# Patient Record
Sex: FEMALE | Race: WHITE | Employment: OTHER | URBAN - METROPOLITAN AREA
[De-identification: names, ages, dates, MRNs, and addresses within clinical notes are randomized per-mention and may not be internally consistent; named-entity substitution may affect disease eponyms.]

---

## 2019-01-16 ENCOUNTER — TRANSFERRED RECORDS (OUTPATIENT)
Dept: HEALTH INFORMATION MANAGEMENT | Facility: CLINIC | Age: 84
End: 2019-01-16

## 2019-01-16 LAB — EJECTION FRACTION: NORMAL %

## 2019-04-08 ENCOUNTER — TRANSFERRED RECORDS (OUTPATIENT)
Dept: HEALTH INFORMATION MANAGEMENT | Facility: CLINIC | Age: 84
End: 2019-04-08

## 2020-07-16 ENCOUNTER — TRANSFERRED RECORDS (OUTPATIENT)
Dept: HEALTH INFORMATION MANAGEMENT | Facility: CLINIC | Age: 85
End: 2020-07-16

## 2021-04-14 ENCOUNTER — TRANSFERRED RECORDS (OUTPATIENT)
Dept: FAMILY MEDICINE | Facility: CLINIC | Age: 86
End: 2021-04-14

## 2021-06-15 ENCOUNTER — TRANSFERRED RECORDS (OUTPATIENT)
Dept: HEALTH INFORMATION MANAGEMENT | Facility: CLINIC | Age: 86
End: 2021-06-15

## 2021-07-12 ENCOUNTER — TRANSFERRED RECORDS (OUTPATIENT)
Dept: HEALTH INFORMATION MANAGEMENT | Facility: CLINIC | Age: 86
End: 2021-07-12

## 2021-08-30 LAB
ALT SERPL-CCNC: 41 U/L (ref 14–63)
AST SERPL-CCNC: 46 U/L (ref 15–37)
CREATININE (EXTERNAL): 2.84 MG/DL (ref 0.55–1.3)
GFR ESTIMATED (EXTERNAL): 16 ML/MIN
GLUCOSE (EXTERNAL): 94 MG/DL (ref 74–106)
POTASSIUM (EXTERNAL): 4.4 MMOL/L (ref 3.5–5.1)

## 2021-09-27 ENCOUNTER — TRANSFERRED RECORDS (OUTPATIENT)
Dept: HEALTH INFORMATION MANAGEMENT | Facility: CLINIC | Age: 86
End: 2021-09-27

## 2022-07-21 ENCOUNTER — TRANSFERRED RECORDS (OUTPATIENT)
Dept: HEALTH INFORMATION MANAGEMENT | Facility: CLINIC | Age: 87
End: 2022-07-21

## 2022-07-21 LAB
CHOLESTEROL (EXTERNAL): 203 MG/DL (ref 0–200)
HDLC SERPL-MCNC: 70 MG/DL (ref 40–60)
LDL CHOLESTEROL CALCULATED (EXTERNAL): 104 MG/DL (ref 0–129)
TRIGLYCERIDES (EXTERNAL): 145 MG/DL (ref 30–150)

## 2022-08-22 ENCOUNTER — TRANSFERRED RECORDS (OUTPATIENT)
Dept: HEALTH INFORMATION MANAGEMENT | Facility: CLINIC | Age: 87
End: 2022-08-22

## 2022-08-29 ENCOUNTER — TRANSFERRED RECORDS (OUTPATIENT)
Dept: HEALTH INFORMATION MANAGEMENT | Facility: CLINIC | Age: 87
End: 2022-08-29

## 2022-10-11 ENCOUNTER — TRANSFERRED RECORDS (OUTPATIENT)
Dept: HEALTH INFORMATION MANAGEMENT | Facility: CLINIC | Age: 87
End: 2022-10-11

## 2023-01-01 ENCOUNTER — ANCILLARY PROCEDURE (OUTPATIENT)
Dept: CARDIOLOGY | Facility: CLINIC | Age: 88
End: 2023-01-01
Attending: INTERNAL MEDICINE
Payer: MEDICARE

## 2023-01-01 ENCOUNTER — VIRTUAL VISIT (OUTPATIENT)
Dept: FAMILY MEDICINE | Facility: CLINIC | Age: 88
End: 2023-01-01
Payer: MEDICARE

## 2023-01-01 ENCOUNTER — APPOINTMENT (OUTPATIENT)
Dept: PHYSICAL THERAPY | Facility: CLINIC | Age: 88
DRG: 280 | End: 2023-01-01
Payer: MEDICARE

## 2023-01-01 ENCOUNTER — MEDICAL CORRESPONDENCE (OUTPATIENT)
Dept: HEALTH INFORMATION MANAGEMENT | Facility: CLINIC | Age: 88
End: 2023-01-01
Payer: MEDICARE

## 2023-01-01 ENCOUNTER — TELEPHONE (OUTPATIENT)
Dept: FAMILY MEDICINE | Facility: CLINIC | Age: 88
End: 2023-01-01
Payer: MEDICARE

## 2023-01-01 ENCOUNTER — MYC MEDICAL ADVICE (OUTPATIENT)
Dept: FAMILY MEDICINE | Facility: CLINIC | Age: 88
End: 2023-01-01
Payer: MEDICARE

## 2023-01-01 ENCOUNTER — DOCUMENTATION ONLY (OUTPATIENT)
Dept: CARE COORDINATION | Facility: CLINIC | Age: 88
End: 2023-01-01
Payer: MEDICARE

## 2023-01-01 ENCOUNTER — ANCILLARY PROCEDURE (OUTPATIENT)
Dept: GENERAL RADIOLOGY | Facility: CLINIC | Age: 88
End: 2023-01-01
Attending: PHYSICIAN ASSISTANT
Payer: MEDICARE

## 2023-01-01 ENCOUNTER — HOSPITAL ENCOUNTER (INPATIENT)
Facility: CLINIC | Age: 88
LOS: 5 days | Discharge: HOME HEALTH | DRG: 280 | End: 2023-12-13
Attending: EMERGENCY MEDICINE | Admitting: HOSPITALIST
Payer: MEDICARE

## 2023-01-01 ENCOUNTER — MYC MEDICAL ADVICE (OUTPATIENT)
Dept: NEPHROLOGY | Facility: CLINIC | Age: 88
End: 2023-01-01
Payer: MEDICARE

## 2023-01-01 ENCOUNTER — ANCILLARY PROCEDURE (OUTPATIENT)
Dept: GENERAL RADIOLOGY | Facility: CLINIC | Age: 88
End: 2023-01-01
Attending: STUDENT IN AN ORGANIZED HEALTH CARE EDUCATION/TRAINING PROGRAM
Payer: MEDICARE

## 2023-01-01 ENCOUNTER — APPOINTMENT (OUTPATIENT)
Dept: GENERAL RADIOLOGY | Facility: CLINIC | Age: 88
DRG: 280 | End: 2023-01-01
Attending: HOSPITALIST
Payer: MEDICARE

## 2023-01-01 ENCOUNTER — PATIENT OUTREACH (OUTPATIENT)
Dept: CARE COORDINATION | Facility: CLINIC | Age: 88
End: 2023-01-01
Payer: MEDICARE

## 2023-01-01 ENCOUNTER — OFFICE VISIT (OUTPATIENT)
Dept: ORTHOPEDICS | Facility: CLINIC | Age: 88
End: 2023-01-01
Payer: MEDICARE

## 2023-01-01 ENCOUNTER — PATIENT OUTREACH (OUTPATIENT)
Dept: FAMILY MEDICINE | Facility: CLINIC | Age: 88
End: 2023-01-01
Payer: MEDICARE

## 2023-01-01 ENCOUNTER — APPOINTMENT (OUTPATIENT)
Dept: PHYSICAL THERAPY | Facility: CLINIC | Age: 88
DRG: 280 | End: 2023-01-01
Attending: HOSPITALIST
Payer: MEDICARE

## 2023-01-01 ENCOUNTER — HOSPITAL ENCOUNTER (OUTPATIENT)
Dept: CT IMAGING | Facility: CLINIC | Age: 88
Discharge: HOME | DRG: 280 | End: 2023-12-06
Attending: PHYSICIAN ASSISTANT | Admitting: PHYSICIAN ASSISTANT
Payer: MEDICARE

## 2023-01-01 ENCOUNTER — DOCUMENTATION ONLY (OUTPATIENT)
Dept: OTHER | Facility: CLINIC | Age: 88
End: 2023-01-01
Payer: MEDICARE

## 2023-01-01 ENCOUNTER — TELEPHONE (OUTPATIENT)
Dept: NEPHROLOGY | Facility: CLINIC | Age: 88
End: 2023-01-01
Payer: MEDICARE

## 2023-01-01 ENCOUNTER — MEDICAL CORRESPONDENCE (OUTPATIENT)
Dept: HEALTH INFORMATION MANAGEMENT | Facility: CLINIC | Age: 88
End: 2023-01-01

## 2023-01-01 ENCOUNTER — MYC MEDICAL ADVICE (OUTPATIENT)
Dept: PULMONOLOGY | Facility: CLINIC | Age: 88
End: 2023-01-01
Payer: MEDICARE

## 2023-01-01 ENCOUNTER — OFFICE VISIT (OUTPATIENT)
Dept: FAMILY MEDICINE | Facility: CLINIC | Age: 88
End: 2023-01-01
Payer: MEDICARE

## 2023-01-01 ENCOUNTER — TELEPHONE (OUTPATIENT)
Dept: FAMILY MEDICINE | Facility: CLINIC | Age: 88
End: 2023-01-01

## 2023-01-01 ENCOUNTER — TELEPHONE (OUTPATIENT)
Dept: CARDIOLOGY | Facility: CLINIC | Age: 88
End: 2023-01-01

## 2023-01-01 ENCOUNTER — ALLIED HEALTH/NURSE VISIT (OUTPATIENT)
Dept: PULMONOLOGY | Facility: CLINIC | Age: 88
End: 2023-01-01
Payer: MEDICARE

## 2023-01-01 ENCOUNTER — PATIENT OUTREACH (OUTPATIENT)
Dept: CARE COORDINATION | Facility: CLINIC | Age: 88
End: 2023-01-01

## 2023-01-01 ENCOUNTER — ANCILLARY PROCEDURE (OUTPATIENT)
Dept: GENERAL RADIOLOGY | Facility: CLINIC | Age: 88
End: 2023-01-01
Attending: INTERNAL MEDICINE
Payer: MEDICARE

## 2023-01-01 ENCOUNTER — APPOINTMENT (OUTPATIENT)
Dept: GENERAL RADIOLOGY | Facility: CLINIC | Age: 88
DRG: 280 | End: 2023-01-01
Attending: EMERGENCY MEDICINE
Payer: MEDICARE

## 2023-01-01 ENCOUNTER — OFFICE VISIT (OUTPATIENT)
Dept: PULMONOLOGY | Facility: CLINIC | Age: 88
End: 2023-01-01
Attending: FAMILY MEDICINE
Payer: MEDICARE

## 2023-01-01 ENCOUNTER — TELEPHONE (OUTPATIENT)
Dept: CARE COORDINATION | Facility: CLINIC | Age: 88
End: 2023-01-01
Payer: MEDICARE

## 2023-01-01 ENCOUNTER — OFFICE VISIT (OUTPATIENT)
Dept: INTERNAL MEDICINE | Facility: CLINIC | Age: 88
End: 2023-01-01
Payer: MEDICARE

## 2023-01-01 ENCOUNTER — APPOINTMENT (OUTPATIENT)
Dept: ULTRASOUND IMAGING | Facility: CLINIC | Age: 88
DRG: 280 | End: 2023-01-01
Attending: HOSPITALIST
Payer: MEDICARE

## 2023-01-01 ENCOUNTER — APPOINTMENT (OUTPATIENT)
Dept: CARDIOLOGY | Facility: CLINIC | Age: 88
DRG: 280 | End: 2023-01-01
Payer: MEDICARE

## 2023-01-01 VITALS
HEART RATE: 60 BPM | TEMPERATURE: 97.5 F | DIASTOLIC BLOOD PRESSURE: 70 MMHG | SYSTOLIC BLOOD PRESSURE: 140 MMHG | HEIGHT: 57 IN | BODY MASS INDEX: 36.63 KG/M2 | OXYGEN SATURATION: 91 % | WEIGHT: 169.8 LBS | RESPIRATION RATE: 16 BRPM

## 2023-01-01 VITALS
BODY MASS INDEX: 33.53 KG/M2 | RESPIRATION RATE: 18 BRPM | DIASTOLIC BLOOD PRESSURE: 79 MMHG | OXYGEN SATURATION: 94 % | WEIGHT: 166 LBS | HEART RATE: 63 BPM | SYSTOLIC BLOOD PRESSURE: 166 MMHG

## 2023-01-01 VITALS
DIASTOLIC BLOOD PRESSURE: 55 MMHG | WEIGHT: 166 LBS | HEART RATE: 63 BPM | OXYGEN SATURATION: 96 % | RESPIRATION RATE: 16 BRPM | BODY MASS INDEX: 33.53 KG/M2 | TEMPERATURE: 97.6 F | SYSTOLIC BLOOD PRESSURE: 128 MMHG

## 2023-01-01 VITALS
TEMPERATURE: 97.8 F | RESPIRATION RATE: 18 BRPM | HEIGHT: 59 IN | SYSTOLIC BLOOD PRESSURE: 188 MMHG | BODY MASS INDEX: 33.69 KG/M2 | DIASTOLIC BLOOD PRESSURE: 67 MMHG | OXYGEN SATURATION: 100 % | HEART RATE: 62 BPM | WEIGHT: 167.1 LBS

## 2023-01-01 VITALS
WEIGHT: 163.8 LBS | HEIGHT: 57 IN | TEMPERATURE: 97.3 F | BODY MASS INDEX: 35.34 KG/M2 | OXYGEN SATURATION: 97 % | HEART RATE: 60 BPM | SYSTOLIC BLOOD PRESSURE: 109 MMHG | RESPIRATION RATE: 10 BRPM | DIASTOLIC BLOOD PRESSURE: 56 MMHG

## 2023-01-01 VITALS
WEIGHT: 163.2 LBS | SYSTOLIC BLOOD PRESSURE: 184 MMHG | RESPIRATION RATE: 16 BRPM | DIASTOLIC BLOOD PRESSURE: 65 MMHG | HEART RATE: 62 BPM | OXYGEN SATURATION: 93 % | TEMPERATURE: 97.9 F | BODY MASS INDEX: 35.94 KG/M2

## 2023-01-01 VITALS — BODY MASS INDEX: 33.47 KG/M2 | HEIGHT: 59 IN | WEIGHT: 166 LBS

## 2023-01-01 DIAGNOSIS — I50.30 HEART FAILURE WITH PRESERVED EJECTION FRACTION, NYHA CLASS I (H): Primary | ICD-10-CM

## 2023-01-01 DIAGNOSIS — D63.1 ANEMIA DUE TO STAGE 4 CHRONIC KIDNEY DISEASE (H): ICD-10-CM

## 2023-01-01 DIAGNOSIS — G89.29 CHRONIC RIGHT-SIDED LOW BACK PAIN WITH RIGHT-SIDED SCIATICA: Primary | ICD-10-CM

## 2023-01-01 DIAGNOSIS — M25.551 HIP PAIN, RIGHT: Primary | ICD-10-CM

## 2023-01-01 DIAGNOSIS — J43.8 OTHER EMPHYSEMA (H): Primary | ICD-10-CM

## 2023-01-01 DIAGNOSIS — J18.9 PNEUMONIA OF RIGHT LOWER LOBE DUE TO INFECTIOUS ORGANISM: Primary | ICD-10-CM

## 2023-01-01 DIAGNOSIS — J43.8 OTHER EMPHYSEMA (H): ICD-10-CM

## 2023-01-01 DIAGNOSIS — R91.8 PULMONARY NODULES: Primary | ICD-10-CM

## 2023-01-01 DIAGNOSIS — Z95.0 PACEMAKER: ICD-10-CM

## 2023-01-01 DIAGNOSIS — J96.01 ACUTE RESPIRATORY FAILURE WITH HYPOXIA (H): Primary | ICD-10-CM

## 2023-01-01 DIAGNOSIS — Z53.9 DIAGNOSIS NOT YET DEFINED: Primary | ICD-10-CM

## 2023-01-01 DIAGNOSIS — I65.23 BILATERAL CAROTID ARTERY STENOSIS: ICD-10-CM

## 2023-01-01 DIAGNOSIS — I10 BENIGN ESSENTIAL HYPERTENSION: ICD-10-CM

## 2023-01-01 DIAGNOSIS — I50.33 ACUTE ON CHRONIC DIASTOLIC CONGESTIVE HEART FAILURE (H): Primary | ICD-10-CM

## 2023-01-01 DIAGNOSIS — N18.4 ANEMIA DUE TO STAGE 4 CHRONIC KIDNEY DISEASE (H): ICD-10-CM

## 2023-01-01 DIAGNOSIS — N18.4 CKD (CHRONIC KIDNEY DISEASE) STAGE 4, GFR 15-29 ML/MIN (H): Primary | ICD-10-CM

## 2023-01-01 DIAGNOSIS — J42 CHRONIC BRONCHITIS, UNSPECIFIED CHRONIC BRONCHITIS TYPE (H): ICD-10-CM

## 2023-01-01 DIAGNOSIS — F33.42 RECURRENT MAJOR DEPRESSIVE DISORDER, IN FULL REMISSION (H): ICD-10-CM

## 2023-01-01 DIAGNOSIS — M41.9 KYPHOSCOLIOSIS: ICD-10-CM

## 2023-01-01 DIAGNOSIS — J44.9 CHRONIC OBSTRUCTIVE PULMONARY DISEASE, UNSPECIFIED COPD TYPE (H): Primary | ICD-10-CM

## 2023-01-01 DIAGNOSIS — M54.16 LUMBAR RADICULOPATHY, ACUTE: ICD-10-CM

## 2023-01-01 DIAGNOSIS — J96.01 ACUTE RESPIRATORY FAILURE WITH HYPOXIA (H): ICD-10-CM

## 2023-01-01 DIAGNOSIS — Z00.00 ENCOUNTER FOR MEDICARE ANNUAL WELLNESS EXAM: Primary | ICD-10-CM

## 2023-01-01 DIAGNOSIS — B37.2 CANDIDAL INTERTRIGO: ICD-10-CM

## 2023-01-01 DIAGNOSIS — J18.9 PNEUMONIA OF RIGHT LOWER LOBE DUE TO INFECTIOUS ORGANISM: ICD-10-CM

## 2023-01-01 DIAGNOSIS — F41.1 GENERALIZED ANXIETY DISORDER: ICD-10-CM

## 2023-01-01 DIAGNOSIS — I50.30 HEART FAILURE WITH PRESERVED EJECTION FRACTION, NYHA CLASS I (H): ICD-10-CM

## 2023-01-01 DIAGNOSIS — I50.33 ACUTE ON CHRONIC DIASTOLIC CONGESTIVE HEART FAILURE (H): ICD-10-CM

## 2023-01-01 DIAGNOSIS — I50.32 CHRONIC HEART FAILURE WITH PRESERVED EJECTION FRACTION (H): ICD-10-CM

## 2023-01-01 DIAGNOSIS — M54.41 CHRONIC RIGHT-SIDED LOW BACK PAIN WITH RIGHT-SIDED SCIATICA: Primary | ICD-10-CM

## 2023-01-01 DIAGNOSIS — N18.4 CKD (CHRONIC KIDNEY DISEASE) STAGE 4, GFR 15-29 ML/MIN (H): ICD-10-CM

## 2023-01-01 DIAGNOSIS — M54.16 LUMBAR RADICULOPATHY, ACUTE: Primary | ICD-10-CM

## 2023-01-01 DIAGNOSIS — B37.2 CANDIDAL INTERTRIGO: Primary | ICD-10-CM

## 2023-01-01 DIAGNOSIS — M25.551 HIP PAIN, RIGHT: ICD-10-CM

## 2023-01-01 DIAGNOSIS — J44.89 CHRONIC BRONCHITIS WITH COPD (CHRONIC OBSTRUCTIVE PULMONARY DISEASE) (H): Primary | ICD-10-CM

## 2023-01-01 DIAGNOSIS — Z78.9 POLST (PHYSICIAN ORDERS FOR LIFE-SUSTAINING TREATMENT): ICD-10-CM

## 2023-01-01 DIAGNOSIS — E78.5 HYPERLIPIDEMIA LDL GOAL <100: ICD-10-CM

## 2023-01-01 DIAGNOSIS — I49.5 SICK SINUS SYNDROME (H): ICD-10-CM

## 2023-01-01 DIAGNOSIS — M54.41 ACUTE RIGHT-SIDED LOW BACK PAIN WITH RIGHT-SIDED SCIATICA: ICD-10-CM

## 2023-01-01 LAB
ALBUMIN MFR UR ELPH: 69.9 MG/DL
ALBUMIN SERPL BCG-MCNC: 3.8 G/DL (ref 3.5–5.2)
ALBUMIN SERPL BCG-MCNC: 4 G/DL (ref 3.5–5.2)
ALBUMIN UR-MCNC: 100 MG/DL
ALBUMIN UR-MCNC: 50 MG/DL
ALP SERPL-CCNC: 77 U/L (ref 40–150)
ALP SERPL-CCNC: 81 U/L (ref 40–150)
ALT SERPL W P-5'-P-CCNC: 14 U/L (ref 0–50)
ALT SERPL W P-5'-P-CCNC: 16 U/L (ref 0–50)
ANION GAP SERPL CALCULATED.3IONS-SCNC: 10 MMOL/L (ref 7–15)
ANION GAP SERPL CALCULATED.3IONS-SCNC: 12 MMOL/L (ref 7–15)
ANION GAP SERPL CALCULATED.3IONS-SCNC: 13 MMOL/L (ref 7–15)
ANION GAP SERPL CALCULATED.3IONS-SCNC: 14 MMOL/L (ref 7–15)
ANION GAP SERPL CALCULATED.3IONS-SCNC: 15 MMOL/L (ref 7–15)
ANION GAP SERPL CALCULATED.3IONS-SCNC: 9 MMOL/L (ref 7–15)
APPEARANCE UR: ABNORMAL
APPEARANCE UR: CLEAR
AST SERPL W P-5'-P-CCNC: 24 U/L (ref 0–45)
AST SERPL W P-5'-P-CCNC: 25 U/L (ref 0–45)
ATRIAL RATE - MUSE: 60 BPM
BACTERIA #/AREA URNS HPF: ABNORMAL /HPF
BACTERIA #/AREA URNS HPF: ABNORMAL /HPF
BACTERIA BLD CULT: NO GROWTH
BACTERIA BLD CULT: NO GROWTH
BACTERIA SPT CULT: ABNORMAL
BASOPHILS # BLD AUTO: 0 10E3/UL (ref 0–0.2)
BASOPHILS # BLD AUTO: 0 10E3/UL (ref 0–0.2)
BASOPHILS # BLD AUTO: 0.1 10E3/UL (ref 0–0.2)
BASOPHILS NFR BLD AUTO: 0 %
BASOPHILS NFR BLD AUTO: 1 %
BASOPHILS NFR BLD AUTO: 1 %
BILIRUB SERPL-MCNC: 0.3 MG/DL
BILIRUB SERPL-MCNC: 0.3 MG/DL
BILIRUB UR QL STRIP: NEGATIVE
BILIRUB UR QL STRIP: NEGATIVE
BUN SERPL-MCNC: 62.8 MG/DL (ref 8–23)
BUN SERPL-MCNC: 63.6 MG/DL (ref 8–23)
BUN SERPL-MCNC: 66.1 MG/DL (ref 8–23)
BUN SERPL-MCNC: 77.3 MG/DL (ref 8–23)
BUN SERPL-MCNC: 82.2 MG/DL (ref 8–23)
BUN SERPL-MCNC: 93.7 MG/DL (ref 8–23)
BUN SERPL-MCNC: 96.6 MG/DL (ref 8–23)
BUN SERPL-MCNC: 97.1 MG/DL (ref 8–23)
CALCIUM SERPL-MCNC: 8.3 MG/DL (ref 8.8–10.2)
CALCIUM SERPL-MCNC: 8.4 MG/DL (ref 8.8–10.2)
CALCIUM SERPL-MCNC: 8.6 MG/DL (ref 8.8–10.2)
CALCIUM SERPL-MCNC: 8.7 MG/DL (ref 8.8–10.2)
CALCIUM SERPL-MCNC: 9.1 MG/DL (ref 8.8–10.2)
CALCIUM SERPL-MCNC: 9.4 MG/DL (ref 8.8–10.2)
CHLORIDE SERPL-SCNC: 102 MMOL/L (ref 98–107)
CHLORIDE SERPL-SCNC: 103 MMOL/L (ref 98–107)
CHLORIDE SERPL-SCNC: 105 MMOL/L (ref 98–107)
CHLORIDE SERPL-SCNC: 105 MMOL/L (ref 98–107)
CHLORIDE SERPL-SCNC: 106 MMOL/L (ref 98–107)
CHLORIDE SERPL-SCNC: 109 MMOL/L (ref 98–107)
CHLORIDE SERPL-SCNC: 110 MMOL/L (ref 98–107)
CHLORIDE SERPL-SCNC: 98 MMOL/L (ref 98–107)
COLOR UR AUTO: ABNORMAL
COLOR UR AUTO: YELLOW
CREAT SERPL-MCNC: 2.06 MG/DL (ref 0.51–0.95)
CREAT SERPL-MCNC: 2.14 MG/DL (ref 0.51–0.95)
CREAT SERPL-MCNC: 2.15 MG/DL (ref 0.51–0.95)
CREAT SERPL-MCNC: 2.18 MG/DL (ref 0.51–0.95)
CREAT SERPL-MCNC: 2.19 MG/DL (ref 0.51–0.95)
CREAT SERPL-MCNC: 2.32 MG/DL (ref 0.51–0.95)
CREAT SERPL-MCNC: 2.49 MG/DL (ref 0.51–0.95)
CREAT SERPL-MCNC: 2.7 MG/DL (ref 0.51–0.95)
CREAT UR-MCNC: 48.2 MG/DL
CREAT UR-MCNC: 48.2 MG/DL
DEPRECATED HCO3 PLAS-SCNC: 21 MMOL/L (ref 22–29)
DEPRECATED HCO3 PLAS-SCNC: 22 MMOL/L (ref 22–29)
DEPRECATED HCO3 PLAS-SCNC: 22 MMOL/L (ref 22–29)
DEPRECATED HCO3 PLAS-SCNC: 23 MMOL/L (ref 22–29)
DEPRECATED HCO3 PLAS-SCNC: 23 MMOL/L (ref 22–29)
DEPRECATED HCO3 PLAS-SCNC: 25 MMOL/L (ref 22–29)
DEPRECATED HCO3 PLAS-SCNC: 28 MMOL/L (ref 22–29)
DEPRECATED HCO3 PLAS-SCNC: 28 MMOL/L (ref 22–29)
DIASTOLIC BLOOD PRESSURE - MUSE: NORMAL MMHG
DLCOUNC-%PRED-PRE: 49 %
DLCOUNC-PRE: 7.8 ML/MIN/MMHG
DLCOUNC-PRED: 15.74 ML/MIN/MMHG
EGFRCR SERPLBLD CKD-EPI 2021: 17 ML/MIN/1.73M2
EGFRCR SERPLBLD CKD-EPI 2021: 18 ML/MIN/1.73M2
EGFRCR SERPLBLD CKD-EPI 2021: 20 ML/MIN/1.73M2
EGFRCR SERPLBLD CKD-EPI 2021: 21 ML/MIN/1.73M2
EGFRCR SERPLBLD CKD-EPI 2021: 21 ML/MIN/1.73M2
EGFRCR SERPLBLD CKD-EPI 2021: 22 ML/MIN/1.73M2
EGFRCR SERPLBLD CKD-EPI 2021: 22 ML/MIN/1.73M2
EGFRCR SERPLBLD CKD-EPI 2021: 23 ML/MIN/1.73M2
EOSINOPHIL # BLD AUTO: 0 10E3/UL (ref 0–0.7)
EOSINOPHIL # BLD AUTO: 0.2 10E3/UL (ref 0–0.7)
EOSINOPHIL # BLD AUTO: 0.3 10E3/UL (ref 0–0.7)
EOSINOPHIL NFR BLD AUTO: 0 %
EOSINOPHIL NFR BLD AUTO: 3 %
EOSINOPHIL NFR BLD AUTO: 5 %
ERV-%PRED-PRE: 39 %
ERV-PRE: 0.23 L
ERV-PRED: 0.58 L
ERYTHROCYTE [DISTWIDTH] IN BLOOD BY AUTOMATED COUNT: 12.3 % (ref 10–15)
ERYTHROCYTE [DISTWIDTH] IN BLOOD BY AUTOMATED COUNT: 12.7 % (ref 10–15)
ERYTHROCYTE [DISTWIDTH] IN BLOOD BY AUTOMATED COUNT: 12.8 % (ref 10–15)
ERYTHROCYTE [DISTWIDTH] IN BLOOD BY AUTOMATED COUNT: 13 % (ref 10–15)
ERYTHROCYTE [DISTWIDTH] IN BLOOD BY AUTOMATED COUNT: 13 % (ref 10–15)
EXPTIME-PRE: 6.27 SEC
FEF2575-%PRED-PRE: 34 %
FEF2575-PRE: 0.41 L/SEC
FEF2575-PRED: 1.18 L/SEC
FEFMAX-%PRED-PRE: 70 %
FEFMAX-PRE: 2.43 L/SEC
FEFMAX-PRED: 3.44 L/SEC
FEV1-%PRED-PRE: 61 %
FEV1-PRE: 0.86 L
FEV1FEV6-PRE: 63 %
FEV1FEV6-PRED: 77 %
FEV1FVC-PRE: 63 %
FEV1FVC-PRED: 78 %
FEV1SVC-PRE: 62 %
FEV1SVC-PRED: 60 %
FIFMAX-PRE: 2.42 L/SEC
FLUAV RNA SPEC QL NAA+PROBE: NEGATIVE
FLUBV RNA RESP QL NAA+PROBE: NEGATIVE
FRCN2-%PRED-PRE: 92 %
FRCN2-PRE: 2.26 L
FRCN2-PRED: 2.44 L
FVC-%PRED-PRE: 74 %
FVC-PRE: 1.36 L
FVC-PRED: 1.82 L
GLUCOSE SERPL-MCNC: 114 MG/DL (ref 70–99)
GLUCOSE SERPL-MCNC: 118 MG/DL (ref 70–99)
GLUCOSE SERPL-MCNC: 123 MG/DL (ref 70–99)
GLUCOSE SERPL-MCNC: 90 MG/DL (ref 70–99)
GLUCOSE SERPL-MCNC: 92 MG/DL (ref 70–99)
GLUCOSE SERPL-MCNC: 93 MG/DL (ref 70–99)
GLUCOSE SERPL-MCNC: 96 MG/DL (ref 70–99)
GLUCOSE SERPL-MCNC: 98 MG/DL (ref 70–99)
GLUCOSE UR STRIP-MCNC: NEGATIVE MG/DL
GLUCOSE UR STRIP-MCNC: NEGATIVE MG/DL
GRAM STAIN RESULT: ABNORMAL
HCT VFR BLD AUTO: 31.8 % (ref 35–47)
HCT VFR BLD AUTO: 33.5 % (ref 35–47)
HCT VFR BLD AUTO: 33.6 % (ref 35–47)
HCT VFR BLD AUTO: 33.7 % (ref 35–47)
HCT VFR BLD AUTO: 33.8 % (ref 35–47)
HGB BLD-MCNC: 10.1 G/DL (ref 11.7–15.7)
HGB BLD-MCNC: 10.6 G/DL (ref 11.7–15.7)
HGB BLD-MCNC: 10.7 G/DL (ref 11.7–15.7)
HGB BLD-MCNC: 10.7 G/DL (ref 11.7–15.7)
HGB BLD-MCNC: 10.9 G/DL (ref 11.7–15.7)
HGB UR QL STRIP: ABNORMAL
HGB UR QL STRIP: NEGATIVE
HOLD SPECIMEN: NORMAL
HYALINE CASTS: 1 /LPF
IC-%PRED-PRE: 99 %
IC-PRE: 1.15 L
IC-PRED: 1.16 L
IMM GRANULOCYTES # BLD: 0 10E3/UL
IMM GRANULOCYTES NFR BLD: 0 %
IMM GRANULOCYTES NFR BLD: 1 %
IMM GRANULOCYTES NFR BLD: 1 %
INTERPRETATION ECG - MUSE: NORMAL
KETONES UR STRIP-MCNC: NEGATIVE MG/DL
KETONES UR STRIP-MCNC: NEGATIVE MG/DL
LACTATE SERPL-SCNC: 1.2 MMOL/L (ref 0.7–2)
LEUKOCYTE ESTERASE UR QL STRIP: ABNORMAL
LEUKOCYTE ESTERASE UR QL STRIP: ABNORMAL
LVEF ECHO: NORMAL
LYMPHOCYTES # BLD AUTO: 0.5 10E3/UL (ref 0.8–5.3)
LYMPHOCYTES # BLD AUTO: 0.6 10E3/UL (ref 0.8–5.3)
LYMPHOCYTES # BLD AUTO: 0.8 10E3/UL (ref 0.8–5.3)
LYMPHOCYTES NFR BLD AUTO: 11 %
LYMPHOCYTES NFR BLD AUTO: 12 %
LYMPHOCYTES NFR BLD AUTO: 12 %
MAGNESIUM SERPL-MCNC: 1.7 MG/DL (ref 1.7–2.3)
MAGNESIUM SERPL-MCNC: 1.8 MG/DL (ref 1.7–2.3)
MAGNESIUM SERPL-MCNC: 1.9 MG/DL (ref 1.7–2.3)
MAGNESIUM SERPL-MCNC: 2 MG/DL (ref 1.7–2.3)
MCH RBC QN AUTO: 32.9 PG (ref 26.5–33)
MCH RBC QN AUTO: 33 PG (ref 26.5–33)
MCH RBC QN AUTO: 33.1 PG (ref 26.5–33)
MCH RBC QN AUTO: 33.2 PG (ref 26.5–33)
MCH RBC QN AUTO: 33.4 PG (ref 26.5–33)
MCHC RBC AUTO-ENTMCNC: 31.6 G/DL (ref 31.5–36.5)
MCHC RBC AUTO-ENTMCNC: 31.7 G/DL (ref 31.5–36.5)
MCHC RBC AUTO-ENTMCNC: 31.8 G/DL (ref 31.5–36.5)
MCHC RBC AUTO-ENTMCNC: 31.8 G/DL (ref 31.5–36.5)
MCHC RBC AUTO-ENTMCNC: 32.4 G/DL (ref 31.5–36.5)
MCV RBC AUTO: 103 FL (ref 78–100)
MCV RBC AUTO: 104 FL (ref 78–100)
MCV RBC AUTO: 104 FL (ref 78–100)
MCV RBC AUTO: 105 FL (ref 78–100)
MCV RBC AUTO: 105 FL (ref 78–100)
MDC_IDC_LEAD_IMPLANT_DT: NORMAL
MDC_IDC_LEAD_IMPLANT_DT: NORMAL
MDC_IDC_LEAD_LOCATION: NORMAL
MDC_IDC_LEAD_LOCATION: NORMAL
MDC_IDC_LEAD_MFG: NORMAL
MDC_IDC_LEAD_MFG: NORMAL
MDC_IDC_LEAD_MODEL: NORMAL
MDC_IDC_LEAD_MODEL: NORMAL
MDC_IDC_LEAD_POLARITY_TYPE: NORMAL
MDC_IDC_LEAD_POLARITY_TYPE: NORMAL
MDC_IDC_PG_IMPLANT_DTM: NORMAL
MDC_IDC_PG_MFG: NORMAL
MDC_IDC_PG_MODEL: NORMAL
MDC_IDC_PG_SERIAL: NORMAL
MDC_IDC_PG_TYPE: NORMAL
MDC_IDC_SESS_CLINIC_NAME: NORMAL
MDC_IDC_SESS_DTM: NORMAL
MDC_IDC_SESS_TYPE: NORMAL
MICROALBUMIN UR-MCNC: 427 MG/L
MICROALBUMIN/CREAT UR: 885.89 MG/G CR (ref 0–25)
MONOCYTES # BLD AUTO: 0.4 10E3/UL (ref 0–1.3)
MONOCYTES # BLD AUTO: 0.5 10E3/UL (ref 0–1.3)
MONOCYTES # BLD AUTO: 0.6 10E3/UL (ref 0–1.3)
MONOCYTES NFR BLD AUTO: 10 %
MONOCYTES NFR BLD AUTO: 9 %
MONOCYTES NFR BLD AUTO: 9 %
NEUTROPHILS # BLD AUTO: 3.2 10E3/UL (ref 1.6–8.3)
NEUTROPHILS # BLD AUTO: 3.7 10E3/UL (ref 1.6–8.3)
NEUTROPHILS # BLD AUTO: 5 10E3/UL (ref 1.6–8.3)
NEUTROPHILS NFR BLD AUTO: 73 %
NEUTROPHILS NFR BLD AUTO: 73 %
NEUTROPHILS NFR BLD AUTO: 78 %
NITRATE UR QL: NEGATIVE
NITRATE UR QL: NEGATIVE
NRBC # BLD AUTO: 0 10E3/UL
NRBC # BLD AUTO: 0 10E3/UL
NRBC BLD AUTO-RTO: 0 /100
NRBC BLD AUTO-RTO: 0 /100
NT-PROBNP SERPL-MCNC: 5097 PG/ML (ref 0–1800)
P AXIS - MUSE: 8 DEGREES
PH UR STRIP: 5 [PH] (ref 5–7)
PH UR STRIP: 5 [PH] (ref 5–7)
PHOSPHATE SERPL-MCNC: 4.1 MG/DL (ref 2.5–4.5)
PHOSPHATE SERPL-MCNC: 4.6 MG/DL (ref 2.5–4.5)
PLATELET # BLD AUTO: 154 10E3/UL (ref 150–450)
PLATELET # BLD AUTO: 155 10E3/UL (ref 150–450)
PLATELET # BLD AUTO: 156 10E3/UL (ref 150–450)
PLATELET # BLD AUTO: 157 10E3/UL (ref 150–450)
PLATELET # BLD AUTO: 164 10E3/UL (ref 150–450)
PLATELET # BLD AUTO: 178 10E3/UL (ref 150–450)
POTASSIUM SERPL-SCNC: 4.5 MMOL/L (ref 3.4–5.3)
POTASSIUM SERPL-SCNC: 4.6 MMOL/L (ref 3.4–5.3)
POTASSIUM SERPL-SCNC: 4.7 MMOL/L (ref 3.4–5.3)
POTASSIUM SERPL-SCNC: 4.7 MMOL/L (ref 3.4–5.3)
POTASSIUM SERPL-SCNC: 4.9 MMOL/L (ref 3.4–5.3)
POTASSIUM SERPL-SCNC: 4.9 MMOL/L (ref 3.4–5.3)
POTASSIUM SERPL-SCNC: 5.4 MMOL/L (ref 3.4–5.3)
PR INTERVAL - MUSE: 120 MS
PROT SERPL-MCNC: 6.3 G/DL (ref 6.4–8.3)
PROT SERPL-MCNC: 6.4 G/DL (ref 6.4–8.3)
PROT/CREAT 24H UR: 1.45 MG/MG CR (ref 0–0.2)
PTH-INTACT SERPL-MCNC: 159 PG/ML (ref 15–65)
QRS DURATION - MUSE: 118 MS
QT - MUSE: 430 MS
QTC - MUSE: 430 MS
R AXIS - MUSE: -43 DEGREES
RBC # BLD AUTO: 3.07 10E6/UL (ref 3.8–5.2)
RBC # BLD AUTO: 3.2 10E6/UL (ref 3.8–5.2)
RBC # BLD AUTO: 3.22 10E6/UL (ref 3.8–5.2)
RBC # BLD AUTO: 3.24 10E6/UL (ref 3.8–5.2)
RBC # BLD AUTO: 3.26 10E6/UL (ref 3.8–5.2)
RBC #/AREA URNS AUTO: ABNORMAL /HPF
RBC URINE: 2 /HPF
RSV RNA SPEC NAA+PROBE: NEGATIVE
RVN2-%PRED-PRE: 97 %
RVN2-PRE: 2.03 L
RVN2-PRED: 2.09 L
SARS-COV-2 RNA RESP QL NAA+PROBE: NEGATIVE
SODIUM SERPL-SCNC: 138 MMOL/L (ref 135–145)
SODIUM SERPL-SCNC: 138 MMOL/L (ref 135–145)
SODIUM SERPL-SCNC: 139 MMOL/L (ref 135–145)
SODIUM SERPL-SCNC: 140 MMOL/L (ref 135–145)
SODIUM SERPL-SCNC: 140 MMOL/L (ref 135–145)
SODIUM SERPL-SCNC: 142 MMOL/L (ref 135–145)
SODIUM SERPL-SCNC: 145 MMOL/L (ref 135–145)
SODIUM SERPL-SCNC: 145 MMOL/L (ref 135–145)
SP GR UR STRIP: 1.01 (ref 1–1.03)
SP GR UR STRIP: 1.02 (ref 1–1.03)
SYSTOLIC BLOOD PRESSURE - MUSE: NORMAL MMHG
T AXIS - MUSE: 89 DEGREES
TLCN2-%PRED-PRE: 83 %
TLCN2-PRE: 3.41 L
TLCN2-PRED: 4.1 L
TROPONIN T SERPL HS-MCNC: 77 NG/L
TROPONIN T SERPL HS-MCNC: 77 NG/L
TROPONIN T SERPL HS-MCNC: 82 NG/L
TROPONIN T SERPL HS-MCNC: 83 NG/L
TROPONIN T SERPL HS-MCNC: 84 NG/L
TROPONIN T SERPL HS-MCNC: 88 NG/L
UROBILINOGEN UR STRIP-ACNC: 0.2 E.U./DL
UROBILINOGEN UR STRIP-MCNC: NORMAL MG/DL
VA-%PRED-PRE: 74 %
VA-PRE: 2.72 L
VC-%PRED-PRE: 58 %
VC-PRE: 1.38 L
VC-PRED: 2.34 L
VENTRICULAR RATE- MUSE: 60 BPM
WBC # BLD AUTO: 4 10E3/UL (ref 4–11)
WBC # BLD AUTO: 5.1 10E3/UL (ref 4–11)
WBC # BLD AUTO: 5.6 10E3/UL (ref 4–11)
WBC # BLD AUTO: 6.1 10E3/UL (ref 4–11)
WBC # BLD AUTO: 6.7 10E3/UL (ref 4–11)
WBC #/AREA URNS AUTO: >100 /HPF
WBC URINE: 3 /HPF

## 2023-01-01 PROCEDURE — 83735 ASSAY OF MAGNESIUM: CPT | Performed by: HOSPITALIST

## 2023-01-01 PROCEDURE — 87070 CULTURE OTHR SPECIMN AEROBIC: CPT

## 2023-01-01 PROCEDURE — 99207 PR APP CREDIT; MD BILLING SHARED VISIT: CPT

## 2023-01-01 PROCEDURE — 250N000013 HC RX MED GY IP 250 OP 250 PS 637: Performed by: INTERNAL MEDICINE

## 2023-01-01 PROCEDURE — 250N000012 HC RX MED GY IP 250 OP 636 PS 637

## 2023-01-01 PROCEDURE — 120N000001 HC R&B MED SURG/OB

## 2023-01-01 PROCEDURE — 36415 COLL VENOUS BLD VENIPUNCTURE: CPT | Performed by: HOSPITALIST

## 2023-01-01 PROCEDURE — 93971 EXTREMITY STUDY: CPT | Mod: RT

## 2023-01-01 PROCEDURE — 80048 BASIC METABOLIC PNL TOTAL CA: CPT | Performed by: PHYSICIAN ASSISTANT

## 2023-01-01 PROCEDURE — 84100 ASSAY OF PHOSPHORUS: CPT | Performed by: PHYSICIAN ASSISTANT

## 2023-01-01 PROCEDURE — 93306 TTE W/DOPPLER COMPLETE: CPT | Mod: 26 | Performed by: INTERNAL MEDICINE

## 2023-01-01 PROCEDURE — 83735 ASSAY OF MAGNESIUM: CPT | Performed by: INTERNAL MEDICINE

## 2023-01-01 PROCEDURE — 83970 ASSAY OF PARATHORMONE: CPT | Performed by: INTERNAL MEDICINE

## 2023-01-01 PROCEDURE — 250N000011 HC RX IP 250 OP 636: Mod: JZ | Performed by: HOSPITALIST

## 2023-01-01 PROCEDURE — 94640 AIRWAY INHALATION TREATMENT: CPT

## 2023-01-01 PROCEDURE — 81001 URINALYSIS AUTO W/SCOPE: CPT

## 2023-01-01 PROCEDURE — 250N000011 HC RX IP 250 OP 636: Performed by: INTERNAL MEDICINE

## 2023-01-01 PROCEDURE — 80048 BASIC METABOLIC PNL TOTAL CA: CPT | Performed by: HOSPITALIST

## 2023-01-01 PROCEDURE — 36415 COLL VENOUS BLD VENIPUNCTURE: CPT | Performed by: PHYSICIAN ASSISTANT

## 2023-01-01 PROCEDURE — 94375 RESPIRATORY FLOW VOLUME LOOP: CPT | Performed by: INTERNAL MEDICINE

## 2023-01-01 PROCEDURE — 250N000013 HC RX MED GY IP 250 OP 250 PS 637: Performed by: HOSPITALIST

## 2023-01-01 PROCEDURE — G0180 MD CERTIFICATION HHA PATIENT: HCPCS | Performed by: FAMILY MEDICINE

## 2023-01-01 PROCEDURE — 999N000157 HC STATISTIC RCP TIME EA 10 MIN

## 2023-01-01 PROCEDURE — 94640 AIRWAY INHALATION TREATMENT: CPT | Mod: 76

## 2023-01-01 PROCEDURE — 250N000011 HC RX IP 250 OP 636: Performed by: HOSPITALIST

## 2023-01-01 PROCEDURE — 93005 ELECTROCARDIOGRAM TRACING: CPT

## 2023-01-01 PROCEDURE — 99232 SBSQ HOSP IP/OBS MODERATE 35: CPT | Performed by: INTERNAL MEDICINE

## 2023-01-01 PROCEDURE — 85049 AUTOMATED PLATELET COUNT: CPT | Performed by: HOSPITALIST

## 2023-01-01 PROCEDURE — 97530 THERAPEUTIC ACTIVITIES: CPT | Mod: GP | Performed by: PHYSICAL THERAPIST

## 2023-01-01 PROCEDURE — 99233 SBSQ HOSP IP/OBS HIGH 50: CPT | Performed by: INTERNAL MEDICINE

## 2023-01-01 PROCEDURE — 99223 1ST HOSP IP/OBS HIGH 75: CPT | Mod: AI | Performed by: HOSPITALIST

## 2023-01-01 PROCEDURE — 85025 COMPLETE CBC W/AUTO DIFF WBC: CPT

## 2023-01-01 PROCEDURE — 72100 X-RAY EXAM L-S SPINE 2/3 VWS: CPT | Mod: TC | Performed by: RADIOLOGY

## 2023-01-01 PROCEDURE — 71045 X-RAY EXAM CHEST 1 VIEW: CPT

## 2023-01-01 PROCEDURE — 250N000011 HC RX IP 250 OP 636: Mod: JZ

## 2023-01-01 PROCEDURE — 250N000013 HC RX MED GY IP 250 OP 250 PS 637

## 2023-01-01 PROCEDURE — 99214 OFFICE O/P EST MOD 30 MIN: CPT | Performed by: PHYSICIAN ASSISTANT

## 2023-01-01 PROCEDURE — 85025 COMPLETE CBC W/AUTO DIFF WBC: CPT | Performed by: PHYSICIAN ASSISTANT

## 2023-01-01 PROCEDURE — 36415 COLL VENOUS BLD VENIPUNCTURE: CPT | Performed by: EMERGENCY MEDICINE

## 2023-01-01 PROCEDURE — G0439 PPPS, SUBSEQ VISIT: HCPCS | Performed by: FAMILY MEDICINE

## 2023-01-01 PROCEDURE — 250N000009 HC RX 250: Performed by: HOSPITALIST

## 2023-01-01 PROCEDURE — 82040 ASSAY OF SERUM ALBUMIN: CPT | Performed by: EMERGENCY MEDICINE

## 2023-01-01 PROCEDURE — 96374 THER/PROPH/DIAG INJ IV PUSH: CPT

## 2023-01-01 PROCEDURE — 250N000011 HC RX IP 250 OP 636: Mod: JZ | Performed by: INTERNAL MEDICINE

## 2023-01-01 PROCEDURE — 250N000012 HC RX MED GY IP 250 OP 636 PS 637: Performed by: INTERNAL MEDICINE

## 2023-01-01 PROCEDURE — 99214 OFFICE O/P EST MOD 30 MIN: CPT | Performed by: INTERNAL MEDICINE

## 2023-01-01 PROCEDURE — 87040 BLOOD CULTURE FOR BACTERIA: CPT | Performed by: EMERGENCY MEDICINE

## 2023-01-01 PROCEDURE — 36415 COLL VENOUS BLD VENIPUNCTURE: CPT | Performed by: INTERNAL MEDICINE

## 2023-01-01 PROCEDURE — 99215 OFFICE O/P EST HI 40 MIN: CPT | Mod: 95 | Performed by: FAMILY MEDICINE

## 2023-01-01 PROCEDURE — 83880 ASSAY OF NATRIURETIC PEPTIDE: CPT | Performed by: EMERGENCY MEDICINE

## 2023-01-01 PROCEDURE — 97161 PT EVAL LOW COMPLEX 20 MIN: CPT | Mod: GP | Performed by: PHYSICAL THERAPIST

## 2023-01-01 PROCEDURE — 999N000156 HC STATISTIC RCP CONSULT EA 30 MIN

## 2023-01-01 PROCEDURE — 85027 COMPLETE CBC AUTOMATED: CPT | Performed by: HOSPITALIST

## 2023-01-01 PROCEDURE — 80048 BASIC METABOLIC PNL TOTAL CA: CPT | Performed by: INTERNAL MEDICINE

## 2023-01-01 PROCEDURE — 87637 SARSCOV2&INF A&B&RSV AMP PRB: CPT | Performed by: EMERGENCY MEDICINE

## 2023-01-01 PROCEDURE — 82374 ASSAY BLOOD CARBON DIOXIDE: CPT | Performed by: INTERNAL MEDICINE

## 2023-01-01 PROCEDURE — 99233 SBSQ HOSP IP/OBS HIGH 50: CPT | Performed by: HOSPITALIST

## 2023-01-01 PROCEDURE — 84484 ASSAY OF TROPONIN QUANT: CPT | Performed by: EMERGENCY MEDICINE

## 2023-01-01 PROCEDURE — 255N000002 HC RX 255 OP 636: Performed by: HOSPITALIST

## 2023-01-01 PROCEDURE — 84100 ASSAY OF PHOSPHORUS: CPT | Performed by: INTERNAL MEDICINE

## 2023-01-01 PROCEDURE — 250N000009 HC RX 250

## 2023-01-01 PROCEDURE — 84132 ASSAY OF SERUM POTASSIUM: CPT | Performed by: INTERNAL MEDICINE

## 2023-01-01 PROCEDURE — 99214 OFFICE O/P EST MOD 30 MIN: CPT | Mod: 25 | Performed by: FAMILY MEDICINE

## 2023-01-01 PROCEDURE — 71250 CT THORAX DX C-: CPT | Mod: MG

## 2023-01-01 PROCEDURE — 94729 DIFFUSING CAPACITY: CPT | Performed by: INTERNAL MEDICINE

## 2023-01-01 PROCEDURE — 93280 PM DEVICE PROGR EVAL DUAL: CPT | Performed by: INTERNAL MEDICINE

## 2023-01-01 PROCEDURE — 99204 OFFICE O/P NEW MOD 45 MIN: CPT | Mod: 25 | Performed by: STUDENT IN AN ORGANIZED HEALTH CARE EDUCATION/TRAINING PROGRAM

## 2023-01-01 PROCEDURE — 84484 ASSAY OF TROPONIN QUANT: CPT | Performed by: HOSPITALIST

## 2023-01-01 PROCEDURE — 81001 URINALYSIS AUTO W/SCOPE: CPT | Performed by: PHYSICIAN ASSISTANT

## 2023-01-01 PROCEDURE — 99495 TRANSJ CARE MGMT MOD F2F 14D: CPT | Performed by: PHYSICIAN ASSISTANT

## 2023-01-01 PROCEDURE — 80053 COMPREHEN METABOLIC PANEL: CPT | Performed by: PHYSICIAN ASSISTANT

## 2023-01-01 PROCEDURE — 71046 X-RAY EXAM CHEST 2 VIEWS: CPT | Mod: TC | Performed by: RADIOLOGY

## 2023-01-01 PROCEDURE — 85025 COMPLETE CBC W/AUTO DIFF WBC: CPT | Performed by: EMERGENCY MEDICINE

## 2023-01-01 PROCEDURE — 99239 HOSP IP/OBS DSCHRG MGMT >30: CPT | Performed by: INTERNAL MEDICINE

## 2023-01-01 PROCEDURE — 93294 REM INTERROG EVL PM/LDLS PM: CPT | Performed by: INTERNAL MEDICINE

## 2023-01-01 PROCEDURE — 99204 OFFICE O/P NEW MOD 45 MIN: CPT | Performed by: STUDENT IN AN ORGANIZED HEALTH CARE EDUCATION/TRAINING PROGRAM

## 2023-01-01 PROCEDURE — 82374 ASSAY BLOOD CARBON DIOXIDE: CPT | Performed by: EMERGENCY MEDICINE

## 2023-01-01 PROCEDURE — 99285 EMERGENCY DEPT VISIT HI MDM: CPT | Mod: 25

## 2023-01-01 PROCEDURE — 82043 UR ALBUMIN QUANTITATIVE: CPT | Performed by: PHYSICIAN ASSISTANT

## 2023-01-01 PROCEDURE — 250N000011 HC RX IP 250 OP 636: Mod: JZ | Performed by: EMERGENCY MEDICINE

## 2023-01-01 PROCEDURE — 93296 REM INTERROG EVL PM/IDS: CPT | Performed by: INTERNAL MEDICINE

## 2023-01-01 PROCEDURE — 84156 ASSAY OF PROTEIN URINE: CPT | Performed by: PHYSICIAN ASSISTANT

## 2023-01-01 PROCEDURE — 83605 ASSAY OF LACTIC ACID: CPT | Performed by: EMERGENCY MEDICINE

## 2023-01-01 PROCEDURE — 94726 PLETHYSMOGRAPHY LUNG VOLUMES: CPT | Performed by: INTERNAL MEDICINE

## 2023-01-01 PROCEDURE — 83735 ASSAY OF MAGNESIUM: CPT

## 2023-01-01 PROCEDURE — 73502 X-RAY EXAM HIP UNI 2-3 VIEWS: CPT | Mod: TC | Performed by: RADIOLOGY

## 2023-01-01 PROCEDURE — 99222 1ST HOSP IP/OBS MODERATE 55: CPT | Performed by: INTERNAL MEDICINE

## 2023-01-01 PROCEDURE — 82570 ASSAY OF URINE CREATININE: CPT | Performed by: PHYSICIAN ASSISTANT

## 2023-01-01 RX ORDER — CITALOPRAM HYDROBROMIDE 20 MG/1
20 TABLET ORAL DAILY
Status: DISCONTINUED | OUTPATIENT
Start: 2023-01-01 | End: 2023-01-01 | Stop reason: HOSPADM

## 2023-01-01 RX ORDER — TORSEMIDE 20 MG/1
20 TABLET ORAL 2 TIMES DAILY
Qty: 60 TABLET | Refills: 0 | Status: SHIPPED | OUTPATIENT
Start: 2023-01-01 | End: 2023-01-01

## 2023-01-01 RX ORDER — HEPARIN SODIUM 5000 [USP'U]/.5ML
5000 INJECTION, SOLUTION INTRAVENOUS; SUBCUTANEOUS EVERY 12 HOURS
Status: DISCONTINUED | OUTPATIENT
Start: 2023-01-01 | End: 2023-01-01 | Stop reason: HOSPADM

## 2023-01-01 RX ORDER — CITALOPRAM HYDROBROMIDE 20 MG/1
TABLET ORAL
Qty: 135 TABLET | Refills: 1 | Status: SHIPPED | OUTPATIENT
Start: 2023-01-01 | End: 2024-01-01

## 2023-01-01 RX ORDER — CLONIDINE HYDROCHLORIDE 0.2 MG/1
0.2 TABLET ORAL 2 TIMES DAILY
Qty: 180 TABLET | Refills: 0 | Status: SHIPPED | OUTPATIENT
Start: 2023-01-01 | End: 2024-01-01

## 2023-01-01 RX ORDER — CALCIUM CARBONATE 500 MG/1
1000 TABLET, CHEWABLE ORAL 4 TIMES DAILY PRN
Status: DISCONTINUED | OUTPATIENT
Start: 2023-01-01 | End: 2023-01-01 | Stop reason: HOSPADM

## 2023-01-01 RX ORDER — GUAIFENESIN/DEXTROMETHORPHAN 100-10MG/5
10 SYRUP ORAL EVERY 4 HOURS PRN
Status: DISCONTINUED | OUTPATIENT
Start: 2023-01-01 | End: 2023-01-01 | Stop reason: HOSPADM

## 2023-01-01 RX ORDER — AZITHROMYCIN 250 MG/1
250 TABLET, FILM COATED ORAL DAILY
Status: DISCONTINUED | OUTPATIENT
Start: 2023-01-01 | End: 2023-01-01

## 2023-01-01 RX ORDER — IPRATROPIUM BROMIDE AND ALBUTEROL SULFATE 2.5; .5 MG/3ML; MG/3ML
1 SOLUTION RESPIRATORY (INHALATION) EVERY 6 HOURS
Qty: 180 ML | Refills: 3 | Status: SHIPPED | OUTPATIENT
Start: 2023-01-01 | End: 2024-01-01

## 2023-01-01 RX ORDER — CHLORTHALIDONE 25 MG/1
25 TABLET ORAL DAILY
Status: ON HOLD | COMMUNITY
End: 2023-01-01

## 2023-01-01 RX ORDER — KETOCONAZOLE 20 MG/G
CREAM TOPICAL 2 TIMES DAILY
Qty: 60 G | Refills: 3 | Status: ON HOLD | OUTPATIENT
Start: 2023-01-01 | End: 2023-01-01

## 2023-01-01 RX ORDER — CLONIDINE HYDROCHLORIDE 0.1 MG/1
0.05 TABLET ORAL 2 TIMES DAILY
Status: DISCONTINUED | OUTPATIENT
Start: 2023-01-01 | End: 2023-01-01

## 2023-01-01 RX ORDER — PREDNISONE 10 MG/1
10 TABLET ORAL DAILY
Qty: 2 TABLET | Refills: 0 | Status: SHIPPED | OUTPATIENT
Start: 2023-01-01 | End: 2023-01-01

## 2023-01-01 RX ORDER — NYSTATIN 100000 [USP'U]/G
POWDER TOPICAL 3 TIMES DAILY PRN
Qty: 60 G | Refills: 1 | Status: SHIPPED | OUTPATIENT
Start: 2023-01-01

## 2023-01-01 RX ORDER — ACETAMINOPHEN 325 MG/1
650 TABLET ORAL EVERY 4 HOURS PRN
Status: DISCONTINUED | OUTPATIENT
Start: 2023-01-01 | End: 2023-01-01 | Stop reason: HOSPADM

## 2023-01-01 RX ORDER — LIDOCAINE 40 MG/G
CREAM TOPICAL
Status: DISCONTINUED | OUTPATIENT
Start: 2023-01-01 | End: 2023-01-01 | Stop reason: HOSPADM

## 2023-01-01 RX ORDER — ONDANSETRON 4 MG/1
4 TABLET, ORALLY DISINTEGRATING ORAL EVERY 6 HOURS PRN
Status: DISCONTINUED | OUTPATIENT
Start: 2023-01-01 | End: 2023-01-01 | Stop reason: HOSPADM

## 2023-01-01 RX ORDER — CLONIDINE HYDROCHLORIDE 0.2 MG/1
0.2 TABLET ORAL 2 TIMES DAILY
Qty: 60 TABLET | Refills: 0 | Status: SHIPPED | OUTPATIENT
Start: 2023-01-01 | End: 2023-01-01

## 2023-01-01 RX ORDER — ONDANSETRON 2 MG/ML
4 INJECTION INTRAMUSCULAR; INTRAVENOUS EVERY 6 HOURS PRN
Status: DISCONTINUED | OUTPATIENT
Start: 2023-01-01 | End: 2023-01-01 | Stop reason: HOSPADM

## 2023-01-01 RX ORDER — CEFTRIAXONE 1 G/1
1 INJECTION, POWDER, FOR SOLUTION INTRAMUSCULAR; INTRAVENOUS EVERY 24 HOURS
Status: DISCONTINUED | OUTPATIENT
Start: 2023-01-01 | End: 2023-01-01 | Stop reason: HOSPADM

## 2023-01-01 RX ORDER — FUROSEMIDE 10 MG/ML
40 INJECTION INTRAMUSCULAR; INTRAVENOUS EVERY 12 HOURS
Status: DISCONTINUED | OUTPATIENT
Start: 2023-01-01 | End: 2023-01-01

## 2023-01-01 RX ORDER — IPRATROPIUM BROMIDE AND ALBUTEROL SULFATE 2.5; .5 MG/3ML; MG/3ML
3 SOLUTION RESPIRATORY (INHALATION)
Status: DISCONTINUED | OUTPATIENT
Start: 2023-01-01 | End: 2023-01-01 | Stop reason: HOSPADM

## 2023-01-01 RX ORDER — FUROSEMIDE 40 MG
40 TABLET ORAL 2 TIMES DAILY
Status: DISCONTINUED | OUTPATIENT
Start: 2023-01-01 | End: 2023-01-01

## 2023-01-01 RX ORDER — TRAMADOL HYDROCHLORIDE 50 MG/1
50 TABLET ORAL EVERY 6 HOURS PRN
Qty: 10 TABLET | Refills: 0 | Status: SHIPPED | OUTPATIENT
Start: 2023-01-01 | End: 2023-01-01

## 2023-01-01 RX ORDER — ALBUTEROL SULFATE 0.83 MG/ML
2.5 SOLUTION RESPIRATORY (INHALATION)
Status: DISCONTINUED | OUTPATIENT
Start: 2023-01-01 | End: 2023-01-01 | Stop reason: HOSPADM

## 2023-01-01 RX ORDER — CLOPIDOGREL BISULFATE 75 MG/1
75 TABLET ORAL DAILY
Status: DISCONTINUED | OUTPATIENT
Start: 2023-01-01 | End: 2023-01-01 | Stop reason: HOSPADM

## 2023-01-01 RX ORDER — CLONIDINE HYDROCHLORIDE 0.1 MG/1
0.1 TABLET ORAL 2 TIMES DAILY
Status: DISCONTINUED | OUTPATIENT
Start: 2023-01-01 | End: 2023-01-01

## 2023-01-01 RX ORDER — SODIUM BICARBONATE 650 MG/1
650 TABLET ORAL 2 TIMES DAILY
Status: DISCONTINUED | OUTPATIENT
Start: 2023-01-01 | End: 2023-01-01 | Stop reason: HOSPADM

## 2023-01-01 RX ORDER — HEPARIN SODIUM 5000 [USP'U]/.5ML
5000 INJECTION, SOLUTION INTRAVENOUS; SUBCUTANEOUS EVERY 8 HOURS
Status: DISCONTINUED | OUTPATIENT
Start: 2023-01-01 | End: 2023-01-01

## 2023-01-01 RX ORDER — AZITHROMYCIN 250 MG/1
250-500 TABLET, FILM COATED ORAL SEE ADMIN INSTRUCTIONS
Status: ON HOLD | COMMUNITY
End: 2023-01-01

## 2023-01-01 RX ORDER — HYDRALAZINE HYDROCHLORIDE 20 MG/ML
10 INJECTION INTRAMUSCULAR; INTRAVENOUS EVERY 4 HOURS PRN
Status: DISCONTINUED | OUTPATIENT
Start: 2023-01-01 | End: 2023-01-01 | Stop reason: HOSPADM

## 2023-01-01 RX ORDER — CHLORTHALIDONE 25 MG/1
25 TABLET ORAL DAILY
Status: DISCONTINUED | OUTPATIENT
Start: 2023-01-01 | End: 2023-01-01

## 2023-01-01 RX ORDER — BUMETANIDE 0.25 MG/ML
2 INJECTION INTRAMUSCULAR; INTRAVENOUS EVERY 8 HOURS
Status: DISCONTINUED | OUTPATIENT
Start: 2023-01-01 | End: 2023-01-01

## 2023-01-01 RX ORDER — PREDNISONE 20 MG/1
40 TABLET ORAL DAILY
Qty: 10 TABLET | Refills: 0 | Status: SHIPPED | OUTPATIENT
Start: 2023-01-01 | End: 2023-01-01

## 2023-01-01 RX ORDER — AZITHROMYCIN 250 MG/1
TABLET, FILM COATED ORAL
Qty: 6 TABLET | Refills: 0 | Status: SHIPPED | OUTPATIENT
Start: 2023-01-01 | End: 2023-01-01

## 2023-01-01 RX ORDER — CEFDINIR 300 MG/1
300 CAPSULE ORAL DAILY
Qty: 10 CAPSULE | Refills: 0 | Status: SHIPPED | OUTPATIENT
Start: 2023-01-01 | End: 2023-01-01

## 2023-01-01 RX ORDER — TORSEMIDE 20 MG/1
20 TABLET ORAL 2 TIMES DAILY
Status: DISCONTINUED | OUTPATIENT
Start: 2023-01-01 | End: 2023-01-01 | Stop reason: HOSPADM

## 2023-01-01 RX ORDER — PREDNISONE 20 MG/1
40 TABLET ORAL DAILY
Status: DISCONTINUED | OUTPATIENT
Start: 2023-01-01 | End: 2023-01-01

## 2023-01-01 RX ORDER — UMECLIDINIUM BROMIDE AND VILANTEROL TRIFENATATE 62.5; 25 UG/1; UG/1
1 POWDER RESPIRATORY (INHALATION) DAILY
Qty: 1 EACH | Refills: 5 | Status: SHIPPED | OUTPATIENT
Start: 2023-01-01 | End: 2024-01-01

## 2023-01-01 RX ORDER — RESPIRATORY SYNCYTIAL VIRUS VACCINE 120MCG/0.5
0.5 KIT INTRAMUSCULAR ONCE
Qty: 1 EACH | Refills: 0 | Status: CANCELLED | OUTPATIENT
Start: 2023-01-01 | End: 2023-01-01

## 2023-01-01 RX ORDER — AMLODIPINE BESYLATE 10 MG/1
10 TABLET ORAL DAILY
Status: DISCONTINUED | OUTPATIENT
Start: 2023-01-01 | End: 2023-01-01 | Stop reason: HOSPADM

## 2023-01-01 RX ORDER — CEFDINIR 300 MG/1
300 CAPSULE ORAL DAILY
Status: CANCELLED | OUTPATIENT
Start: 2023-01-01

## 2023-01-01 RX ORDER — ATORVASTATIN CALCIUM 40 MG/1
40 TABLET, FILM COATED ORAL DAILY
Status: DISCONTINUED | OUTPATIENT
Start: 2023-01-01 | End: 2023-01-01 | Stop reason: HOSPADM

## 2023-01-01 RX ORDER — PREDNISONE 20 MG/1
20 TABLET ORAL DAILY
Status: DISCONTINUED | OUTPATIENT
Start: 2023-01-01 | End: 2023-01-01 | Stop reason: HOSPADM

## 2023-01-01 RX ORDER — BUMETANIDE 0.25 MG/ML
1 INJECTION INTRAMUSCULAR; INTRAVENOUS EVERY 8 HOURS
Status: DISCONTINUED | OUTPATIENT
Start: 2023-01-01 | End: 2023-01-01

## 2023-01-01 RX ORDER — DOXYCYCLINE 100 MG/1
100 CAPSULE ORAL EVERY 12 HOURS SCHEDULED
Status: DISCONTINUED | OUTPATIENT
Start: 2023-01-01 | End: 2023-01-01 | Stop reason: HOSPADM

## 2023-01-01 RX ORDER — FUROSEMIDE 10 MG/ML
40 INJECTION INTRAMUSCULAR; INTRAVENOUS ONCE
Status: COMPLETED | OUTPATIENT
Start: 2023-01-01 | End: 2023-01-01

## 2023-01-01 RX ORDER — POTASSIUM CHLORIDE 1500 MG/1
TABLET, EXTENDED RELEASE ORAL
COMMUNITY
Start: 2023-01-09 | End: 2023-01-01

## 2023-01-01 RX ORDER — MONTELUKAST SODIUM 10 MG/1
10 TABLET ORAL AT BEDTIME
Status: DISCONTINUED | OUTPATIENT
Start: 2023-01-01 | End: 2023-01-01 | Stop reason: HOSPADM

## 2023-01-01 RX ORDER — IPRATROPIUM BROMIDE AND ALBUTEROL SULFATE 2.5; .5 MG/3ML; MG/3ML
3 SOLUTION RESPIRATORY (INHALATION)
Status: DISCONTINUED | OUTPATIENT
Start: 2023-01-01 | End: 2023-01-01

## 2023-01-01 RX ORDER — AMOXICILLIN 250 MG
1 CAPSULE ORAL 2 TIMES DAILY PRN
Status: DISCONTINUED | OUTPATIENT
Start: 2023-01-01 | End: 2023-01-01 | Stop reason: HOSPADM

## 2023-01-01 RX ORDER — AMOXICILLIN 250 MG
2 CAPSULE ORAL 2 TIMES DAILY PRN
Status: DISCONTINUED | OUTPATIENT
Start: 2023-01-01 | End: 2023-01-01 | Stop reason: HOSPADM

## 2023-01-01 RX ORDER — TORSEMIDE 20 MG/1
20 TABLET ORAL 2 TIMES DAILY
Qty: 60 TABLET | Refills: 0 | Status: SHIPPED | OUTPATIENT
Start: 2023-01-01 | End: 2024-01-01

## 2023-01-01 RX ORDER — CLONIDINE HYDROCHLORIDE 0.1 MG/1
0.2 TABLET ORAL 2 TIMES DAILY
Status: DISCONTINUED | OUTPATIENT
Start: 2023-01-01 | End: 2023-01-01 | Stop reason: HOSPADM

## 2023-01-01 RX ORDER — DOXAZOSIN 1 MG/1
1 TABLET ORAL AT BEDTIME
Status: DISCONTINUED | OUTPATIENT
Start: 2023-01-01 | End: 2023-01-01

## 2023-01-01 RX ORDER — ACETAMINOPHEN 650 MG/1
650 SUPPOSITORY RECTAL EVERY 4 HOURS PRN
Status: DISCONTINUED | OUTPATIENT
Start: 2023-01-01 | End: 2023-01-01 | Stop reason: HOSPADM

## 2023-01-01 RX ADMIN — BUMETANIDE 2 MG: 0.25 INJECTION INTRAMUSCULAR; INTRAVENOUS at 14:08

## 2023-01-01 RX ADMIN — CLOPIDOGREL BISULFATE 75 MG: 75 TABLET ORAL at 08:19

## 2023-01-01 RX ADMIN — CITALOPRAM HYDROBROMIDE 20 MG: 20 TABLET, FILM COATED ORAL at 15:51

## 2023-01-01 RX ADMIN — IPRATROPIUM BROMIDE AND ALBUTEROL SULFATE 3 ML: .5; 3 SOLUTION RESPIRATORY (INHALATION) at 12:00

## 2023-01-01 RX ADMIN — AZITHROMYCIN DIHYDRATE 250 MG: 250 TABLET ORAL at 14:59

## 2023-01-01 RX ADMIN — IPRATROPIUM BROMIDE AND ALBUTEROL SULFATE 3 ML: .5; 3 SOLUTION RESPIRATORY (INHALATION) at 08:14

## 2023-01-01 RX ADMIN — CARVEDILOL 37.5 MG: 25 TABLET, FILM COATED ORAL at 18:03

## 2023-01-01 RX ADMIN — CARVEDILOL 37.5 MG: 25 TABLET, FILM COATED ORAL at 09:47

## 2023-01-01 RX ADMIN — SODIUM BICARBONATE 650 MG TABLET 650 MG: at 08:46

## 2023-01-01 RX ADMIN — AZITHROMYCIN DIHYDRATE 250 MG: 250 TABLET ORAL at 08:19

## 2023-01-01 RX ADMIN — IPRATROPIUM BROMIDE AND ALBUTEROL SULFATE 3 ML: .5; 3 SOLUTION RESPIRATORY (INHALATION) at 08:33

## 2023-01-01 RX ADMIN — SODIUM BICARBONATE 650 MG TABLET 650 MG: at 19:05

## 2023-01-01 RX ADMIN — UMECLIDINIUM BROMIDE AND VILANTEROL TRIFENATATE 1 PUFF: 62.5; 25 POWDER RESPIRATORY (INHALATION) at 08:03

## 2023-01-01 RX ADMIN — HEPARIN SODIUM 5000 UNITS: 5000 INJECTION, SOLUTION INTRAVENOUS; SUBCUTANEOUS at 06:14

## 2023-01-01 RX ADMIN — DOXYCYCLINE HYCLATE 100 MG: 100 CAPSULE ORAL at 20:37

## 2023-01-01 RX ADMIN — IPRATROPIUM BROMIDE AND ALBUTEROL SULFATE 3 ML: .5; 3 SOLUTION RESPIRATORY (INHALATION) at 15:54

## 2023-01-01 RX ADMIN — AMLODIPINE BESYLATE 10 MG: 10 TABLET ORAL at 08:19

## 2023-01-01 RX ADMIN — MONTELUKAST 10 MG: 10 TABLET, FILM COATED ORAL at 23:09

## 2023-01-01 RX ADMIN — UMECLIDINIUM BROMIDE AND VILANTEROL TRIFENATATE 1 PUFF: 62.5; 25 POWDER RESPIRATORY (INHALATION) at 08:14

## 2023-01-01 RX ADMIN — CARVEDILOL 37.5 MG: 25 TABLET, FILM COATED ORAL at 08:47

## 2023-01-01 RX ADMIN — PREDNISONE 20 MG: 20 TABLET ORAL at 08:54

## 2023-01-01 RX ADMIN — TORSEMIDE 20 MG: 20 TABLET ORAL at 13:19

## 2023-01-01 RX ADMIN — HEPARIN SODIUM 5000 UNITS: 5000 INJECTION, SOLUTION INTRAVENOUS; SUBCUTANEOUS at 06:26

## 2023-01-01 RX ADMIN — MONTELUKAST 10 MG: 10 TABLET, FILM COATED ORAL at 21:11

## 2023-01-01 RX ADMIN — HEPARIN SODIUM 5000 UNITS: 5000 INJECTION, SOLUTION INTRAVENOUS; SUBCUTANEOUS at 19:07

## 2023-01-01 RX ADMIN — IPRATROPIUM BROMIDE AND ALBUTEROL SULFATE 3 ML: .5; 3 SOLUTION RESPIRATORY (INHALATION) at 16:10

## 2023-01-01 RX ADMIN — IPRATROPIUM BROMIDE AND ALBUTEROL SULFATE 3 ML: .5; 3 SOLUTION RESPIRATORY (INHALATION) at 09:24

## 2023-01-01 RX ADMIN — DOXYCYCLINE HYCLATE 100 MG: 100 CAPSULE ORAL at 08:55

## 2023-01-01 RX ADMIN — IPRATROPIUM BROMIDE AND ALBUTEROL SULFATE 3 ML: .5; 3 SOLUTION RESPIRATORY (INHALATION) at 15:52

## 2023-01-01 RX ADMIN — SODIUM BICARBONATE 650 MG TABLET 650 MG: at 20:40

## 2023-01-01 RX ADMIN — CLOPIDOGREL BISULFATE 75 MG: 75 TABLET ORAL at 09:48

## 2023-01-01 RX ADMIN — CARVEDILOL 37.5 MG: 25 TABLET, FILM COATED ORAL at 08:54

## 2023-01-01 RX ADMIN — SODIUM BICARBONATE 650 MG TABLET 650 MG: at 08:18

## 2023-01-01 RX ADMIN — HEPARIN SODIUM 5000 UNITS: 5000 INJECTION, SOLUTION INTRAVENOUS; SUBCUTANEOUS at 06:53

## 2023-01-01 RX ADMIN — IPRATROPIUM BROMIDE AND ALBUTEROL SULFATE 3 ML: .5; 3 SOLUTION RESPIRATORY (INHALATION) at 08:03

## 2023-01-01 RX ADMIN — IPRATROPIUM BROMIDE AND ALBUTEROL SULFATE 3 ML: .5; 3 SOLUTION RESPIRATORY (INHALATION) at 19:31

## 2023-01-01 RX ADMIN — CARVEDILOL 37.5 MG: 25 TABLET, FILM COATED ORAL at 18:28

## 2023-01-01 RX ADMIN — SODIUM BICARBONATE 650 MG TABLET 650 MG: at 20:37

## 2023-01-01 RX ADMIN — UMECLIDINIUM BROMIDE AND VILANTEROL TRIFENATATE 1 PUFF: 62.5; 25 POWDER RESPIRATORY (INHALATION) at 09:24

## 2023-01-01 RX ADMIN — AMLODIPINE BESYLATE 10 MG: 10 TABLET ORAL at 08:49

## 2023-01-01 RX ADMIN — IPRATROPIUM BROMIDE AND ALBUTEROL SULFATE 3 ML: .5; 3 SOLUTION RESPIRATORY (INHALATION) at 12:20

## 2023-01-01 RX ADMIN — CLOPIDOGREL BISULFATE 75 MG: 75 TABLET ORAL at 09:09

## 2023-01-01 RX ADMIN — TORSEMIDE 20 MG: 20 TABLET ORAL at 20:37

## 2023-01-01 RX ADMIN — UMECLIDINIUM BROMIDE AND VILANTEROL TRIFENATATE 1 PUFF: 62.5; 25 POWDER RESPIRATORY (INHALATION) at 08:35

## 2023-01-01 RX ADMIN — IPRATROPIUM BROMIDE AND ALBUTEROL SULFATE 3 ML: .5; 3 SOLUTION RESPIRATORY (INHALATION) at 12:31

## 2023-01-01 RX ADMIN — BUMETANIDE 1 MG: 0.25 INJECTION INTRAMUSCULAR; INTRAVENOUS at 06:53

## 2023-01-01 RX ADMIN — HEPARIN SODIUM 5000 UNITS: 5000 INJECTION, SOLUTION INTRAVENOUS; SUBCUTANEOUS at 22:25

## 2023-01-01 RX ADMIN — CITALOPRAM HYDROBROMIDE 20 MG: 20 TABLET, FILM COATED ORAL at 09:47

## 2023-01-01 RX ADMIN — CEFTRIAXONE 1 G: 1 INJECTION, POWDER, FOR SOLUTION INTRAMUSCULAR; INTRAVENOUS at 15:09

## 2023-01-01 RX ADMIN — CLONIDINE HYDROCHLORIDE 0.1 MG: 0.1 TABLET ORAL at 19:05

## 2023-01-01 RX ADMIN — GUAIFENESIN SYRUP AND DEXTROMETHORPHAN 10 ML: 100; 10 SYRUP ORAL at 14:59

## 2023-01-01 RX ADMIN — DOXAZOSIN 1 MG: 1 TABLET ORAL at 22:25

## 2023-01-01 RX ADMIN — IPRATROPIUM BROMIDE AND ALBUTEROL SULFATE 3 ML: .5; 3 SOLUTION RESPIRATORY (INHALATION) at 12:24

## 2023-01-01 RX ADMIN — PREDNISONE 40 MG: 20 TABLET ORAL at 14:59

## 2023-01-01 RX ADMIN — CLOPIDOGREL BISULFATE 75 MG: 75 TABLET ORAL at 08:49

## 2023-01-01 RX ADMIN — BUMETANIDE 2 MG: 0.25 INJECTION INTRAMUSCULAR; INTRAVENOUS at 06:42

## 2023-01-01 RX ADMIN — HEPARIN SODIUM 5000 UNITS: 5000 INJECTION, SOLUTION INTRAVENOUS; SUBCUTANEOUS at 14:14

## 2023-01-01 RX ADMIN — SODIUM BICARBONATE 650 MG TABLET 650 MG: at 20:48

## 2023-01-01 RX ADMIN — TORSEMIDE 20 MG: 20 TABLET ORAL at 08:55

## 2023-01-01 RX ADMIN — AMLODIPINE BESYLATE 10 MG: 10 TABLET ORAL at 09:47

## 2023-01-01 RX ADMIN — CITALOPRAM HYDROBROMIDE 20 MG: 20 TABLET, FILM COATED ORAL at 08:54

## 2023-01-01 RX ADMIN — BUMETANIDE 2 MG: 0.25 INJECTION INTRAMUSCULAR; INTRAVENOUS at 23:10

## 2023-01-01 RX ADMIN — CITALOPRAM HYDROBROMIDE 20 MG: 20 TABLET, FILM COATED ORAL at 08:19

## 2023-01-01 RX ADMIN — HEPARIN SODIUM 5000 UNITS: 5000 INJECTION, SOLUTION INTRAVENOUS; SUBCUTANEOUS at 07:00

## 2023-01-01 RX ADMIN — MONTELUKAST 10 MG: 10 TABLET, FILM COATED ORAL at 22:25

## 2023-01-01 RX ADMIN — ATORVASTATIN CALCIUM 40 MG: 40 TABLET, FILM COATED ORAL at 08:55

## 2023-01-01 RX ADMIN — PREDNISONE 20 MG: 20 TABLET ORAL at 08:47

## 2023-01-01 RX ADMIN — CARVEDILOL 37.5 MG: 25 TABLET, FILM COATED ORAL at 08:19

## 2023-01-01 RX ADMIN — SODIUM BICARBONATE 650 MG TABLET 650 MG: at 08:54

## 2023-01-01 RX ADMIN — CLONIDINE HYDROCHLORIDE 0.05 MG: 0.1 TABLET ORAL at 20:32

## 2023-01-01 RX ADMIN — IPRATROPIUM BROMIDE AND ALBUTEROL SULFATE 3 ML: .5; 3 SOLUTION RESPIRATORY (INHALATION) at 12:22

## 2023-01-01 RX ADMIN — AMLODIPINE BESYLATE 10 MG: 10 TABLET ORAL at 15:51

## 2023-01-01 RX ADMIN — ATORVASTATIN CALCIUM 40 MG: 40 TABLET, FILM COATED ORAL at 08:46

## 2023-01-01 RX ADMIN — IPRATROPIUM BROMIDE AND ALBUTEROL SULFATE 3 ML: .5; 3 SOLUTION RESPIRATORY (INHALATION) at 21:12

## 2023-01-01 RX ADMIN — CLONIDINE HYDROCHLORIDE 0.05 MG: 0.1 TABLET ORAL at 20:42

## 2023-01-01 RX ADMIN — AMLODIPINE BESYLATE 10 MG: 10 TABLET ORAL at 08:55

## 2023-01-01 RX ADMIN — HEPARIN SODIUM 5000 UNITS: 5000 INJECTION, SOLUTION INTRAVENOUS; SUBCUTANEOUS at 23:09

## 2023-01-01 RX ADMIN — CARVEDILOL 37.5 MG: 25 TABLET, FILM COATED ORAL at 17:55

## 2023-01-01 RX ADMIN — HEPARIN SODIUM 5000 UNITS: 5000 INJECTION, SOLUTION INTRAVENOUS; SUBCUTANEOUS at 18:58

## 2023-01-01 RX ADMIN — BUMETANIDE 1 MG: 0.25 INJECTION INTRAMUSCULAR; INTRAVENOUS at 22:25

## 2023-01-01 RX ADMIN — DOXYCYCLINE HYCLATE 100 MG: 100 CAPSULE ORAL at 19:06

## 2023-01-01 RX ADMIN — SODIUM BICARBONATE 650 MG TABLET 650 MG: at 09:09

## 2023-01-01 RX ADMIN — CEFTRIAXONE 1 G: 1 INJECTION, POWDER, FOR SOLUTION INTRAMUSCULAR; INTRAVENOUS at 13:24

## 2023-01-01 RX ADMIN — DOXAZOSIN 1 MG: 1 TABLET ORAL at 22:54

## 2023-01-01 RX ADMIN — PREDNISONE 40 MG: 20 TABLET ORAL at 09:09

## 2023-01-01 RX ADMIN — DOXYCYCLINE HYCLATE 100 MG: 100 CAPSULE ORAL at 20:32

## 2023-01-01 RX ADMIN — DOXYCYCLINE HYCLATE 100 MG: 100 CAPSULE ORAL at 09:09

## 2023-01-01 RX ADMIN — ATORVASTATIN CALCIUM 40 MG: 40 TABLET, FILM COATED ORAL at 09:47

## 2023-01-01 RX ADMIN — IPRATROPIUM BROMIDE AND ALBUTEROL SULFATE 3 ML: .5; 3 SOLUTION RESPIRATORY (INHALATION) at 20:33

## 2023-01-01 RX ADMIN — PREDNISONE 40 MG: 20 TABLET ORAL at 08:18

## 2023-01-01 RX ADMIN — MONTELUKAST 10 MG: 10 TABLET, FILM COATED ORAL at 21:52

## 2023-01-01 RX ADMIN — IPRATROPIUM BROMIDE AND ALBUTEROL SULFATE 3 ML: .5; 3 SOLUTION RESPIRATORY (INHALATION) at 16:27

## 2023-01-01 RX ADMIN — CLONIDINE HYDROCHLORIDE 0.05 MG: 0.1 TABLET ORAL at 09:09

## 2023-01-01 RX ADMIN — FUROSEMIDE 40 MG: 10 INJECTION, SOLUTION INTRAMUSCULAR; INTRAVENOUS at 09:40

## 2023-01-01 RX ADMIN — FUROSEMIDE 40 MG: 10 INJECTION, SOLUTION INTRAMUSCULAR; INTRAVENOUS at 17:29

## 2023-01-01 RX ADMIN — CLONIDINE HYDROCHLORIDE 0.05 MG: 0.1 TABLET ORAL at 18:57

## 2023-01-01 RX ADMIN — SODIUM BICARBONATE 650 MG TABLET 650 MG: at 09:47

## 2023-01-01 RX ADMIN — CARVEDILOL 37.5 MG: 25 TABLET, FILM COATED ORAL at 18:58

## 2023-01-01 RX ADMIN — MONTELUKAST 10 MG: 10 TABLET, FILM COATED ORAL at 22:54

## 2023-01-01 RX ADMIN — CLONIDINE HYDROCHLORIDE 0.2 MG: 0.1 TABLET ORAL at 08:55

## 2023-01-01 RX ADMIN — DOXYCYCLINE HYCLATE 100 MG: 100 CAPSULE ORAL at 09:47

## 2023-01-01 RX ADMIN — CLOPIDOGREL BISULFATE 75 MG: 75 TABLET ORAL at 08:55

## 2023-01-01 RX ADMIN — HEPARIN SODIUM 5000 UNITS: 5000 INJECTION, SOLUTION INTRAVENOUS; SUBCUTANEOUS at 14:37

## 2023-01-01 RX ADMIN — PREDNISONE 40 MG: 20 TABLET ORAL at 09:47

## 2023-01-01 RX ADMIN — CITALOPRAM HYDROBROMIDE 20 MG: 20 TABLET, FILM COATED ORAL at 08:48

## 2023-01-01 RX ADMIN — DOXAZOSIN 1 MG: 1 TABLET ORAL at 23:09

## 2023-01-01 RX ADMIN — CLONIDINE HYDROCHLORIDE 0.1 MG: 0.1 TABLET ORAL at 08:19

## 2023-01-01 RX ADMIN — CEFTRIAXONE 1 G: 1 INJECTION, POWDER, FOR SOLUTION INTRAMUSCULAR; INTRAVENOUS at 14:10

## 2023-01-01 RX ADMIN — CLONIDINE HYDROCHLORIDE 0.1 MG: 0.1 TABLET ORAL at 08:46

## 2023-01-01 RX ADMIN — ATORVASTATIN CALCIUM 40 MG: 40 TABLET, FILM COATED ORAL at 08:19

## 2023-01-01 RX ADMIN — FUROSEMIDE 40 MG: 10 INJECTION, SOLUTION INTRAMUSCULAR; INTRAVENOUS at 05:50

## 2023-01-01 RX ADMIN — CLOPIDOGREL BISULFATE 75 MG: 75 TABLET ORAL at 15:51

## 2023-01-01 RX ADMIN — BUMETANIDE 1 MG: 0.25 INJECTION INTRAMUSCULAR; INTRAVENOUS at 14:40

## 2023-01-01 RX ADMIN — IPRATROPIUM BROMIDE AND ALBUTEROL SULFATE 3 ML: .5; 3 SOLUTION RESPIRATORY (INHALATION) at 16:00

## 2023-01-01 RX ADMIN — CEFTRIAXONE 1 G: 1 INJECTION, POWDER, FOR SOLUTION INTRAMUSCULAR; INTRAVENOUS at 14:05

## 2023-01-01 RX ADMIN — CLONIDINE HYDROCHLORIDE 0.05 MG: 0.1 TABLET ORAL at 09:48

## 2023-01-01 RX ADMIN — CEFTRIAXONE 1 G: 1 INJECTION, POWDER, FOR SOLUTION INTRAMUSCULAR; INTRAVENOUS at 14:59

## 2023-01-01 RX ADMIN — SODIUM BICARBONATE 650 MG TABLET 650 MG: at 20:31

## 2023-01-01 RX ADMIN — AMLODIPINE BESYLATE 10 MG: 10 TABLET ORAL at 09:09

## 2023-01-01 RX ADMIN — PERFLUTREN 2 ML: 6.52 INJECTION, SUSPENSION INTRAVENOUS at 13:58

## 2023-01-01 RX ADMIN — TORSEMIDE 20 MG: 20 TABLET ORAL at 21:11

## 2023-01-01 RX ADMIN — CARVEDILOL 37.5 MG: 25 TABLET, FILM COATED ORAL at 09:09

## 2023-01-01 RX ADMIN — ATORVASTATIN CALCIUM 40 MG: 40 TABLET, FILM COATED ORAL at 09:09

## 2023-01-01 RX ADMIN — CLONIDINE HYDROCHLORIDE 0.2 MG: 0.1 TABLET ORAL at 20:37

## 2023-01-01 RX ADMIN — ALBUTEROL SULFATE 2.5 MG: 2.5 SOLUTION RESPIRATORY (INHALATION) at 17:08

## 2023-01-01 RX ADMIN — TORSEMIDE 20 MG: 20 TABLET ORAL at 08:47

## 2023-01-01 RX ADMIN — CARVEDILOL 37.5 MG: 25 TABLET, FILM COATED ORAL at 17:30

## 2023-01-01 RX ADMIN — DOXYCYCLINE HYCLATE 100 MG: 100 CAPSULE ORAL at 08:48

## 2023-01-01 RX ADMIN — CITALOPRAM HYDROBROMIDE 20 MG: 20 TABLET, FILM COATED ORAL at 09:09

## 2023-01-01 ASSESSMENT — ACTIVITIES OF DAILY LIVING (ADL)
ADLS_ACUITY_SCORE: 28
ADLS_ACUITY_SCORE: 26
ADLS_ACUITY_SCORE: 28
ADLS_ACUITY_SCORE: 37
ADLS_ACUITY_SCORE: 35
ADLS_ACUITY_SCORE: 28
ADLS_ACUITY_SCORE: 45
ADLS_ACUITY_SCORE: 28
ADLS_ACUITY_SCORE: 45
ADLS_ACUITY_SCORE: 28
ADLS_ACUITY_SCORE: 26
ADLS_ACUITY_SCORE: 28
ADLS_ACUITY_SCORE: 37
ADLS_ACUITY_SCORE: 45
ADLS_ACUITY_SCORE: 28
ADLS_ACUITY_SCORE: 28
ADLS_ACUITY_SCORE: 37
ADLS_ACUITY_SCORE: 45
ADLS_ACUITY_SCORE: 45
ADLS_ACUITY_SCORE: 28
ADLS_ACUITY_SCORE: 26
ADLS_ACUITY_SCORE: 45
ADLS_ACUITY_SCORE: 45
CURRENT_FUNCTION: BATHING REQUIRES ASSISTANCE
ADLS_ACUITY_SCORE: 28
ADLS_ACUITY_SCORE: 26
ADLS_ACUITY_SCORE: 28
ADLS_ACUITY_SCORE: 35
ADLS_ACUITY_SCORE: 37
ADLS_ACUITY_SCORE: 28
CURRENT_FUNCTION: SHOPPING REQUIRES ASSISTANCE
ADLS_ACUITY_SCORE: 35
ADLS_ACUITY_SCORE: 28
ADLS_ACUITY_SCORE: 28
ADLS_ACUITY_SCORE: 45
ADLS_ACUITY_SCORE: 28
DEPENDENT_IADLS:: CLEANING;COOKING;TRANSPORTATION
ADLS_ACUITY_SCORE: 45
ADLS_ACUITY_SCORE: 28
ADLS_ACUITY_SCORE: 45
ADLS_ACUITY_SCORE: 35
ADLS_ACUITY_SCORE: 37
ADLS_ACUITY_SCORE: 45
ADLS_ACUITY_SCORE: 26
ADLS_ACUITY_SCORE: 45
ADLS_ACUITY_SCORE: 28
ADLS_ACUITY_SCORE: 45
CURRENT_FUNCTION: TRANSPORTATION REQUIRES ASSISTANCE
ADLS_ACUITY_SCORE: 28
ADLS_ACUITY_SCORE: 45
ADLS_ACUITY_SCORE: 28
CURRENT_FUNCTION: LAUNDRY REQUIRES ASSISTANCE
ADLS_ACUITY_SCORE: 28
CURRENT_FUNCTION: HOUSEWORK REQUIRES ASSISTANCE
ADLS_ACUITY_SCORE: 26
ADLS_ACUITY_SCORE: 28
ADLS_ACUITY_SCORE: 26
ADLS_ACUITY_SCORE: 41
ADLS_ACUITY_SCORE: 28
ADLS_ACUITY_SCORE: 45
ADLS_ACUITY_SCORE: 45
ADLS_ACUITY_SCORE: 26
ADLS_ACUITY_SCORE: 28
ADLS_ACUITY_SCORE: 28
ADLS_ACUITY_SCORE: 26
ADLS_ACUITY_SCORE: 26
ADLS_ACUITY_SCORE: 28
CURRENT_FUNCTION: PREPARING MEALS REQUIRES ASSISTANCE
ADLS_ACUITY_SCORE: 45

## 2023-01-01 ASSESSMENT — ENCOUNTER SYMPTOMS
JOINT SWELLING: 0
DIARRHEA: 0
HEADACHES: 0
WEAKNESS: 1
CONSTIPATION: 0
FREQUENCY: 0
ABDOMINAL PAIN: 0
COUGH: 1
SHORTNESS OF BREATH: 1
FEVER: 0
BREAST MASS: 0
PARESTHESIAS: 0
DIZZINESS: 0
HEMATURIA: 0
HEARTBURN: 0
MYALGIAS: 0
SORE THROAT: 0
COUGH: 1
CHILLS: 0
WHEEZING: 1
HEMATOCHEZIA: 0
NAUSEA: 0
ARTHRALGIAS: 1
PALPITATIONS: 0
DYSURIA: 0
EYE PAIN: 0
NERVOUS/ANXIOUS: 0
SHORTNESS OF BREATH: 1

## 2023-01-01 ASSESSMENT — ANXIETY QUESTIONNAIRES
7. FEELING AFRAID AS IF SOMETHING AWFUL MIGHT HAPPEN: NOT AT ALL
3. WORRYING TOO MUCH ABOUT DIFFERENT THINGS: SEVERAL DAYS
6. BECOMING EASILY ANNOYED OR IRRITABLE: NOT AT ALL
1. FEELING NERVOUS, ANXIOUS, OR ON EDGE: SEVERAL DAYS
GAD7 TOTAL SCORE: 3
2. NOT BEING ABLE TO STOP OR CONTROL WORRYING: SEVERAL DAYS
IF YOU CHECKED OFF ANY PROBLEMS ON THIS QUESTIONNAIRE, HOW DIFFICULT HAVE THESE PROBLEMS MADE IT FOR YOU TO DO YOUR WORK, TAKE CARE OF THINGS AT HOME, OR GET ALONG WITH OTHER PEOPLE: NOT DIFFICULT AT ALL
GAD7 TOTAL SCORE: 3
4. TROUBLE RELAXING: NOT AT ALL
5. BEING SO RESTLESS THAT IT IS HARD TO SIT STILL: NOT AT ALL

## 2023-01-01 ASSESSMENT — PATIENT HEALTH QUESTIONNAIRE - PHQ9
SUM OF ALL RESPONSES TO PHQ QUESTIONS 1-9: 0
SUM OF ALL RESPONSES TO PHQ QUESTIONS 1-9: 0
10. IF YOU CHECKED OFF ANY PROBLEMS, HOW DIFFICULT HAVE THESE PROBLEMS MADE IT FOR YOU TO DO YOUR WORK, TAKE CARE OF THINGS AT HOME, OR GET ALONG WITH OTHER PEOPLE: NOT DIFFICULT AT ALL

## 2023-01-01 ASSESSMENT — LIFESTYLE VARIABLES: SMOKING_STATUS: 0

## 2023-04-10 ENCOUNTER — TRANSFERRED RECORDS (OUTPATIENT)
Dept: HEALTH INFORMATION MANAGEMENT | Facility: CLINIC | Age: 88
End: 2023-04-10

## 2023-04-26 ENCOUNTER — TRANSFERRED RECORDS (OUTPATIENT)
Dept: HEALTH INFORMATION MANAGEMENT | Facility: CLINIC | Age: 88
End: 2023-04-26
Payer: MEDICARE

## 2023-04-28 LAB — GFR ESTIMATED (EXTERNAL): 20 ML/MIN

## 2023-04-29 LAB
ALT SERPL-CCNC: 21 U/L (ref 14–63)
AST SERPL-CCNC: 22 U/L (ref 15–37)
CREATININE (EXTERNAL): 2.34 MG/DL (ref 0.55–1.3)
GLUCOSE (EXTERNAL): 112 MG/DL (ref 74–106)
POTASSIUM (EXTERNAL): 4.6 MMOL/L (ref 3.5–5.1)

## 2023-04-30 ENCOUNTER — TRANSFERRED RECORDS (OUTPATIENT)
Dept: HEALTH INFORMATION MANAGEMENT | Facility: CLINIC | Age: 88
End: 2023-04-30
Payer: MEDICARE

## 2023-05-05 ENCOUNTER — TRANSFERRED RECORDS (OUTPATIENT)
Dept: HEALTH INFORMATION MANAGEMENT | Facility: CLINIC | Age: 88
End: 2023-05-05
Payer: MEDICARE

## 2023-05-11 ENCOUNTER — TELEPHONE (OUTPATIENT)
Dept: FAMILY MEDICINE | Facility: CLINIC | Age: 88
End: 2023-05-11
Payer: MEDICARE

## 2023-05-11 NOTE — TELEPHONE ENCOUNTER
Reason for call:  Other   Patient called regarding (reason for call):   Establish care and ED follow up    Additional comments:   Establish care and ED follow up, discharge 1 week ago pneumonia; has stage 5 cancer, COPD, CHF. Please reach out to daughter-in-law for scheduling    Phone number to reach patient:  Home number on file [phone] (home)    Best Time:  any    Can we leave a detailed message on this number?  YES    Travel screening: Not Applicable

## 2023-05-23 ENCOUNTER — OFFICE VISIT (OUTPATIENT)
Dept: FAMILY MEDICINE | Facility: CLINIC | Age: 88
End: 2023-05-23
Payer: MEDICARE

## 2023-05-23 VITALS
TEMPERATURE: 97.8 F | SYSTOLIC BLOOD PRESSURE: 153 MMHG | HEART RATE: 64 BPM | OXYGEN SATURATION: 96 % | RESPIRATION RATE: 17 BRPM | DIASTOLIC BLOOD PRESSURE: 67 MMHG

## 2023-05-23 DIAGNOSIS — J42 CHRONIC BRONCHITIS, UNSPECIFIED CHRONIC BRONCHITIS TYPE (H): ICD-10-CM

## 2023-05-23 DIAGNOSIS — F41.1 GENERALIZED ANXIETY DISORDER: ICD-10-CM

## 2023-05-23 DIAGNOSIS — E78.5 HYPERLIPIDEMIA LDL GOAL <100: ICD-10-CM

## 2023-05-23 DIAGNOSIS — I50.32 CHRONIC HEART FAILURE WITH PRESERVED EJECTION FRACTION (H): ICD-10-CM

## 2023-05-23 DIAGNOSIS — I10 BENIGN ESSENTIAL HYPERTENSION: ICD-10-CM

## 2023-05-23 DIAGNOSIS — Z93.2 ILEOSTOMY STATUS (H): ICD-10-CM

## 2023-05-23 DIAGNOSIS — D50.9 IRON DEFICIENCY ANEMIA, UNSPECIFIED IRON DEFICIENCY ANEMIA TYPE: ICD-10-CM

## 2023-05-23 DIAGNOSIS — Z95.0 CARDIAC PACEMAKER IN SITU: ICD-10-CM

## 2023-05-23 DIAGNOSIS — Z76.89 ENCOUNTER TO ESTABLISH CARE WITH NEW DOCTOR: Primary | ICD-10-CM

## 2023-05-23 DIAGNOSIS — E21.5: ICD-10-CM

## 2023-05-23 DIAGNOSIS — J18.9 PNEUMONIA OF RIGHT LUNG DUE TO INFECTIOUS ORGANISM, UNSPECIFIED PART OF LUNG: ICD-10-CM

## 2023-05-23 DIAGNOSIS — N18.4 CKD (CHRONIC KIDNEY DISEASE) STAGE 4, GFR 15-29 ML/MIN (H): ICD-10-CM

## 2023-05-23 DIAGNOSIS — F33.42 RECURRENT MAJOR DEPRESSIVE DISORDER, IN FULL REMISSION (H): ICD-10-CM

## 2023-05-23 PROBLEM — L40.9 PSORIASIS: Status: ACTIVE | Noted: 2023-05-23

## 2023-05-23 PROBLEM — Z87.19 HISTORY OF ULCERATIVE COLITIS: Status: ACTIVE | Noted: 2023-05-23

## 2023-05-23 PROBLEM — Z96.652 S/P TOTAL KNEE ARTHROPLASTY, LEFT: Status: ACTIVE | Noted: 2023-05-23

## 2023-05-23 PROBLEM — N39.0 RECURRENT UTI: Status: ACTIVE | Noted: 2023-05-23

## 2023-05-23 PROBLEM — F41.9 ANXIETY: Status: ACTIVE | Noted: 2023-05-23

## 2023-05-23 PROBLEM — M15.9 OSTEOARTHRITIS OF MULTIPLE JOINTS: Status: ACTIVE | Noted: 2023-05-23

## 2023-05-23 PROBLEM — D63.1 ANEMIA DUE TO STAGE 4 CHRONIC KIDNEY DISEASE (H): Status: ACTIVE | Noted: 2023-05-23

## 2023-05-23 PROBLEM — I50.9 CHF (CONGESTIVE HEART FAILURE) (H): Status: RESOLVED | Noted: 2023-05-23 | Resolved: 2023-05-23

## 2023-05-23 PROBLEM — I49.5 SICK SINUS SYNDROME (H): Status: ACTIVE | Noted: 2023-05-23

## 2023-05-23 PROBLEM — E55.9 VITAMIN D DEFICIENCY: Status: ACTIVE | Noted: 2023-05-23

## 2023-05-23 PROBLEM — K51.00 ULCERATIVE PANCOLITIS (H): Status: ACTIVE | Noted: 2023-05-23

## 2023-05-23 PROBLEM — M41.9 KYPHOSCOLIOSIS: Status: ACTIVE | Noted: 2023-05-23

## 2023-05-23 PROBLEM — Z96.651 S/P TOTAL KNEE REPLACEMENT, RIGHT: Status: ACTIVE | Noted: 2023-05-23

## 2023-05-23 PROBLEM — I50.9 CHF (CONGESTIVE HEART FAILURE) (H): Status: ACTIVE | Noted: 2023-05-23

## 2023-05-23 LAB
ALBUMIN SERPL BCG-MCNC: 3.8 G/DL (ref 3.5–5.2)
ALBUMIN UR-MCNC: NEGATIVE MG/DL
ALP SERPL-CCNC: 75 U/L (ref 35–104)
ALT SERPL W P-5'-P-CCNC: 21 U/L (ref 10–35)
ANION GAP SERPL CALCULATED.3IONS-SCNC: 12 MMOL/L (ref 7–15)
APPEARANCE UR: CLEAR
AST SERPL W P-5'-P-CCNC: 41 U/L (ref 10–35)
BACTERIA #/AREA URNS HPF: ABNORMAL /HPF
BASOPHILS # BLD AUTO: 0 10E3/UL (ref 0–0.2)
BASOPHILS NFR BLD AUTO: 1 %
BILIRUB SERPL-MCNC: 0.2 MG/DL
BILIRUB UR QL STRIP: NEGATIVE
BUN SERPL-MCNC: 97.3 MG/DL (ref 8–23)
CALCIUM SERPL-MCNC: 9.1 MG/DL (ref 8.8–10.2)
CHLORIDE SERPL-SCNC: 102 MMOL/L (ref 98–107)
COLOR UR AUTO: YELLOW
CREAT SERPL-MCNC: 2.95 MG/DL (ref 0.51–0.95)
CREAT UR-MCNC: 71.9 MG/DL
DEPRECATED HCO3 PLAS-SCNC: 22 MMOL/L (ref 22–29)
EOSINOPHIL # BLD AUTO: 0.1 10E3/UL (ref 0–0.7)
EOSINOPHIL NFR BLD AUTO: 2 %
ERYTHROCYTE [DISTWIDTH] IN BLOOD BY AUTOMATED COUNT: 12 % (ref 10–15)
GFR SERPL CREATININE-BSD FRML MDRD: 15 ML/MIN/1.73M2
GLUCOSE SERPL-MCNC: 111 MG/DL (ref 70–99)
GLUCOSE UR STRIP-MCNC: NEGATIVE MG/DL
HCT VFR BLD AUTO: 33.3 % (ref 35–47)
HGB BLD-MCNC: 10.9 G/DL (ref 11.7–15.7)
HGB UR QL STRIP: NEGATIVE
IMM GRANULOCYTES # BLD: 0 10E3/UL
IMM GRANULOCYTES NFR BLD: 0 %
KETONES UR STRIP-MCNC: NEGATIVE MG/DL
LEUKOCYTE ESTERASE UR QL STRIP: ABNORMAL
LYMPHOCYTES # BLD AUTO: 0.5 10E3/UL (ref 0.8–5.3)
LYMPHOCYTES NFR BLD AUTO: 11 %
MCH RBC QN AUTO: 33.5 PG (ref 26.5–33)
MCHC RBC AUTO-ENTMCNC: 32.7 G/DL (ref 31.5–36.5)
MCV RBC AUTO: 103 FL (ref 78–100)
MICROALBUMIN UR-MCNC: 77.7 MG/L
MICROALBUMIN/CREAT UR: 108.07 MG/G CR (ref 0–25)
MONOCYTES # BLD AUTO: 0.4 10E3/UL (ref 0–1.3)
MONOCYTES NFR BLD AUTO: 8 %
NEUTROPHILS # BLD AUTO: 3.8 10E3/UL (ref 1.6–8.3)
NEUTROPHILS NFR BLD AUTO: 78 %
NITRATE UR QL: NEGATIVE
PH UR STRIP: 5 [PH] (ref 5–7)
PHOSPHATE SERPL-MCNC: 4.8 MG/DL (ref 2.5–4.5)
PLATELET # BLD AUTO: 178 10E3/UL (ref 150–450)
POTASSIUM SERPL-SCNC: 5 MMOL/L (ref 3.4–5.3)
PROT SERPL-MCNC: 6.6 G/DL (ref 6.4–8.3)
PTH-INTACT SERPL-MCNC: 56 PG/ML (ref 15–65)
RBC # BLD AUTO: 3.25 10E6/UL (ref 3.8–5.2)
RBC #/AREA URNS AUTO: ABNORMAL /HPF
SODIUM SERPL-SCNC: 136 MMOL/L (ref 136–145)
SP GR UR STRIP: 1.01 (ref 1–1.03)
SQUAMOUS #/AREA URNS AUTO: ABNORMAL /LPF
TSH SERPL DL<=0.005 MIU/L-ACNC: 1.04 UIU/ML (ref 0.3–4.2)
UROBILINOGEN UR STRIP-ACNC: 0.2 E.U./DL
WBC # BLD AUTO: 4.9 10E3/UL (ref 4–11)
WBC #/AREA URNS AUTO: ABNORMAL /HPF

## 2023-05-23 PROCEDURE — 84100 ASSAY OF PHOSPHORUS: CPT | Performed by: FAMILY MEDICINE

## 2023-05-23 PROCEDURE — 81001 URINALYSIS AUTO W/SCOPE: CPT | Performed by: FAMILY MEDICINE

## 2023-05-23 PROCEDURE — 87086 URINE CULTURE/COLONY COUNT: CPT | Performed by: FAMILY MEDICINE

## 2023-05-23 PROCEDURE — 99205 OFFICE O/P NEW HI 60 MIN: CPT | Performed by: FAMILY MEDICINE

## 2023-05-23 PROCEDURE — 87088 URINE BACTERIA CULTURE: CPT | Performed by: FAMILY MEDICINE

## 2023-05-23 PROCEDURE — 82043 UR ALBUMIN QUANTITATIVE: CPT | Performed by: FAMILY MEDICINE

## 2023-05-23 PROCEDURE — 83970 ASSAY OF PARATHORMONE: CPT | Performed by: FAMILY MEDICINE

## 2023-05-23 PROCEDURE — 36415 COLL VENOUS BLD VENIPUNCTURE: CPT | Performed by: FAMILY MEDICINE

## 2023-05-23 PROCEDURE — 87186 SC STD MICRODIL/AGAR DIL: CPT | Performed by: FAMILY MEDICINE

## 2023-05-23 PROCEDURE — 80050 GENERAL HEALTH PANEL: CPT | Performed by: FAMILY MEDICINE

## 2023-05-23 PROCEDURE — 82570 ASSAY OF URINE CREATININE: CPT | Performed by: FAMILY MEDICINE

## 2023-05-23 RX ORDER — CLONIDINE HYDROCHLORIDE 0.1 MG/1
0.1 TABLET ORAL 2 TIMES DAILY
COMMUNITY
End: 2023-05-23

## 2023-05-23 RX ORDER — FUROSEMIDE 20 MG
40 TABLET ORAL 2 TIMES DAILY
COMMUNITY
End: 2023-05-23

## 2023-05-23 RX ORDER — KARAYA GUM
POWDER (GRAM) TOPICAL
Qty: 128 G | Refills: 3 | Status: SHIPPED | OUTPATIENT
Start: 2023-05-23 | End: 2023-01-01 | Stop reason: ALTCHOICE

## 2023-05-23 RX ORDER — KARAYA GUM
POWDER (GRAM) TOPICAL
COMMUNITY
End: 2023-05-23

## 2023-05-23 RX ORDER — DOXAZOSIN 1 MG/1
1 TABLET ORAL AT BEDTIME
COMMUNITY
End: 2023-05-23

## 2023-05-23 RX ORDER — DOXYLAMINE SUCCINATE AND PYRIDOXINE HYDROCHLORIDE, DELAYED RELEASE TABLETS 10 MG/10 MG 10; 10 MG/1; MG/1
TABLET, DELAYED RELEASE ORAL
COMMUNITY
End: 2023-01-01 | Stop reason: ALTCHOICE

## 2023-05-23 RX ORDER — ALBUTEROL SULFATE 90 UG/1
2 AEROSOL, METERED RESPIRATORY (INHALATION) EVERY 4 HOURS PRN
Qty: 18 G | Refills: 3 | Status: SHIPPED | OUTPATIENT
Start: 2023-05-23 | End: 2023-01-01

## 2023-05-23 RX ORDER — CHLORTHALIDONE 25 MG/1
25 TABLET ORAL DAILY
COMMUNITY
End: 2023-05-23

## 2023-05-23 RX ORDER — BUDESONIDE 0.5 MG/2ML
0.5 INHALANT ORAL 2 TIMES DAILY
Qty: 360 ML | Refills: 3 | Status: SHIPPED | OUTPATIENT
Start: 2023-05-23 | End: 2023-06-05

## 2023-05-23 RX ORDER — CHLORTHALIDONE 25 MG/1
25 TABLET ORAL DAILY
Qty: 90 TABLET | Refills: 3 | Status: SHIPPED | OUTPATIENT
Start: 2023-05-23 | End: 2023-07-14

## 2023-05-23 RX ORDER — POTASSIUM CHLORIDE 1500 MG/1
20 TABLET, EXTENDED RELEASE ORAL DAILY
Qty: 90 TABLET | Refills: 3 | Status: SHIPPED | OUTPATIENT
Start: 2023-05-23 | End: 2023-06-12

## 2023-05-23 RX ORDER — FERROUS SULFATE 325(65) MG
325 TABLET ORAL 2 TIMES DAILY WITH MEALS
COMMUNITY
End: 2023-05-23

## 2023-05-23 RX ORDER — CLONIDINE HYDROCHLORIDE 0.1 MG/1
0.1 TABLET ORAL 2 TIMES DAILY
Qty: 180 TABLET | Refills: 3 | Status: SHIPPED | OUTPATIENT
Start: 2023-05-23 | End: 2023-08-07

## 2023-05-23 RX ORDER — DOXYCYCLINE 100 MG/1
100 CAPSULE ORAL
COMMUNITY
End: 2023-05-23

## 2023-05-23 RX ORDER — FUROSEMIDE 40 MG
40 TABLET ORAL 2 TIMES DAILY
Qty: 180 TABLET | Refills: 3 | Status: SHIPPED | OUTPATIENT
Start: 2023-05-23 | End: 2023-06-12

## 2023-05-23 RX ORDER — FERROUS SULFATE 325(65) MG
325 TABLET ORAL 2 TIMES DAILY WITH MEALS
Qty: 180 TABLET | Refills: 3 | Status: SHIPPED | OUTPATIENT
Start: 2023-05-23 | End: 2023-06-12

## 2023-05-23 RX ORDER — MONTELUKAST SODIUM 10 MG/1
10 TABLET ORAL AT BEDTIME
COMMUNITY
End: 2023-05-23

## 2023-05-23 RX ORDER — CITALOPRAM HYDROBROMIDE 20 MG/1
20 TABLET ORAL DAILY
Qty: 90 TABLET | Refills: 1 | Status: SHIPPED | OUTPATIENT
Start: 2023-05-23 | End: 2023-06-19

## 2023-05-23 RX ORDER — ALBUTEROL SULFATE 90 UG/1
2 AEROSOL, METERED RESPIRATORY (INHALATION) EVERY 6 HOURS PRN
COMMUNITY
End: 2023-05-23

## 2023-05-23 RX ORDER — CARVEDILOL 25 MG/1
25 TABLET ORAL 2 TIMES DAILY WITH MEALS
Qty: 180 TABLET | Refills: 3 | Status: SHIPPED | OUTPATIENT
Start: 2023-05-23 | End: 2023-08-07

## 2023-05-23 RX ORDER — AMLODIPINE BESYLATE 10 MG/1
10 TABLET ORAL DAILY
Qty: 90 TABLET | Refills: 3 | Status: SHIPPED | OUTPATIENT
Start: 2023-05-23 | End: 2024-01-01

## 2023-05-23 RX ORDER — BUDESONIDE 0.5 MG/2ML
0.5 INHALANT ORAL 2 TIMES DAILY
COMMUNITY
End: 2023-05-23

## 2023-05-23 RX ORDER — CARVEDILOL 25 MG/1
25 TABLET ORAL 2 TIMES DAILY WITH MEALS
COMMUNITY
End: 2023-05-23

## 2023-05-23 RX ORDER — POTASSIUM CHLORIDE 750 MG/1
20 TABLET, EXTENDED RELEASE ORAL DAILY
COMMUNITY
End: 2023-05-23

## 2023-05-23 RX ORDER — IPRATROPIUM BROMIDE AND ALBUTEROL SULFATE 2.5; .5 MG/3ML; MG/3ML
1 SOLUTION RESPIRATORY (INHALATION) 2 TIMES DAILY
Qty: 180 ML | Refills: 11 | Status: SHIPPED | OUTPATIENT
Start: 2023-05-23 | End: 2023-08-14

## 2023-05-23 RX ORDER — PRAVASTATIN SODIUM 80 MG/1
80 TABLET ORAL DAILY
COMMUNITY
End: 2023-05-23

## 2023-05-23 RX ORDER — MONTELUKAST SODIUM 10 MG/1
10 TABLET ORAL AT BEDTIME
Qty: 90 TABLET | Refills: 3 | Status: SHIPPED | OUTPATIENT
Start: 2023-05-23 | End: 2024-01-01

## 2023-05-23 RX ORDER — PRAVASTATIN SODIUM 80 MG/1
80 TABLET ORAL DAILY
Qty: 90 TABLET | Refills: 3 | Status: SHIPPED | OUTPATIENT
Start: 2023-05-23 | End: 2023-08-11

## 2023-05-23 RX ORDER — DOXAZOSIN 1 MG/1
1 TABLET ORAL AT BEDTIME
Qty: 90 TABLET | Refills: 3 | Status: ON HOLD | OUTPATIENT
Start: 2023-05-23 | End: 2023-01-01

## 2023-05-23 RX ORDER — CITALOPRAM HYDROBROMIDE 20 MG/1
20 TABLET ORAL DAILY
COMMUNITY
End: 2023-05-23

## 2023-05-23 RX ORDER — AMLODIPINE BESYLATE 10 MG/1
10 TABLET ORAL DAILY
COMMUNITY
End: 2023-05-23

## 2023-05-23 RX ORDER — DOXYCYCLINE 100 MG/1
100 CAPSULE ORAL
Qty: 39 CAPSULE | Refills: 3 | Status: ON HOLD | OUTPATIENT
Start: 2023-05-23 | End: 2024-01-01

## 2023-05-23 RX ORDER — SENNOSIDES 8.6 MG
1300 CAPSULE ORAL EVERY MORNING
COMMUNITY

## 2023-05-23 RX ORDER — IPRATROPIUM BROMIDE AND ALBUTEROL SULFATE 2.5; .5 MG/3ML; MG/3ML
1 SOLUTION RESPIRATORY (INHALATION) 2 TIMES DAILY
COMMUNITY
End: 2023-05-23

## 2023-05-23 RX ORDER — MULTIPLE VITAMINS W/ MINERALS TAB 9MG-400MCG
1 TAB ORAL DAILY
COMMUNITY

## 2023-05-23 NOTE — PROGRESS NOTES
Assessment & Plan     Encounter to establish care with new doctor  Reviewed problem list, medications, allergies, past medical history, surgical history, social history, and family history.      Pneumonia of right lung due to infectious organism, unspecified part of lung  Patient remains on oxygen, but will need to get supplies from a [place] provider, as her current equipment is from [place].  - Home Oxygen Order for DME - ONLY FOR DME    CKD (chronic kidney disease) stage 4, GFR 15-29 ml/min (H)  Referral to Nephrology.  - Adult Nephrology  Referral; Future  - Comprehensive metabolic panel (BMP + Alb, Alk Phos, ALT, AST, Total. Bili, TP); Future  - CBC with platelets and differential; Future  - UA Macroscopic with reflex to Microscopic and Culture; Future  - Phosphorus; Future  - Albumin Random Urine Quantitative with Creat Ratio; Future  - Comprehensive metabolic panel (BMP + Alb, Alk Phos, ALT, AST, Total. Bili, TP)  - CBC with platelets and differential  - UA Macroscopic with reflex to Microscopic and Culture  - Phosphorus  - Albumin Random Urine Quantitative with Creat Ratio  - UA Microscopic with Reflex to Culture  - Urine Culture    Benign essential hypertension  Referral to Cardiology.  Medications refilled today.  - Adult Cardiology Eval  Referral; Future  - amLODIPine (NORVASC) 10 MG tablet; Take 1 tablet (10 mg) by mouth daily  - carvedilol (COREG) 25 MG tablet; Take 1 tablet (25 mg) by mouth 2 times daily (with meals)  - chlorthalidone (HYGROTON) 25 MG tablet; Take 1 tablet (25 mg) by mouth daily  - cloNIDine (CATAPRES) 0.1 MG tablet; Take 1 tablet (0.1 mg) by mouth 2 times daily  - doxazosin (CARDURA) 1 MG tablet; Take 1 tablet (1 mg) by mouth At Bedtime    Hyperlipidemia LDL goal <100  Referral to cardiology, will need to update labs.  - Adult Cardiology Eval  Referral; Future  - pravastatin (PRAVACHOL) 80 MG tablet; Take 1 tablet (80 mg) by mouth daily  - Lipid panel reflex to  direct LDL Fasting    Cardiac pacemaker in situ  - Adult Cardiology Eval  Referral; Future    Chronic bronchitis, unspecified chronic bronchitis type (H)  On oxygen secondary to recent pneumonia infection, will continue for now and work on weaning as tolerated.  Refill current medications, consider pulmonology referral in future.  - albuterol (PROAIR HFA/PROVENTIL HFA/VENTOLIN HFA) 108 (90 Base) MCG/ACT inhaler; Inhale 2 puffs into the lungs every 4 hours as needed for shortness of breath, wheezing or cough  - budesonide (PULMICORT) 0.5 MG/2ML neb solution; Take 2 mLs (0.5 mg) by nebulization 2 times daily  - doxycycline hyclate (VIBRAMYCIN) 100 MG capsule; Take 1 capsule (100 mg) by mouth three times a week  - ipratropium - albuterol 0.5 mg/2.5 mg/3 mL (DUONEB) 0.5-2.5 (3) MG/3ML neb solution; Take 1 vial (3 mLs) by nebulization 2 times daily  - montelukast (SINGULAIR) 10 MG tablet; Take 1 tablet (10 mg) by mouth At Bedtime    Ileostomy status (H)  Referral to GI, will have SB3 PAL assist in getting ostomy supplies filled. Patient can only use the Karaya paste due to skin sensitivity.  - Adult GI  Referral - Consult Only; Future  - Ostomy Supplies (KARAYA PASTE) PSTE; Use as directed    Recurrent major depressive disorder, in full remission (H)  Controlled, continue current medication.  - citalopram (CELEXA) 20 MG tablet; Take 1 tablet (20 mg) by mouth daily    Generalized anxiety disorder  Continue current medications.  - TSH with free T4 reflex; Future  - TSH with free T4 reflex  - citalopram (CELEXA) 20 MG tablet; Take 1 tablet (20 mg) by mouth daily    Chronic heart failure with preserved ejection fraction (H)  Referral to cardiology, continue current medications.  - Adult Cardiology Eval  Referral; Future  - carvedilol (COREG) 25 MG tablet; Take 1 tablet (25 mg) by mouth 2 times daily (with meals)  - furosemide (LASIX) 40 MG tablet; Take 1 tablet (40 mg) by mouth 2 times daily  -  potassium chloride ER (K-TAB) 20 MEQ CR tablet; Take 1 tablet (20 mEq) by mouth daily    Parathyroid gland disorder (H)  Worsening per last records, check labs, Recommendations pending results.  - TSH with free T4 reflex; Future  - Parathyroid Hormone Intact; Future  - TSH with free T4 reflex  - Parathyroid Hormone Intact    Iron deficiency anemia, unspecified iron deficiency anemia type  Continue current medication.  - ferrous sulfate (FEROSUL) 325 (65 Fe) MG tablet; Take 1 tablet (325 mg) by mouth 2 times daily (with meals)      I certify that this patient, Arpita Rocha has been under my care (or a nurse practitioner or physican's assistant working with me). This is the face-to-face encounter for oxygen medical necessity.      At the time of this encounter supplemental oxygen is reasonable and necessary and is expected to improve the patient's condition in a home setting.       Patient has continued oxygen desaturation due to Pneumonia J18.9.    If portability is ordered, is the patient mobile within the home? yes          71 minutes spent by me on the date of the encounter doing chart review, history and exam, documentation and further activities per the note    See Patient Instructions    Ludivina Canales MD  [facility]    Alex Palm is a 87 year old, presenting for the following health issues:  ER F/U        5/23/2023    12:43 PM   Additional Questions   Roomed by Audelia Arenas [facility]     ED/UC Followup:    Facility:  [facility]   Date of visit: discharged May 6th   Reason for visit: pneumonia     Establish care     referral for Cardiologist, Neuphrology     records were faxed 2 weeks ago     Patient has medication list with her       Discharged from hospital 2 weeks ago from pneumonia. On oxygen, will need oygen from here, has concerntrator at home. COPD before pneumonia without oxygen, discharged with oxygen after pneumonia, currently 3L per [place]. Send orders  "to [facility] for oxygen. [facility] [address] [place] for Medications.    Has ileostomy, will need GI referral. Will need ostomy supplies, (premier drain flextend convex use as directed every 3-4 days, z43.2, 7/8\", premier pouch, karaya paste, etc).    Needs follow up for renal function, needs labs done.     Current Outpatient Medications   Medication Sig Dispense Refill     acetaminophen (TYLENOL) 650 MG CR tablet Take 1,300 mg by mouth every morning       albuterol (PROAIR HFA/PROVENTIL HFA/VENTOLIN HFA) 108 (90 Base) MCG/ACT inhaler Inhale 2 puffs into the lungs every 6 hours as needed       amLODIPine (NORVASC) 10 MG tablet Take 10 mg by mouth daily       Bioflavonoid Products (VITAMIN C) CHEW        budesonide (PULMICORT) 0.5 MG/2ML neb solution Take 0.5 mg by nebulization 2 times daily       carvedilol (COREG) 25 MG tablet Take 25 mg by mouth 2 times daily (with meals)       chlorthalidone (HYGROTON) 25 MG tablet Take 25 mg by mouth daily       citalopram (CELEXA) 20 MG tablet Take 20 mg by mouth daily       cloNIDine (CATAPRES) 0.1 MG tablet Take 0.1 mg by mouth 2 times daily       Cranberry 450 MG TABS        doxazosin (CARDURA) 1 MG tablet Take 1 mg by mouth At Bedtime       doxycycline hyclate (VIBRAMYCIN) 100 MG capsule Take 100 mg by mouth three times a week       Doxylamine-Pyridoxine 10-10 MG TBEC        ferrous sulfate (FEROSUL) 325 (65 Fe) MG tablet Take 325 mg by mouth 2 times daily (with meals)       furosemide (LASIX) 20 MG tablet Take 40 mg by mouth 2 times daily       ipratropium - albuterol 0.5 mg/2.5 mg/3 mL (DUONEB) 0.5-2.5 (3) MG/3ML neb solution Take 1 vial by nebulization 2 times daily       montelukast (SINGULAIR) 10 MG tablet Take 10 mg by mouth At Bedtime       multivitamin w/minerals (THERA-VIT-M) tablet Take 1 tablet by mouth daily       Ostomy Supplies (KARAYA PASTE) PSTE        potassium chloride ER (K-TAB/KLOR-CON) 10 MEQ CR tablet Take 20 mEq by mouth daily       " pravastatin (PRAVACHOL) 80 MG tablet Take 80 mg by mouth daily           Review of Systems   Constitutional, HEENT, cardiovascular, pulmonary, gi and gu systems are negative, except as otherwise noted.      Objective    BP (!) 153/67   Pulse 64   Temp 97.8  F (36.6  C) (Oral)   Resp 17   SpO2 96%   There is no height or weight on file to calculate BMI.  Physical Exam   GENERAL: healthy, alert and no distress  PSYCH: mentation appears normal, affect normal/bright

## 2023-05-23 NOTE — Clinical Note
New patient, needs oxygen order sent to [facility].  Needs ostomy supplies, but I cannot find the order?

## 2023-05-24 ENCOUNTER — TELEPHONE (OUTPATIENT)
Dept: NEPHROLOGY | Facility: CLINIC | Age: 88
End: 2023-05-24
Payer: MEDICARE

## 2023-05-24 ENCOUNTER — ALLIED HEALTH/NURSE VISIT (OUTPATIENT)
Dept: FAMILY MEDICINE | Facility: CLINIC | Age: 88
End: 2023-05-24
Payer: MEDICARE

## 2023-05-24 ENCOUNTER — PATIENT OUTREACH (OUTPATIENT)
Dept: FAMILY MEDICINE | Facility: CLINIC | Age: 88
End: 2023-05-24
Payer: MEDICARE

## 2023-05-24 VITALS — OXYGEN SATURATION: 98 % | HEART RATE: 60 BPM

## 2023-05-24 DIAGNOSIS — Z93.2 ILEOSTOMY STATUS (H): Primary | ICD-10-CM

## 2023-05-24 DIAGNOSIS — Z43.2 ILEOSTOMY CARE (H): ICD-10-CM

## 2023-05-24 DIAGNOSIS — J42 CHRONIC BRONCHITIS, UNSPECIFIED CHRONIC BRONCHITIS TYPE (H): Primary | ICD-10-CM

## 2023-05-24 PROCEDURE — 99207 PR NO CHARGE NURSE ONLY: CPT

## 2023-05-24 RX ORDER — OSTOMY SUPPLY
PASTE (GRAM) MISCELLANEOUS
Qty: 120 G | Refills: 3 | Status: SHIPPED | OUTPATIENT
Start: 2023-05-24

## 2023-05-24 RX ORDER — OSTOMY SUPPLY 1"
EACH MISCELLANEOUS
Qty: 5 EACH | Refills: 12 | Status: SHIPPED | OUTPATIENT
Start: 2023-05-24

## 2023-05-24 NOTE — TELEPHONE ENCOUNTER
- [place] home [facility] calling to request addendum to the LOV with Dr. MCCANN.    - Need the Face to Face documentation added to the note     - Patient will also need a walk test scheduled to be done in clinic for oxygen to be supplied.     - Called patient's son (CTC on file) spoke with the reps for [employer], advised that the karaya paste is no longer being manufactured. Requests that new script for adapt paste be written instead.     - Routing to PCP to addend, and to sign the order for the adapt paste     Nando Webber RN  Patient Advocate Liaison (PAL)  [facility]  [phone]    05/24/2023 at 1:12 PM

## 2023-05-24 NOTE — TELEPHONE ENCOUNTER
[facility] Call Center    Phone Message    May a detailed message be left on voicemail: yes     Reason for Call: Order(s): Other:   Reason for requested: initial visit CKD labs orders  Date needed: 9/6/23  Provider name: Dr. Mclain      Action Taken: Other: cs neph    Travel Screening: Not Applicable

## 2023-05-24 NOTE — TELEPHONE ENCOUNTER
- SB 3 PAL Welcome Letter Sent  - Scheduled ANW   Appointments in Next Year    Aug 21, 2023 10:00 AM  (Arrive by 9:40 AM)  Annual Wellness Visit with April Justina Canales MD  [facility] ([facility] ) [phone]        Phone numbers provided for referrals   - GI Referral for ileostomy = [phone]  - Cardiology referral = [phone]  - Nephrology Referral  = [phone]    Nando Webber - Patient Advocate Liaison - PAL RN  [facility]  [phone]

## 2023-05-24 NOTE — TELEPHONE ENCOUNTER
Faxed the following orders to [facility] ([phone]).  - Home O2 orders  - Orders for ostomy supplies    - Sent 05/24/2023 @ 11:39 AM    Nando Webber RN  Patient Advocate Liaison (PAL)  [facility]  [phone]    05/24/2023 at 11:51 AM

## 2023-05-24 NOTE — PROGRESS NOTES
Patient has been assessed for Home Oxygen needs. Oxygen readings:    *Pulse oximetry (SpO2) = 93% on room air at rest while awake.    *SpO2 improved to 98% on 2liters/minute at rest.    *SpO2 = 86% on room air during activity/with exercise.    Dropped from 98% to 86% in approximately 30 seconds of walking.     *SpO2 improved to 88% on 2 liters/minute during activity/with exercise.    Nando Webber RN  Patient Advocate Liaison (PAL)  [facility]  [phone]    05/24/2023 at 4:17 PM

## 2023-05-24 NOTE — LETTER
Yolis Palm,    Thank you for choosing [facility] today for your health care needs.     [facility] is transforming primary care  At [facility], we re dedicated to constantly improve how we serve the health care needs of our patients and communities. We re currently making changes to the way we deliver care.     Changes you ll notice include:  An emphasis on building a relationship with a primary care provider  Access to a PAL (patient advocate and liaison) to help guide you with your care needs  Appointment lengths tailored to your specific needs and greater access to a care team to help you and your provider improve and maintain your health and well-being  Improved online access to your care team    Benefits of a primary care provider  If you don t have a designated primary care provider, we encourage you to get to know our care team online and find a provider you d like to see. Most of our providers have a short video on their online provider page. Visit [facility] to explore our providers and locations.    Benefits of having a primary care provider include:    They get to know you - your health history, family history and goals, making it easier to make a health plan together.   You get to know them - making health-related conversations and decisions easier    Primary care doctors help you when you re sick or hurt - but also focus on keeping you healthy with preventive care and screenings.    A doctor who sees you regularly is more likely to notice changes in your health.   You ll be connected to a broad care team who partners with your provider to support you.    Patient Advocate Liaison (PAL)   To help make sure you get the right care, at the right time, we include PALs, or Patient Advocate Liaisons, as part of your care team. Your PAL will be your first line of contact. They ll advocate for your needs and help you navigate our services, connecting you with care team members and  community resources to ensure your care is well coordinated. You ll be introduced to a PAL in an upcoming visit. You can reach your PAL (Nando GAO RN) at [phone].    Expanded care team access with tailored appointment lengths  Depending on your health care needs, you may have longer or shorter appointments and see additional care team providers - including Medication Therapy Management (MTM) pharmacists, diabetes educators, behavioral health clinicians, or social workers. At times, they may be included in your visit with your provider, or you may see them individually.     Online access to your health care records and care team  [employer] is our online tool that makes it easy to see your health care information and communicate with your care team.     ADTELLIGENCE allows you to:   View your health maintenance plan so you know when you re due for a preventive screening  Send secure messages to your care team  View your health history and visit summaries   Schedule appointments   Complete questionnaires and eCheck-in before appointments    Get care from your provider with an e-visit    View and pay your bill     Sign up at [email or web address]. Once you have an account, you also can download the mobile carlos.     Connecting to fast and convenient care  When you need fast, convenient care - consider one of the following options:     Video Visit: A convenient care option for visiting with your provider out of the comfort of your own home. Most of the things you come to the clinic to address with your provider can now be done virtually through a video. This includes your chronic medication follow up, questions or concerns you may have, and even your annual Medicare Wellness Visit.     Phone Visit: Another convenient option for follow up of common problems that may require a more in-depth discussion with your provider.     E-visit: When you need acute care quickly, or have a quick question about your medication, an E-visit is  completed through [employer] and your provider will respond within one business day.      Nando FAULKNER RN  Patient Advocate Liaison (PAL)  [facility]  [phone]

## 2023-05-24 NOTE — TELEPHONE ENCOUNTER
- Call placed to [facility] ([phone]) to inquire on supply. [facility] does not have the Karaya paste, but has powder, does not have the Premier drainable pouch ostomy supply     - Call placed to [facility] ([phone]) to inquire on supply. [facility] does not have the Karaya paste, but has powder, does not have the Premier drainable pouch ostomy supply any more, no longer supplied.     - Call placed to [facility] ([phone]) to inquire on supply.[facility] does not have Ostomy supplies.     - Call placed to [facility] ([phone]) to inquire on supply. [facility] does not have the Karaya paste, but has powder, does not have the Premier drainable pouch ostomy supply, but can get it ordered for the patient. Requires Office notes, progress notes, and prescriptions faxed to [phone].    - Call placed to Daquan (CTC on file), advised that if adamant about getting the paste, will need to contact [place] supplier to see what supply chains carry the paste. Patient does not want to try any other product due to skin sensitivity.     - Will await call back from Daquan on where they can get the paste supplied.    - Teed up ostomy supplies for provider signature     Nando Webber RN  Patient Advocate Liaison (PAL)  [facility]  [phone]    05/24/2023 at 9:41 AM

## 2023-05-25 ENCOUNTER — MYC MEDICAL ADVICE (OUTPATIENT)
Dept: FAMILY MEDICINE | Facility: CLINIC | Age: 88
End: 2023-05-25
Payer: MEDICARE

## 2023-05-25 ENCOUNTER — TELEPHONE (OUTPATIENT)
Dept: NEPHROLOGY | Facility: CLINIC | Age: 88
End: 2023-05-25
Payer: MEDICARE

## 2023-05-25 DIAGNOSIS — N39.0 URINARY TRACT INFECTION WITHOUT HEMATURIA, SITE UNSPECIFIED: Primary | ICD-10-CM

## 2023-05-25 DIAGNOSIS — D63.1 ANEMIA DUE TO STAGE 4 CHRONIC KIDNEY DISEASE (H): ICD-10-CM

## 2023-05-25 DIAGNOSIS — N18.4 ANEMIA DUE TO STAGE 4 CHRONIC KIDNEY DISEASE (H): ICD-10-CM

## 2023-05-25 DIAGNOSIS — N18.4 CKD (CHRONIC KIDNEY DISEASE) STAGE 4, GFR 15-29 ML/MIN (H): ICD-10-CM

## 2023-05-25 RX ORDER — CEPHALEXIN 500 MG/1
500 CAPSULE ORAL 2 TIMES DAILY
Qty: 14 CAPSULE | Refills: 0 | Status: SHIPPED | OUTPATIENT
Start: 2023-05-25 | End: 2023-06-01

## 2023-05-25 NOTE — TELEPHONE ENCOUNTER
Earlier appt scheduled from wait list previously scheduled for 9/6 at [place] location to now 6/12 @ 9:30 with labs at 8:15 at [facility] location (same provider). Pt's son (Daquan) in agreement, lab orders needed.    Yahaira Silveira    Nephrology Clinic Navigator.

## 2023-05-25 NOTE — CONFIDENTIAL NOTE
DIAGNOSIS:  CKD (chronic kidney disease) stage 4, GFR 15-29 ml/min    DATE RECEIVED: 06.12.2023    NOTES STATUS DETAILS   OFFICE NOTE from referring provider Internal 05.23.2023 Ludivina Chamberlain MD   OFFICE NOTE from other specialist      *Only VASCULITIS or LUPUS gather office notes for the following     *PULMONARY       *ENT     *DERMATOLOGY     *RHEUMATOLOGY     DISCHARGE SUMMARY from hospital     DISCHARGE REPORT from the ER     MEDICATION LIST Internal    IMAGING  (NEED IMAGES AND REPORTS)     KIDNEY CT SCAN     KIDNEY ULTRASOUND     MR ABDOMEN     NUCLEAR MEDICINE RENAL     LABS     CBC Internal 05.23.2023   CMP Internal 05.23.2023   BMP     UA Internal 05.23.2023   URINE PROTEIN Internal 05.23.2023   RENAL PANEL     BIOPSY     KIDNEY BIOPSY

## 2023-05-25 NOTE — TELEPHONE ENCOUNTER
- updated forms as requested by [facility] re-faxed to ([phone]).    - Home O2 orders + F2F + Walk Test  - Ostomy Supplies + Paste script.    Sent 05/25/2023 @ 0940 AM    Nando Webber RN  Patient Advocate Liaison (PAL)  [facility]  [phone]    05/25/2023 at 9:40 AM

## 2023-05-26 ENCOUNTER — TELEPHONE (OUTPATIENT)
Dept: FAMILY MEDICINE | Facility: CLINIC | Age: 88
End: 2023-05-26
Payer: MEDICARE

## 2023-05-26 NOTE — TELEPHONE ENCOUNTER
----- Message from April Justina Canales MD sent at 5/25/2023  5:53 PM CDT -----  Creatinine Clearance: 17 mL/min  Please call patient and let her know that her urine grew bacteria, indicating she has an infection.  Given that she recently was hospitalized, I want to treat her for the infection, even though she may not have symptoms.    I am sending in Cephalexin, one capsule (500 mg) twice daily      (Creatinine Clearance: 17 mL/min)

## 2023-05-26 NOTE — TELEPHONE ENCOUNTER
- Patient has question on Furosemide.   - Patient was taking this script 40 mg in AM and 20 mg in afternoon.  - Orders placed by Dr. MCCANN are for 40 mg 2 times daily.   - OK to continue with the 40 mg 2 times daily vs what she was on before (not seeing 60 mg in outside records)    - Routing to PCP to advise.    Nando Webber, RODRÍGUEZ  Patient Advocate Liaison (PAL)  [facility]  [phone]    05/26/2023 at 8:34 AM

## 2023-05-26 NOTE — TELEPHONE ENCOUNTER
Called pt and relayed provider message below. Patient was given an opportunity to ask questions, verbalized understanding of plan, and is agreeable.    Raine Bergman RN

## 2023-05-26 NOTE — TELEPHONE ENCOUNTER
- Call from Daquan (CTC on file)  - Requests which medication is the abx. Informed that it is the Cephalexin for her bacterial infection.   - Daquan will  script today.    - Questions about transferring to different pharmacy, education provided on how to transfer pharmacies. Will transition from [facility] pharmacy to [facility] pharmacy off [facility].    - Daquan given opportunity to ask questions, no further questions at this time.     Nando Webber RN  Patient Advocate Liaison (PAL)  [facility]  [phone]    05/26/2023 at 11:40 AM

## 2023-05-26 NOTE — TELEPHONE ENCOUNTER
Ok, what I found in the records was 40 mg BID.  If she is already taking 40 in the am and 20 in the PM, lets have her go down to 20 mg BID and recheck labs as planned next week.

## 2023-05-26 NOTE — TELEPHONE ENCOUNTER
Routing to Nando Richey, Registered Nurse, PAL (Patient Advocate Liaison)   [facility]   [phone]

## 2023-05-26 NOTE — TELEPHONE ENCOUNTER
- Relayed PCP recommendation via myc message.     Nando Webber, RN  Patient Advocate Liaison (PAL)  [facility]  [phone]    05/26/2023 at 10:20 AM

## 2023-05-27 LAB
BACTERIA UR CULT: ABNORMAL
BACTERIA UR CULT: ABNORMAL

## 2023-05-30 ENCOUNTER — MYC MEDICAL ADVICE (OUTPATIENT)
Dept: FAMILY MEDICINE | Facility: CLINIC | Age: 88
End: 2023-05-30
Payer: MEDICARE

## 2023-05-30 DIAGNOSIS — J42 CHRONIC BRONCHITIS, UNSPECIFIED CHRONIC BRONCHITIS TYPE (H): ICD-10-CM

## 2023-05-30 DIAGNOSIS — J44.89 CHRONIC BRONCHITIS WITH COPD (CHRONIC OBSTRUCTIVE PULMONARY DISEASE) (H): Primary | ICD-10-CM

## 2023-05-30 DIAGNOSIS — Z99.81 OXYGEN DEPENDENT: ICD-10-CM

## 2023-05-30 NOTE — TELEPHONE ENCOUNTER
Dr. Jose Canales   Please see my chart message, request for pulmonology referral     Thank you,   Maria C Richey, Registered Nurse  [facility]

## 2023-06-05 RX ORDER — BUDESONIDE 0.5 MG/2ML
0.5 INHALANT ORAL 2 TIMES DAILY
Qty: 360 ML | Refills: 3 | Status: SHIPPED | OUTPATIENT
Start: 2023-06-05 | End: 2023-06-05

## 2023-06-05 RX ORDER — BUDESONIDE 0.5 MG/2ML
0.5 INHALANT ORAL 2 TIMES DAILY
Qty: 360 ML | Refills: 3 | Status: SHIPPED | OUTPATIENT
Start: 2023-06-05 | End: 2024-01-01

## 2023-06-07 ENCOUNTER — MYC MEDICAL ADVICE (OUTPATIENT)
Dept: FAMILY MEDICINE | Facility: CLINIC | Age: 88
End: 2023-06-07
Payer: MEDICARE

## 2023-06-07 DIAGNOSIS — Z93.2 ILEOSTOMY STATUS (H): Primary | ICD-10-CM

## 2023-06-07 NOTE — TELEPHONE ENCOUNTER
Received a vm from Angela with [facility] returning call to writer.     -Angela is requesting a call back to [phone]. Angela states it is okay to leave a detailed message on secure line.     -Writegraeme returned call to [facility] and left a detailed message and requesting call back.     Angela returned call to write and was able to see orders placed and will process supply order and will likely be sent out tomorrow after processing.     -Replied to pt via [employer].     Renetta VARGAS RN   PAL (Patient Advocate Liaison)  [facility]  [phone]

## 2023-06-07 NOTE — TELEPHONE ENCOUNTER
Call placed to [facility] to determine if supplies have been sent to pt as a fax was sent on 5/24/23 to [phone] for pouches and paste.     -Left message to call back to CARMEN RN at [phone].    -Awaiting call back      RODRÍGUEZ Garcia (Patient Advocate Liaison)  [facility]  [phone]

## 2023-06-08 ENCOUNTER — TELEPHONE (OUTPATIENT)
Dept: WOUND CARE | Facility: CLINIC | Age: 88
End: 2023-06-08
Payer: MEDICARE

## 2023-06-08 NOTE — TELEPHONE ENCOUNTER
Spoke with pt's son Daquan to offer scheduling for referral from Dr. Jose Canales for ileostomy. Pt's son stated she is not needing an appt with ostomy nurse at this time and that they already received supplies through PCP. Stated they will call back if they need any appt with Roxanna in the future.

## 2023-06-08 NOTE — TELEPHONE ENCOUNTER
- Concerning patient's myc message about the adapt paste. Call placed to [facility] to inquire about the adapt paste and if this needs to be reordered or if it is good to send to patient. LMTCB     - Awaiting call back from [facility] to advise.    Nando Webber RN  Patient Advocate Liaison (PAL)  [facility]  [phone]    06/08/2023 at 5:50 PM

## 2023-06-09 DIAGNOSIS — I10 BENIGN ESSENTIAL HYPERTENSION: Primary | ICD-10-CM

## 2023-06-09 NOTE — TELEPHONE ENCOUNTER
Called Daquan ([facility] on file) advised that still waiting for [facility] to update on if the paste was sent.     Will be outreaching come Monday if nothing has been heard.     Daquan understands, and states that they have a little bit of the previous supply, so waiting until Monday is ok.    Nando Webber, RN  Patient Advocate Liaison (PAL)  [facility]  [phone]    06/09/2023 at 4:17 PM

## 2023-06-09 NOTE — TELEPHONE ENCOUNTER
- [facility] calls back and states in  that they sent the ostomy supplies, but did not confirm the adapt paste.    - Call back LMTCB concerning confirmation that ADAPT paste was sent with supplies.    - Awaiting call back    Nando Webber RN  Patient Advocate Liaison (PAL)  [facility]  [phone]    06/09/2023 at 11:53 AM

## 2023-06-12 ENCOUNTER — OFFICE VISIT (OUTPATIENT)
Dept: NEPHROLOGY | Facility: CLINIC | Age: 88
End: 2023-06-12
Attending: INTERNAL MEDICINE
Payer: MEDICARE

## 2023-06-12 ENCOUNTER — PRE VISIT (OUTPATIENT)
Dept: NEPHROLOGY | Facility: CLINIC | Age: 88
End: 2023-06-12

## 2023-06-12 ENCOUNTER — LAB (OUTPATIENT)
Dept: LAB | Facility: CLINIC | Age: 88
End: 2023-06-12
Payer: MEDICARE

## 2023-06-12 VITALS
HEIGHT: 57 IN | SYSTOLIC BLOOD PRESSURE: 120 MMHG | WEIGHT: 168.2 LBS | HEART RATE: 64 BPM | BODY MASS INDEX: 36.29 KG/M2 | DIASTOLIC BLOOD PRESSURE: 66 MMHG

## 2023-06-12 DIAGNOSIS — E55.9 VITAMIN D DEFICIENCY: ICD-10-CM

## 2023-06-12 DIAGNOSIS — D50.9 IRON DEFICIENCY ANEMIA, UNSPECIFIED IRON DEFICIENCY ANEMIA TYPE: ICD-10-CM

## 2023-06-12 DIAGNOSIS — N18.4 CKD (CHRONIC KIDNEY DISEASE) STAGE 4, GFR 15-29 ML/MIN (H): ICD-10-CM

## 2023-06-12 DIAGNOSIS — J44.1 COPD EXACERBATION (H): ICD-10-CM

## 2023-06-12 DIAGNOSIS — E78.5 HYPERLIPIDEMIA LDL GOAL <100: ICD-10-CM

## 2023-06-12 DIAGNOSIS — E66.01 MORBID OBESITY (H): Primary | ICD-10-CM

## 2023-06-12 DIAGNOSIS — I50.32 CHRONIC HEART FAILURE WITH PRESERVED EJECTION FRACTION (H): ICD-10-CM

## 2023-06-12 DIAGNOSIS — I10 BENIGN ESSENTIAL HYPERTENSION: ICD-10-CM

## 2023-06-12 LAB
ALBUMIN MFR UR ELPH: 23.1 MG/DL (ref 1–14)
ALBUMIN SERPL BCG-MCNC: 3.8 G/DL (ref 3.5–5.2)
ANION GAP SERPL CALCULATED.3IONS-SCNC: 11 MMOL/L (ref 7–15)
BUN SERPL-MCNC: 94.4 MG/DL (ref 8–23)
CALCIUM SERPL-MCNC: 9.2 MG/DL (ref 8.8–10.2)
CHLORIDE SERPL-SCNC: 108 MMOL/L (ref 98–107)
CHOLEST SERPL-MCNC: 161 MG/DL
CREAT SERPL-MCNC: 2.55 MG/DL (ref 0.51–0.95)
CREAT UR-MCNC: 72.3 MG/DL
DEPRECATED HCO3 PLAS-SCNC: 23 MMOL/L (ref 22–29)
ERYTHROCYTE [DISTWIDTH] IN BLOOD BY AUTOMATED COUNT: 12.4 % (ref 10–15)
FERRITIN SERPL-MCNC: 1342 NG/ML (ref 11–328)
GFR SERPL CREATININE-BSD FRML MDRD: 18 ML/MIN/1.73M2
GLUCOSE SERPL-MCNC: 104 MG/DL (ref 70–99)
HCT VFR BLD AUTO: 32.3 % (ref 35–47)
HDLC SERPL-MCNC: 52 MG/DL
HGB BLD-MCNC: 10.4 G/DL (ref 11.7–15.7)
IRON BINDING CAPACITY (ROCHE): 230 UG/DL (ref 240–430)
IRON SATN MFR SERPL: 49 % (ref 15–46)
IRON SERPL-MCNC: 112 UG/DL (ref 37–145)
LDLC SERPL CALC-MCNC: 95 MG/DL
MCH RBC QN AUTO: 33 PG (ref 26.5–33)
MCHC RBC AUTO-ENTMCNC: 32.2 G/DL (ref 31.5–36.5)
MCV RBC AUTO: 103 FL (ref 78–100)
NONHDLC SERPL-MCNC: 109 MG/DL
PHOSPHATE SERPL-MCNC: 4.7 MG/DL (ref 2.5–4.5)
PLATELET # BLD AUTO: 117 10E3/UL (ref 150–450)
POTASSIUM SERPL-SCNC: 4.5 MMOL/L (ref 3.4–5.3)
PROT/CREAT 24H UR: 0.32 MG/MG CR (ref 0–0.2)
RBC # BLD AUTO: 3.15 10E6/UL (ref 3.8–5.2)
SODIUM SERPL-SCNC: 142 MMOL/L (ref 136–145)
TRIGL SERPL-MCNC: 72 MG/DL
VIT B12 SERPL-MCNC: 1251 PG/ML (ref 232–1245)
WBC # BLD AUTO: 6.3 10E3/UL (ref 4–11)

## 2023-06-12 PROCEDURE — 82306 VITAMIN D 25 HYDROXY: CPT | Performed by: INTERNAL MEDICINE

## 2023-06-12 PROCEDURE — 83550 IRON BINDING TEST: CPT | Performed by: PATHOLOGY

## 2023-06-12 PROCEDURE — 80069 RENAL FUNCTION PANEL: CPT | Performed by: PATHOLOGY

## 2023-06-12 PROCEDURE — 36415 COLL VENOUS BLD VENIPUNCTURE: CPT | Performed by: PATHOLOGY

## 2023-06-12 PROCEDURE — 85027 COMPLETE CBC AUTOMATED: CPT | Performed by: PATHOLOGY

## 2023-06-12 PROCEDURE — 84156 ASSAY OF PROTEIN URINE: CPT | Performed by: PATHOLOGY

## 2023-06-12 PROCEDURE — 82607 VITAMIN B-12: CPT | Performed by: INTERNAL MEDICINE

## 2023-06-12 PROCEDURE — G0463 HOSPITAL OUTPT CLINIC VISIT: HCPCS | Performed by: INTERNAL MEDICINE

## 2023-06-12 PROCEDURE — 83540 ASSAY OF IRON: CPT | Performed by: PATHOLOGY

## 2023-06-12 PROCEDURE — 82728 ASSAY OF FERRITIN: CPT | Performed by: PATHOLOGY

## 2023-06-12 PROCEDURE — 80061 LIPID PANEL: CPT | Performed by: PATHOLOGY

## 2023-06-12 PROCEDURE — 99205 OFFICE O/P NEW HI 60 MIN: CPT | Performed by: INTERNAL MEDICINE

## 2023-06-12 RX ORDER — FERROUS SULFATE 325(65) MG
650 TABLET ORAL
Qty: 180 TABLET | Refills: 3 | Status: SHIPPED | OUTPATIENT
Start: 2023-06-12

## 2023-06-12 RX ORDER — FUROSEMIDE 40 MG
40 TABLET ORAL DAILY
Qty: 180 TABLET | Refills: 3 | Status: SHIPPED | OUTPATIENT
Start: 2023-06-12 | End: 2023-07-14

## 2023-06-12 ASSESSMENT — PAIN SCALES - GENERAL: PAINLEVEL: NO PAIN (0)

## 2023-06-12 NOTE — TELEPHONE ENCOUNTER
- Called [facility] again, TCB concerning ADAPT ostomy paste    Nando Webber, RN  Patient Advocate Liaison (PAL)  [facility]  [phone]    06/12/2023 at 9:08 AM

## 2023-06-12 NOTE — LETTER
6/12/2023       RE: Arpita Rocha  [address]  [place] [address]     Dear Colleague,    Thank you for referring your patient, Arpita Rocha, to the [facility] NEPHROLOGY [facility]IC [place] at [facility]. Please see a copy of my visit note below.    Nephrology Clinic    Arpita Rocha MRN:[id number] YOB: 1935  Date of Service: 06/12/2023  Primary care provider: Ludivina Chamberlain  Requesting physician: Ludivina Chamberlain    REASON FOR CONSULT: CKD    HISTORY OF PRESENT ILLNESS:   Arpita Rocha is a 87 year old female who presents for evaluation of an elevated creatinine at 2.5 mg/dL  The patient has recently moved from [place] and was first seen by a primary care provider in [place] on 5/23/2023.  The past medical history is significant for COPD s/p recent exacerbation on oxygen, ileostomy, HTN, HFpEF, pacemaker insertion and CKD stage 4 for many years. She reports having been followed by a nephrologist in [place] for the past 4 years and used to see him every 3 months on average. She was recently hospitalized in May 2023 for a pneumonia and was discharged on oxygen and on furosemide 20 mg twice daily. She used to be on furosemide 40 mg in the morning and 20 mg in the afternoon. Also as her blood pressure was found to be uncontrolled while in the hospital, she had doxazocin 1 mg at bedtime added to her BP regimen which includes otherwise chlorthalidone 25 mg daily, furosemide 20 mg twice daily, clonidine 0.1 mg twice daily, amlodipine 10 mg daily, and carvedilol 25 mg twice daily. She hasn't been monitoring her blood pressure. It is 120/66 today in clinic. The latest UACR is 108.87 mg/g Cr on 5/23. There is no recent imaging available.    The patient denies any dysuria and any pollakiuria. She reports mild LE edema improved from before and is essentially sedentary.    The following portions of the patient's  history were reviewed and updated as appropriate: allergies, current medications, past family history, past medical history, past social history, past surgical history and problem list.    PAST MEDICAL HISTORY:  No past medical history on file.  PAST SURGICAL HISTORY:  Past Surgical History:   Procedure Laterality Date    CATARACT EXTRACTION Bilateral     COLONOSCOPY      FEMUR FRACTURE SURGERY Right     2010    ILEOSTOMY      IMPLANT PACEMAKER      left knee replacement      MULTIPLE TOOTH EXTRACTIONS      right knee replacement Right     SPINAL FUSION      TONSILLECTOMY      TUBAL LIGATION       MEDICATIONS:  Prescription Medications as of 6/12/2023         Rx Number Disp Refills Start End Last Dispensed Date Next Fill Date Owning Pharmacy    acetaminophen (TYLENOL) 650 MG CR tablet            Sig: Take 1,300 mg by mouth every morning    Class: Historical    Route: Oral    albuterol (PROAIR HFA/PROVENTIL HFA/VENTOLIN HFA) 108 (90 Base) MCG/ACT inhaler  18 g 3 5/23/2023    [id number] [address] AT [place] [address]    Sig: Inhale 2 puffs into the lungs every 4 hours as needed for shortness of breath, wheezing or cough    Class: E-Prescribe    Notes to Pharmacy: Pharmacy may dispense brand covered by insurance (Proair, or proventil or ventolin or generic albuterol inhaler)    Route: Inhalation    amLODIPine (NORVASC) 10 MG tablet  90 tablet 3 5/23/2023    [facility] #[id number] [facility] [address] AT [facility] [address]    Sig: Take 1 tablet (10 mg) by mouth daily    Class: E-Prescribe    Route: Oral    Bioflavonoid Products (VITAMIN C) CHEW            Class: Historical    Route: Oral    budesonide (PULMICORT) 0.5 MG/2ML neb solution  360 mL 3 6/5/2023    [facility]/pharmacy #[id number] [facility], [place] - [address]    Sig: Take 2 mLs (0.5 mg) by nebulization 2 times daily    Class: E-Prescribe    Route: Nebulization    carvedilol  (COREG) 25 MG tablet  180 tablet 3 5/23/2023    [facility] #[id number] [facility], [place] - [address] AT [facility] [address]    Sig: Take 1 tablet (25 mg) by mouth 2 times daily (with meals)    Class: E-Prescribe    Route: Oral    chlorthalidone (HYGROTON) 25 MG tablet  90 tablet 3 5/23/2023    [facility] #[id number] [facility], [place] - [address] AT [facility] [address]    Sig: Take 1 tablet (25 mg) by mouth daily    Class: E-Prescribe    Route: Oral    citalopram (CELEXA) 20 MG tablet  90 tablet 1 5/23/2023    [facility] #[id number] [place] [address], [place] - [address] AT [facility] [address]    Sig: Take 1 tablet (20 mg) by mouth daily    Class: E-Prescribe    Route: Oral    cloNIDine (CATAPRES) 0.1 MG tablet  180 tablet 3 5/23/2023    [facility] #[id number] [place] [address], [place] - [address] AT [place] [address]    Sig: Take 1 tablet (0.1 mg) by mouth 2 times daily    Class: E-Prescribe    Route: Oral    Cranberry 450 MG TABS            Class: Historical    Route: Oral    doxazosin (CARDURA) 1 MG tablet  90 tablet 3 5/23/2023    [facility] #[id number] [facility], [place] - [address] AT [facility] [address]    Sig: Take 1 tablet (1 mg) by mouth At Bedtime    Class: E-Prescribe    Route: Oral    doxycycline hyclate (VIBRAMYCIN) 100 MG capsule  39 capsule 3 5/23/2023    [facility] #[id number] [facility], [place] - [address] AT [facility] [address]    Sig: Take 1 capsule (100 mg) by mouth three times a week    Class: E-Prescribe    Route: Oral    Doxylamine-Pyridoxine 10-10 MG TBEC            Class: Historical    Route: Oral    ferrous sulfate (FEROSUL) 325 (65 Fe) MG tablet  180 tablet 3 5/23/2023    [facility] #[id number] [facility], [place] - [address] AT [place] [address]    Sig: Take 1 tablet (325 mg) by mouth 2 times daily (with meals)    Class: E-Prescribe    Route: Oral     furosemide (LASIX) 40 MG tablet  180 tablet 3 5/23/2023    [facility] #[id number] [facility], [place] - [address] AT [facility] [address]    Sig: Take 1 tablet (40 mg) by mouth 2 times daily    Class: E-Prescribe    Route: Oral    ipratropium - albuterol 0.5 mg/2.5 mg/3 mL (DUONEB) 0.5-2.5 (3) MG/3ML neb solution  180 mL 11 5/23/2023    [facility] #[id number] [facility], [place] - [address] AT [facility] [address]    Sig: Take 1 vial (3 mLs) by nebulization 2 times daily    Class: E-Prescribe    Route: Nebulization    montelukast (SINGULAIR) 10 MG tablet  90 tablet 3 5/23/2023    [facility] #[id number] [facility], [place] - [address] AT [facility] [address]    Sig: Take 1 tablet (10 mg) by mouth At Bedtime    Class: E-Prescribe    Route: Oral    multivitamin w/minerals (THERA-VIT-M) tablet            Sig: Take 1 tablet by mouth daily    Class: Historical    Route: Oral    Ostomy Supplies (ADAPT) PSTE  120 g 3 5/24/2023        Sig: Use with new ostomy pouch and drain PRN    Class: Local Print    Ostomy Supplies (KARAYA PASTE) PSTE  128 g 3 5/23/2023    [facility] #[id number] [facility], [place] - [address] AT [facility] [address]    Sig: Use as directed    Class: E-Prescribe    Ostomy Supplies (PREMIER DRAINABLE POUCH 22MM) Pouch MISC  5 each 12 5/24/2023        Sig: Use as directed every 3-4 days    Class: Local Print    potassium chloride ER (K-TAB) 20 MEQ CR tablet  90 tablet 3 5/23/2023    [facility] #[id number] [facility], [place] - [address] AT [facility] [address]    Sig: Take 1 tablet (20 mEq) by mouth daily    Class: E-Prescribe    Route: Oral    pravastatin (PRAVACHOL) 80 MG tablet  90 tablet 3 5/23/2023    [facility] #[id number] - [place], [place] - [address] AT [place] [address]    Sig: Take 1 tablet (80 mg) by mouth daily    Class: E-Prescribe    Route: Oral           ALLERGIES:     Allergies   Allergen Reactions    Acetaminophen-Codeine     Penicillin G      REVIEW OF SYSTEMS:  Review Of Systems  Skin: negative for, pigmentation, acne, rash, scaling, itching, bruising, lumps or bumps  Eyes: negative for, visual blurring, double vision, glaucoma, cataracts, eye pain, color blindness, glasses, contacts  Ears/Nose/Throat: negative for, nasal congestion, purulent rhinorrhea, sneezing, postnasal drainage, hearing loss, deafness, tinnitus, vertigo  Respiratory: as per above  Cardiovascular: negative for, palpitations, tachycardia, irregular heart beat, chest pain, exertional chest pain or pressure and paroxysmal nocturnal dyspnea  Gastrointestinal: negative for, nausea, vomiting, heartburn, dyspepsia, reflux and hematemesis  Genitourinary: negative for, nocturia, dysuria, frequency, urgency and hesitancy  Musculoskeletal: negative for, fracture, back pain, neck pain, arthritis, joint pain, joint swelling and joint stiffness  Neurologic: negative for, headaches, syncope, stroke, seizures, paralysis, local weakness, numbness or tingling of hands and numbness or tingling of feet    A comprehensive review of systems was performed and found to be negative except as described here or above.  SOCIAL HISTORY:   Social History     Socioeconomic History    Marital status:      Spouse name: Not on file    Number of children: Not on file    Years of education: Not on file    Highest education level: Not on file   Occupational History    Not on file   Tobacco Use    Smoking status: Former     Packs/day: 1.00     Years: 44.00     Pack years: 44.00     Types: Cigarettes     Start date: 1952     Quit date: 1996     Years since quittin.4    Smokeless tobacco: Never   Vaping Use    Vaping status: Never Used   Substance and Sexual Activity    Alcohol use: Not Currently    Drug use: Never    Sexual activity: Not Currently     Partners: Male   Other Topics Concern    Not on file   Social History  "Narrative    Not on file     Social Determinants of Health     Financial Resource Strain: Not on file   Food Insecurity: Not on file   Transportation Needs: Not on file   Physical Activity: Not on file   Stress: Not on file   Social Connections: Not on file   Intimate Partner Violence: Not on file   Housing Stability: Not on file     FAMILY MEDICAL HISTORY:   Family History   Problem Relation Age of Onset    Cerebrovascular Disease Mother     Diabetes Mother     Hypertension Mother     Cancer Mother     Hypertension Father     Cerebrovascular Disease Sister     Hypertension Sister     Dementia Sister     Cancer Brother     Cancer Brother     Emphysema Brother      PHYSICAL EXAM:   /66   Pulse 64   Ht 1.448 m (4' 9\")   Wt 76.3 kg (168 lb 3.2 oz)   BMI 36.40 kg/m    GENERAL APPEARANCE: alert and no distress  EYES: nonicteric  HENT: mouth without ulcers or lesions  NECK: supple, no adenopathy  RESP: lungs clear to auscultation   CV: regular rhythm, normal rate, no rub  ABDOMEN: soft, nontender, normal bowel sounds, no HSM   Extremities: no clubbing, cyanosis, or edema  MS: no evidence of inflammation in joints, no muscle tenderness  SKIN: no rash  NEURO: mentation intact and speech normal  PSYCH: affect normal/bright   LABS:   Recent Results (from the past 672 hour(s))   Comprehensive metabolic panel (BMP + Alb, Alk Phos, ALT, AST, Total. Bili, TP)    Collection Time: 05/23/23  1:59 PM   Result Value Ref Range    Sodium 136 136 - 145 mmol/L    Potassium 5.0 3.4 - 5.3 mmol/L    Chloride 102 98 - 107 mmol/L    Carbon Dioxide (CO2) 22 22 - 29 mmol/L    Anion Gap 12 7 - 15 mmol/L    Urea Nitrogen 97.3 (H) 8.0 - 23.0 mg/dL    Creatinine 2.95 (H) 0.51 - 0.95 mg/dL    Calcium 9.1 8.8 - 10.2 mg/dL    Glucose 111 (H) 70 - 99 mg/dL    Alkaline Phosphatase 75 35 - 104 U/L    AST 41 (H) 10 - 35 U/L    ALT 21 10 - 35 U/L    Protein Total 6.6 6.4 - 8.3 g/dL    Albumin 3.8 3.5 - 5.2 g/dL    Bilirubin Total 0.2 <=1.2 mg/dL "    GFR Estimate 15 (L) >60 mL/min/1.73m2   Phosphorus    Collection Time: 05/23/23  1:59 PM   Result Value Ref Range    Phosphorus 4.8 (H) 2.5 - 4.5 mg/dL   TSH with free T4 reflex    Collection Time: 05/23/23  1:59 PM   Result Value Ref Range    TSH 1.04 0.30 - 4.20 uIU/mL   Parathyroid Hormone Intact    Collection Time: 05/23/23  1:59 PM   Result Value Ref Range    Parathyroid Hormone Intact 56 15 - 65 pg/mL   CBC with platelets and differential    Collection Time: 05/23/23  1:59 PM   Result Value Ref Range    WBC Count 4.9 4.0 - 11.0 10e3/uL    RBC Count 3.25 (L) 3.80 - 5.20 10e6/uL    Hemoglobin 10.9 (L) 11.7 - 15.7 g/dL    Hematocrit 33.3 (L) 35.0 - 47.0 %     (H) 78 - 100 fL    MCH 33.5 (H) 26.5 - 33.0 pg    MCHC 32.7 31.5 - 36.5 g/dL    RDW 12.0 10.0 - 15.0 %    Platelet Count 178 150 - 450 10e3/uL    % Neutrophils 78 %    % Lymphocytes 11 %    % Monocytes 8 %    % Eosinophils 2 %    % Basophils 1 %    % Immature Granulocytes 0 %    Absolute Neutrophils 3.8 1.6 - 8.3 10e3/uL    Absolute Lymphocytes 0.5 (L) 0.8 - 5.3 10e3/uL    Absolute Monocytes 0.4 0.0 - 1.3 10e3/uL    Absolute Eosinophils 0.1 0.0 - 0.7 10e3/uL    Absolute Basophils 0.0 0.0 - 0.2 10e3/uL    Absolute Immature Granulocytes 0.0 <=0.4 10e3/uL   UA Macroscopic with reflex to Microscopic and Culture    Collection Time: 05/23/23  2:13 PM    Specimen: Urine, Clean Catch   Result Value Ref Range    Color Urine Yellow Colorless, Straw, Light Yellow, Yellow    Appearance Urine Clear Clear    Glucose Urine Negative Negative mg/dL    Bilirubin Urine Negative Negative    Ketones Urine Negative Negative mg/dL    Specific Gravity Urine 1.015 1.003 - 1.035    Blood Urine Negative Negative    pH Urine 5.0 5.0 - 7.0    Protein Albumin Urine Negative Negative mg/dL    Urobilinogen Urine 0.2 0.2, 1.0 E.U./dL    Nitrite Urine Negative Negative    Leukocyte Esterase Urine Small (A) Negative   Albumin Random Urine Quantitative with Creat Ratio    Collection  Time: 05/23/23  2:13 PM   Result Value Ref Range    Creatinine Urine mg/dL 71.9 mg/dL    Albumin Urine mg/L 77.7 mg/L    Albumin Urine mg/g Cr 108.07 (H) 0.00 - 25.00 mg/g Cr   UA Microscopic with Reflex to Culture    Collection Time: 05/23/23  2:13 PM   Result Value Ref Range    Bacteria Urine Few (A) None Seen /HPF    RBC Urine None Seen 0-2 /HPF /HPF    WBC Urine 25-50 (A) 0-5 /HPF /HPF    Squamous Epithelials Urine Few (A) None Seen /LPF   Urine Culture    Collection Time: 05/23/23  2:13 PM    Specimen: Urine, Clean Catch   Result Value Ref Range    Culture >100,000 CFU/mL Enterococcus faecalis (A)     Culture <10,000 CFU/mL Urogenital carlos        Susceptibility    Enterococcus faecalis - SERENE     Penicillin 2 Susceptible ug/mL     Ampicillin <=2 Susceptible ug/mL     Vancomycin 1 Susceptible ug/mL     Nitrofurantoin <=16 Susceptible ug/mL   Lipid panel reflex to direct LDL Fasting    Collection Time: 06/12/23  8:26 AM   Result Value Ref Range    Cholesterol 161 <200 mg/dL    Triglycerides 72 <150 mg/dL    Direct Measure HDL 52 >=50 mg/dL    LDL Cholesterol Calculated 95 <=100 mg/dL    Non HDL Cholesterol 109 <130 mg/dL   Renal panel    Collection Time: 06/12/23  8:26 AM   Result Value Ref Range    Sodium 142 136 - 145 mmol/L    Potassium 4.5 3.4 - 5.3 mmol/L    Chloride 108 (H) 98 - 107 mmol/L    Carbon Dioxide (CO2) 23 22 - 29 mmol/L    Anion Gap 11 7 - 15 mmol/L    Glucose 104 (H) 70 - 99 mg/dL    Urea Nitrogen 94.4 (H) 8.0 - 23.0 mg/dL    Creatinine 2.55 (H) 0.51 - 0.95 mg/dL    GFR Estimate 18 (L) >60 mL/min/1.73m2    Calcium 9.2 8.8 - 10.2 mg/dL    Albumin 3.8 3.5 - 5.2 g/dL    Phosphorus 4.7 (H) 2.5 - 4.5 mg/dL   CBC with platelets    Collection Time: 06/12/23  8:26 AM   Result Value Ref Range    WBC Count 6.3 4.0 - 11.0 10e3/uL    RBC Count 3.15 (L) 3.80 - 5.20 10e6/uL    Hemoglobin 10.4 (L) 11.7 - 15.7 g/dL    Hematocrit 32.3 (L) 35.0 - 47.0 %     (H) 78 - 100 fL    MCH 33.0 26.5 - 33.0 pg     MCHC 32.2 31.5 - 36.5 g/dL    RDW 12.4 10.0 - 15.0 %    Platelet Count 117 (L) 150 - 450 10e3/uL   Protein  random urine    Collection Time: 06/12/23  8:35 AM   Result Value Ref Range    Total Protein Urine mg/dL 23.1 (H) 1.0 - 14.0 mg/dL    Total Protein UR MG/MG CR 0.32 (H) 0.00 - 0.20 mg/mg Cr    Creatinine Urine mg/dL 72.3 mg/dL     CMP  Recent Labs   Lab Test 06/12/23  0826 05/23/23  1359    136   POTASSIUM 4.5 5.0   CHLORIDE 108* 102   CO2 23 22   ANIONGAP 11 12   * 111*   BUN 94.4* 97.3*   CR 2.55* 2.95*   GFRESTIMATED 18* 15*   CANELO 9.2 9.1   PHOS 4.7* 4.8*   PROTTOTAL  --  6.6   ALBUMIN 3.8 3.8   BILITOTAL  --  0.2   ALKPHOS  --  75   AST  --  41*   ALT  --  21     CBC  Recent Labs   Lab Test 06/12/23  0826 05/23/23  1359   HGB 10.4* 10.9*   WBC 6.3 4.9   RBC 3.15* 3.25*   HCT 32.3* 33.3*   * 103*   MCH 33.0 33.5*   MCHC 32.2 32.7   RDW 12.4 12.0   * 178     INRNo lab results found.  ABGNo lab results found.   URINE STUDIES  Recent Labs   Lab Test 05/23/23  1413   COLOR Yellow   APPEARANCE Clear   URINEGLC Negative   URINEBILI Negative   URINEKETONE Negative   SG 1.015   UBLD Negative   URINEPH 5.0   PROTEIN Negative   UROBILINOGEN 0.2   NITRITE Negative   LEUKEST Small*   RBCU None Seen   WBCU 25-50*     No lab results found.    ASSESSMENT AND PLAN:   #CKD stage 4  eGFR around 15-20 mL/min  UACR on 5/22 is 108 mg/g Cr  UPCR on 6/12 is 0.32 mg/g Cr  Recent renal imaging is not available and therefore I ordered a kidney ultrasound.  The potential responsible factors include a short bowel syndrome, longstanding HTN, pulmonary-renal syndrome and normal decline related to age. The patient is unlikely at this stage of CKD to respond to 20 mg of furosemide and therefore will change furosemide to 40 mg once daily and the patient will keep a BP diary and communicate the results  No documented intake of NSAIDs or other nephrotoxic medications.   The patient was also instructed to lose  weight,  keep the sodium intake around 2400 mg /day, follow a plant-based diet and to avoid NSAIDs     #HTN  Primary and secondary to CKD. Currently on doxazosin 1 mg daily, chlorthalidone 25 mg daily, furosemide 20 mg twice daily, clonidine 0.1 mg twice daily, amlodipine 10 mg daily, and carvedilol 25 mg twice daily. Management as per above.    #CVD/dyslipidemia  On pravastatin 80 mg daily as indicated for any patient with CKD above the age of 50     #Blood count  Hemoglobin 10.4  Iron sat 49%  Ferritin level 1342   The patient is iron replete therefore seems to be mostly driven by CKD and chronic inflammation. No indication for MANUELA at this point     #Acid-base status  CO2 level 23 no need for any sodium bicarbonate supplementation    #Electrolytes  Na 142  K 4.5 no acute issues, stopped potassium supplementation     #BMD  Ca  9.2        P 4.7   Albumin 3.8  iPTH  56 Vitamin D level is pending    #CKD journey/transplant goals of care to be discussed at her next visit    The total time of this encounter amounted to 60 minutes on the day of the encounter. This time included time spent with the patient, reviewing records, ordering tests, and performing post visit documentation.   Labs ordered include: CBC with diff, Renal Panel, CMP, UA with microscopy, UPCR, UACR, vitamin D levels, iPTH levels    The patient will return to follow up in 2 months     Cata Mclain MD  Division of Renal Disease and Hypertension  June 12, 2023  9:23 AM      Again, thank you for allowing me to participate in the care of your patient.      Sincerely,    Cata Mclain MD

## 2023-06-12 NOTE — PATIENT INSTRUCTIONS
It was a pleasure taking care of you today.  I've included a brief summary of our discussion and care plan from today's visit below.  Please review this information with your primary care provider.  _______________________________________________________________________    My recommendations are summarized as follows:  -Keep the amount of sodium in your diet at 2.4 g/day (also see instructions attached in that regard)  -Keep a Blood Pressure diary by taking your blood pressure twice a day as instructed (also see instructions attached in that regard). Please make sure that you are using a validated blood pressure device by checking that it is the case at: https://www.validatebp.org/  -Avoid all NSAID's. Examples include Ibuprofen (Advil, Motrin), naprosyn (Aleve), celebrex among others. Acetaminophen (Tylenol) is ok with maximum dose in 24 hours of 3200 mg.  -Healthy lifestyle measures will keep your kidney's functioning at their current best. This includes regular exercise, weight loss and smoking cessation if you smoke.   -Do not exceed one alcoholic drink per day  -If for any reason, you are at risk of volume depletion/dehydration (high grade fevers, severe vomiting or diarrhea) stop furosemide and chlorthalidone till you get better  -Stop potassium pills  -Take furosemide 40 mg once a day instead of 20 mg twice a day  -Take 2 tablets of iron every other day instead of one tablet twice a day  -Do labs in 2 weeks, one months, 6 weeks (7/24) and 2 months from now  -Do a renal ultrasound  -Do a chest XRay      To schedule imaging please call [phone]     To schedule your lab appointment at the [facility], please call       Return to Nephrology Clinic in 2 months to review your progress.    _______________________________________________________________________    Who do I call with any questions after my visit?    Please be in touch if there are any further questions that arise  following today's visit.  There are multiple ways to contact your nephrology care team.      During business hours, you may reach your Nephrology LPN Care Coordinator, Makenna, at .      To schedule or reschedule an appointment, please call [phone].    You can always send a secure message through Mindbloom.  Mindbloom messages are answered by your nurse or doctor typically within 24 hours.  Please allow extra time on weekends and holidays.      For urgent/emergent questions after business hours, you may reach the on-call Nephrology Fellow by contacting the [facility]  at [phone].     How will I get the results of any tests ordered?    You will receive all of your results.  If you have signed up for [facility], any tests ordered at your visit will be available to you after your physician reviews them.  Typically this takes 1-2 weeks.  If there are urgent results that require a change in your care plan, your physician or nurse will call you to discuss the next steps.      What is Mindbloom?  Mindbloom is a secure way for you to access all of your healthcare records from the [facility].  It is a web based computer program, so you can sign on to it from any location.  It also allows you to send secure messages to your care team.  I recommend signing up for Mindbloom access if you have not already done so and are comfortable with using a computer.      How do I schedule a follow-up visit?  If you did not schedule a follow-up visit today, please call [phone] to schedule a follow-up office visit.        Sincerely,      Dr. Cata Mclain  [facility]  Division of Nephrology and Hypertension

## 2023-06-12 NOTE — NURSING NOTE
"Chief Complaint   Patient presents with     Consult     Establish care with CKD     /66   Pulse 64   Ht 1.448 m (4' 9\")   Wt 76.3 kg (168 lb 3.2 oz)   BMI 36.40 kg/m    Pili Pinzon CMA on 6/12/2023 at 9:24 AM    "

## 2023-06-12 NOTE — TELEPHONE ENCOUNTER
- Call placed to Daquan (CTC on file) request for phone number that was given to him from [facility], will look for it and get back to PAL on the phone numbers    Nando Webber, RN  Patient Advocate Liaison (PAL)  [facility]  [phone]    06/12/2023 at 9:39 AM

## 2023-06-12 NOTE — PROGRESS NOTES
Nephrology Clinic    Arpita Rocha MRN:[id number] YOB: 1935  Date of Service: 06/12/2023  Primary care provider: Ludivina Chamberlain  Requesting physician: Ludivina Chamberlain    REASON FOR CONSULT: CKD    HISTORY OF PRESENT ILLNESS:   Arpita Rocha is a 87 year old female who presents for evaluation of an elevated creatinine at 2.5 mg/dL  The patient has recently moved from [place] and was first seen by a primary care provider in [place] on 5/23/2023.  The past medical history is significant for COPD s/p recent exacerbation on oxygen, ileostomy, HTN, HFpEF, pacemaker insertion and CKD stage 4 for many years. She reports having been followed by a nephrologist in [place] for the past 4 years and used to see him every 3 months on average. She was recently hospitalized in May 2023 for a pneumonia and was discharged on oxygen and on furosemide 20 mg twice daily. She used to be on furosemide 40 mg in the morning and 20 mg in the afternoon. Also as her blood pressure was found to be uncontrolled while in the hospital, she had doxazocin 1 mg at bedtime added to her BP regimen which includes otherwise chlorthalidone 25 mg daily, furosemide 20 mg twice daily, clonidine 0.1 mg twice daily, amlodipine 10 mg daily, and carvedilol 25 mg twice daily. She hasn't been monitoring her blood pressure. It is 120/66 today in clinic. The latest UACR is 108.87 mg/g Cr on 5/23. There is no recent imaging available.    The patient denies any dysuria and any pollakiuria. She reports mild LE edema improved from before and is essentially sedentary.    The following portions of the patient's history were reviewed and updated as appropriate: allergies, current medications, past family history, past medical history, past social history, past surgical history and problem list.    PAST MEDICAL HISTORY:  No past medical history on file.  PAST SURGICAL HISTORY:  Past Surgical History:   Procedure Laterality Date      CATARACT EXTRACTION Bilateral      COLONOSCOPY       FEMUR FRACTURE SURGERY Right     2010     ILEOSTOMY       IMPLANT PACEMAKER       left knee replacement       MULTIPLE TOOTH EXTRACTIONS       right knee replacement Right      SPINAL FUSION       TONSILLECTOMY       TUBAL LIGATION       MEDICATIONS:  Prescription Medications as of 6/12/2023       Rx Number Disp Refills Start End Last Dispensed Date Next Fill Date Owning Pharmacy    acetaminophen (TYLENOL) 650 MG CR tablet            Sig: Take 1,300 mg by mouth every morning    Class: Historical    Route: Oral    albuterol (PROAIR HFA/PROVENTIL HFA/VENTOLIN HFA) 108 (90 Base) MCG/ACT inhaler  18 g 3 5/23/2023    [id number], [place] - [address] AT [facility] [address]    Sig: Inhale 2 puffs into the lungs every 4 hours as needed for shortness of breath, wheezing or cough    Class: E-Prescribe    Notes to Pharmacy: Pharmacy may dispense brand covered by insurance (Proair, or proventil or ventolin or generic albuterol inhaler)    Route: Inhalation    amLODIPine (NORVASC) 10 MG tablet  90 tablet 3 5/23/2023    [id number], [place] - [address] AT [facility] [address]    Sig: Take 1 tablet (10 mg) by mouth daily    Class: E-Prescribe    Route: Oral    Bioflavonoid Products (VITAMIN C) CHEW            Class: Historical    Route: Oral    budesonide (PULMICORT) 0.5 MG/2ML neb solution  360 mL 3 6/5/2023    [facility][id number] - [place], [place] - [address]    Sig: Take 2 mLs (0.5 mg) by nebulization 2 times daily    Class: E-Prescribe    Route: Nebulization    carvedilol (COREG) 25 MG tablet  180 tablet 3 5/23/2023    [id number], [place] - [address] AT [facility] [address]    Sig: Take 1 tablet (25 mg) by mouth 2 times daily (with meals)    Class: E-Prescribe    Route: Oral    chlorthalidone (HYGROTON) 25 MG tablet  90 tablet 3 5/23/2023     [facility] STORE #[id number] [facility], [place] - [address] AT [place] [address]    Sig: Take 1 tablet (25 mg) by mouth daily    Class: E-Prescribe    Route: Oral    citalopram (CELEXA) 20 MG tablet  90 tablet 1 5/23/2023    [facility] ST[facility] #[id number] [facility], [place] - [address] AT [place] [address]    Sig: Take 1 tablet (20 mg) by mouth daily    Class: E-Prescribe    Route: Oral    cloNIDine (CATAPRES) 0.1 MG tablet  180 tablet 3 5/23/2023    [facility] DR[facility] STORE #[id number] [place] [address], [place] - [address] AT [place] [address]    Sig: Take 1 tablet (0.1 mg) by mouth 2 times daily    Class: E-Prescribe    Route: Oral    Cranberry 450 MG TABS            Class: Historical    Route: Oral    doxazosin (CARDURA) 1 MG tablet  90 tablet 3 5/23/2023    [facility] [address] [place] [address], [place] - [address] AT [place] [address]    Sig: Take 1 tablet (1 mg) by mouth At Bedtime    Class: E-Prescribe    Route: Oral    doxycycline hyclate (VIBRAMYCIN) 100 MG capsule  39 capsule 3 5/23/2023    [facility] #[id number] [place] [address], [place] - [address] AT [place] [address]    Sig: Take 1 capsule (100 mg) by mouth three times a week    Class: E-Prescribe    Route: Oral    Doxylamine-Pyridoxine 10-10 MG TBEC            Class: Historical    Route: Oral    ferrous sulfate (FEROSUL) 325 (65 Fe) MG tablet  180 tablet 3 5/23/2023    [id number] [place] [address], [place] - [address] AT [facility] [address]    Sig: Take 1 tablet (325 mg) by mouth 2 times daily (with meals)    Class: E-Prescribe    Route: Oral    furosemide (LASIX) 40 MG tablet  180 tablet 3 5/23/2023    [facility] STORE #[id number] [facility], [place] - [address] AT [place] [address]    Sig: Take 1 tablet (40 mg) by mouth 2 times daily    Class: E-Prescribe    Route: Oral    ipratropium - albuterol 0.5 mg/2.5 mg/3 mL (DUONEB) 0.5-2.5 (3)  MG/3ML neb solution  180 mL 11 5/23/2023    [facility] DR[facility] STORE #[id number] [facility], [place] - [address] AT [facility] [address]    Sig: Take 1 vial (3 mLs) by nebulization 2 times daily    Class: E-Prescribe    Route: Nebulization    montelukast (SINGULAIR) 10 MG tablet  90 tablet 3 5/23/2023    [facility] ST[facility] #[id number] [facility], [place] - [address] AT [facility] [address]    Sig: Take 1 tablet (10 mg) by mouth At Bedtime    Class: E-Prescribe    Route: Oral    multivitamin w/minerals (THERA-VIT-M) tablet            Sig: Take 1 tablet by mouth daily    Class: Historical    Route: Oral    Ostomy Supplies (ADAPT) PSTE  120 g 3 5/24/2023        Sig: Use with new ostomy pouch and drain PRN    Class: Local Print    Ostomy Supplies (KARAYA PASTE) PSTE  128 g 3 5/23/2023    [facility] DR[facility] STORE #[id number] [place] [address], [place] - [address] AT [place] [address]    Sig: Use as directed    Class: E-Prescribe    Ostomy Supplies (PREMIER DRAINABLE POUCH 22MM) Pouch MISC  5 each 12 5/24/2023        Sig: Use as directed every 3-4 days    Class: Local Print    potassium chloride ER (K-TAB) 20 MEQ CR tablet  90 tablet 3 5/23/2023    [facility] STORE #[id number] [facility], [place] - [address] AT [facility] [address]    Sig: Take 1 tablet (20 mEq) by mouth daily    Class: E-Prescribe    Route: Oral    pravastatin (PRAVACHOL) 80 MG tablet  90 tablet 3 5/23/2023    [facility] ST[facility] #[id number] [facility], [place] - [address] AT [facility] [address]    Sig: Take 1 tablet (80 mg) by mouth daily    Class: E-Prescribe    Route: Oral         ALLERGIES:    Allergies   Allergen Reactions     Acetaminophen-Codeine      Penicillin G      REVIEW OF SYSTEMS:  Review Of Systems  Skin: negative for, pigmentation, acne, rash, scaling, itching, bruising, lumps or bumps  Eyes: negative for, visual blurring, double vision, glaucoma, cataracts, eye pain, color blindness, glasses,  contacts  Ears/Nose/Throat: negative for, nasal congestion, purulent rhinorrhea, sneezing, postnasal drainage, hearing loss, deafness, tinnitus, vertigo  Respiratory: as per above  Cardiovascular: negative for, palpitations, tachycardia, irregular heart beat, chest pain, exertional chest pain or pressure and paroxysmal nocturnal dyspnea  Gastrointestinal: negative for, nausea, vomiting, heartburn, dyspepsia, reflux and hematemesis  Genitourinary: negative for, nocturia, dysuria, frequency, urgency and hesitancy  Musculoskeletal: negative for, fracture, back pain, neck pain, arthritis, joint pain, joint swelling and joint stiffness  Neurologic: negative for, headaches, syncope, stroke, seizures, paralysis, local weakness, numbness or tingling of hands and numbness or tingling of feet    A comprehensive review of systems was performed and found to be negative except as described here or above.  SOCIAL HISTORY:   Social History     Socioeconomic History     Marital status:      Spouse name: Not on file     Number of children: Not on file     Years of education: Not on file     Highest education level: Not on file   Occupational History     Not on file   Tobacco Use     Smoking status: Former     Packs/day: 1.00     Years: 44.00     Pack years: 44.00     Types: Cigarettes     Start date: 1952     Quit date: 1996     Years since quittin.4     Smokeless tobacco: Never   Vaping Use     Vaping status: Never Used   Substance and Sexual Activity     Alcohol use: Not Currently     Drug use: Never     Sexual activity: Not Currently     Partners: Male   Other Topics Concern     Not on file   Social History Narrative     Not on file     Social Determinants of Health     Financial Resource Strain: Not on file   Food Insecurity: Not on file   Transportation Needs: Not on file   Physical Activity: Not on file   Stress: Not on file   Social Connections: Not on file   Intimate Partner Violence: Not on file  "  Housing Stability: Not on file     FAMILY MEDICAL HISTORY:   Family History   Problem Relation Age of Onset     Cerebrovascular Disease Mother      Diabetes Mother      Hypertension Mother      Cancer Mother      Hypertension Father      Cerebrovascular Disease Sister      Hypertension Sister      Dementia Sister      Cancer Brother      Cancer Brother      Emphysema Brother      PHYSICAL EXAM:   /66   Pulse 64   Ht 1.448 m (4' 9\")   Wt 76.3 kg (168 lb 3.2 oz)   BMI 36.40 kg/m    GENERAL APPEARANCE: alert and no distress  EYES: nonicteric  HENT: mouth without ulcers or lesions  NECK: supple, no adenopathy  RESP: lungs clear to auscultation   CV: regular rhythm, normal rate, no rub  ABDOMEN: soft, nontender, normal bowel sounds, no HSM   Extremities: no clubbing, cyanosis, or edema  MS: no evidence of inflammation in joints, no muscle tenderness  SKIN: no rash  NEURO: mentation intact and speech normal  PSYCH: affect normal/bright   LABS:   Recent Results (from the past 672 hour(s))   Comprehensive metabolic panel (BMP + Alb, Alk Phos, ALT, AST, Total. Bili, TP)    Collection Time: 05/23/23  1:59 PM   Result Value Ref Range    Sodium 136 136 - 145 mmol/L    Potassium 5.0 3.4 - 5.3 mmol/L    Chloride 102 98 - 107 mmol/L    Carbon Dioxide (CO2) 22 22 - 29 mmol/L    Anion Gap 12 7 - 15 mmol/L    Urea Nitrogen 97.3 (H) 8.0 - 23.0 mg/dL    Creatinine 2.95 (H) 0.51 - 0.95 mg/dL    Calcium 9.1 8.8 - 10.2 mg/dL    Glucose 111 (H) 70 - 99 mg/dL    Alkaline Phosphatase 75 35 - 104 U/L    AST 41 (H) 10 - 35 U/L    ALT 21 10 - 35 U/L    Protein Total 6.6 6.4 - 8.3 g/dL    Albumin 3.8 3.5 - 5.2 g/dL    Bilirubin Total 0.2 <=1.2 mg/dL    GFR Estimate 15 (L) >60 mL/min/1.73m2   Phosphorus    Collection Time: 05/23/23  1:59 PM   Result Value Ref Range    Phosphorus 4.8 (H) 2.5 - 4.5 mg/dL   TSH with free T4 reflex    Collection Time: 05/23/23  1:59 PM   Result Value Ref Range    TSH 1.04 0.30 - 4.20 uIU/mL   Parathyroid " Hormone Intact    Collection Time: 05/23/23  1:59 PM   Result Value Ref Range    Parathyroid Hormone Intact 56 15 - 65 pg/mL   CBC with platelets and differential    Collection Time: 05/23/23  1:59 PM   Result Value Ref Range    WBC Count 4.9 4.0 - 11.0 10e3/uL    RBC Count 3.25 (L) 3.80 - 5.20 10e6/uL    Hemoglobin 10.9 (L) 11.7 - 15.7 g/dL    Hematocrit 33.3 (L) 35.0 - 47.0 %     (H) 78 - 100 fL    MCH 33.5 (H) 26.5 - 33.0 pg    MCHC 32.7 31.5 - 36.5 g/dL    RDW 12.0 10.0 - 15.0 %    Platelet Count 178 150 - 450 10e3/uL    % Neutrophils 78 %    % Lymphocytes 11 %    % Monocytes 8 %    % Eosinophils 2 %    % Basophils 1 %    % Immature Granulocytes 0 %    Absolute Neutrophils 3.8 1.6 - 8.3 10e3/uL    Absolute Lymphocytes 0.5 (L) 0.8 - 5.3 10e3/uL    Absolute Monocytes 0.4 0.0 - 1.3 10e3/uL    Absolute Eosinophils 0.1 0.0 - 0.7 10e3/uL    Absolute Basophils 0.0 0.0 - 0.2 10e3/uL    Absolute Immature Granulocytes 0.0 <=0.4 10e3/uL   UA Macroscopic with reflex to Microscopic and Culture    Collection Time: 05/23/23  2:13 PM    Specimen: Urine, Clean Catch   Result Value Ref Range    Color Urine Yellow Colorless, Straw, Light Yellow, Yellow    Appearance Urine Clear Clear    Glucose Urine Negative Negative mg/dL    Bilirubin Urine Negative Negative    Ketones Urine Negative Negative mg/dL    Specific Gravity Urine 1.015 1.003 - 1.035    Blood Urine Negative Negative    pH Urine 5.0 5.0 - 7.0    Protein Albumin Urine Negative Negative mg/dL    Urobilinogen Urine 0.2 0.2, 1.0 E.U./dL    Nitrite Urine Negative Negative    Leukocyte Esterase Urine Small (A) Negative   Albumin Random Urine Quantitative with Creat Ratio    Collection Time: 05/23/23  2:13 PM   Result Value Ref Range    Creatinine Urine mg/dL 71.9 mg/dL    Albumin Urine mg/L 77.7 mg/L    Albumin Urine mg/g Cr 108.07 (H) 0.00 - 25.00 mg/g Cr   UA Microscopic with Reflex to Culture    Collection Time: 05/23/23  2:13 PM   Result Value Ref Range    Bacteria  Urine Few (A) None Seen /HPF    RBC Urine None Seen 0-2 /HPF /HPF    WBC Urine 25-50 (A) 0-5 /HPF /HPF    Squamous Epithelials Urine Few (A) None Seen /LPF   Urine Culture    Collection Time: 05/23/23  2:13 PM    Specimen: Urine, Clean Catch   Result Value Ref Range    Culture >100,000 CFU/mL Enterococcus faecalis (A)     Culture <10,000 CFU/mL Urogenital carlos        Susceptibility    Enterococcus faecalis - SERENE     Penicillin 2 Susceptible ug/mL     Ampicillin <=2 Susceptible ug/mL     Vancomycin 1 Susceptible ug/mL     Nitrofurantoin <=16 Susceptible ug/mL   Lipid panel reflex to direct LDL Fasting    Collection Time: 06/12/23  8:26 AM   Result Value Ref Range    Cholesterol 161 <200 mg/dL    Triglycerides 72 <150 mg/dL    Direct Measure HDL 52 >=50 mg/dL    LDL Cholesterol Calculated 95 <=100 mg/dL    Non HDL Cholesterol 109 <130 mg/dL   Renal panel    Collection Time: 06/12/23  8:26 AM   Result Value Ref Range    Sodium 142 136 - 145 mmol/L    Potassium 4.5 3.4 - 5.3 mmol/L    Chloride 108 (H) 98 - 107 mmol/L    Carbon Dioxide (CO2) 23 22 - 29 mmol/L    Anion Gap 11 7 - 15 mmol/L    Glucose 104 (H) 70 - 99 mg/dL    Urea Nitrogen 94.4 (H) 8.0 - 23.0 mg/dL    Creatinine 2.55 (H) 0.51 - 0.95 mg/dL    GFR Estimate 18 (L) >60 mL/min/1.73m2    Calcium 9.2 8.8 - 10.2 mg/dL    Albumin 3.8 3.5 - 5.2 g/dL    Phosphorus 4.7 (H) 2.5 - 4.5 mg/dL   CBC with platelets    Collection Time: 06/12/23  8:26 AM   Result Value Ref Range    WBC Count 6.3 4.0 - 11.0 10e3/uL    RBC Count 3.15 (L) 3.80 - 5.20 10e6/uL    Hemoglobin 10.4 (L) 11.7 - 15.7 g/dL    Hematocrit 32.3 (L) 35.0 - 47.0 %     (H) 78 - 100 fL    MCH 33.0 26.5 - 33.0 pg    MCHC 32.2 31.5 - 36.5 g/dL    RDW 12.4 10.0 - 15.0 %    Platelet Count 117 (L) 150 - 450 10e3/uL   Protein  random urine    Collection Time: 06/12/23  8:35 AM   Result Value Ref Range    Total Protein Urine mg/dL 23.1 (H) 1.0 - 14.0 mg/dL    Total Protein UR MG/MG CR 0.32 (H) 0.00 - 0.20  mg/mg Cr    Creatinine Urine mg/dL 72.3 mg/dL     CMP  Recent Labs   Lab Test 06/12/23  0826 05/23/23  1359    136   POTASSIUM 4.5 5.0   CHLORIDE 108* 102   CO2 23 22   ANIONGAP 11 12   * 111*   BUN 94.4* 97.3*   CR 2.55* 2.95*   GFRESTIMATED 18* 15*   CANELO 9.2 9.1   PHOS 4.7* 4.8*   PROTTOTAL  --  6.6   ALBUMIN 3.8 3.8   BILITOTAL  --  0.2   ALKPHOS  --  75   AST  --  41*   ALT  --  21     CBC  Recent Labs   Lab Test 06/12/23  0826 05/23/23  1359   HGB 10.4* 10.9*   WBC 6.3 4.9   RBC 3.15* 3.25*   HCT 32.3* 33.3*   * 103*   MCH 33.0 33.5*   MCHC 32.2 32.7   RDW 12.4 12.0   * 178     INRNo lab results found.  ABGNo lab results found.   URINE STUDIES  Recent Labs   Lab Test 05/23/23  1413   COLOR Yellow   APPEARANCE Clear   URINEGLC Negative   URINEBILI Negative   URINEKETONE Negative   SG 1.015   UBLD Negative   URINEPH 5.0   PROTEIN Negative   UROBILINOGEN 0.2   NITRITE Negative   LEUKEST Small*   RBCU None Seen   WBCU 25-50*     No lab results found.    ASSESSMENT AND PLAN:   #CKD stage 4  eGFR around 15-20 mL/min  UACR on 5/22 is 108 mg/g Cr  UPCR on 6/12 is 0.32 mg/g Cr  Recent renal imaging is not available and therefore I ordered a kidney ultrasound.  The potential responsible factors include a short bowel syndrome, longstanding HTN, pulmonary-renal syndrome and normal decline related to age. The patient is unlikely at this stage of CKD to respond to 20 mg of furosemide and therefore will change furosemide to 40 mg once daily and the patient will keep a BP diary and communicate the results  No documented intake of NSAIDs or other nephrotoxic medications.   The patient was also instructed to lose weight,  keep the sodium intake around 2400 mg /day, follow a plant-based diet and to avoid NSAIDs     #HTN  Primary and secondary to CKD. Currently on doxazosin 1 mg daily, chlorthalidone 25 mg daily, furosemide 20 mg twice daily, clonidine 0.1 mg twice daily, amlodipine 10 mg daily, and  carvedilol 25 mg twice daily. Management as per above.    #CVD/dyslipidemia  On pravastatin 80 mg daily as indicated for any patient with CKD above the age of 50     #Blood count  Hemoglobin 10.4  Iron sat 49%  Ferritin level 1342   The patient is iron replete therefore seems to be mostly driven by CKD and chronic inflammation. No indication for MANUELA at this point     #Acid-base status  CO2 level 23 no need for any sodium bicarbonate supplementation    #Electrolytes  Na 142  K 4.5 no acute issues, stopped potassium supplementation     #BMD  Ca  9.2        P 4.7   Albumin 3.8  iPTH  56 Vitamin D level is pending    #CKD journey/transplant goals of care to be discussed at her next visit    The total time of this encounter amounted to 60 minutes on the day of the encounter. This time included time spent with the patient, reviewing records, ordering tests, and performing post visit documentation.   Labs ordered include: CBC with diff, Renal Panel, CMP, UA with microscopy, UPCR, UACR, vitamin D levels, iPTH levels    The patient will return to follow up in 2 months     Cata Mclain MD  Division of Renal Disease and Hypertension  June 12, 2023  9:23 AM

## 2023-06-13 ENCOUNTER — LAB (OUTPATIENT)
Dept: LAB | Facility: CLINIC | Age: 88
End: 2023-06-13
Payer: MEDICARE

## 2023-06-13 ENCOUNTER — TELEPHONE (OUTPATIENT)
Dept: LAB | Facility: CLINIC | Age: 88
End: 2023-06-13

## 2023-06-13 DIAGNOSIS — D50.9 IRON DEFICIENCY ANEMIA, UNSPECIFIED IRON DEFICIENCY ANEMIA TYPE: ICD-10-CM

## 2023-06-13 LAB
DEPRECATED CALCIDIOL+CALCIFEROL SERPL-MC: 34 UG/L (ref 20–75)
FOLATE SERPL-MCNC: 28.3 NG/ML (ref 4.6–34.8)

## 2023-06-13 PROCEDURE — 82746 ASSAY OF FOLIC ACID SERUM: CPT

## 2023-06-13 PROCEDURE — 36415 COLL VENOUS BLD VENIPUNCTURE: CPT

## 2023-06-13 NOTE — PROGRESS NOTES
Lab was drawn in a wrong tube, canceled and reordered. Lab called patient and daughter answered, daughter said will try to get her mom back for redraw.     ANTONIETA Villarreal

## 2023-06-14 NOTE — TELEPHONE ENCOUNTER
Call back from [facility]. Advised that there is a form that is required to be signed by primary care. Fax number provided @ [phone] [facility].     - Will wait for fax     Nando Webber RN  Patient Advocate Liaison (PAL)  [facility]  [phone]    06/14/2023 at 10:01 AM

## 2023-06-14 NOTE — TELEPHONE ENCOUNTER
- DME signed, Faxed to the below listed number 06/14/2023 @ 8:30 AM.    - TC to follow-up on if fax received.     Nando Webber RN  Patient Advocate Liaison (PAL)  [facility]  [phone]    06/14/2023 at 8:44 AM

## 2023-06-14 NOTE — TELEPHONE ENCOUNTER
"- [facility] (Nicole) calls back, states that they have everything they need from PCP, call placed to patient to verify address, and then supplies will be sent to patients address.     - Will wait for patient to return call.     - call placed to [employer], states that they need an \"item number\", holister Adapt paste stock # [id number] and [id number] from [employer] website. Advised to give this number to [facility] and verify that it is for the adapt paste.     Nando Webber RN  Patient Advocate Liaison (PAL)  [facility]  [phone]    06/14/2023 at 10:33 AM   "

## 2023-06-14 NOTE — TELEPHONE ENCOUNTER
- Call from [facility] (CTC on file). Advised that the received ostomy supplies, but no ADAPT paste.    - Number provided on the invoice ([phone]) to call concerning orders.     - Call placed to above number, advised that [facility] requires a DME order for the adapt paste, not a prescription.    - DME order teed up for provider review and routed.     - When signed, [facility] requests FAX to [phone]    Nando Webber RN  Patient Advocate Liaison (PAL)  [facility]  [phone]    06/14/2023 at 8:24 AM

## 2023-06-15 DIAGNOSIS — J44.1 COPD EXACERBATION (H): Primary | ICD-10-CM

## 2023-06-16 ENCOUNTER — PATIENT OUTREACH (OUTPATIENT)
Dept: FAMILY MEDICINE | Facility: CLINIC | Age: 88
End: 2023-06-16
Payer: MEDICARE

## 2023-06-16 ENCOUNTER — TELEPHONE (OUTPATIENT)
Dept: NEPHROLOGY | Facility: CLINIC | Age: 88
End: 2023-06-16
Payer: MEDICARE

## 2023-06-16 ENCOUNTER — TELEPHONE (OUTPATIENT)
Dept: PULMONOLOGY | Facility: CLINIC | Age: 88
End: 2023-06-16
Payer: MEDICARE

## 2023-06-16 NOTE — TELEPHONE ENCOUNTER
- [facility] (CTC on file) calling to report that the adapt paste has arrived to the house. Nothing further needed at this time.    Nando Webber RN  Patient Advocate Liaison (PAL)  [facility]  [phone]    06/16/2023 at 11:06 AM

## 2023-06-16 NOTE — TELEPHONE ENCOUNTER
Earlier 2 month follow appt scheduled for 8/7 at 10:30 in person with labs scheduled at 9:30. Pt's son Daquan in agreement, lab orders needed. December appt will remain on for now, if not needed, [facility] will reach out to cancel.    Yahaira Silveira,  Nephrology Clinic Navigator  [facility]  Direct: [phone].

## 2023-06-16 NOTE — TELEPHONE ENCOUNTER
[facility] Call Center    Phone Message    May a detailed message be left on voicemail: yes     Reason for Call: Appointment Intake    Referring Provider Name: Cata Mclain MD, Urgent: 3-5 Days  Diagnosis and/or Symptoms: COPD exacerbation (H), patient has seen [facility] in the past.  Please call to schedule.     Action Taken: Pulm    Travel Screening: Not Applicable

## 2023-06-16 NOTE — TELEPHONE ENCOUNTER
No new patient appointments prior to patient's appt that she is scheduled for. Sent [employer] message to patient to try location other than [place] for new patient appt.    Artur Zimmerman RN

## 2023-06-21 ENCOUNTER — PATIENT OUTREACH (OUTPATIENT)
Dept: FAMILY MEDICINE | Facility: CLINIC | Age: 88
End: 2023-06-21
Payer: MEDICARE

## 2023-06-21 NOTE — TELEPHONE ENCOUNTER
"- [employer] (CTC on file) calls and leaves message on PAL RN line    - States that patient is looking to have a different company supply her O2 and portable tanks     - States that they are having issues with the regulators making \"too much noise\" and wanting to go with a different company.    - Patient is requesting PAL RN to fax O2 prescription and portable order to \"[employer]\" ([phone])    - Call placed to [employer] concerning the transition off current supplier. Supplier contracts are usually valid for several years after establishing, and conversation with medicare is usually required to transition.    - Call placed to [facility], spoke with Maria C concerning this transfer. Due to the request being new, and [facility] only having this contract for 1 billed month, OK to have this sent over to a different supplier to see if can contract.    - Sending fax to Kaveh 06/21/2023 @ 10:13 AM.    Nando Webber RN  Patient Advocate Liaison (PAL)  [facility]  [phone]    06/21/2023 at 10:13 AM   "

## 2023-06-22 ENCOUNTER — HOSPITAL ENCOUNTER (OUTPATIENT)
Dept: ULTRASOUND IMAGING | Facility: CLINIC | Age: 88
Discharge: HOME | End: 2023-06-22
Attending: INTERNAL MEDICINE
Payer: MEDICARE

## 2023-06-22 ENCOUNTER — HOSPITAL ENCOUNTER (OUTPATIENT)
Dept: GENERAL RADIOLOGY | Facility: CLINIC | Age: 88
Discharge: HOME | End: 2023-06-22
Attending: INTERNAL MEDICINE
Payer: MEDICARE

## 2023-06-22 DIAGNOSIS — N18.4 CKD (CHRONIC KIDNEY DISEASE) STAGE 4, GFR 15-29 ML/MIN (H): ICD-10-CM

## 2023-06-22 DIAGNOSIS — J44.1 COPD EXACERBATION (H): ICD-10-CM

## 2023-06-22 PROCEDURE — 71046 X-RAY EXAM CHEST 2 VIEWS: CPT

## 2023-06-22 PROCEDURE — 76770 US EXAM ABDO BACK WALL COMP: CPT

## 2023-06-25 ENCOUNTER — HEALTH MAINTENANCE LETTER (OUTPATIENT)
Age: 88
End: 2023-06-25

## 2023-06-27 ENCOUNTER — LAB (OUTPATIENT)
Dept: LAB | Facility: CLINIC | Age: 88
End: 2023-06-27
Payer: MEDICARE

## 2023-06-27 DIAGNOSIS — N18.4 CKD (CHRONIC KIDNEY DISEASE) STAGE 4, GFR 15-29 ML/MIN (H): ICD-10-CM

## 2023-06-27 LAB
ALBUMIN SERPL BCG-MCNC: 3.6 G/DL (ref 3.5–5.2)
ALP SERPL-CCNC: 64 U/L (ref 35–104)
ALT SERPL W P-5'-P-CCNC: 14 U/L (ref 0–50)
ANION GAP SERPL CALCULATED.3IONS-SCNC: 13 MMOL/L (ref 7–15)
AST SERPL W P-5'-P-CCNC: 23 U/L (ref 0–45)
BILIRUB SERPL-MCNC: <0.2 MG/DL
BUN SERPL-MCNC: 90 MG/DL (ref 8–23)
CALCIUM SERPL-MCNC: 8.8 MG/DL (ref 8.8–10.2)
CHLORIDE SERPL-SCNC: 105 MMOL/L (ref 98–107)
CREAT SERPL-MCNC: 2.34 MG/DL (ref 0.51–0.95)
DEPRECATED HCO3 PLAS-SCNC: 20 MMOL/L (ref 22–29)
GFR SERPL CREATININE-BSD FRML MDRD: 20 ML/MIN/1.73M2
GLUCOSE SERPL-MCNC: 169 MG/DL (ref 70–99)
POTASSIUM SERPL-SCNC: 5.1 MMOL/L (ref 3.4–5.3)
PROT SERPL-MCNC: 6.1 G/DL (ref 6.4–8.3)
SODIUM SERPL-SCNC: 138 MMOL/L (ref 136–145)

## 2023-06-27 PROCEDURE — 80053 COMPREHEN METABOLIC PANEL: CPT

## 2023-06-27 PROCEDURE — 36415 COLL VENOUS BLD VENIPUNCTURE: CPT

## 2023-06-28 ENCOUNTER — TELEPHONE (OUTPATIENT)
Dept: FAMILY MEDICINE | Facility: CLINIC | Age: 88
End: 2023-06-28
Payer: MEDICARE

## 2023-06-28 NOTE — TELEPHONE ENCOUNTER
Patient Quality Outreach    Patient is due for the following:   Physical Annual Wellness Visit      Topic Date Due     COVID-19 Vaccine (1) Never done     Pneumococcal Vaccine (1 - PCV) Never done     Diptheria Tetanus Pertussis (DTAP/TDAP/TD) Vaccine (1 - Tdap) Never done     Zoster (Shingles) Vaccine (1 of 2) Never done       Next Steps:   Patient was scheduled for 08/21/23    Type of outreach:    Chart review performed, no outreach needed.      Questions for provider review:    None           Audelia Arenas, CMA

## 2023-07-06 ENCOUNTER — PATIENT OUTREACH (OUTPATIENT)
Dept: FAMILY MEDICINE | Facility: CLINIC | Age: 88
End: 2023-07-06
Payer: MEDICARE

## 2023-07-06 NOTE — TELEPHONE ENCOUNTER
- Daquan calls (CTC on file)     - States that he needs F2F and walk test from LOV note sent to [facility] for them to fill the new DME.    - PAL RN received 2 faxes from [facility] concerning F2F and testing to qualify for the DME order for O2.     - This PAL RN Faxed DME, F2F OV note, and Walk test to [facility] 07/06/2023 @ 11:45. Confirmation received from fax outgoing.    Nando Webber RN  Patient Advocate Liaison (PAL)  [facility]  [phone]    07/06/2023 at 11:57 AM

## 2023-07-07 ENCOUNTER — OFFICE VISIT (OUTPATIENT)
Dept: CARDIOLOGY | Facility: CLINIC | Age: 88
End: 2023-07-07
Payer: MEDICARE

## 2023-07-07 ENCOUNTER — LAB (OUTPATIENT)
Dept: LAB | Facility: CLINIC | Age: 88
End: 2023-07-07
Payer: MEDICARE

## 2023-07-07 VITALS
WEIGHT: 165.1 LBS | HEART RATE: 60 BPM | HEIGHT: 57 IN | BODY MASS INDEX: 35.62 KG/M2 | OXYGEN SATURATION: 100 % | DIASTOLIC BLOOD PRESSURE: 59 MMHG | SYSTOLIC BLOOD PRESSURE: 145 MMHG

## 2023-07-07 DIAGNOSIS — Z95.0 CARDIAC PACEMAKER IN SITU: ICD-10-CM

## 2023-07-07 DIAGNOSIS — N18.4 CKD (CHRONIC KIDNEY DISEASE) STAGE 4, GFR 15-29 ML/MIN (H): ICD-10-CM

## 2023-07-07 DIAGNOSIS — I50.30 HEART FAILURE WITH PRESERVED EJECTION FRACTION, NYHA CLASS I (H): Primary | ICD-10-CM

## 2023-07-07 DIAGNOSIS — R09.89 BRUIT OF RIGHT CAROTID ARTERY: ICD-10-CM

## 2023-07-07 DIAGNOSIS — I10 BENIGN ESSENTIAL HYPERTENSION: ICD-10-CM

## 2023-07-07 DIAGNOSIS — R06.03 ACUTE RESPIRATORY DISTRESS: ICD-10-CM

## 2023-07-07 DIAGNOSIS — Z95.0 PACEMAKER: ICD-10-CM

## 2023-07-07 DIAGNOSIS — E78.5 HYPERLIPIDEMIA LDL GOAL <100: ICD-10-CM

## 2023-07-07 DIAGNOSIS — I50.30 HEART FAILURE WITH PRESERVED EJECTION FRACTION, NYHA CLASS I (H): ICD-10-CM

## 2023-07-07 DIAGNOSIS — J43.8 OTHER EMPHYSEMA (H): ICD-10-CM

## 2023-07-07 LAB
ANION GAP SERPL CALCULATED.3IONS-SCNC: 7 MMOL/L (ref 7–15)
BUN SERPL-MCNC: 77.2 MG/DL (ref 8–23)
CALCIUM SERPL-MCNC: 9.6 MG/DL (ref 8.8–10.2)
CHLORIDE SERPL-SCNC: 102 MMOL/L (ref 98–107)
CREAT SERPL-MCNC: 2.21 MG/DL (ref 0.51–0.95)
DEPRECATED HCO3 PLAS-SCNC: 24 MMOL/L (ref 22–29)
GFR SERPL CREATININE-BSD FRML MDRD: 21 ML/MIN/1.73M2
GLUCOSE SERPL-MCNC: 100 MG/DL (ref 70–99)
NT-PROBNP SERPL-MCNC: 1345 PG/ML (ref 0–1800)
POTASSIUM SERPL-SCNC: 5.2 MMOL/L (ref 3.4–5.3)
SODIUM SERPL-SCNC: 133 MMOL/L (ref 136–145)

## 2023-07-07 PROCEDURE — 93000 ELECTROCARDIOGRAM COMPLETE: CPT | Performed by: INTERNAL MEDICINE

## 2023-07-07 PROCEDURE — 80048 BASIC METABOLIC PNL TOTAL CA: CPT | Performed by: INTERNAL MEDICINE

## 2023-07-07 PROCEDURE — 36415 COLL VENOUS BLD VENIPUNCTURE: CPT | Performed by: INTERNAL MEDICINE

## 2023-07-07 PROCEDURE — 99215 OFFICE O/P EST HI 40 MIN: CPT | Performed by: INTERNAL MEDICINE

## 2023-07-07 PROCEDURE — 83880 ASSAY OF NATRIURETIC PEPTIDE: CPT | Performed by: INTERNAL MEDICINE

## 2023-07-07 NOTE — PROGRESS NOTES
HPI and Plan:   See dictation    Today's clinic visit entailed:  Review of external notes as documented elsewhere in note  Review of the result(s) of each unique test - BMP, CBC, CXR, renal ultrasound  Discussion of management or test interpretation with external physician/other qualified healthcare professional/appropriate source - Dr. Mclain, nephrology    Provider  Link to [facility] Help Grid     The level of medical decision making during this visit was of high complexity.      Orders Placed This Encounter   Procedures     US Carotid Bilateral     Basic metabolic panel     Follow-Up with Cardiology     Adult Nephrology  Referral     EKG 12-lead complete w/read - Clinics (performed today)     Echocardiogram Complete       No orders of the defined types were placed in this encounter.      There are no discontinued medications.      Encounter Diagnoses   Name Primary?     Cardiac pacemaker in situ      Benign essential hypertension      Hyperlipidemia LDL goal <100      Heart failure with preserved ejection fraction, NYHA class I (H) Yes     CKD (chronic kidney disease) stage 4, GFR 15-29 ml/min (H)      Other emphysema (H)      Acute respiratory distress      Pacemaker      Bruit of right carotid artery        CURRENT MEDICATIONS:  Current Outpatient Medications   Medication Sig Dispense Refill     acetaminophen (TYLENOL) 650 MG CR tablet Take 1,300 mg by mouth every morning       albuterol (PROAIR HFA/PROVENTIL HFA/VENTOLIN HFA) 108 (90 Base) MCG/ACT inhaler Inhale 2 puffs into the lungs every 4 hours as needed for shortness of breath, wheezing or cough 18 g 3     amLODIPine (NORVASC) 10 MG tablet Take 1 tablet (10 mg) by mouth daily 90 tablet 3     Bioflavonoid Products (VITAMIN C) CHEW        budesonide (PULMICORT) 0.5 MG/2ML neb solution Take 2 mLs (0.5 mg) by nebulization 2 times daily 360 mL 3     carvedilol (COREG) 25 MG tablet Take 1 tablet (25 mg) by mouth 2 times daily (with meals) 180 tablet 3      chlorthalidone (HYGROTON) 25 MG tablet Take 1 tablet (25 mg) by mouth daily 90 tablet 3     citalopram (CELEXA) 20 MG tablet Take 1.5 tablets (30 mg) by mouth daily 135 tablet 0     cloNIDine (CATAPRES) 0.1 MG tablet Take 1 tablet (0.1 mg) by mouth 2 times daily 180 tablet 3     Cranberry 450 MG TABS        doxazosin (CARDURA) 1 MG tablet Take 1 tablet (1 mg) by mouth At Bedtime 90 tablet 3     doxycycline hyclate (VIBRAMYCIN) 100 MG capsule Take 1 capsule (100 mg) by mouth three times a week 39 capsule 3     Doxylamine-Pyridoxine 10-10 MG TBEC        ferrous sulfate (FEROSUL) 325 (65 Fe) MG tablet Take 2 tablets (650 mg) by mouth daily (with breakfast) 180 tablet 3     furosemide (LASIX) 40 MG tablet Take 1 tablet (40 mg) by mouth daily 180 tablet 3     ipratropium - albuterol 0.5 mg/2.5 mg/3 mL (DUONEB) 0.5-2.5 (3) MG/3ML neb solution Take 1 vial (3 mLs) by nebulization 2 times daily 180 mL 11     montelukast (SINGULAIR) 10 MG tablet Take 1 tablet (10 mg) by mouth At Bedtime 90 tablet 3     multivitamin w/minerals (THERA-VIT-M) tablet Take 1 tablet by mouth daily       Ostomy Supplies (ADAPT) PSTE Use with new ostomy pouch and drain  g 3     Ostomy Supplies (KARAYA PASTE) PSTE Use as directed 128 g 3     Ostomy Supplies (PREMIER DRAINABLE POUCH 22MM) Pouch MISC Use as directed every 3-4 days 5 each 12     pravastatin (PRAVACHOL) 80 MG tablet Take 1 tablet (80 mg) by mouth daily 90 tablet 3       ALLERGIES     Allergies   Allergen Reactions     Acetaminophen-Codeine      Penicillin G        PAST MEDICAL HISTORY:  No past medical history on file.    PAST SURGICAL HISTORY:  Past Surgical History:   Procedure Laterality Date     CATARACT EXTRACTION Bilateral      COLONOSCOPY       FEMUR FRACTURE SURGERY Right     2010     ILEOSTOMY       IMPLANT PACEMAKER       left knee replacement       MULTIPLE TOOTH EXTRACTIONS       right knee replacement Right      SPINAL FUSION       TONSILLECTOMY       TUBAL LIGATION    "      FAMILY HISTORY:  Family History   Problem Relation Age of Onset     Cerebrovascular Disease Mother      Diabetes Mother      Hypertension Mother      Cancer Mother      Hypertension Father      Cerebrovascular Disease Sister      Hypertension Sister      Dementia Sister      Cancer Brother      Cancer Brother      Emphysema Brother        SOCIAL HISTORY:  Social History     Socioeconomic History     Marital status:      Spouse name: None     Number of children: None     Years of education: None     Highest education level: None   Tobacco Use     Smoking status: Former     Packs/day: 1.00     Years: 44.00     Pack years: 44.00     Types: Cigarettes     Start date: 1952     Quit date: 1996     Years since quittin.5     Smokeless tobacco: Never   Vaping Use     Vaping Use: Never used   Substance and Sexual Activity     Alcohol use: Not Currently     Drug use: Never     Sexual activity: Not Currently     Partners: Male       Review of Systems:  Skin:  not assessed       Eyes:  Positive for glasses    ENT:  not assessed      Respiratory:  Positive for shortness of breath;dyspnea on exertion has COPD   Cardiovascular:    Positive for;edema pt has pacemaker;ankles  Gastroenterology: not assessed      Genitourinary:  not assessed      Musculoskeletal:  not assessed      Neurologic:  not assessed      Psychiatric:  not assessed      Heme/Lymph/Imm:  not assessed      Endocrine:  not assessed        Physical Exam:  Vitals: BP (!) 145/59 (BP Location: Right arm, Patient Position: Sitting, Cuff Size: Adult Large)   Pulse 60   Ht 1.448 m (4' 9\")   Wt 74.9 kg (165 lb 1.6 oz)   SpO2 100%   BMI 35.73 kg/m      Constitutional:  cooperative;in no acute distress frail;chronically ill      Skin:  warm and dry to the touch          Head:  normocephalic        Eyes:  pupils equal and round        Lymph:      ENT:  no pallor or cyanosis        Neck:    right carotid bruit      Respiratory:  normal symmetry " basal rales  mild kyphosis    Cardiac: regular rhythm;no murmurs, gallops or rubs detected                  pulses below the femoral arteries are diminished                                      GI:  abdomen soft        Extremities and Muscular Skeletal:      bilateral LE edema;2+          Neurological:  no gross motor deficits        Psych:  Alert and Oriented x 3        CC  April Justina Canales MD  [address] [place] [address]

## 2023-07-07 NOTE — LETTER
7/7/2023    Ludivina Canales MD  [address] [facility] [address]    RE: Arpita Rocha       Dear Colleague,     I had the pleasure of seeing Arpita Rocha in the [facility].  HPI and Plan:   See dictation    Today's clinic visit entailed:  Review of external notes as documented elsewhere in note  Review of the result(s) of each unique test - BMP, CBC, CXR, renal ultrasound  Discussion of management or test interpretation with external physician/other qualified healthcare professional/appropriate source - Dr. Mclain, nephrology    Provider  Link to [facility] Help Grid     The level of medical decision making during this visit was of high complexity.      Orders Placed This Encounter   Procedures    US Carotid Bilateral    Basic metabolic panel    Follow-Up with Cardiology    Adult Nephrology  Referral    EKG 12-lead complete w/read - Clinics (performed today)    Echocardiogram Complete       No orders of the defined types were placed in this encounter.      There are no discontinued medications.      Encounter Diagnoses   Name Primary?    Cardiac pacemaker in situ     Benign essential hypertension     Hyperlipidemia LDL goal <100     Heart failure with preserved ejection fraction, NYHA class I (H) Yes    CKD (chronic kidney disease) stage 4, GFR 15-29 ml/min (H)     Other emphysema (H)     Acute respiratory distress     Pacemaker     Bruit of right carotid artery        CURRENT MEDICATIONS:  Current Outpatient Medications   Medication Sig Dispense Refill    acetaminophen (TYLENOL) 650 MG CR tablet Take 1,300 mg by mouth every morning      albuterol (PROAIR HFA/PROVENTIL HFA/VENTOLIN HFA) 108 (90 Base) MCG/ACT inhaler Inhale 2 puffs into the lungs every 4 hours as needed for shortness of breath, wheezing or cough 18 g 3    amLODIPine (NORVASC) 10 MG tablet Take 1 tablet (10 mg) by mouth daily 90 tablet 3    Bioflavonoid Products (VITAMIN C) CHEW       budesonide (PULMICORT)  0.5 MG/2ML neb solution Take 2 mLs (0.5 mg) by nebulization 2 times daily 360 mL 3    carvedilol (COREG) 25 MG tablet Take 1 tablet (25 mg) by mouth 2 times daily (with meals) 180 tablet 3    chlorthalidone (HYGROTON) 25 MG tablet Take 1 tablet (25 mg) by mouth daily 90 tablet 3    citalopram (CELEXA) 20 MG tablet Take 1.5 tablets (30 mg) by mouth daily 135 tablet 0    cloNIDine (CATAPRES) 0.1 MG tablet Take 1 tablet (0.1 mg) by mouth 2 times daily 180 tablet 3    Cranberry 450 MG TABS       doxazosin (CARDURA) 1 MG tablet Take 1 tablet (1 mg) by mouth At Bedtime 90 tablet 3    doxycycline hyclate (VIBRAMYCIN) 100 MG capsule Take 1 capsule (100 mg) by mouth three times a week 39 capsule 3    Doxylamine-Pyridoxine 10-10 MG TBEC       ferrous sulfate (FEROSUL) 325 (65 Fe) MG tablet Take 2 tablets (650 mg) by mouth daily (with breakfast) 180 tablet 3    furosemide (LASIX) 40 MG tablet Take 1 tablet (40 mg) by mouth daily 180 tablet 3    ipratropium - albuterol 0.5 mg/2.5 mg/3 mL (DUONEB) 0.5-2.5 (3) MG/3ML neb solution Take 1 vial (3 mLs) by nebulization 2 times daily 180 mL 11    montelukast (SINGULAIR) 10 MG tablet Take 1 tablet (10 mg) by mouth At Bedtime 90 tablet 3    multivitamin w/minerals (THERA-VIT-M) tablet Take 1 tablet by mouth daily      Ostomy Supplies (ADAPT) PSTE Use with new ostomy pouch and drain  g 3    Ostomy Supplies (KARAYA PASTE) PSTE Use as directed 128 g 3    Ostomy Supplies (PREMIER DRAINABLE POUCH 22MM) Pouch MISC Use as directed every 3-4 days 5 each 12    pravastatin (PRAVACHOL) 80 MG tablet Take 1 tablet (80 mg) by mouth daily 90 tablet 3       ALLERGIES     Allergies   Allergen Reactions    Acetaminophen-Codeine     Penicillin G        PAST MEDICAL HISTORY:  No past medical history on file.    PAST SURGICAL HISTORY:  Past Surgical History:   Procedure Laterality Date    CATARACT EXTRACTION Bilateral     COLONOSCOPY      FEMUR FRACTURE SURGERY Right     2010    ILEOSTOMY       "IMPLANT PACEMAKER      left knee replacement      MULTIPLE TOOTH EXTRACTIONS      right knee replacement Right     SPINAL FUSION      TONSILLECTOMY      TUBAL LIGATION         FAMILY HISTORY:  Family History   Problem Relation Age of Onset    Cerebrovascular Disease Mother     Diabetes Mother     Hypertension Mother     Cancer Mother     Hypertension Father     Cerebrovascular Disease Sister     Hypertension Sister     Dementia Sister     Cancer Brother     Cancer Brother     Emphysema Brother        SOCIAL HISTORY:  Social History     Socioeconomic History    Marital status:      Spouse name: None    Number of children: None    Years of education: None    Highest education level: None   Tobacco Use    Smoking status: Former     Packs/day: 1.00     Years: 44.00     Pack years: 44.00     Types: Cigarettes     Start date: 1952     Quit date: 1996     Years since quittin.5    Smokeless tobacco: Never   Vaping Use    Vaping Use: Never used   Substance and Sexual Activity    Alcohol use: Not Currently    Drug use: Never    Sexual activity: Not Currently     Partners: Male       Review of Systems:  Skin:  not assessed       Eyes:  Positive for glasses    ENT:  not assessed      Respiratory:  Positive for shortness of breath;dyspnea on exertion has COPD   Cardiovascular:    Positive for;edema pt has pacemaker;ankles  Gastroenterology: not assessed      Genitourinary:  not assessed      Musculoskeletal:  not assessed      Neurologic:  not assessed      Psychiatric:  not assessed      Heme/Lymph/Imm:  not assessed      Endocrine:  not assessed        Physical Exam:  Vitals: BP (!) 145/59 (BP Location: Right arm, Patient Position: Sitting, Cuff Size: Adult Large)   Pulse 60   Ht 1.448 m (4' 9\")   Wt 74.9 kg (165 lb 1.6 oz)   SpO2 100%   BMI 35.73 kg/m      Constitutional:  cooperative;in no acute distress frail;chronically ill      Skin:  warm and dry to the touch          Head:  normocephalic    "     Eyes:  pupils equal and round        Lymph:      ENT:  no pallor or cyanosis        Neck:    right carotid bruit      Respiratory:  normal symmetry basal rales  mild kyphosis    Cardiac: regular rhythm;no murmurs, gallops or rubs detected                  pulses below the femoral arteries are diminished                                      GI:  abdomen soft        Extremities and Muscular Skeletal:      bilateral LE edema;2+          Neurological:  no gross motor deficits        Psych:  Alert and Oriented x 3        CC  Ludivina Canales MD  [address] [place] [address]                Service Date: 07/07/2023    REFERRING PHYSICIAN:  Ludivina Canales MD    HISTORY OF PRESENT ILLNESS:  Ms. Rocha is a pleasant 87-year-old female who presents to clinic today for evaluation of heart failure.  She is present with her son and daughter-in-law.  She presents in a wheelchair with oxygen.  She has underlying history of remote but heavy tobacco abuse, quitting in 1995, likely underlying emphysema.  However, she was fairly mobile without significant symptoms up until about 6 months to a year ago.  She developed COVID infection, which did not require hospitalization, but a few months later ended up with a secondary pneumonia and was hospitalized with this. She was discharged on oxygen of 3 liters per nasal cannula after this hospitalization, which she continues to use.  She has continued difficulty with dyspnea on exertion.  She has what sounds like orthopneic symptoms.  She has been sleeping in a recliner for over a year now as she did not feel well with her breathing lying supine.  Prior to the COVID infection and pneumonia, she was able to walk back and forth from her car to various places without resting or stopping with the breathing.  Her symptoms prior to the infections, mainly were with the orthopnea at nighttime.  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  It looks  like her creatinine averages between 2.3 to 2.5.  She has known chronic kidney disease for about 5 years.  She is not diabetic.  She does have high blood pressure and is on multiple medications for this. Back in 2006,  she had a permanent pacemaker implant. Just in listening to her, it sounds like she may have had symptomatic bradycardia. On EKG today, we see that she is atrial paced.  No ST segment changes.  She has seen a nephrologist in June and I did review that note from Dr. Mclain.  I believe this was an initial visit. Renal ultrasound was ordered.  I do not believe any medication changes were made with the exception of increasing Lasix to 40 mg.  She is on amlodipine 10 mg, chlorthalidone 25 mg, carvedilol 25 mg twice daily, clonidine 0.1 twice daily, doxazosin 1 mg at bedtime for her blood pressure.  She does have anemia of chronic disease.  Her iron saturations were within normal limits.  Dr. Mclain did not feel she qualified or needed epoetin injections at this time.  Since that visit, however, her total CO2 has decreased. Based on lab work done 10 days ago, her creatinine was measured at 2.34, total CO2 is now 20.  I believe this is the lowest it has been on recent checks.  Her potassium went up a little bit to 5.1 and sodium was 138.    PHYSICAL EXAMINATION:  Today she is mildly hypertensive with a blood pressure 145/59, pulse is 60, weight is 165. carotid pulses are diminished with a vibratory bruit noted on the right. Cardiovascular tones are regular without murmur or gallop.  Lung fields demonstrate diminished breath sounds posteriorly with crackles in the left base.  She has 2+ pitting edema to mid shin.    IMPRESSION:  In summary, Ms. Rocha is a pleasant 87-year-old female with history of permanent pacemaker implant, recent hospitalization for pneumonia and deterioration of her breathing in the last 6 months to a year, now requiring oxygen.  She has underlying stage 4 or 5 chronic kidney disease,  anemia of chronic disease and COPD with former heavy remote tobacco use.  1.  Cardiorenal syndrome.  She likely has an element of heart failure with preserved ejection fraction from cardiorenal syndrome.  I would like to order an echocardiogram as well as an NT-proBNP today to assess her LV function, evaluate for pulmonary hypertension and increased intracardiac pressures.  I am reluctant to adjusting her diuretics until I have this information.  I would also like to talk to her nephrologist about her current regimen, which includes chlorthalidone and furosemide.  Her oxygen saturations have been fairly high on 3 liters.  She can try and titrate down her oxygen from 3 liters to 2 and possibly 1 at rest.  She may require up to 3 with exertion.  2.  Permanent pacemaker.  We will [facility] up in the [facility] for checks. On EKG today, she is atrial paced.  We will need to check the battery life.  3.  Carotid artery disease.  I hear a vibratory murmur on the right.  I would like her to undergo a carotid ultrasound for further evaluation.  4.  Chronic kidney disease.  Creatinine on last check was improved at 2.34, but she did develop some acidosis with a total CO2 declining to 20.  Once again, I will talk to her nephrologist. Addition of sodium bicarbonate may exacerbate any heart failure.  I would also like to coordinate efforts with diuretics with her nephrologist as well.  5.  Anemia of chronic disease.  Her last hemoglobin measured in mid June was 10.4, platelets were a little low at 117.  Her iron saturations were in normal range.  This is being closely followed by Nephrology.      I will see her back in clinic with the above test results and after conversing with her nephrologist and coordinating her medications.    Please feel free to contact me with any questions you have in regards to her care.    Mary Gonzalez, DO    cc:  Ludivina Canales MD  [address]  [place]  [address]      Mary Gonzalez DO        D: 2023   T: 2023   MT: [id number]    Name:     CONNER LINDER  MRN:      [id number]        Account:      [id number]   :      1935           Service Date: 2023       Document: [id number]     Thank you for allowing me to participate in the care of your patient.      Sincerely,     Mary Gonzalez DO     [facility]

## 2023-07-07 NOTE — PROGRESS NOTES
Service Date: 07/07/2023    REFERRING PHYSICIAN:  Ludivina Canales MD    HISTORY OF PRESENT ILLNESS:  Ms. Rocha is a pleasant 87-year-old female who presents to clinic today for evaluation of heart failure.  She is present with her son and daughter-in-law.  She presents in a wheelchair with oxygen.  She has underlying history of remote but heavy tobacco abuse, quitting in 1995, likely underlying emphysema.  However, she was fairly mobile without significant symptoms up until about 6 months to a year ago.  She developed COVID infection, which did not require hospitalization, but a few months later ended up with a secondary pneumonia and was hospitalized with this. She was discharged on oxygen of 3 liters per nasal cannula after this hospitalization, which she continues to use.  She has continued difficulty with dyspnea on exertion.  She has what sounds like orthopneic symptoms.  She has been sleeping in a recliner for over a year now as she did not feel well with her breathing lying supine.  Prior to the COVID infection and pneumonia, she was able to walk back and forth from her car to various places without resting or stopping with the breathing.  Her symptoms prior to the infections, mainly were with the orthopnea at nighttime.  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  It looks like her creatinine averages between 2.3 to 2.5.  She has known chronic kidney disease for about 5 years.  She is not diabetic.  She does have high blood pressure and is on multiple medications for this. Back in 2006,  she had a permanent pacemaker implant. Just in listening to her, it sounds like she may have had symptomatic bradycardia. On EKG today, we see that she is atrial paced.  No ST segment changes.  She has seen a nephrologist in June and I did review that note from Dr. Mclain.  I believe this was an initial visit. Renal ultrasound was ordered.  I do not believe any medication changes were made with the  exception of increasing Lasix to 40 mg.  She is on amlodipine 10 mg, chlorthalidone 25 mg, carvedilol 25 mg twice daily, clonidine 0.1 twice daily, doxazosin 1 mg at bedtime for her blood pressure.  She does have anemia of chronic disease.  Her iron saturations were within normal limits.  Dr. Mclain did not feel she qualified or needed epoetin injections at this time.  Since that visit, however, her total CO2 has decreased. Based on lab work done 10 days ago, her creatinine was measured at 2.34, total CO2 is now 20.  I believe this is the lowest it has been on recent checks.  Her potassium went up a little bit to 5.1 and sodium was 138.    PHYSICAL EXAMINATION:  Today she is mildly hypertensive with a blood pressure 145/59, pulse is 60, weight is 165. carotid pulses are diminished with a vibratory bruit noted on the right. Cardiovascular tones are regular without murmur or gallop.  Lung fields demonstrate diminished breath sounds posteriorly with crackles in the left base.  She has 2+ pitting edema to mid shin.    IMPRESSION:  In summary, Ms. Rocha is a pleasant 87-year-old female with history of permanent pacemaker implant, recent hospitalization for pneumonia and deterioration of her breathing in the last 6 months to a year, now requiring oxygen.  She has underlying stage 4 or 5 chronic kidney disease, anemia of chronic disease and COPD with former heavy remote tobacco use.  1.  Cardiorenal syndrome.  She likely has an element of heart failure with preserved ejection fraction from cardiorenal syndrome.  I would like to order an echocardiogram as well as an NT-proBNP today to assess her LV function, evaluate for pulmonary hypertension and increased intracardiac pressures.  I am reluctant to adjusting her diuretics until I have this information.  I would also like to talk to her nephrologist about her current regimen, which includes chlorthalidone and furosemide.  Her oxygen saturations have been fairly high on 3  liters.  She can try and titrate down her oxygen from 3 liters to 2 and possibly 1 at rest.  She may require up to 3 with exertion.  2.  Permanent pacemaker.  We will [facility] up [facility] for checks. On EKG today, she is atrial paced.  We will need to check the battery life.  3.  Carotid artery disease.  I hear a vibratory murmur on the right.  I would like her to undergo a carotid ultrasound for further evaluation.  4.  Chronic kidney disease.  Creatinine on last check was improved at 2.34, but she did develop some acidosis with a total CO2 declining to 20.  Once again, I will talk to her nephrologist. Addition of sodium bicarbonate may exacerbate any heart failure.  I would also like to coordinate efforts with diuretics with her nephrologist as well.  5.  Anemia of chronic disease.  Her last hemoglobin measured in mid  was 10.4, platelets were a little low at 117.  Her iron saturations were in normal range.  This is being closely followed by Nephrology.      I will see her back in clinic with the above test results and after conversing with her nephrologist and coordinating her medications.    Please feel free to contact me with any questions you have in regards to her care.    Mary Gonzalez DO    cc:  [place] [address]      Mary Gonzalez DO        D: 2023   T: 2023   MT: [id number]    Name:     CONNER LINDER  MRN:      [id number]        Account:      [id number]   :      1935           Service Date: 2023       Document: [id number]

## 2023-07-11 ENCOUNTER — TELEPHONE (OUTPATIENT)
Dept: CARDIOLOGY | Facility: CLINIC | Age: 88
End: 2023-07-11

## 2023-07-11 DIAGNOSIS — I50.32 CHRONIC HEART FAILURE WITH PRESERVED EJECTION FRACTION (H): ICD-10-CM

## 2023-07-11 DIAGNOSIS — N18.4 CKD (CHRONIC KIDNEY DISEASE) STAGE 4, GFR 15-29 ML/MIN (H): ICD-10-CM

## 2023-07-11 NOTE — TELEPHONE ENCOUNTER
Please see labs and advise on any changes.  Per last OV note 7/7/23:    4.  Chronic kidney disease.  Creatinine on last check was improved at 2.34, but she did develop some acidosis with a total CO2 declining to 20.  Once again, I will talk to her nephrologist. Addition of sodium bicarbonate may exacerbate any heart failure.  I would also like to coordinate efforts with diuretics with her nephrologist as well.          Geent Montaño RN on 7/11/2023 at 2:31 PM

## 2023-07-14 RX ORDER — FUROSEMIDE 40 MG
40 TABLET ORAL 2 TIMES DAILY
Qty: 180 TABLET | Refills: 3 | Status: ON HOLD | OUTPATIENT
Start: 2023-07-14 | End: 2023-01-01

## 2023-07-14 NOTE — TELEPHONE ENCOUNTER
Mary Gonzalez DO Lidke, Jen M, RN  Caller: Unspecified (3 days ago,  2:29 PM)  I talked to her nephrologist.  Have her stop chlorthalidone.  Increase lasix to 40mg BID and add a complete metabolic panel on July 25 when she comes in for her echo and carotid ultrasound.         Discussed with son and daughter-in-law who are patient's caregivers.  They have verbalized understanding and will call for lab appt.  Genet Montaño RN on 7/14/2023 at 10:02 AM

## 2023-07-18 ENCOUNTER — LAB (OUTPATIENT)
Dept: LAB | Facility: CLINIC | Age: 88
End: 2023-07-18
Payer: MEDICARE

## 2023-07-18 DIAGNOSIS — N18.4 CKD (CHRONIC KIDNEY DISEASE) STAGE 4, GFR 15-29 ML/MIN (H): ICD-10-CM

## 2023-07-18 DIAGNOSIS — I50.32 CHRONIC HEART FAILURE WITH PRESERVED EJECTION FRACTION (H): ICD-10-CM

## 2023-07-18 LAB
ALBUMIN SERPL BCG-MCNC: 4 G/DL (ref 3.5–5.2)
ALP SERPL-CCNC: 69 U/L (ref 35–104)
ALT SERPL W P-5'-P-CCNC: 16 U/L (ref 0–50)
ANION GAP SERPL CALCULATED.3IONS-SCNC: 14 MMOL/L (ref 7–15)
AST SERPL W P-5'-P-CCNC: 35 U/L (ref 0–45)
BASOPHILS # BLD AUTO: 0 10E3/UL (ref 0–0.2)
BASOPHILS NFR BLD AUTO: 1 %
BILIRUB SERPL-MCNC: 0.2 MG/DL
BUN SERPL-MCNC: 99.8 MG/DL (ref 8–23)
CALCIUM SERPL-MCNC: 9 MG/DL (ref 8.8–10.2)
CHLORIDE SERPL-SCNC: 104 MMOL/L (ref 98–107)
CREAT SERPL-MCNC: 2.74 MG/DL (ref 0.51–0.95)
DEPRECATED HCO3 PLAS-SCNC: 19 MMOL/L (ref 22–29)
EOSINOPHIL # BLD AUTO: 0.2 10E3/UL (ref 0–0.7)
EOSINOPHIL NFR BLD AUTO: 4 %
ERYTHROCYTE [DISTWIDTH] IN BLOOD BY AUTOMATED COUNT: 12.7 % (ref 10–15)
GFR SERPL CREATININE-BSD FRML MDRD: 16 ML/MIN/1.73M2
GLUCOSE SERPL-MCNC: 98 MG/DL (ref 70–99)
HCT VFR BLD AUTO: 30.5 % (ref 35–47)
HGB BLD-MCNC: 9.8 G/DL (ref 11.7–15.7)
IMM GRANULOCYTES # BLD: 0 10E3/UL
IMM GRANULOCYTES NFR BLD: 0 %
LYMPHOCYTES # BLD AUTO: 0.6 10E3/UL (ref 0.8–5.3)
LYMPHOCYTES NFR BLD AUTO: 16 %
MCH RBC QN AUTO: 32.9 PG (ref 26.5–33)
MCHC RBC AUTO-ENTMCNC: 32.1 G/DL (ref 31.5–36.5)
MCV RBC AUTO: 102 FL (ref 78–100)
MONOCYTES # BLD AUTO: 0.3 10E3/UL (ref 0–1.3)
MONOCYTES NFR BLD AUTO: 8 %
NEUTROPHILS # BLD AUTO: 2.9 10E3/UL (ref 1.6–8.3)
NEUTROPHILS NFR BLD AUTO: 72 %
PLATELET # BLD AUTO: 152 10E3/UL (ref 150–450)
POTASSIUM SERPL-SCNC: 4.8 MMOL/L (ref 3.4–5.3)
PROT SERPL-MCNC: 6.6 G/DL (ref 6.4–8.3)
RBC # BLD AUTO: 2.98 10E6/UL (ref 3.8–5.2)
SODIUM SERPL-SCNC: 137 MMOL/L (ref 136–145)
WBC # BLD AUTO: 4.1 10E3/UL (ref 4–11)

## 2023-07-18 PROCEDURE — 80053 COMPREHEN METABOLIC PANEL: CPT

## 2023-07-18 PROCEDURE — 85025 COMPLETE CBC W/AUTO DIFF WBC: CPT

## 2023-07-18 PROCEDURE — 36415 COLL VENOUS BLD VENIPUNCTURE: CPT

## 2023-07-19 ENCOUNTER — TELEPHONE (OUTPATIENT)
Dept: CARDIOLOGY | Facility: CLINIC | Age: 88
End: 2023-07-19
Payer: MEDICARE

## 2023-07-19 DIAGNOSIS — I50.32 CHRONIC HEART FAILURE WITH PRESERVED EJECTION FRACTION (H): Primary | ICD-10-CM

## 2023-07-20 ENCOUNTER — TELEPHONE (OUTPATIENT)
Dept: FAMILY MEDICINE | Facility: CLINIC | Age: 88
End: 2023-07-20
Payer: MEDICARE

## 2023-07-20 NOTE — TELEPHONE ENCOUNTER
General Call    Contacts       Type Contact Phone/Fax    07/20/2023 01:53 PM CDT Phone (Incoming) [phone]        Reason for Call:  Oxygen Test    What are your questions or concerns:  Sue from [facility] called and said that they have the prescription for the oxygen, but they do NOT have the Oxygen Test; she would like to know how to get the oxygen test ordered. Please call her back at your earliest convenience, thank you!    Date of last appointment with provider: 5/23/23    Could we send this information to you in [facility] or would you prefer to receive a phone call?:   Patient would prefer a phone call   Okay to leave a detailed message?: Yes at Other phone number:  Sue Thomas Aprnikko Roa - [phone]

## 2023-07-20 NOTE — TELEPHONE ENCOUNTER
- Call placed to Sue shepherd below  - They advised they have not received any further faxes with the information needed despite several attempts to fax    - Confirmed fax number as [phone].     - PAL RN sent requested documents again 07/20/2023 @ 2:30 PM.    Nando Webber RN  Patient Advocate Liaison (PAL)  [facility]  [phone]    07/20/2023 at 2:32 PM

## 2023-07-20 NOTE — TELEPHONE ENCOUNTER
- Call back to [facility], confirmed that they did receive the fax this time with all the information.    - [facility] advises that the DME order  2 days after it was written so will need a new DME order    - Also advised that the O2 testing also  after 30 days from test date. Advised will need to repeat this walk test as well.    - PAL RN confirmed that no other information needs to be updated at this time, will reschedule patient for a new walk test and DME orders signed day of visit with short window of DME script.     - Call placed to [facility] concerning supply, and if patient continues to receive supply, they did confirm that patient still is receiving O2 supplies through [facility].     - Call placed to Son ([facility] on file), resquests to hold off on O2 orders for now.    - Reports that patient is down to 1 lpm now vs 3 lpm.     - Wants to discuss with patient before any more decisions are made.    - PAL RN closing encounter for now, will revisit when call back with decision.    Nando Webber, RN  Patient Advocate Liaison (PAL)  [facility]  [phone]    2023 at 2:58 PM

## 2023-07-21 NOTE — TELEPHONE ENCOUNTER
Mary Gonzalez, Genet Clay, RN  Caller: Unspecified (2 days ago,  8:52 AM)  See if she feels any better with the change in diuretic.  I am nervous about waiting until 8/7 to see what her Cr is doing. See if she can come in to have a BMP on Monday.     Patient is coming in on Tuesday 7/25 and will complete BMP at that time.  Patient has not noticed any change in symptoms.  Genet Montaño, RODRÍGUEZ on 7/21/2023 at 4:13 PM

## 2023-07-25 ENCOUNTER — HOSPITAL ENCOUNTER (OUTPATIENT)
Dept: CARDIOLOGY | Facility: CLINIC | Age: 88
Discharge: HOME | End: 2023-07-25
Attending: INTERNAL MEDICINE
Payer: MEDICARE

## 2023-07-25 ENCOUNTER — LAB (OUTPATIENT)
Dept: LAB | Facility: CLINIC | Age: 88
End: 2023-07-25
Payer: MEDICARE

## 2023-07-25 DIAGNOSIS — R06.03 ACUTE RESPIRATORY DISTRESS: ICD-10-CM

## 2023-07-25 DIAGNOSIS — R09.89 BRUIT OF RIGHT CAROTID ARTERY: ICD-10-CM

## 2023-07-25 DIAGNOSIS — I50.30 HEART FAILURE WITH PRESERVED EJECTION FRACTION, NYHA CLASS I (H): ICD-10-CM

## 2023-07-25 DIAGNOSIS — I50.32 CHRONIC HEART FAILURE WITH PRESERVED EJECTION FRACTION (H): ICD-10-CM

## 2023-07-25 LAB
ANION GAP SERPL CALCULATED.3IONS-SCNC: 13 MMOL/L (ref 7–15)
BUN SERPL-MCNC: 96.5 MG/DL (ref 8–23)
CALCIUM SERPL-MCNC: 9.2 MG/DL (ref 8.8–10.2)
CHLORIDE SERPL-SCNC: 105 MMOL/L (ref 98–107)
CREAT SERPL-MCNC: 2.69 MG/DL (ref 0.51–0.95)
DEPRECATED HCO3 PLAS-SCNC: 20 MMOL/L (ref 22–29)
GFR SERPL CREATININE-BSD FRML MDRD: 17 ML/MIN/1.73M2
GLUCOSE SERPL-MCNC: 119 MG/DL (ref 70–99)
LVEF ECHO: NORMAL
POTASSIUM SERPL-SCNC: 4.6 MMOL/L (ref 3.4–5.3)
SODIUM SERPL-SCNC: 138 MMOL/L (ref 136–145)

## 2023-07-25 PROCEDURE — 93880 EXTRACRANIAL BILAT STUDY: CPT

## 2023-07-25 PROCEDURE — 255N000002 HC RX 255 OP 636: Performed by: INTERNAL MEDICINE

## 2023-07-25 PROCEDURE — 36415 COLL VENOUS BLD VENIPUNCTURE: CPT | Performed by: INTERNAL MEDICINE

## 2023-07-25 PROCEDURE — 93306 TTE W/DOPPLER COMPLETE: CPT | Mod: 26 | Performed by: INTERNAL MEDICINE

## 2023-07-25 PROCEDURE — 93880 EXTRACRANIAL BILAT STUDY: CPT | Mod: 26 | Performed by: INTERNAL MEDICINE

## 2023-07-25 PROCEDURE — 80048 BASIC METABOLIC PNL TOTAL CA: CPT | Performed by: INTERNAL MEDICINE

## 2023-07-25 RX ADMIN — HUMAN ALBUMIN MICROSPHERES AND PERFLUTREN 3 ML: 10; .22 INJECTION, SOLUTION INTRAVENOUS at 14:22

## 2023-07-26 NOTE — TELEPHONE ENCOUNTER
BMP recheck.  See below, patient does not notice any change with the change made in her diuretics.        Genet Montaño RN on 7/26/2023 at 9:04 AM

## 2023-07-27 ENCOUNTER — TELEPHONE (OUTPATIENT)
Dept: CARDIOLOGY | Facility: CLINIC | Age: 88
End: 2023-07-27
Payer: MEDICARE

## 2023-07-27 NOTE — TELEPHONE ENCOUNTER
Please see carotid ultrasound results and advise, per last OV note 7/7/23:    3. Carotid artery disease. I hear a vibratory murmur on the right. I would like her to undergo a carotid ultrasound for further evaluation.       Impression:     1. Right internal carotid artery: Velocity criteria are discordant,  with  cm/s meeting threshold for ?70% diameter stenosis, though  EDV and PICA/CCA ratio falls within 50-69% range. Recommend clinical  correlation and further assessment with CTA/MRA if appropriate.      2. Left internal carotid artery: Velocity criteria are discordant,  with  cm/s meeting threshold for ?70% diameter stenosis, though  EDV and PICA/CCA ratio falls within 50-69% range. Recommend clinical  correlation and further assessment with CTA/MRA if appropriate.      3. Bilateral vertebral arteries demonstrate antegrade flow.      4. Elevated bilateral external carotid arteries suggesting significant  obstruction.      5. There are no prior studies available for comparison.

## 2023-08-07 ENCOUNTER — LAB (OUTPATIENT)
Dept: LAB | Facility: CLINIC | Age: 88
End: 2023-08-07
Payer: MEDICARE

## 2023-08-07 ENCOUNTER — OFFICE VISIT (OUTPATIENT)
Dept: NEPHROLOGY | Facility: CLINIC | Age: 88
End: 2023-08-07
Attending: INTERNAL MEDICINE
Payer: MEDICARE

## 2023-08-07 ENCOUNTER — PATIENT OUTREACH (OUTPATIENT)
Dept: CARE COORDINATION | Facility: CLINIC | Age: 88
End: 2023-08-07

## 2023-08-07 VITALS
DIASTOLIC BLOOD PRESSURE: 70 MMHG | WEIGHT: 168 LBS | HEART RATE: 60 BPM | OXYGEN SATURATION: 94 % | SYSTOLIC BLOOD PRESSURE: 165 MMHG | BODY MASS INDEX: 36.35 KG/M2

## 2023-08-07 DIAGNOSIS — D63.1 ANEMIA DUE TO STAGE 4 CHRONIC KIDNEY DISEASE (H): ICD-10-CM

## 2023-08-07 DIAGNOSIS — I10 BENIGN ESSENTIAL HYPERTENSION: ICD-10-CM

## 2023-08-07 DIAGNOSIS — N18.4 ANEMIA DUE TO STAGE 4 CHRONIC KIDNEY DISEASE (H): ICD-10-CM

## 2023-08-07 DIAGNOSIS — I50.32 CHRONIC HEART FAILURE WITH PRESERVED EJECTION FRACTION (H): ICD-10-CM

## 2023-08-07 DIAGNOSIS — N18.4 CKD (CHRONIC KIDNEY DISEASE) STAGE 4, GFR 15-29 ML/MIN (H): ICD-10-CM

## 2023-08-07 DIAGNOSIS — N18.4 ANEMIA DUE TO STAGE 4 CHRONIC KIDNEY DISEASE (H): Primary | ICD-10-CM

## 2023-08-07 DIAGNOSIS — D63.1 ANEMIA DUE TO STAGE 4 CHRONIC KIDNEY DISEASE (H): Primary | ICD-10-CM

## 2023-08-07 LAB
ALBUMIN SERPL BCG-MCNC: 3.9 G/DL (ref 3.5–5.2)
ALP SERPL-CCNC: 85 U/L (ref 35–104)
ALT SERPL W P-5'-P-CCNC: 11 U/L (ref 0–50)
ANION GAP SERPL CALCULATED.3IONS-SCNC: 12 MMOL/L (ref 7–15)
AST SERPL W P-5'-P-CCNC: 22 U/L (ref 0–45)
BASOPHILS # BLD AUTO: 0 10E3/UL (ref 0–0.2)
BASOPHILS NFR BLD AUTO: 1 %
BILIRUB SERPL-MCNC: 0.2 MG/DL
BUN SERPL-MCNC: 81.4 MG/DL (ref 8–23)
CALCIUM SERPL-MCNC: 9.2 MG/DL (ref 8.8–10.2)
CHLORIDE SERPL-SCNC: 108 MMOL/L (ref 98–107)
CREAT SERPL-MCNC: 2.55 MG/DL (ref 0.51–0.95)
DEPRECATED HCO3 PLAS-SCNC: 21 MMOL/L (ref 22–29)
EOSINOPHIL # BLD AUTO: 0.2 10E3/UL (ref 0–0.7)
EOSINOPHIL NFR BLD AUTO: 4 %
ERYTHROCYTE [DISTWIDTH] IN BLOOD BY AUTOMATED COUNT: 12.7 % (ref 10–15)
FERRITIN SERPL-MCNC: 1379 NG/ML (ref 11–328)
GFR SERPL CREATININE-BSD FRML MDRD: 18 ML/MIN/1.73M2
GLUCOSE SERPL-MCNC: 108 MG/DL (ref 70–99)
HCT VFR BLD AUTO: 29.8 % (ref 35–47)
HGB BLD-MCNC: 9.7 G/DL (ref 11.7–15.7)
IMM GRANULOCYTES # BLD: 0 10E3/UL
IMM GRANULOCYTES NFR BLD: 0 %
IRON BINDING CAPACITY (ROCHE): 202 UG/DL (ref 240–430)
IRON SATN MFR SERPL: 44 % (ref 15–46)
IRON SERPL-MCNC: 89 UG/DL (ref 37–145)
LYMPHOCYTES # BLD AUTO: 0.6 10E3/UL (ref 0.8–5.3)
LYMPHOCYTES NFR BLD AUTO: 12 %
MCH RBC QN AUTO: 33.2 PG (ref 26.5–33)
MCHC RBC AUTO-ENTMCNC: 32.6 G/DL (ref 31.5–36.5)
MCV RBC AUTO: 102 FL (ref 78–100)
MONOCYTES # BLD AUTO: 0.4 10E3/UL (ref 0–1.3)
MONOCYTES NFR BLD AUTO: 8 %
NEUTROPHILS # BLD AUTO: 3.5 10E3/UL (ref 1.6–8.3)
NEUTROPHILS NFR BLD AUTO: 75 %
NRBC # BLD AUTO: 0 10E3/UL
NRBC BLD AUTO-RTO: 0 /100
PLATELET # BLD AUTO: 129 10E3/UL (ref 150–450)
POTASSIUM SERPL-SCNC: 5 MMOL/L (ref 3.4–5.3)
PROT SERPL-MCNC: 6.4 G/DL (ref 6.4–8.3)
RBC # BLD AUTO: 2.92 10E6/UL (ref 3.8–5.2)
SODIUM SERPL-SCNC: 141 MMOL/L (ref 136–145)
WBC # BLD AUTO: 4.6 10E3/UL (ref 4–11)

## 2023-08-07 PROCEDURE — 99214 OFFICE O/P EST MOD 30 MIN: CPT | Performed by: INTERNAL MEDICINE

## 2023-08-07 PROCEDURE — 85025 COMPLETE CBC W/AUTO DIFF WBC: CPT | Performed by: PATHOLOGY

## 2023-08-07 PROCEDURE — 82728 ASSAY OF FERRITIN: CPT | Performed by: PATHOLOGY

## 2023-08-07 PROCEDURE — 83550 IRON BINDING TEST: CPT | Performed by: PATHOLOGY

## 2023-08-07 PROCEDURE — 83540 ASSAY OF IRON: CPT | Performed by: PATHOLOGY

## 2023-08-07 PROCEDURE — 80053 COMPREHEN METABOLIC PANEL: CPT | Performed by: PATHOLOGY

## 2023-08-07 PROCEDURE — G0463 HOSPITAL OUTPT CLINIC VISIT: HCPCS | Performed by: INTERNAL MEDICINE

## 2023-08-07 PROCEDURE — 36415 COLL VENOUS BLD VENIPUNCTURE: CPT | Performed by: PATHOLOGY

## 2023-08-07 RX ORDER — CLONIDINE HYDROCHLORIDE 0.1 MG/1
0.05 TABLET ORAL 2 TIMES DAILY
Qty: 180 TABLET | Refills: 3 | Status: ON HOLD | OUTPATIENT
Start: 2023-08-07 | End: 2023-01-01

## 2023-08-07 RX ORDER — CARVEDILOL 25 MG/1
37.5 TABLET ORAL 2 TIMES DAILY WITH MEALS
Qty: 180 TABLET | Refills: 3 | Status: SHIPPED | OUTPATIENT
Start: 2023-08-07 | End: 2024-01-01

## 2023-08-07 RX ORDER — SODIUM BICARBONATE 650 MG/1
650 TABLET ORAL 2 TIMES DAILY
Qty: 180 TABLET | Refills: 1 | Status: SHIPPED | OUTPATIENT
Start: 2023-08-07 | End: 2024-01-01

## 2023-08-07 ASSESSMENT — PAIN SCALES - GENERAL: PAINLEVEL: NO PAIN (0)

## 2023-08-07 NOTE — PROGRESS NOTES
Anemia Management Note - Enrollment  SUBJECTIVE/OBJECTIVE:    Referred by Dr. Cata Mclain on 2023  Primary Diagnosis: Anemia in Chronic Kidney Disease (N18.4, D63.1)     Secondary Diagnosis:  Chronic Kidney Disease, Stage 4 (N18.4)   Hgb goal range:  10-11  Epo/Darbo: TBD:  Daquan will discuss with Arpita and call me back.   Iron regimen:  NA. Elevated Ferritin level.  Labs : 2024  Recent MANUELA use, transfusion, IV iron: NA  RX/TX plans : TBD    No history of stroke, MI, and blood clots or cancers.    Contact: OK to speak with Daquan Rohca (son), Bright Morrisjoanne (son), Micehlle and Randa Josefina (daughter-in-laws) regarding Scheduling. Billing and Medical Information per consent to communicate dated .         Latest Ref Rng & Units 2023     1:59 PM 2023     8:26 AM 2023    10:11 AM 2023     9:24 AM   Anemia   Hemoglobin 11.7 - 15.7 g/dL 10.9  10.4  9.8  9.7    Ferritin 11 - 328 ng/mL  1,342   1,379        BP Readings from Last 3 Encounters:   23 (!) 165/70   23 (!) 145/59   23 120/66     Wt Readings from Last 2 Encounters:   23 76.2 kg (168 lb)   23 74.9 kg (165 lb 1.6 oz)     Current Outpatient Medications   Medication Sig Dispense Refill    acetaminophen (TYLENOL) 650 MG CR tablet Take 1,300 mg by mouth every morning      albuterol (PROAIR HFA/PROVENTIL HFA/VENTOLIN HFA) 108 (90 Base) MCG/ACT inhaler Inhale 2 puffs into the lungs every 4 hours as needed for shortness of breath, wheezing or cough 18 g 3    amLODIPine (NORVASC) 10 MG tablet Take 1 tablet (10 mg) by mouth daily 90 tablet 3    Bioflavonoid Products (VITAMIN C) CHEW       budesonide (PULMICORT) 0.5 MG/2ML neb solution Take 2 mLs (0.5 mg) by nebulization 2 times daily 360 mL 3    carvedilol (COREG) 25 MG tablet Take 1.5 tablets (37.5 mg) by mouth 2 times daily (with meals) 180 tablet 3    citalopram (CELEXA) 20 MG tablet Take 1.5 tablets (30 mg) by mouth daily 135 tablet 0     cloNIDine (CATAPRES) 0.1 MG tablet Take 0.5 tablets (0.05 mg) by mouth 2 times daily 180 tablet 3    Cranberry 450 MG TABS       doxazosin (CARDURA) 1 MG tablet Take 1 tablet (1 mg) by mouth At Bedtime 90 tablet 3    doxycycline hyclate (VIBRAMYCIN) 100 MG capsule Take 1 capsule (100 mg) by mouth three times a week 39 capsule 3    Doxylamine-Pyridoxine 10-10 MG TBEC       ferrous sulfate (FEROSUL) 325 (65 Fe) MG tablet Take 2 tablets (650 mg) by mouth daily (with breakfast) 180 tablet 3    furosemide (LASIX) 40 MG tablet Take 1 tablet (40 mg) by mouth 2 times daily 180 tablet 3    ipratropium - albuterol 0.5 mg/2.5 mg/3 mL (DUONEB) 0.5-2.5 (3) MG/3ML neb solution Take 1 vial (3 mLs) by nebulization 2 times daily 180 mL 11    montelukast (SINGULAIR) 10 MG tablet Take 1 tablet (10 mg) by mouth At Bedtime 90 tablet 3    multivitamin w/minerals (THERA-VIT-M) tablet Take 1 tablet by mouth daily      Ostomy Supplies (ADAPT) PSTE Use with new ostomy pouch and drain  g 3    Ostomy Supplies (KARAYA PASTE) PSTE Use as directed 128 g 3    Ostomy Supplies (PREMIER DRAINABLE POUCH 22MM) Pouch MISC Use as directed every 3-4 days 5 each 12    pravastatin (PRAVACHOL) 80 MG tablet Take 1 tablet (80 mg) by mouth daily 90 tablet 3    sodium bicarbonate 650 MG tablet Take 1 tablet (650 mg) by mouth 2 times daily for 180 days 180 tablet 1     ASSESSMENT:  Hgb Not at goal/Initiation of therapy   Ferritin: Elevated  TSat: at goal  Iron regimen recommended: NA  Recommended MANUELA regimen: TBD  Blood Pressure: HTN. Monitor closely.     PLAN:  1. Spoke with Daquan for enrollment in Anemia Management Service once Iron levels are resulted.  2. Discussed:  anemia overview, monitoring service, and goal hemoglobin range and rationale and risks of MANUELA blood clots, stroke, and increase in blood pressure  3. Dose location: in clinic -[facility]   4. Labs: [place]  5. Pharmacy: TBD      Need updated Iron studies before Insurance will  approve MANUELA.  Was able to add-on Iron Studies to prior lab draw. Results are pending.     8/9/23; Iron levels are back, ok to initiate MANUELA therapy. Last Hgb was 9.7. Range was ordered 10-11 by Dr. Mclain. Spoke with Daquan (son), he will discuss with Arpita and his wife the options for MANUELA and call back on 8/10/23.     Next call date:  8/11/23    Ne Schuster RN   Anemia Services  [facility]  [email or web address]   Office : [phone]  Fax: [phone]

## 2023-08-07 NOTE — PROGRESS NOTES
Nephrology Clinic    Arpita Rocha MRN:[id number] YOB: 1935  Date of Service: 08/07/2023  Primary care provider: Ludivina Chamberlain  Requesting physician: Ludivina Chamberlain    REASON FOR CONSULT: CKD    HISTORY OF PRESENT ILLNESS:   Arpita Rocha is a 87 year old female who first presented  for evaluation of an elevated creatinine at 2.5 mg/dL in June 2023.  The patient has recently moved from [place] and was first seen by a primary care provider in [place] on 5/23/2023.  The past medical history is significant for COPD s/p recent exacerbation on oxygen, ileostomy, HTN, HFpEF, pacemaker insertion and CKD stage 4 for many years. She reports having been followed by a nephrologist in [place] for the past 4 years and used to see him every 3 months on average. Her baseline creatinine level varies between 2.2 and 2.7 and is 2.5 at this visit. The latest UACR is 108.87 mg/g Cr on 5/23. A renal ultrasound done on 6/22/23 shows normal size kidneys with a few simple cysts bilaterally.    She was recently hospitalized in May 2023 for a pneumonia and was discharged on oxygen and on furosemide 20 mg twice daily. She used to be on furosemide 40 mg in the morning and 20 mg in the afternoon. Also as her blood pressure was found to be uncontrolled while in the hospital, she had doxazocin 1 mg at bedtime added to her BP regimen which includes otherwise chlorthalidone 25 mg daily, furosemide 20 mg twice daily, clonidine 0.1 mg twice daily, amlodipine 10 mg daily, and carvedilol 25 mg twice daily. She was seen by Dr Gonzalez from cardiology and her diuretic regimen was amended from chlorthalidone 25 mg daily and furosemide 40 mg daily to furosemide 40 mg twice daily. She reports that her dyspnea has significantly improved and that she is no longer needing oxygen at rest. An echocardiogram done on 7/7/23 shows grade 1 diastolic dysfunction and mild to moderate mitral regurgitation. Her blood  pressure control remains sub optimal with systolic values between 150 and 160.    The patient denies any dysuria and any pollakiuria. She reports mild LE edema improved from before and is essentially sedentary.    The following portions of the patient's history were reviewed and updated as appropriate: allergies, current medications, past family history, past medical history, past social history, past surgical history and problem list.    PAST MEDICAL HISTORY:  No past medical history on file.  PAST SURGICAL HISTORY:  Past Surgical History:   Procedure Laterality Date    CATARACT EXTRACTION Bilateral     COLONOSCOPY      FEMUR FRACTURE SURGERY Right     2010    ILEOSTOMY      IMPLANT PACEMAKER      left knee replacement      MULTIPLE TOOTH EXTRACTIONS      right knee replacement Right     SPINAL FUSION      TONSILLECTOMY      TUBAL LIGATION       MEDICATIONS:  Prescription Medications as of 8/7/2023         Rx Number Disp Refills Start End Last Dispensed Date Next Fill Date Owning Pharmacy    acetaminophen (TYLENOL) 650 MG CR tablet            Sig: Take 1,300 mg by mouth every morning    Class: Historical    Route: Oral    albuterol (PROAIR HFA/PROVENTIL HFA/VENTOLIN HFA) 108 (90 Base) MCG/ACT inhaler  18 g 3 5/23/2023    [facility] [employer] [id number] [place] [address] [address] AT [place] [address]    Sig: Inhale 2 puffs into the lungs every 4 hours as needed for shortness of breath, wheezing or cough    Class: E-Prescribe    Notes to Pharmacy: Pharmacy may dispense brand covered by insurance (Proair, or proventil or ventolin or generic albuterol inhaler)    Route: Inhalation    amLODIPine (NORVASC) 10 MG tablet  90 tablet 3 5/23/2023    [facility] #[id number] [facility] [address] AT [place] [address]    Sig: Take 1 tablet (10 mg) by mouth daily    Class: E-Prescribe    Route: Oral    Bioflavonoid Products (VITAMIN C) CHEW            Class: Historical    Route:  Oral    budesonide (PULMICORT) 0.5 MG/2ML neb solution  360 mL 3 6/5/2023    [facility]/pharmacy #[id number] [facility], [place] - [address]    Sig: Take 2 mLs (0.5 mg) by nebulization 2 times daily    Class: E-Prescribe    Route: Nebulization    carvedilol (COREG) 25 MG tablet  180 tablet 3 5/23/2023    [facility] #[id number] [facility], [place] - [address] AT [facility] [address]    Sig: Take 1 tablet (25 mg) by mouth 2 times daily (with meals)    Class: E-Prescribe    Route: Oral    citalopram (CELEXA) 20 MG tablet  135 tablet 0 6/19/2023    [facility]/pharmacy #[id number] [facility], [place] - [address]    Sig: Take 1.5 tablets (30 mg) by mouth daily    Class: E-Prescribe    Route: Oral    cloNIDine (CATAPRES) 0.1 MG tablet  180 tablet 3 5/23/2023    [facility] [address] [place] [address], [place] - [address] AT [facility] [address]    Sig: Take 1 tablet (0.1 mg) by mouth 2 times daily    Class: E-Prescribe    Route: Oral    Cranberry 450 MG TABS            Class: Historical    Route: Oral    doxazosin (CARDURA) 1 MG tablet  90 tablet 3 5/23/2023    [facility] [address] [place] [address], [place] - [address] AT [facility] [address]    Sig: Take 1 tablet (1 mg) by mouth At Bedtime    Class: E-Prescribe    Route: Oral    doxycycline hyclate (VIBRAMYCIN) 100 MG capsule  39 capsule 3 5/23/2023    [facility] #[id number] [facility], [place] - [address] AT [facility] [address]    Sig: Take 1 capsule (100 mg) by mouth three times a week    Class: E-Prescribe    Route: Oral    Doxylamine-Pyridoxine 10-10 MG TBEC            Class: Historical    Route: Oral    ferrous sulfate (FEROSUL) 325 (65 Fe) MG tablet  180 tablet 3 6/12/2023    [facility]/pharmacy #[id number] [facility], [place] - [address]    Sig: Take 2 tablets (650 mg) by mouth daily (with breakfast)    Class: E-Prescribe    Route: Oral    furosemide (LASIX) 40 MG tablet  180 tablet 3 7/14/2023    [facility]/pharmacy #[id number]  [facility], [place] - [address]    Sig: Take 1 tablet (40 mg) by mouth 2 times daily    Class: E-Prescribe    Notes to Pharmacy: Do not fill, patient will call for refill.    Route: Oral    ipratropium - albuterol 0.5 mg/2.5 mg/3 mL (DUONEB) 0.5-2.5 (3) MG/3ML neb solution  180 mL 11 5/23/2023    [facility] #[id number] [place] [address], [place] - [address] AT [facility] [address]    Sig: Take 1 vial (3 mLs) by nebulization 2 times daily    Class: E-Prescribe    Route: Nebulization    montelukast (SINGULAIR) 10 MG tablet  90 tablet 3 5/23/2023    [facility] [address] [place] [address], [place] - [address] AT [facility] [address]    Sig: Take 1 tablet (10 mg) by mouth At Bedtime    Class: E-Prescribe    Route: Oral    multivitamin w/minerals (THERA-VIT-M) tablet            Sig: Take 1 tablet by mouth daily    Class: Historical    Route: Oral    Ostomy Supplies (ADAPT) PSTE  120 g 3 5/24/2023        Sig: Use with new ostomy pouch and drain PRN    Class: Local Print    Ostomy Supplies (KARAYA PASTE) PSTE  128 g 3 5/23/2023    [facility] [address] [place] [address], [place] - [address] AT [facility] [address]    Sig: Use as directed    Class: E-Prescribe    Ostomy Supplies (PREMIER DRAINABLE POUCH 22MM) Pouch MISC  5 each 12 5/24/2023        Sig: Use as directed every 3-4 days    Class: Local Print    pravastatin (PRAVACHOL) 80 MG tablet  90 tablet 3 5/23/2023    [facility] #[id number] [facility], [place] - [address] AT [facility] [address]    Sig: Take 1 tablet (80 mg) by mouth daily    Class: E-Prescribe    Route: Oral           ALLERGIES:    Allergies   Allergen Reactions    Acetaminophen-Codeine     Penicillin G      REVIEW OF SYSTEMS:  Review Of Systems  Skin: negative for, pigmentation, acne, rash, scaling, itching, bruising, lumps or bumps  Eyes: negative for, visual blurring, double vision, glaucoma, cataracts, eye pain, color blindness, glasses,  contacts  Ears/Nose/Throat: negative for, nasal congestion, purulent rhinorrhea, sneezing, postnasal drainage, hearing loss, deafness, tinnitus, vertigo  Respiratory: as per above  Cardiovascular: negative for, palpitations, tachycardia, irregular heart beat, chest pain, exertional chest pain or pressure and paroxysmal nocturnal dyspnea  Gastrointestinal: negative for, nausea, vomiting, heartburn, dyspepsia, reflux and hematemesis  Genitourinary: negative for, nocturia, dysuria, frequency, urgency and hesitancy  Musculoskeletal: negative for, fracture, back pain, neck pain, arthritis, joint pain, joint swelling and joint stiffness  Neurologic: negative for, headaches, syncope, stroke, seizures, paralysis, local weakness, numbness or tingling of hands and numbness or tingling of feet    A comprehensive review of systems was performed and found to be negative except as described here or above.  SOCIAL HISTORY:   Social History     Socioeconomic History    Marital status:      Spouse name: Not on file    Number of children: Not on file    Years of education: Not on file    Highest education level: Not on file   Occupational History    Not on file   Tobacco Use    Smoking status: Former     Packs/day: 1.00     Years: 44.00     Pack years: 44.00     Types: Cigarettes     Start date: 1952     Quit date: 1996     Years since quittin.6    Smokeless tobacco: Never   Vaping Use    Vaping Use: Never used   Substance and Sexual Activity    Alcohol use: Not Currently    Drug use: Never    Sexual activity: Not Currently     Partners: Male   Other Topics Concern    Not on file   Social History Narrative    Not on file     Social Determinants of Health     Financial Resource Strain: Not on file   Food Insecurity: Not on file   Transportation Needs: Not on file   Physical Activity: Not on file   Stress: Not on file   Social Connections: Not on file   Intimate Partner Violence: Not on file   Housing Stability: Not  on file     FAMILY MEDICAL HISTORY:   Family History   Problem Relation Age of Onset    Cerebrovascular Disease Mother     Diabetes Mother     Hypertension Mother     Cancer Mother     Hypertension Father     Cerebrovascular Disease Sister     Hypertension Sister     Dementia Sister     Cancer Brother     Cancer Brother     Emphysema Brother      PHYSICAL EXAM:   BP (!) 165/70 (BP Location: Left arm, Patient Position: Sitting, Cuff Size: Adult Large)   Pulse 60   Wt 76.2 kg (168 lb)   SpO2 94%   BMI 36.35 kg/m    GENERAL APPEARANCE: alert and no distress  EYES: nonicteric  HENT: mouth without ulcers or lesions  NECK: supple, no adenopathy  RESP: lungs clear to auscultation   CV: regular rhythm, normal rate, no rub  ABDOMEN: soft, nontender, normal bowel sounds, no HSM   Extremities: no clubbing, cyanosis, or edema  MS: no evidence of inflammation in joints, no muscle tenderness  SKIN: no rash  NEURO: mentation intact and speech normal  PSYCH: affect normal/bright   LABS:   Recent Results (from the past 672 hour(s))   Comprehensive metabolic panel    Collection Time: 07/18/23 10:11 AM   Result Value Ref Range    Sodium 137 136 - 145 mmol/L    Potassium 4.8 3.4 - 5.3 mmol/L    Chloride 104 98 - 107 mmol/L    Carbon Dioxide (CO2) 19 (L) 22 - 29 mmol/L    Anion Gap 14 7 - 15 mmol/L    Urea Nitrogen 99.8 (H) 8.0 - 23.0 mg/dL    Creatinine 2.74 (H) 0.51 - 0.95 mg/dL    Calcium 9.0 8.8 - 10.2 mg/dL    Glucose 98 70 - 99 mg/dL    Alkaline Phosphatase 69 35 - 104 U/L    AST 35 0 - 45 U/L    ALT 16 0 - 50 U/L    Protein Total 6.6 6.4 - 8.3 g/dL    Albumin 4.0 3.5 - 5.2 g/dL    Bilirubin Total 0.2 <=1.2 mg/dL    GFR Estimate 16 (L) >60 mL/min/1.73m2   CBC with platelets and differential    Collection Time: 07/18/23 10:11 AM   Result Value Ref Range    WBC Count 4.1 4.0 - 11.0 10e3/uL    RBC Count 2.98 (L) 3.80 - 5.20 10e6/uL    Hemoglobin 9.8 (L) 11.7 - 15.7 g/dL    Hematocrit 30.5 (L) 35.0 - 47.0 %     (H) 78 - 100  fL    MCH 32.9 26.5 - 33.0 pg    MCHC 32.1 31.5 - 36.5 g/dL    RDW 12.7 10.0 - 15.0 %    Platelet Count 152 150 - 450 10e3/uL    % Neutrophils 72 %    % Lymphocytes 16 %    % Monocytes 8 %    % Eosinophils 4 %    % Basophils 1 %    % Immature Granulocytes 0 %    Absolute Neutrophils 2.9 1.6 - 8.3 10e3/uL    Absolute Lymphocytes 0.6 (L) 0.8 - 5.3 10e3/uL    Absolute Monocytes 0.3 0.0 - 1.3 10e3/uL    Absolute Eosinophils 0.2 0.0 - 0.7 10e3/uL    Absolute Basophils 0.0 0.0 - 0.2 10e3/uL    Absolute Immature Granulocytes 0.0 <=0.4 10e3/uL   Basic metabolic panel    Collection Time: 07/25/23  2:17 PM   Result Value Ref Range    Sodium 138 136 - 145 mmol/L    Potassium 4.6 3.4 - 5.3 mmol/L    Chloride 105 98 - 107 mmol/L    Carbon Dioxide (CO2) 20 (L) 22 - 29 mmol/L    Anion Gap 13 7 - 15 mmol/L    Urea Nitrogen 96.5 (H) 8.0 - 23.0 mg/dL    Creatinine 2.69 (H) 0.51 - 0.95 mg/dL    Calcium 9.2 8.8 - 10.2 mg/dL    Glucose 119 (H) 70 - 99 mg/dL    GFR Estimate 17 (L) >60 mL/min/1.73m2   Echocardiogram Complete    Collection Time: 07/25/23  2:22 PM   Result Value Ref Range    LVEF  60-65%    Comprehensive metabolic panel    Collection Time: 08/07/23  9:24 AM   Result Value Ref Range    Sodium 141 136 - 145 mmol/L    Potassium 5.0 3.4 - 5.3 mmol/L    Chloride 108 (H) 98 - 107 mmol/L    Carbon Dioxide (CO2) 21 (L) 22 - 29 mmol/L    Anion Gap 12 7 - 15 mmol/L    Urea Nitrogen 81.4 (H) 8.0 - 23.0 mg/dL    Creatinine 2.55 (H) 0.51 - 0.95 mg/dL    Calcium 9.2 8.8 - 10.2 mg/dL    Glucose 108 (H) 70 - 99 mg/dL    Alkaline Phosphatase 85 35 - 104 U/L    AST 22 0 - 45 U/L    ALT 11 0 - 50 U/L    Protein Total 6.4 6.4 - 8.3 g/dL    Albumin 3.9 3.5 - 5.2 g/dL    Bilirubin Total 0.2 <=1.2 mg/dL    GFR Estimate 18 (L) >60 mL/min/1.73m2   CBC with platelets and differential    Collection Time: 08/07/23  9:24 AM   Result Value Ref Range    WBC Count 4.6 4.0 - 11.0 10e3/uL    RBC Count 2.92 (L) 3.80 - 5.20 10e6/uL    Hemoglobin 9.7 (L) 11.7  - 15.7 g/dL    Hematocrit 29.8 (L) 35.0 - 47.0 %     (H) 78 - 100 fL    MCH 33.2 (H) 26.5 - 33.0 pg    MCHC 32.6 31.5 - 36.5 g/dL    RDW 12.7 10.0 - 15.0 %    Platelet Count 129 (L) 150 - 450 10e3/uL    % Neutrophils 75 %    % Lymphocytes 12 %    % Monocytes 8 %    % Eosinophils 4 %    % Basophils 1 %    % Immature Granulocytes 0 %    NRBCs per 100 WBC 0 <1 /100    Absolute Neutrophils 3.5 1.6 - 8.3 10e3/uL    Absolute Lymphocytes 0.6 (L) 0.8 - 5.3 10e3/uL    Absolute Monocytes 0.4 0.0 - 1.3 10e3/uL    Absolute Eosinophils 0.2 0.0 - 0.7 10e3/uL    Absolute Basophils 0.0 0.0 - 0.2 10e3/uL    Absolute Immature Granulocytes 0.0 <=0.4 10e3/uL    Absolute NRBCs 0.0 10e3/uL     CMP  Recent Labs   Lab Test 08/07/23  0924 07/25/23  1417 07/18/23  1011 07/07/23  1131 06/27/23  1405 06/12/23  0826 05/23/23  1359    138 137 133* 138 142 136   POTASSIUM 5.0 4.6 4.8 5.2 5.1 4.5 5.0   CHLORIDE 108* 105 104 102 105 108* 102   CO2 21* 20* 19* 24 20* 23 22   ANIONGAP 12 13 14 7 13 11 12   * 119* 98 100* 169* 104* 111*   BUN 81.4* 96.5* 99.8* 77.2* 90.0* 94.4* 97.3*   CR 2.55* 2.69* 2.74* 2.21* 2.34* 2.55* 2.95*   GFRESTIMATED 18* 17* 16* 21* 20* 18* 15*   CANELO 9.2 9.2 9.0 9.6 8.8 9.2 9.1   PHOS  --   --   --   --   --  4.7* 4.8*   PROTTOTAL 6.4  --  6.6  --  6.1*  --  6.6   ALBUMIN 3.9  --  4.0  --  3.6 3.8 3.8   BILITOTAL 0.2  --  0.2  --  <0.2  --  0.2   ALKPHOS 85  --  69  --  64  --  75   AST 22  --  35  --  23  --  41*   ALT 11  --  16  --  14  --  21     CBC  Recent Labs   Lab Test 08/07/23  0924 07/18/23  1011 06/12/23  0826 05/23/23  1359   HGB 9.7* 9.8* 10.4* 10.9*   WBC 4.6 4.1 6.3 4.9   RBC 2.92* 2.98* 3.15* 3.25*   HCT 29.8* 30.5* 32.3* 33.3*   * 102* 103* 103*   MCH 33.2* 32.9 33.0 33.5*   MCHC 32.6 32.1 32.2 32.7   RDW 12.7 12.7 12.4 12.0   * 152 117* 178     INRNo lab results found.  ABGNo lab results found.   URINE STUDIES  Recent Labs   Lab Test 05/23/23  1413   COLOR Yellow    APPEARANCE Clear   URINEGLC Negative   URINEBILI Negative   URINEKETONE Negative   SG 1.015   UBLD Negative   URINEPH 5.0   PROTEIN Negative   UROBILINOGEN 0.2   NITRITE Negative   LEUKEST Small*   RBCU None Seen   WBCU 25-50*     No lab results found.    ASSESSMENT AND PLAN:   #CKD stage 4  eGFR around 15-20 mL/min  UACR on 5/22 is 108 mg/g Cr  UPCR on 6/12 is 0.32 mg/g Cr  A kidney ultrasound done on 6/22 shows normal size kidneys with a few bilateral simple cysts.  The potential responsible factors include a short bowel syndrome, longstanding HTN, pulmonary-cardiorenal syndrome and normal decline related to age. No documented intake of NSAIDs or other nephrotoxic medications. The blood pressure remains suboptimal and the patient is complaining of significant fatigue and dry mouth likely related to clonidine use. Unable to add ACE inh or spironolactone at this point as her potassium level is 5. Will increase carvedilol to 37.5 mg twice daily and decrease concomitantly clinidine to 0.5 tablets twice daily.  The patient was also instructed to lose weight,  keep the sodium intake around 2400 mg /day, follow a plant-based diet and to avoid NSAIDs     #HTN  Primary and secondary to CKD. Currently on doxazosin 1 mg daily, furosemide 40 mg twice daily, clonidine 0.1 mg twice daily, amlodipine 10 mg daily, and carvedilol 25 mg twice daily. Management as per above.    #CVD/dyslipidemia  On pravastatin 80 mg daily as indicated for any patient with CKD above the age of 50     #Blood count  Hemoglobin 10.4 -> 9.7 despite iron supplementation. Will refer her to anemia clinic  Iron sat 49%  Ferritin level 1342   The patient is iron replete therefore seems to be mostly driven by CKD and chronic inflammation.    #Acid-base status  CO2 level 21, started a small dose of sodium bicarbonate    #Electrolytes  Na 142 -> 141  K 4.5 -> 5 off potassium supplementation     #BMD  Ca  9.2        P 4.7   Albumin 3.8  iPTH  56 Vitamin D  level is 34 no need for supplementation    #CKD journey/transplant: the patient does not wish to have dialysis if she were to need it but is willing to get CKD education    The total time of this encounter amounted to 30 minutes on the day of the encounter. This time included time spent with the patient, reviewing records, ordering tests, and performing post visit documentation.     The patient will return to follow up in 6 weeks     Cata Mclain MD  Division of Renal Disease and Hypertension

## 2023-08-07 NOTE — NURSING NOTE
Chief Complaint   Patient presents with    RECHECK     BP (!) 152/73 (BP Location: Left arm, Patient Position: Sitting, Cuff Size: Adult Large)   Pulse 60   Wt 76.2 kg (168 lb)   SpO2 94%   BMI 36.35 kg/m

## 2023-08-07 NOTE — LETTER
8/7/2023       RE: Arpita Rocha  [address]  [place] [address]     Dear Colleague,    Thank you for referring your patient, Arpita Rocha, to the [facility] NEPHROLOGY [facility]IC [place] at [facility]. Please see a copy of my visit note below.    Nephrology Clinic    Arpita Rocha MRN:[id number] YOB: 1935  Date of Service: 08/07/2023  Primary care provider: Ludivina Chamberlain  Requesting physician: Ludivina Chamberlain    REASON FOR CONSULT: CKD    HISTORY OF PRESENT ILLNESS:   Arpita Rocha is a 87 year old female who first presented  for evaluation of an elevated creatinine at 2.5 mg/dL in June 2023.  The patient has recently moved from [place] and was first seen by a primary care provider in [place] on 5/23/2023.  The past medical history is significant for COPD s/p recent exacerbation on oxygen, ileostomy, HTN, HFpEF, pacemaker insertion and CKD stage 4 for many years. She reports having been followed by a nephrologist in [place] for the past 4 years and used to see him every 3 months on average. Her baseline creatinine level varies between 2.2 and 2.7 and is 2.5 at this visit. The latest UACR is 108.87 mg/g Cr on 5/23. A renal ultrasound done on 6/22/23 shows normal size kidneys with a few simple cysts bilaterally.    She was recently hospitalized in May 2023 for a pneumonia and was discharged on oxygen and on furosemide 20 mg twice daily. She used to be on furosemide 40 mg in the morning and 20 mg in the afternoon. Also as her blood pressure was found to be uncontrolled while in the hospital, she had doxazocin 1 mg at bedtime added to her BP regimen which includes otherwise chlorthalidone 25 mg daily, furosemide 20 mg twice daily, clonidine 0.1 mg twice daily, amlodipine 10 mg daily, and carvedilol 25 mg twice daily. She was seen by Dr Gonzalez from cardiology and her diuretic regimen was amended from  chlorthalidone 25 mg daily and furosemide 40 mg daily to furosemide 40 mg twice daily. She reports that her dyspnea has significantly improved and that she is no longer needing oxygen at rest. An echocardiogram done on 7/7/23 shows grade 1 diastolic dysfunction and mild to moderate mitral regurgitation. Her blood pressure control remains sub optimal with systolic values between 150 and 160.    The patient denies any dysuria and any pollakiuria. She reports mild LE edema improved from before and is essentially sedentary.    The following portions of the patient's history were reviewed and updated as appropriate: allergies, current medications, past family history, past medical history, past social history, past surgical history and problem list.    PAST MEDICAL HISTORY:  No past medical history on file.  PAST SURGICAL HISTORY:  Past Surgical History:   Procedure Laterality Date    CATARACT EXTRACTION Bilateral     COLONOSCOPY      FEMUR FRACTURE SURGERY Right     2010    ILEOSTOMY      IMPLANT PACEMAKER      left knee replacement      MULTIPLE TOOTH EXTRACTIONS      right knee replacement Right     SPINAL FUSION      TONSILLECTOMY      TUBAL LIGATION       MEDICATIONS:  Prescription Medications as of 8/7/2023         Rx Number Disp Refills Start End Last Dispensed Date Next Fill Date Owning Pharmacy    acetaminophen (TYLENOL) 650 MG CR tablet            Sig: Take 1,300 mg by mouth every morning    Class: Historical    Route: Oral    albuterol (PROAIR HFA/PROVENTIL HFA/VENTOLIN HFA) 108 (90 Base) MCG/ACT inhaler  18 g 3 5/23/2023    [facility] #[id number] [facility] [address] AT [place] [address]    Sig: Inhale 2 puffs into the lungs every 4 hours as needed for shortness of breath, wheezing or cough    Class: E-Prescribe    Notes to Pharmacy: Pharmacy may dispense brand covered by insurance (Proair, or proventil or ventolin or generic albuterol inhaler)    Route: Inhalation     amLODIPine (NORVASC) 10 MG tablet  90 tablet 3 5/23/2023    [facility] #[id number] [facility], [place] - [address] AT [facility] [address]    Sig: Take 1 tablet (10 mg) by mouth daily    Class: E-Prescribe    Route: Oral    Bioflavonoid Products (VITAMIN C) CHEW            Class: Historical    Route: Oral    budesonide (PULMICORT) 0.5 MG/2ML neb solution  360 mL 3 6/5/2023    [facility]/pharmacy #[id number] [facility], [place] - [address]    Sig: Take 2 mLs (0.5 mg) by nebulization 2 times daily    Class: E-Prescribe    Route: Nebulization    carvedilol (COREG) 25 MG tablet  180 tablet 3 5/23/2023    [facility] #[id number] [facility], [place] - [address] AT [facility] [address]    Sig: Take 1 tablet (25 mg) by mouth 2 times daily (with meals)    Class: E-Prescribe    Route: Oral    citalopram (CELEXA) 20 MG tablet  135 tablet 0 6/19/2023    [facility]/pharmacy #[id number] - [place], [place] - [address]    Sig: Take 1.5 tablets (30 mg) by mouth daily    Class: E-Prescribe    Route: Oral    cloNIDine (CATAPRES) 0.1 MG tablet  180 tablet 3 5/23/2023    [facility] DR[facility]ORE #[id number] [place] [address], [place] - [address] AT [facility] [address]    Sig: Take 1 tablet (0.1 mg) by mouth 2 times daily    Class: E-Prescribe    Route: Oral    Cranberry 450 MG TABS            Class: Historical    Route: Oral    doxazosin (CARDURA) 1 MG tablet  90 tablet 3 5/23/2023    [facility] #[id number] [facility], [place] - [address] AT [facility] [address]    Sig: Take 1 tablet (1 mg) by mouth At Bedtime    Class: E-Prescribe    Route: Oral    doxycycline hyclate (VIBRAMYCIN) 100 MG capsule  39 capsule 3 5/23/2023    [facility] #[id number] [facility], [place] - [address] AT [facility] [address]    Sig: Take 1 capsule (100 mg) by mouth three times a week    Class: E-Prescribe    Route: Oral    Doxylamine-Pyridoxine 10-10 MG TBEC            Class: Historical    Route:  Oral    ferrous sulfate (FEROSUL) 325 (65 Fe) MG tablet  180 tablet 3 6/12/2023    [facility] #[id number] - [place], [place] - [address]    Sig: Take 2 tablets (650 mg) by mouth daily (with breakfast)    Class: E-Prescribe    Route: Oral    furosemide (LASIX) 40 MG tablet  180 tablet 3 7/14/2023    [facility]/pharmacy #[id number] - [place], [place] - [address]    Sig: Take 1 tablet (40 mg) by mouth 2 times daily    Class: E-Prescribe    Notes to Pharmacy: Do not fill, patient will call for refill.    Route: Oral    ipratropium - albuterol 0.5 mg/2.5 mg/3 mL (DUONEB) 0.5-2.5 (3) MG/3ML neb solution  180 mL 11 5/23/2023    [facility] DR[facility] STORE #[id number] [facility], [place] - [address] AT [facility] [address]    Sig: Take 1 vial (3 mLs) by nebulization 2 times daily    Class: E-Prescribe    Route: Nebulization    montelukast (SINGULAIR) 10 MG tablet  90 tablet 3 5/23/2023    [facility] DR[facility] #[id number] [facility], [place] - [address] AT [facility] [address]    Sig: Take 1 tablet (10 mg) by mouth At Bedtime    Class: E-Prescribe    Route: Oral    multivitamin w/minerals (THERA-VIT-M) tablet            Sig: Take 1 tablet by mouth daily    Class: Historical    Route: Oral    Ostomy Supplies (ADAPT) PSTE  120 g 3 5/24/2023        Sig: Use with new ostomy pouch and drain PRN    Class: Local Print    Ostomy Supplies (KARAYA PASTE) PSTE  128 g 3 5/23/2023    [facility] #[id number] [facility], [place] - [address] AT [facility] [address]    Sig: Use as directed    Class: E-Prescribe    Ostomy Supplies (PREMIER DRAINABLE POUCH 22MM) Pouch MISC  5 each 12 5/24/2023        Sig: Use as directed every 3-4 days    Class: Local Print    pravastatin (PRAVACHOL) 80 MG tablet  90 tablet 3 5/23/2023    [facility] #[id number] [place] [address] [address] AT [place] [address]    Sig: Take 1 tablet (80 mg) by mouth daily    Class: E-Prescribe    Route: Oral            ALLERGIES:    Allergies   Allergen Reactions    Acetaminophen-Codeine     Penicillin G      REVIEW OF SYSTEMS:  Review Of Systems  Skin: negative for, pigmentation, acne, rash, scaling, itching, bruising, lumps or bumps  Eyes: negative for, visual blurring, double vision, glaucoma, cataracts, eye pain, color blindness, glasses, contacts  Ears/Nose/Throat: negative for, nasal congestion, purulent rhinorrhea, sneezing, postnasal drainage, hearing loss, deafness, tinnitus, vertigo  Respiratory: as per above  Cardiovascular: negative for, palpitations, tachycardia, irregular heart beat, chest pain, exertional chest pain or pressure and paroxysmal nocturnal dyspnea  Gastrointestinal: negative for, nausea, vomiting, heartburn, dyspepsia, reflux and hematemesis  Genitourinary: negative for, nocturia, dysuria, frequency, urgency and hesitancy  Musculoskeletal: negative for, fracture, back pain, neck pain, arthritis, joint pain, joint swelling and joint stiffness  Neurologic: negative for, headaches, syncope, stroke, seizures, paralysis, local weakness, numbness or tingling of hands and numbness or tingling of feet    A comprehensive review of systems was performed and found to be negative except as described here or above.  SOCIAL HISTORY:   Social History     Socioeconomic History    Marital status:      Spouse name: Not on file    Number of children: Not on file    Years of education: Not on file    Highest education level: Not on file   Occupational History    Not on file   Tobacco Use    Smoking status: Former     Packs/day: 1.00     Years: 44.00     Pack years: 44.00     Types: Cigarettes     Start date: 1952     Quit date: 1996     Years since quittin.6    Smokeless tobacco: Never   Vaping Use    Vaping Use: Never used   Substance and Sexual Activity    Alcohol use: Not Currently    Drug use: Never    Sexual activity: Not Currently     Partners: Male   Other Topics Concern    Not on file   Social  History Narrative    Not on file     Social Determinants of Health     Financial Resource Strain: Not on file   Food Insecurity: Not on file   Transportation Needs: Not on file   Physical Activity: Not on file   Stress: Not on file   Social Connections: Not on file   Intimate Partner Violence: Not on file   Housing Stability: Not on file     FAMILY MEDICAL HISTORY:   Family History   Problem Relation Age of Onset    Cerebrovascular Disease Mother     Diabetes Mother     Hypertension Mother     Cancer Mother     Hypertension Father     Cerebrovascular Disease Sister     Hypertension Sister     Dementia Sister     Cancer Brother     Cancer Brother     Emphysema Brother      PHYSICAL EXAM:   BP (!) 165/70 (BP Location: Left arm, Patient Position: Sitting, Cuff Size: Adult Large)   Pulse 60   Wt 76.2 kg (168 lb)   SpO2 94%   BMI 36.35 kg/m    GENERAL APPEARANCE: alert and no distress  EYES: nonicteric  HENT: mouth without ulcers or lesions  NECK: supple, no adenopathy  RESP: lungs clear to auscultation   CV: regular rhythm, normal rate, no rub  ABDOMEN: soft, nontender, normal bowel sounds, no HSM   Extremities: no clubbing, cyanosis, or edema  MS: no evidence of inflammation in joints, no muscle tenderness  SKIN: no rash  NEURO: mentation intact and speech normal  PSYCH: affect normal/bright   LABS:   Recent Results (from the past 672 hour(s))   Comprehensive metabolic panel    Collection Time: 07/18/23 10:11 AM   Result Value Ref Range    Sodium 137 136 - 145 mmol/L    Potassium 4.8 3.4 - 5.3 mmol/L    Chloride 104 98 - 107 mmol/L    Carbon Dioxide (CO2) 19 (L) 22 - 29 mmol/L    Anion Gap 14 7 - 15 mmol/L    Urea Nitrogen 99.8 (H) 8.0 - 23.0 mg/dL    Creatinine 2.74 (H) 0.51 - 0.95 mg/dL    Calcium 9.0 8.8 - 10.2 mg/dL    Glucose 98 70 - 99 mg/dL    Alkaline Phosphatase 69 35 - 104 U/L    AST 35 0 - 45 U/L    ALT 16 0 - 50 U/L    Protein Total 6.6 6.4 - 8.3 g/dL    Albumin 4.0 3.5 - 5.2 g/dL    Bilirubin Total 0.2  <=1.2 mg/dL    GFR Estimate 16 (L) >60 mL/min/1.73m2   CBC with platelets and differential    Collection Time: 07/18/23 10:11 AM   Result Value Ref Range    WBC Count 4.1 4.0 - 11.0 10e3/uL    RBC Count 2.98 (L) 3.80 - 5.20 10e6/uL    Hemoglobin 9.8 (L) 11.7 - 15.7 g/dL    Hematocrit 30.5 (L) 35.0 - 47.0 %     (H) 78 - 100 fL    MCH 32.9 26.5 - 33.0 pg    MCHC 32.1 31.5 - 36.5 g/dL    RDW 12.7 10.0 - 15.0 %    Platelet Count 152 150 - 450 10e3/uL    % Neutrophils 72 %    % Lymphocytes 16 %    % Monocytes 8 %    % Eosinophils 4 %    % Basophils 1 %    % Immature Granulocytes 0 %    Absolute Neutrophils 2.9 1.6 - 8.3 10e3/uL    Absolute Lymphocytes 0.6 (L) 0.8 - 5.3 10e3/uL    Absolute Monocytes 0.3 0.0 - 1.3 10e3/uL    Absolute Eosinophils 0.2 0.0 - 0.7 10e3/uL    Absolute Basophils 0.0 0.0 - 0.2 10e3/uL    Absolute Immature Granulocytes 0.0 <=0.4 10e3/uL   Basic metabolic panel    Collection Time: 07/25/23  2:17 PM   Result Value Ref Range    Sodium 138 136 - 145 mmol/L    Potassium 4.6 3.4 - 5.3 mmol/L    Chloride 105 98 - 107 mmol/L    Carbon Dioxide (CO2) 20 (L) 22 - 29 mmol/L    Anion Gap 13 7 - 15 mmol/L    Urea Nitrogen 96.5 (H) 8.0 - 23.0 mg/dL    Creatinine 2.69 (H) 0.51 - 0.95 mg/dL    Calcium 9.2 8.8 - 10.2 mg/dL    Glucose 119 (H) 70 - 99 mg/dL    GFR Estimate 17 (L) >60 mL/min/1.73m2   Echocardiogram Complete    Collection Time: 07/25/23  2:22 PM   Result Value Ref Range    LVEF  60-65%    Comprehensive metabolic panel    Collection Time: 08/07/23  9:24 AM   Result Value Ref Range    Sodium 141 136 - 145 mmol/L    Potassium 5.0 3.4 - 5.3 mmol/L    Chloride 108 (H) 98 - 107 mmol/L    Carbon Dioxide (CO2) 21 (L) 22 - 29 mmol/L    Anion Gap 12 7 - 15 mmol/L    Urea Nitrogen 81.4 (H) 8.0 - 23.0 mg/dL    Creatinine 2.55 (H) 0.51 - 0.95 mg/dL    Calcium 9.2 8.8 - 10.2 mg/dL    Glucose 108 (H) 70 - 99 mg/dL    Alkaline Phosphatase 85 35 - 104 U/L    AST 22 0 - 45 U/L    ALT 11 0 - 50 U/L    Protein Total  6.4 6.4 - 8.3 g/dL    Albumin 3.9 3.5 - 5.2 g/dL    Bilirubin Total 0.2 <=1.2 mg/dL    GFR Estimate 18 (L) >60 mL/min/1.73m2   CBC with platelets and differential    Collection Time: 08/07/23  9:24 AM   Result Value Ref Range    WBC Count 4.6 4.0 - 11.0 10e3/uL    RBC Count 2.92 (L) 3.80 - 5.20 10e6/uL    Hemoglobin 9.7 (L) 11.7 - 15.7 g/dL    Hematocrit 29.8 (L) 35.0 - 47.0 %     (H) 78 - 100 fL    MCH 33.2 (H) 26.5 - 33.0 pg    MCHC 32.6 31.5 - 36.5 g/dL    RDW 12.7 10.0 - 15.0 %    Platelet Count 129 (L) 150 - 450 10e3/uL    % Neutrophils 75 %    % Lymphocytes 12 %    % Monocytes 8 %    % Eosinophils 4 %    % Basophils 1 %    % Immature Granulocytes 0 %    NRBCs per 100 WBC 0 <1 /100    Absolute Neutrophils 3.5 1.6 - 8.3 10e3/uL    Absolute Lymphocytes 0.6 (L) 0.8 - 5.3 10e3/uL    Absolute Monocytes 0.4 0.0 - 1.3 10e3/uL    Absolute Eosinophils 0.2 0.0 - 0.7 10e3/uL    Absolute Basophils 0.0 0.0 - 0.2 10e3/uL    Absolute Immature Granulocytes 0.0 <=0.4 10e3/uL    Absolute NRBCs 0.0 10e3/uL     CMP  Recent Labs   Lab Test 08/07/23  0924 07/25/23  1417 07/18/23  1011 07/07/23  1131 06/27/23  1405 06/12/23  0826 05/23/23  1359    138 137 133* 138 142 136   POTASSIUM 5.0 4.6 4.8 5.2 5.1 4.5 5.0   CHLORIDE 108* 105 104 102 105 108* 102   CO2 21* 20* 19* 24 20* 23 22   ANIONGAP 12 13 14 7 13 11 12   * 119* 98 100* 169* 104* 111*   BUN 81.4* 96.5* 99.8* 77.2* 90.0* 94.4* 97.3*   CR 2.55* 2.69* 2.74* 2.21* 2.34* 2.55* 2.95*   GFRESTIMATED 18* 17* 16* 21* 20* 18* 15*   CANELO 9.2 9.2 9.0 9.6 8.8 9.2 9.1   PHOS  --   --   --   --   --  4.7* 4.8*   PROTTOTAL 6.4  --  6.6  --  6.1*  --  6.6   ALBUMIN 3.9  --  4.0  --  3.6 3.8 3.8   BILITOTAL 0.2  --  0.2  --  <0.2  --  0.2   ALKPHOS 85  --  69  --  64  --  75   AST 22  --  35  --  23  --  41*   ALT 11  --  16  --  14  --  21     CBC  Recent Labs   Lab Test 08/07/23  0924 07/18/23  1011 06/12/23  0826 05/23/23  1359   HGB 9.7* 9.8* 10.4* 10.9*   WBC 4.6 4.1  6.3 4.9   RBC 2.92* 2.98* 3.15* 3.25*   HCT 29.8* 30.5* 32.3* 33.3*   * 102* 103* 103*   MCH 33.2* 32.9 33.0 33.5*   MCHC 32.6 32.1 32.2 32.7   RDW 12.7 12.7 12.4 12.0   * 152 117* 178     INRNo lab results found.  ABGNo lab results found.   URINE STUDIES  Recent Labs   Lab Test 05/23/23  1413   COLOR Yellow   APPEARANCE Clear   URINEGLC Negative   URINEBILI Negative   URINEKETONE Negative   SG 1.015   UBLD Negative   URINEPH 5.0   PROTEIN Negative   UROBILINOGEN 0.2   NITRITE Negative   LEUKEST Small*   RBCU None Seen   WBCU 25-50*     No lab results found.    ASSESSMENT AND PLAN:   #CKD stage 4  eGFR around 15-20 mL/min  UACR on 5/22 is 108 mg/g Cr  UPCR on 6/12 is 0.32 mg/g Cr  A kidney ultrasound done on 6/22 shows normal size kidneys with a few bilateral simple cysts.  The potential responsible factors include a short bowel syndrome, longstanding HTN, pulmonary-cardiorenal syndrome and normal decline related to age. No documented intake of NSAIDs or other nephrotoxic medications. The blood pressure remains suboptimal and the patient is complaining of significant fatigue and dry mouth likely related to clonidine use. Unable to add ACE inh or spironolactone at this point as her potassium level is 5. Will increase carvedilol to 37.5 mg twice daily and decrease concomitantly clinidine to 0.5 tablets twice daily.  The patient was also instructed to lose weight,  keep the sodium intake around 2400 mg /day, follow a plant-based diet and to avoid NSAIDs     #HTN  Primary and secondary to CKD. Currently on doxazosin 1 mg daily, furosemide 40 mg twice daily, clonidine 0.1 mg twice daily, amlodipine 10 mg daily, and carvedilol 25 mg twice daily. Management as per above.    #CVD/dyslipidemia  On pravastatin 80 mg daily as indicated for any patient with CKD above the age of 50     #Blood count  Hemoglobin 10.4 -> 9.7 despite iron supplementation. Will refer her to anemia clinic  Iron sat 49%  Ferritin level  1345   The patient is iron replete therefore seems to be mostly driven by CKD and chronic inflammation.    #Acid-base status  CO2 level 21, started a small dose of sodium bicarbonate    #Electrolytes  Na 142 -> 141  K 4.5 -> 5 off potassium supplementation     #BMD  Ca  9.2        P 4.7   Albumin 3.8  iPTH  56 Vitamin D level is 34 no need for supplementation    #CKD journey/transplant: the patient does not wish to have dialysis if she were to need it but is willing to get CKD education    The total time of this encounter amounted to 30 minutes on the day of the encounter. This time included time spent with the patient, reviewing records, ordering tests, and performing post visit documentation.     The patient will return to follow up in 6 weeks     Cata Mclain MD  Division of Renal Disease and Hypertension

## 2023-08-08 NOTE — TELEPHONE ENCOUNTER
Mary Gonzalez DO Lidke, Jen M, RN  Caller: Unspecified (1 week ago)  Need to discuss with pt and family     Patient has upcoming OV 8/11/23 with Dr. Gonzalez to discuss.  Genet Montaño, RN on 8/8/2023 at 4:10 PM

## 2023-08-09 ENCOUNTER — PATIENT OUTREACH (OUTPATIENT)
Dept: NEPHROLOGY | Facility: CLINIC | Age: 88
End: 2023-08-09
Payer: MEDICARE

## 2023-08-10 ENCOUNTER — PATIENT OUTREACH (OUTPATIENT)
Dept: CARE COORDINATION | Facility: CLINIC | Age: 88
End: 2023-08-10
Payer: MEDICARE

## 2023-08-10 NOTE — PROGRESS NOTES
Anemia Management Note  SUBJECTIVE/OBJECTIVE:  Referred by Dr. Cata Mclain on 2023  Primary Diagnosis: Anemia in Chronic Kidney Disease (N18.4, D63.1)     Secondary Diagnosis:  Chronic Kidney Disease, Stage 4 (N18.4)   Hgb goal range:  10-11  Epo/Darbo: TBD:  Arpita prefers to hold off on MANUELA and Monitor labs.   Iron regimen:  NA. Elevated Ferritin level.  Labs : 2024  Recent MANUELA use, transfusion, IV iron: NA  RX/TX plans : TBD     No history of stroke, MI, and blood clots or cancers.     Contact: OK to speak with Daquan Rocha (son), Bright Morrisjoanne (son), Michelle and Randa Rocha (daughter-in-laws) regarding Scheduling. Billing and Medical Information per consent to communicate dated .         Latest Ref Rng & Units 2023     1:59 PM 2023     8:26 AM 2023    10:11 AM 2023     9:24 AM   Anemia   Hemoglobin 11.7 - 15.7 g/dL 10.9  10.4  9.8  9.7    Ferritin 11 - 328 ng/mL  1,342   1,379      BP Readings from Last 3 Encounters:   23 (!) 165/70   23 (!) 145/59   23 120/66     Wt Readings from Last 2 Encounters:   23 76.2 kg (168 lb)   23 74.9 kg (165 lb 1.6 oz)           ASSESSMENT:  Hgb:at goal - continue to monitor  TSat: at goal >30% Ferritin: Elevated (>1000ng/mL)    PLAN:  Daquan returned call. He spoke with Arpita. Arpita prefers to hold off on MANUELA at this time and Monitor labs.  Instructed Daquan to call Anemia Services if Arpita changes her mind.  Labs are scheduled again on 23.     Orders needed to be renewed (for next follow-up date) in EPIC: None    Iron labs due:  Early 2023    Plan discussed with:  Daquan (son)      NEXT FOLLOW-UP DATE:  23    Ne Schuster RN   Anemia Services  [email or web address]   Office : [phone]  Fax: [phone]

## 2023-08-11 ENCOUNTER — OFFICE VISIT (OUTPATIENT)
Dept: CARDIOLOGY | Facility: CLINIC | Age: 88
End: 2023-08-11
Attending: INTERNAL MEDICINE
Payer: MEDICARE

## 2023-08-11 VITALS
WEIGHT: 167.2 LBS | HEART RATE: 60 BPM | DIASTOLIC BLOOD PRESSURE: 60 MMHG | BODY MASS INDEX: 33.71 KG/M2 | OXYGEN SATURATION: 92 % | SYSTOLIC BLOOD PRESSURE: 120 MMHG | HEIGHT: 59 IN

## 2023-08-11 DIAGNOSIS — N18.4 CKD (CHRONIC KIDNEY DISEASE) STAGE 4, GFR 15-29 ML/MIN (H): ICD-10-CM

## 2023-08-11 DIAGNOSIS — J43.8 OTHER EMPHYSEMA (H): ICD-10-CM

## 2023-08-11 DIAGNOSIS — R06.03 ACUTE RESPIRATORY DISTRESS: ICD-10-CM

## 2023-08-11 DIAGNOSIS — Z95.0 CARDIAC PACEMAKER IN SITU: ICD-10-CM

## 2023-08-11 DIAGNOSIS — E78.5 HYPERLIPIDEMIA LDL GOAL <100: ICD-10-CM

## 2023-08-11 DIAGNOSIS — I65.23 BILATERAL CAROTID ARTERY STENOSIS: Primary | ICD-10-CM

## 2023-08-11 DIAGNOSIS — I10 BENIGN ESSENTIAL HYPERTENSION: ICD-10-CM

## 2023-08-11 DIAGNOSIS — I50.30 HEART FAILURE WITH PRESERVED EJECTION FRACTION, NYHA CLASS I (H): ICD-10-CM

## 2023-08-11 PROCEDURE — 99215 OFFICE O/P EST HI 40 MIN: CPT | Performed by: INTERNAL MEDICINE

## 2023-08-11 RX ORDER — ATORVASTATIN CALCIUM 40 MG/1
40 TABLET, FILM COATED ORAL DAILY
Qty: 30 TABLET | Refills: 11 | Status: SHIPPED | OUTPATIENT
Start: 2023-08-11 | End: 2024-01-01

## 2023-08-11 RX ORDER — CLOPIDOGREL BISULFATE 75 MG/1
75 TABLET ORAL DAILY
Qty: 30 TABLET | Refills: 11 | Status: SHIPPED | OUTPATIENT
Start: 2023-08-11 | End: 2024-01-01

## 2023-08-11 NOTE — LETTER
8/11/2023    Ludivina Canales MD  [address] [facility] [address]    RE: Arpita Rocha       Dear Colleague,     I had the pleasure of seeing Arpita Rocha in the [facility].  HPI and Plan:   Arpita Rocha is a 87 year old female who presents with history of COPD, chronic kidney disease, COVID infection last year and pneumonia leaving her oxygen dependent.  She was evaluated for heart failure in July and underwent cardiac testing including an echocardiogram, blood tests including NT proBNP, and a carotid ultrasound due to auscultation of bruits.  I had contacted her nephrologist to discuss changes to her blood pressure management and diuretics.  She saw a kidney specialist a few days ago who recommended to decrease clonidine from 1 mg twice daily 2.5 mg twice daily and increase carvedilol to 37.5 mg twice daily.  She was also put on sodium bicarb tablets due to low total CO2.  Since her last visit she continues to wean off of oxygen utilizing only 1 L with activity at home.  She presents in a wheelchair today not on oxygen.  She has been noticing increased cough recently and her breathing has been stable to slightly improved.  Her blood pressure was significantly improved today with the medication changes from her kidney specialist.  I reviewed her echocardiogram findings which demonstrated normal LV EF of 60 to 65%, she had moderate concentric hypertrophy and grade 1 diastolic dysfunction.  Mild pulmonary hypertension was noted with normal RV function.  Her NT proBNP back in July was 1,345.  Her hemoglobin was low at 9.7 just a few days ago measured by the kidney specialist.  Her carotid ultrasound suggested significant stenosis in both the right and left internal carotid arteries.    Summary    1.  Oxygen dependent COPD-her breathing issues appear to be primarily related to underlying lung disease.  There may be a mild component of heart failure with preserved ejection  fraction but the predominant factor is underlying lung disease.  It sounds like she is successful in reducing her oxygen requirements.  Consider following up with pulmonology.    2.  Hypertension-blood pressure appears better controlled with the changes that the kidney specialist made.  Recommend to continue carvedilol at 37.5 mg twice daily.  I am hoping better blood Pressure control will also help with her breathing issues.    3.  Chronic kidney disease-her creatinine is stable with the change to her diuretics made upon her last visit.  We discussed possible Epogen injections for her anemia of chronic disease.  She is not interested at this time.    4.  Severe bilateral carotid artery stenosis-she was adamant that she does not want to consider surgery.  I have recommended changing pravastatin to Lipitor 40 mg and did provide her prescription with this and went over the potential side effects with higher intensity statin therapy.  I would also recommend Plavix 75 mg daily for prevention of stroke.  I did discuss possible bleeding risks with this and we will need to continue to monitor her platelet count which was slightly low last checked August 7 at 129.    I am happy to see her back at any time.  Please feel free to contact me with any questions you have in regards to her care         Today's clinic visit entailed:  Review of external notes as documented elsewhere in note  Review of the result(s) of each unique test - carotid ultrasound, NT BNP, BMP, echo  Prescription drug management    Provider  Link to [facility] Help Grid     The level of medical decision making during this visit was of high complexity.    No orders of the defined types were placed in this encounter.    Orders Placed This Encounter   Medications    atorvastatin (LIPITOR) 40 MG tablet     Sig: Take 1 tablet (40 mg) by mouth daily     Dispense:  30 tablet     Refill:  11    clopidogrel (PLAVIX) 75 MG tablet     Sig: Take 1 tablet (75 mg) by mouth  daily     Dispense:  30 tablet     Refill:  11     Medications Discontinued During This Encounter   Medication Reason    pravastatin (PRAVACHOL) 80 MG tablet          Encounter Diagnoses   Name Primary?    Cardiac pacemaker in situ     Benign essential hypertension     Hyperlipidemia LDL goal <100     Heart failure with preserved ejection fraction, NYHA class I (H)     CKD (chronic kidney disease) stage 4, GFR 15-29 ml/min (H)     Other emphysema (H)     Acute respiratory distress     Bilateral carotid artery stenosis Yes       CURRENT MEDICATIONS:  Current Outpatient Medications   Medication Sig Dispense Refill    acetaminophen (TYLENOL) 650 MG CR tablet Take 1,300 mg by mouth every morning      albuterol (PROAIR HFA/PROVENTIL HFA/VENTOLIN HFA) 108 (90 Base) MCG/ACT inhaler Inhale 2 puffs into the lungs every 4 hours as needed for shortness of breath, wheezing or cough 18 g 3    amLODIPine (NORVASC) 10 MG tablet Take 1 tablet (10 mg) by mouth daily 90 tablet 3    atorvastatin (LIPITOR) 40 MG tablet Take 1 tablet (40 mg) by mouth daily 30 tablet 11    Bioflavonoid Products (VITAMIN C) CHEW       budesonide (PULMICORT) 0.5 MG/2ML neb solution Take 2 mLs (0.5 mg) by nebulization 2 times daily (Patient taking differently: Take 0.5 mg by nebulization 2 times daily Pt uses up to 4 times daily) 360 mL 3    carvedilol (COREG) 25 MG tablet Take 1.5 tablets (37.5 mg) by mouth 2 times daily (with meals) 180 tablet 3    citalopram (CELEXA) 20 MG tablet Take 1.5 tablets (30 mg) by mouth daily 135 tablet 0    cloNIDine (CATAPRES) 0.1 MG tablet Take 0.5 tablets (0.05 mg) by mouth 2 times daily 180 tablet 3    clopidogrel (PLAVIX) 75 MG tablet Take 1 tablet (75 mg) by mouth daily 30 tablet 11    Cranberry 450 MG TABS       doxazosin (CARDURA) 1 MG tablet Take 1 tablet (1 mg) by mouth At Bedtime 90 tablet 3    doxycycline hyclate (VIBRAMYCIN) 100 MG capsule Take 1 capsule (100 mg) by mouth three times a week 39 capsule 3     Doxylamine-Pyridoxine 10-10 MG TBEC       ferrous sulfate (FEROSUL) 325 (65 Fe) MG tablet Take 2 tablets (650 mg) by mouth daily (with breakfast) 180 tablet 3    furosemide (LASIX) 40 MG tablet Take 1 tablet (40 mg) by mouth 2 times daily 180 tablet 3    ipratropium - albuterol 0.5 mg/2.5 mg/3 mL (DUONEB) 0.5-2.5 (3) MG/3ML neb solution Take 1 vial (3 mLs) by nebulization 2 times daily 180 mL 11    montelukast (SINGULAIR) 10 MG tablet Take 1 tablet (10 mg) by mouth At Bedtime 90 tablet 3    multivitamin w/minerals (THERA-VIT-M) tablet Take 1 tablet by mouth daily      Ostomy Supplies (ADAPT) PSTE Use with new ostomy pouch and drain  g 3    Ostomy Supplies (KARAYA PASTE) PSTE Use as directed 128 g 3    Ostomy Supplies (PREMIER DRAINABLE POUCH 22MM) Pouch MISC Use as directed every 3-4 days 5 each 12    sodium bicarbonate 650 MG tablet Take 1 tablet (650 mg) by mouth 2 times daily for 180 days 180 tablet 1       ALLERGIES     Allergies   Allergen Reactions    Acetaminophen-Codeine     Penicillin G        PAST MEDICAL HISTORY:  No past medical history on file.    PAST SURGICAL HISTORY:  Past Surgical History:   Procedure Laterality Date    CATARACT EXTRACTION Bilateral     COLONOSCOPY      FEMUR FRACTURE SURGERY Right     2010    ILEOSTOMY      IMPLANT PACEMAKER      left knee replacement      MULTIPLE TOOTH EXTRACTIONS      right knee replacement Right     SPINAL FUSION      TONSILLECTOMY      TUBAL LIGATION         FAMILY HISTORY:  Family History   Problem Relation Age of Onset    Cerebrovascular Disease Mother     Diabetes Mother     Hypertension Mother     Cancer Mother     Hypertension Father     Cerebrovascular Disease Sister     Hypertension Sister     Dementia Sister     Cancer Brother     Cancer Brother     Emphysema Brother        SOCIAL HISTORY:  Social History     Socioeconomic History    Marital status:      Spouse name: None    Number of children: None    Years of education: None    Highest  "education level: None   Tobacco Use    Smoking status: Former     Packs/day: 1.00     Years: 44.00     Pack years: 44.00     Types: Cigarettes     Start date: 1952     Quit date: 1996     Years since quittin.6    Smokeless tobacco: Never   Vaping Use    Vaping Use: Never used   Substance and Sexual Activity    Alcohol use: Not Currently    Drug use: Never    Sexual activity: Not Currently     Partners: Male       Review of Systems:  Skin:  not assessed     Eyes:  not assessed    ENT:  not assessed    Respiratory:  Positive for dyspnea on exertion;cough  Cardiovascular:    edema;Positive for  Gastroenterology: not assessed    Genitourinary:  not assessed    Musculoskeletal:  not assessed    Neurologic:  not assessed    Psychiatric:  not assessed    Heme/Lymph/Imm:  not assessed    Endocrine:  not assessed      Physical Exam:  Vitals: /60 (BP Location: Right arm, Patient Position: Sitting, Cuff Size: Adult Regular)   Pulse 60   Ht 1.499 m (4' 11\")   Wt 75.8 kg (167 lb 3.2 oz)   SpO2 92%   BMI 33.77 kg/m      Constitutional:  cooperative;in no acute distress frail;chronically ill      Skin:  warm and dry to the touch          Head:  normocephalic        Eyes:  pupils equal and round        Lymph:      ENT:  no pallor or cyanosis        Neck:    right carotid bruit      Respiratory:  normal symmetry basal rales  mild kyphosis    Cardiac: regular rhythm;no murmurs, gallops or rubs detected                  pulses below the femoral arteries are diminished                                      GI:  abdomen soft        Extremities and Muscular Skeletal:      bilateral LE edema;2+          Neurological:  no gross motor deficits        Psych:  Alert and Oriented x 3        Recent Lab Results:  LIPID RESULTS:  Lab Results   Component Value Date    CHOL 161 2023    HDL 52 2023    LDL 95 2023    TRIG 72 2023       LIVER ENZYME RESULTS:  Lab Results   Component Value Date    AST 22 " 08/07/2023    ALT 11 08/07/2023       CBC RESULTS:  Lab Results   Component Value Date    WBC 4.6 08/07/2023    RBC 2.92 (L) 08/07/2023    HGB 9.7 (L) 08/07/2023    HCT 29.8 (L) 08/07/2023     (H) 08/07/2023    MCH 33.2 (H) 08/07/2023    MCHC 32.6 08/07/2023    RDW 12.7 08/07/2023     (L) 08/07/2023       BMP RESULTS:  Lab Results   Component Value Date     08/07/2023    POTASSIUM 5.0 08/07/2023    CHLORIDE 108 (H) 08/07/2023    CO2 21 (L) 08/07/2023    ANIONGAP 12 08/07/2023     (H) 08/07/2023    BUN 81.4 (H) 08/07/2023    CR 2.55 (H) 08/07/2023    GFRESTIMATED 18 (L) 08/07/2023    CANELO 9.2 08/07/2023        A1C RESULTS:  No results found for: A1C    INR RESULTS:  No results found for: INR        CC  Mary Gonzalez DO  [address]  [place] [id number]      Thank you for allowing me to participate in the care of your patient.      Sincerely,     Mary Gonzalez DO     [facility]

## 2023-08-11 NOTE — PROGRESS NOTES
HPI and Plan:   Arpita Rocha is a 87 year old female who presents with history of COPD, chronic kidney disease, COVID infection last year and pneumonia leaving her oxygen dependent.  She was evaluated for heart failure in July and underwent cardiac testing including an echocardiogram, blood tests including NT proBNP, and a carotid ultrasound due to auscultation of bruits.  I had contacted her nephrologist to discuss changes to her blood pressure management and diuretics.  She saw a kidney specialist a few days ago who recommended to decrease clonidine from 1 mg twice daily 2.5 mg twice daily and increase carvedilol to 37.5 mg twice daily.  She was also put on sodium bicarb tablets due to low total CO2.  Since her last visit she continues to wean off of oxygen utilizing only 1 L with activity at home.  She presents in a wheelchair today not on oxygen.  She has been noticing increased cough recently and her breathing has been stable to slightly improved.  Her blood pressure was significantly improved today with the medication changes from her kidney specialist.  I reviewed her echocardiogram findings which demonstrated normal LV EF of 60 to 65%, she had moderate concentric hypertrophy and grade 1 diastolic dysfunction.  Mild pulmonary hypertension was noted with normal RV function.  Her NT proBNP back in July was 1,345.  Her hemoglobin was low at 9.7 just a few days ago measured by the kidney specialist.  Her carotid ultrasound suggested significant stenosis in both the right and left internal carotid arteries.    Summary    1.  Oxygen dependent COPD-her breathing issues appear to be primarily related to underlying lung disease.  There may be a mild component of heart failure with preserved ejection fraction but the predominant factor is underlying lung disease.  It sounds like she is successful in reducing her oxygen requirements.  Consider following up with pulmonology.    2.  Hypertension-blood pressure  appears better controlled with the changes that the kidney specialist made.  Recommend to continue carvedilol at 37.5 mg twice daily.  I am hoping better blood Pressure control will also help with her breathing issues.    3.  Chronic kidney disease-her creatinine is stable with the change to her diuretics made upon her last visit.  We discussed possible Epogen injections for her anemia of chronic disease.  She is not interested at this time.    4.  Severe bilateral carotid artery stenosis-she was adamant that she does not want to consider surgery.  I have recommended changing pravastatin to Lipitor 40 mg and did provide her prescription with this and went over the potential side effects with higher intensity statin therapy.  I would also recommend Plavix 75 mg daily for prevention of stroke.  I did discuss possible bleeding risks with this and we will need to continue to monitor her platelet count which was slightly low last checked August 7 at 129.    I am happy to see her back at any time.  Please feel free to contact me with any questions you have in regards to her care         Today's clinic visit entailed:  Review of external notes as documented elsewhere in note  Review of the result(s) of each unique test - carotid ultrasound, NT BNP, BMP, echo  Prescription drug management    Provider  Link to [facility] Help Grid     The level of medical decision making during this visit was of high complexity.    No orders of the defined types were placed in this encounter.    Orders Placed This Encounter   Medications    atorvastatin (LIPITOR) 40 MG tablet     Sig: Take 1 tablet (40 mg) by mouth daily     Dispense:  30 tablet     Refill:  11    clopidogrel (PLAVIX) 75 MG tablet     Sig: Take 1 tablet (75 mg) by mouth daily     Dispense:  30 tablet     Refill:  11     Medications Discontinued During This Encounter   Medication Reason    pravastatin (PRAVACHOL) 80 MG tablet          Encounter Diagnoses   Name Primary?    Cardiac  pacemaker in situ     Benign essential hypertension     Hyperlipidemia LDL goal <100     Heart failure with preserved ejection fraction, NYHA class I (H)     CKD (chronic kidney disease) stage 4, GFR 15-29 ml/min (H)     Other emphysema (H)     Acute respiratory distress     Bilateral carotid artery stenosis Yes       CURRENT MEDICATIONS:  Current Outpatient Medications   Medication Sig Dispense Refill    acetaminophen (TYLENOL) 650 MG CR tablet Take 1,300 mg by mouth every morning      albuterol (PROAIR HFA/PROVENTIL HFA/VENTOLIN HFA) 108 (90 Base) MCG/ACT inhaler Inhale 2 puffs into the lungs every 4 hours as needed for shortness of breath, wheezing or cough 18 g 3    amLODIPine (NORVASC) 10 MG tablet Take 1 tablet (10 mg) by mouth daily 90 tablet 3    atorvastatin (LIPITOR) 40 MG tablet Take 1 tablet (40 mg) by mouth daily 30 tablet 11    Bioflavonoid Products (VITAMIN C) CHEW       budesonide (PULMICORT) 0.5 MG/2ML neb solution Take 2 mLs (0.5 mg) by nebulization 2 times daily (Patient taking differently: Take 0.5 mg by nebulization 2 times daily Pt uses up to 4 times daily) 360 mL 3    carvedilol (COREG) 25 MG tablet Take 1.5 tablets (37.5 mg) by mouth 2 times daily (with meals) 180 tablet 3    citalopram (CELEXA) 20 MG tablet Take 1.5 tablets (30 mg) by mouth daily 135 tablet 0    cloNIDine (CATAPRES) 0.1 MG tablet Take 0.5 tablets (0.05 mg) by mouth 2 times daily 180 tablet 3    clopidogrel (PLAVIX) 75 MG tablet Take 1 tablet (75 mg) by mouth daily 30 tablet 11    Cranberry 450 MG TABS       doxazosin (CARDURA) 1 MG tablet Take 1 tablet (1 mg) by mouth At Bedtime 90 tablet 3    doxycycline hyclate (VIBRAMYCIN) 100 MG capsule Take 1 capsule (100 mg) by mouth three times a week 39 capsule 3    Doxylamine-Pyridoxine 10-10 MG TBEC       ferrous sulfate (FEROSUL) 325 (65 Fe) MG tablet Take 2 tablets (650 mg) by mouth daily (with breakfast) 180 tablet 3    furosemide (LASIX) 40 MG tablet Take 1 tablet (40 mg) by  mouth 2 times daily 180 tablet 3    ipratropium - albuterol 0.5 mg/2.5 mg/3 mL (DUONEB) 0.5-2.5 (3) MG/3ML neb solution Take 1 vial (3 mLs) by nebulization 2 times daily 180 mL 11    montelukast (SINGULAIR) 10 MG tablet Take 1 tablet (10 mg) by mouth At Bedtime 90 tablet 3    multivitamin w/minerals (THERA-VIT-M) tablet Take 1 tablet by mouth daily      Ostomy Supplies (ADAPT) PSTE Use with new ostomy pouch and drain  g 3    Ostomy Supplies (KARAYA PASTE) PSTE Use as directed 128 g 3    Ostomy Supplies (PREMIER DRAINABLE POUCH 22MM) Pouch MISC Use as directed every 3-4 days 5 each 12    sodium bicarbonate 650 MG tablet Take 1 tablet (650 mg) by mouth 2 times daily for 180 days 180 tablet 1       ALLERGIES     Allergies   Allergen Reactions    Acetaminophen-Codeine     Penicillin G        PAST MEDICAL HISTORY:  No past medical history on file.    PAST SURGICAL HISTORY:  Past Surgical History:   Procedure Laterality Date    CATARACT EXTRACTION Bilateral     COLONOSCOPY      FEMUR FRACTURE SURGERY Right     2010    ILEOSTOMY      IMPLANT PACEMAKER      left knee replacement      MULTIPLE TOOTH EXTRACTIONS      right knee replacement Right     SPINAL FUSION      TONSILLECTOMY      TUBAL LIGATION         FAMILY HISTORY:  Family History   Problem Relation Age of Onset    Cerebrovascular Disease Mother     Diabetes Mother     Hypertension Mother     Cancer Mother     Hypertension Father     Cerebrovascular Disease Sister     Hypertension Sister     Dementia Sister     Cancer Brother     Cancer Brother     Emphysema Brother        SOCIAL HISTORY:  Social History     Socioeconomic History    Marital status:      Spouse name: None    Number of children: None    Years of education: None    Highest education level: None   Tobacco Use    Smoking status: Former     Packs/day: 1.00     Years: 44.00     Pack years: 44.00     Types: Cigarettes     Start date: 1/1/1952     Quit date: 1/1/1996     Years since quitting:  "27.6    Smokeless tobacco: Never   Vaping Use    Vaping Use: Never used   Substance and Sexual Activity    Alcohol use: Not Currently    Drug use: Never    Sexual activity: Not Currently     Partners: Male       Review of Systems:  Skin:  not assessed     Eyes:  not assessed    ENT:  not assessed    Respiratory:  Positive for dyspnea on exertion;cough  Cardiovascular:    edema;Positive for  Gastroenterology: not assessed    Genitourinary:  not assessed    Musculoskeletal:  not assessed    Neurologic:  not assessed    Psychiatric:  not assessed    Heme/Lymph/Imm:  not assessed    Endocrine:  not assessed      Physical Exam:  Vitals: /60 (BP Location: Right arm, Patient Position: Sitting, Cuff Size: Adult Regular)   Pulse 60   Ht 1.499 m (4' 11\")   Wt 75.8 kg (167 lb 3.2 oz)   SpO2 92%   BMI 33.77 kg/m      Constitutional:  cooperative;in no acute distress frail;chronically ill      Skin:  warm and dry to the touch          Head:  normocephalic        Eyes:  pupils equal and round        Lymph:      ENT:  no pallor or cyanosis        Neck:    right carotid bruit      Respiratory:  normal symmetry basal rales  mild kyphosis    Cardiac: regular rhythm;no murmurs, gallops or rubs detected                  pulses below the femoral arteries are diminished                                      GI:  abdomen soft        Extremities and Muscular Skeletal:      bilateral LE edema;2+          Neurological:  no gross motor deficits        Psych:  Alert and Oriented x 3        Recent Lab Results:  LIPID RESULTS:  Lab Results   Component Value Date    CHOL 161 06/12/2023    HDL 52 06/12/2023    LDL 95 06/12/2023    TRIG 72 06/12/2023       LIVER ENZYME RESULTS:  Lab Results   Component Value Date    AST 22 08/07/2023    ALT 11 08/07/2023       CBC RESULTS:  Lab Results   Component Value Date    WBC 4.6 08/07/2023    RBC 2.92 (L) 08/07/2023    HGB 9.7 (L) 08/07/2023    HCT 29.8 (L) 08/07/2023     (H) 08/07/2023    " MCH 33.2 (H) 08/07/2023    MCHC 32.6 08/07/2023    RDW 12.7 08/07/2023     (L) 08/07/2023       BMP RESULTS:  Lab Results   Component Value Date     08/07/2023    POTASSIUM 5.0 08/07/2023    CHLORIDE 108 (H) 08/07/2023    CO2 21 (L) 08/07/2023    ANIONGAP 12 08/07/2023     (H) 08/07/2023    BUN 81.4 (H) 08/07/2023    CR 2.55 (H) 08/07/2023    GFRESTIMATED 18 (L) 08/07/2023    CANELO 9.2 08/07/2023        A1C RESULTS:  No results found for: A1C    INR RESULTS:  No results found for: INR        CC  Mary Gonzalez, DO  [address]  [place] [id number]

## 2023-08-14 ENCOUNTER — MYC MEDICAL ADVICE (OUTPATIENT)
Dept: FAMILY MEDICINE | Facility: CLINIC | Age: 88
End: 2023-08-14
Payer: MEDICARE

## 2023-08-14 DIAGNOSIS — J42 CHRONIC BRONCHITIS, UNSPECIFIED CHRONIC BRONCHITIS TYPE (H): ICD-10-CM

## 2023-08-14 RX ORDER — IPRATROPIUM BROMIDE AND ALBUTEROL SULFATE 2.5; .5 MG/3ML; MG/3ML
1 SOLUTION RESPIRATORY (INHALATION) 2 TIMES DAILY
Qty: 180 ML | Refills: 9 | Status: SHIPPED | OUTPATIENT
Start: 2023-08-14 | End: 2023-01-01

## 2023-08-14 SDOH — HEALTH STABILITY: PHYSICAL HEALTH: ON AVERAGE, HOW MANY MINUTES DO YOU ENGAGE IN EXERCISE AT THIS LEVEL?: 0 MIN

## 2023-08-14 SDOH — ECONOMIC STABILITY: TRANSPORTATION INSECURITY
IN THE PAST 12 MONTHS, HAS LACK OF TRANSPORTATION KEPT YOU FROM MEETINGS, WORK, OR FROM GETTING THINGS NEEDED FOR DAILY LIVING?: NO

## 2023-08-14 SDOH — ECONOMIC STABILITY: FOOD INSECURITY: WITHIN THE PAST 12 MONTHS, THE FOOD YOU BOUGHT JUST DIDN'T LAST AND YOU DIDN'T HAVE MONEY TO GET MORE.: NEVER TRUE

## 2023-08-14 SDOH — ECONOMIC STABILITY: FOOD INSECURITY: WITHIN THE PAST 12 MONTHS, YOU WORRIED THAT YOUR FOOD WOULD RUN OUT BEFORE YOU GOT MONEY TO BUY MORE.: NEVER TRUE

## 2023-08-14 SDOH — HEALTH STABILITY: PHYSICAL HEALTH: ON AVERAGE, HOW MANY DAYS PER WEEK DO YOU ENGAGE IN MODERATE TO STRENUOUS EXERCISE (LIKE A BRISK WALK)?: 0 DAYS

## 2023-08-14 SDOH — ECONOMIC STABILITY: INCOME INSECURITY: HOW HARD IS IT FOR YOU TO PAY FOR THE VERY BASICS LIKE FOOD, HOUSING, MEDICAL CARE, AND HEATING?: NOT VERY HARD

## 2023-08-14 SDOH — ECONOMIC STABILITY: TRANSPORTATION INSECURITY
IN THE PAST 12 MONTHS, HAS THE LACK OF TRANSPORTATION KEPT YOU FROM MEDICAL APPOINTMENTS OR FROM GETTING MEDICATIONS?: NO

## 2023-08-14 SDOH — ECONOMIC STABILITY: INCOME INSECURITY: IN THE LAST 12 MONTHS, WAS THERE A TIME WHEN YOU WERE NOT ABLE TO PAY THE MORTGAGE OR RENT ON TIME?: NO

## 2023-08-14 ASSESSMENT — ACTIVITIES OF DAILY LIVING (ADL)
CURRENT_FUNCTION: SHOPPING REQUIRES ASSISTANCE
CURRENT_FUNCTION: HOUSEWORK REQUIRES ASSISTANCE
CURRENT_FUNCTION: LAUNDRY REQUIRES ASSISTANCE
CURRENT_FUNCTION: BATHING REQUIRES ASSISTANCE
CURRENT_FUNCTION: TRANSPORTATION REQUIRES ASSISTANCE
CURRENT_FUNCTION: PREPARING MEALS REQUIRES ASSISTANCE

## 2023-08-14 ASSESSMENT — ENCOUNTER SYMPTOMS
ABDOMINAL PAIN: 0
FEVER: 0
CHILLS: 0
PALPITATIONS: 0
SHORTNESS OF BREATH: 1
DYSURIA: 0
HEADACHES: 0
ARTHRALGIAS: 1
FREQUENCY: 0
SORE THROAT: 0
DIARRHEA: 0
NAUSEA: 0
WEAKNESS: 1
HEMATURIA: 0
DIZZINESS: 0
PARESTHESIAS: 0
JOINT SWELLING: 0
MYALGIAS: 0
BREAST MASS: 0
EYE PAIN: 0
HEARTBURN: 0
CONSTIPATION: 0
NERVOUS/ANXIOUS: 0
HEMATOCHEZIA: 0
COUGH: 1

## 2023-08-14 ASSESSMENT — SOCIAL DETERMINANTS OF HEALTH (SDOH)
HOW OFTEN DO YOU ATTEND CHURCH OR RELIGIOUS SERVICES?: MORE THAN 4 TIMES PER YEAR
IN A TYPICAL WEEK, HOW MANY TIMES DO YOU TALK ON THE PHONE WITH FAMILY, FRIENDS, OR NEIGHBORS?: TWICE A WEEK
DO YOU BELONG TO ANY CLUBS OR ORGANIZATIONS SUCH AS CHURCH GROUPS UNIONS, FRATERNAL OR ATHLETIC GROUPS, OR SCHOOL GROUPS?: NO
HOW OFTEN DO YOU GET TOGETHER WITH FRIENDS OR RELATIVES?: MORE THAN THREE TIMES A WEEK

## 2023-08-14 ASSESSMENT — LIFESTYLE VARIABLES
AUDIT-C TOTAL SCORE: 0
HOW MANY STANDARD DRINKS CONTAINING ALCOHOL DO YOU HAVE ON A TYPICAL DAY: PATIENT DOES NOT DRINK
SKIP TO QUESTIONS 9-10: 1
HOW OFTEN DO YOU HAVE SIX OR MORE DRINKS ON ONE OCCASION: NEVER
HOW OFTEN DO YOU HAVE A DRINK CONTAINING ALCOHOL: NEVER

## 2023-08-14 NOTE — TELEPHONE ENCOUNTER
- Advised that a new prescription would need to be sent to the pharmacy as insurance coverage through [employer] was the issue.    - Resent prescription to the [facility], call placed to pharmacy to inquire. Advised that patient's medicare part B prescriptions cannot be transferred between pharmacies.    - Advised that order will be filled tomorrow for the patient, as they are out of supply today.     Nando Webber RN  Patient Advocate Liaison (PAL)  [facility]  [phone]    08/14/2023 at 1:27 PM

## 2023-08-14 NOTE — TELEPHONE ENCOUNTER
- Call placed to [facility], advised that the ipratropium - albuterol  (DUONEB)  is not covered by insurance.     - Request sent to patient to see if there were issues with coverage through the other pharmacy that it was sent to.    Nando Webber RN  Patient Advocate Liaison (PAL)  [facility]  [phone]    08/14/2023 at 9:35 AM

## 2023-08-21 PROBLEM — K51.00 ULCERATIVE PANCOLITIS (H): Status: RESOLVED | Noted: 2023-05-23 | Resolved: 2023-01-01

## 2023-08-21 NOTE — LETTER
My Depression Action Plan  Name: Arpita Rocha   Date of Birth 1935  Date: 8/21/2023    My doctor: Ludivina Chamberlain   My clinic: [address] [address] [phone]            GREEN    ZONE   Good Control    What it looks like:   Things are going generally well. You have normal ups and downs. You may even feel depressed from time to time, but bad moods usually last less than a day.   What you need to do:  Continue to care for yourself (see self care plan)  Check your depression survival kit and update it as needed  Follow your physician s recommendations including any medication.  Do not stop taking medication unless you consult with your physician first.             YELLOW         ZONE Getting Worse    What it looks like:   Depression is starting to interfere with your life.   It may be hard to get out of bed; you may be starting to isolate yourself from others.  Symptoms of depression are starting to last most all day and this has happened for several days.   You may have suicidal thoughts but they are not constant.   What you need to do:     Call your care team. Your response to treatment will improve if you keep your care team informed of your progress. Yellow periods are signs an adjustment may need to be made.     Continue your self-care.  Just get dressed and ready for the day.  Don't give yourself time to talk yourself out of it.    Talk to someone in your support network.    Open up your Depression Self-Care Plan/Wellness Kit.             RED    ZONE Medical Alert - Get Help    What it looks like:   Depression is seriously interfering with your life.   You may experience these or other symptoms: You can t get out of bed most days, can t work or engage in other necessary activities, you have trouble taking care of basic hygiene, or basic responsibilities, thoughts of suicide or death that will not go away,  self-injurious behavior.     What you need to do:  Call your care team and request a same-day appointment. If they are not available (weekends or after hours) call your local crisis line, emergency room or 911.          Depression Self-Care Plan / Wellness Kit    Many people find that medication and therapy are helpful treatments for managing depression. In addition, making small changes to your everyday life can help to boost your mood and improve your wellbeing. Below are some tips for you to consider. Be sure to talk with your medical provider and/or behavioral health consultant if your symptoms are worsening or not improving.     Sleep   Sleep hygiene  means all of the habits that support good, restful sleep. It includes maintaining a consistent bedtime and wake time, using your bedroom only for sleeping or sex, and keeping the bedroom dark and free of distractions like a computer, smartphone, or television.     Develop a Healthy Routine  Maintain good hygiene. Get out of bed in the morning, make your bed, brush your teeth, take a shower, and get dressed. Don t spend too much time viewing media that makes you feel stressed. Find time to relax each day.    Exercise  Get some form of exercise every day. This will help reduce pain and release endorphins, the  feel good  chemicals in your brain. It can be as simple as just going for a walk or doing some gardening, anything that will get you moving.      Diet  Strive to eat healthy foods, including fruits and vegetables. Drink plenty of water. Avoid excessive sugar, caffeine, alcohol, and other mood-altering substances.     Stay Connected with Others  Stay in touch with friends and family members.    Manage Your Mood  Try deep breathing, massage therapy, biofeedback, or meditation. Take part in fun activities when you can. Try to find something to smile about each day.     Psychotherapy  Be open to working with a therapist if your provider recommends it.      Medication  Be sure to take your medication as prescribed. Most anti-depressants need to be taken every day. It usually takes several weeks for medications to work. Not all medicines work for all people. It is important to follow-up with your provider to make sure you have a treatment plan that is working for you. Do not stop your medication abruptly without first discussing it with your provider.    Crisis Resources   These hotlines are for both adults and children. They and are open 24 hours a day, 7 days a week unless noted otherwise.    National Suicide Prevention Lifeline   988 or 6-317-748-PHVY (8560)    Crisis Text Line    www.crisistextline.org  Text HOME to 501640 from anywhere in the [place], anytime, about any type of crisis. A live, trained crisis counselor will receive the text and respond quickly.    [facility] Lifeline for LGBTQ Youth  A national crisis intervention and suicide lifeline for LGBTQ youth under 25. Provides a safe place to talk without judgement. Call 1-574.251.6071; text START to 066381 or visit www.thetrevorproject.org to talk to a trained counselor.    For [facility] crisis numbers, visit the [facility] website at:  https://[place].gov/dhs/people-we-serve/adults/health-care/mental-health/resources/crisis-contacts.jsp

## 2023-08-21 NOTE — LETTER
My COPD Action Plan     Name: Arpita Rocha    YOB: 1935   Date: 8/21/2023    My doctor: Ludivina Canales MD   My clinic: [address] [address] [phone]  My Controller Medicine: Budesonide (Pulmicort)   Dose: twice per day     My Rescue Medicine:  albuterol inhaler every 4 hours as needed, Duo Nebs 4 times per day        My Flare Up Medicine: Doxycycline (Doryx, Monodox, Vibramycin)   Dose: three times per week     My COPD Severity: UNKNOWN, Moderate      Use of Oxygen: Oxygen Not Prescribed      Make sure you've had your pneumonia   vaccines.          GREEN ZONE       Doing well today    Usual level of activity and exercise  Usual amount of cough and mucus  No shortness of breath  Usual level of health (thinking clearly, sleeping well, feel like eating) Actions:    Take daily medicines  Use oxygen as prescribed  Follow regular exercise and diet plan  Avoid cigarette smoke and other irritants that harm the lungs           YELLOW ZONE          Having a bad day or flare up    Short of breath more than usual  A lot more sputum (mucus) than usual  Sputum looks yellow, green, tan, brown or bloody  More coughing or wheezing  Fever or chills  Less energy; trouble completing activities  Trouble thinking or focusing  Using quick relief inhaler or nebulizer more often  Poor sleep; symptoms wake me up  Do not feel like eating Actions:    Get plenty of rest  Take daily medicines  Use quick relief inhaler every 4 hours  If you use oxygen, call you doctor to see if you should adjust your oxygen  Do breathing exercises or other things to help you relax  Let a loved one, friend or neighbor know you are feeling worse  Call your care team if you have 2 or more symptoms.  Start taking steroids or antibiotics if directed by your care team           RED ZONE       Need medical care now    Severe shortness of breath (feel you can't  breathe)  Fever, chills  Not enough breath to do any activity  Trouble coughing up mucus, walking or talking  Blood in mucus  Frequent coughing Rescue medicines are not working  Not able to sleep because of breathing  Feel confused or drowsy  Chest pain    Actions:    Call your health care team.  If you cannot reach your care team, call 911 or go to the emergency room.        Annual Reminders:  Meet with Care Team, Flu Shot every Fall  Pharmacy: [id number] - [facility] [id number]

## 2023-08-21 NOTE — PATIENT INSTRUCTIONS
Patient Education   Personalized Prevention Plan  You are due for the preventive services outlined below.  Your care team is available to assist you in scheduling these services.  If you have already completed any of these items, please share that information with your care team to update in your medical record.  Health Maintenance Due   Topic Date Due    Breathing Capacity Test  Never done    COVID-19 Vaccine (1) Never done    Pneumococcal Vaccine (1 - PCV) Never done    Diptheria Tetanus Pertussis (DTAP/TDAP/TD) Vaccine (1 - Tdap) Never done    Zoster (Shingles) Vaccine (1 of 2) Never done    Kidney Microalbumin Urine Test  08/23/2023     Your Health Risk Assessment indicates you feel you are not in good health    A healthy lifestyle helps keep the body fit and the mind alert. It helps protect you from disease, helps you fight disease, and helps prevent chronic disease (disease that doesn't go away) from getting worse. This is important as you get older and begin to notice twinges in muscles and joints and a decline in the strength and stamina you once took for granted. A healthy lifestyle includes good healthcare, good nutrition, weight control, recreation, and regular exercise. Avoid harmful substances and do what you can to keep safe. Another part of a healthy lifestyle is stay mentally active and socially involved.    Good healthcare   Have a wellness visit every year.   If you have new symptoms, let us know right away. Don't wait until the next checkup.   Take medicines exactly as prescribed and keep your medicines in a safe place. Tell us if your medicine causes problems.   Healthy diet and weight control   Eat 3 or 4 small, nutritious, low-fat, high-fiber meals a day. Include a variety of fruits, vegetables, and whole-grain foods.   Make sure you get enough calcium in your diet. Calcium, vitamin D, and exercise help prevent osteoporosis (bone thinning).   If you live alone, try eating with others when you  "can. That way you get a good meal and have company while you eat it.   Try to keep a healthy weight. If you eat more calories than your body uses for energy, it will be stored as fat and you will gain weight.     Recreation   Recreation is not limited to sports and team events. It includes any activity that provides relaxation, interest, enjoyment, and exercise. Recreation provides an outlet for physical, mental, and social energy. It can give a sense of worth and achievement. It can help you stay healthy.    Mental Exercise and Social Involvement  Mental and emotional health is as important as physical health. Keep in touch with friends and family. Stay as active as possible. Continue to learn and challenge yourself.   Things you can do to stay mentally active are:  Learn something new, like a foreign language or musical instrument.   Play SCRABBLE or do crossword puzzles. If you cannot find people to play these games with you at home, you can play them with others on your computer through the Internet.   Join a games club--anything from card games to chess or checkers or lawn bowling.   Start a new hobby.   Go back to school.   Volunteer.   Read.   Keep up with world events.  Learning About Being Physically Active  What is physical activity?     Being physically active means doing any kind of activity that gets your body moving.  The types of physical activity that can help you get fit and stay healthy include:  Aerobic or \"cardio\" activities. These make your heart beat faster and make you breathe harder, such as brisk walking, riding a bike, or running. They strengthen your heart and lungs and build up your endurance.  Strength training activities. These make your muscles work against, or \"resist,\" something. Examples include lifting weights or doing push-ups. These activities help tone and strengthen your muscles and bones.  Stretches. These let you move your joints and muscles through their full range of " motion. Stretching helps you be more flexible.  Reaching a balance between these three types of physical activity is important because each one contributes to your overall fitness.  What are the benefits of being active?  Being active is one of the best things you can do for your health. It helps you to:  Feel stronger and have more energy to do all the things you like to do.  Focus better at school or work.  Feel, think, and sleep better.  Reach and stay at a healthy weight.  Lose fat and build lean muscle.  Lower your risk for serious health problems, including diabetes, heart attack, high blood pressure, and some cancers.  Keep your heart, lungs, bones, muscles, and joints strong and healthy.  How can you make being active part of your life?  Start slowly. Make it your long-term goal to get at least 30 minutes of exercise on most days of the week. Walking is a good choice. You also may want to do other activities, such as running, swimming, cycling, or playing tennis or team sports.  Pick activities that you like--ones that make your heart beat faster, your muscles stronger, and your muscles and joints more flexible. If you find more than one thing you like doing, do them all. You don't have to do the same thing every day.  Get your heart pumping every day. Any activity that makes your heart beat faster and keeps it at that rate for a while counts.  Here are some great ways to get your heart beating faster:  Go for a brisk walk, run, or bike ride.  Go for a hike or swim.  Go in-line skating.  Play a game of touch football, basketball, or soccer.  Ride a bike.  Play tennis or racquetball.  Climb stairs.  Even some household chores can be aerobic--just do them at a faster pace. Vacuuming, raking or mowing the lawn, sweeping the garage, and washing and waxing the car all can help get your heart rate up.  Strengthen your muscles during the week. You don't have to lift heavy weights or grow big, bulky muscles to get  "stronger. Doing a few simple activities that make your muscles work against, or \"resist,\" something can help you get stronger.  For example, you can:  Do push-ups or sit-ups, which use your own body weight as resistance.  Lift weights or dumbbells or use stretch bands at home or in a gym or community center.  Stretch your muscles often. Stretching will help you as you become more active. It can help you stay flexible, loosen tight muscles, and avoid injury. It can also help improve your balance and posture and can be a great way to relax.  Be sure to stretch the muscles you'll be using when you work out. It's best to warm your muscles slightly before you stretch them. Walk or do some other light aerobic activity for a few minutes, and then start stretching.  When you stretch your muscles:  Do it slowly. Stretching is not about going fast or making sudden movements.  Don't push or bounce during a stretch.  Hold each stretch for at least 15 to 30 seconds, if you can. You should feel a stretch in the muscle, but not pain.  Breathe out as you do the stretch. Then breathe in as you hold the stretch. Don't hold your breath.  If you're worried about how more activity might affect your health, have a checkup before you start. Follow any special advice your doctor gives you for getting a smart start.  Where can you learn more?  Go to https://www.[employer].net/patiented  Enter W332 in the search box to learn more about \"Learning About Being Physically Active.\"  Current as of: October 10, 2022               Content Version: 13.7    3398-4954 [employer].   Care instructions adapted under license by your healthcare professional. If you have questions about a medical condition or this instruction, always ask your healthcare professional. [employer] disclaims any warranty or liability for your use of this information.      Activities of Daily Living    Your Health Risk Assessment indicates you have " difficulties with activities of daily living such as housework, bathing, preparing meals, taking medication, etc. Please make a follow up appointment for us to address this issue in more detail.  Hearing Loss: Care Instructions  Overview     Hearing loss is a sudden or slow decrease in how well you hear. It can range from slight to profound. Permanent hearing loss can occur with aging. It also can happen when you are exposed long-term to loud noise. Examples include listening to loud music, riding motorcycles, or being around other loud machines.  Hearing loss can affect your work and home life. It can make you feel lonely or depressed. You may feel that you have lost your independence. But hearing aids and other devices can help you hear better and feel connected to others.  Follow-up care is a key part of your treatment and safety. Be sure to make and go to all appointments, and call your doctor if you are having problems. It's also a good idea to know your test results and keep a list of the medicines you take.  How can you care for yourself at home?  Avoid loud noises whenever possible. This helps keep your hearing from getting worse.  Always wear hearing protection around loud noises.  Wear a hearing aid as directed.  A professional can help you pick a hearing aid that will work best for you.  You can also get hearing aids over the counter for mild to moderate hearing loss.  Have hearing tests as your doctor suggests. They can show whether your hearing has changed. Your hearing aid may need to be adjusted.  Use other devices as needed. These may include:  Telephone amplifiers and hearing aids that can connect to a television, stereo, radio, or microphone.  Devices that use lights or vibrations. These alert you to the doorbell, a ringing telephone, or a baby monitor.  Television closed-captioning. This shows the words at the bottom of the screen. Most new TVs can do this.  TTY (text telephone). This lets you  "type messages back and forth on the telephone instead of talking or listening. These devices are also called TDD. When messages are typed on the keyboard, they are sent over the phone line to a receiving TTY. The message is shown on a monitor.  Use text messaging, social media, and email if it is hard for you to communicate by telephone.  Try to learn a listening technique called speechreading. It is not lipreading. You pay attention to people's gestures, expressions, posture, and tone of voice. These clues can help you understand what a person is saying. Face the person you are talking to, and have them face you. Make sure the lighting is good. You need to see the other person's face clearly.  Think about counseling if you need help to adjust to your hearing loss.  When should you call for help?  Watch closely for changes in your health, and be sure to contact your doctor if:    You think your hearing is getting worse.     You have new symptoms, such as dizziness or nausea.   Where can you learn more?  Go to https://www.VISEO.net/patiented  Enter R798 in the search box to learn more about \"Hearing Loss: Care Instructions.\"  Current as of: March 1, 2023               Content Version: 13.7    2596-7042 [employer].   Care instructions adapted under license by your healthcare professional. If you have questions about a medical condition or this instruction, always ask your healthcare professional. [employer] disclaims any warranty or liability for your use of this information.      Bladder Training: Care Instructions  Your Care Instructions     Bladder training is used to treat urge incontinence and stress incontinence. Urge incontinence means that the need to urinate comes on so fast that you can't get to a toilet in time. Stress incontinence means that you leak urine because of pressure on your bladder. For example, it may happen when you laugh, cough, or lift something " heavy.  Bladder training can increase how long you can wait before you have to urinate. It can also help your bladder hold more urine. And it can give you better control over the urge to urinate.  It is important to remember that bladder training takes a few weeks to a few months to make a difference. You may not see results right away, but don't give up.  Follow-up care is a key part of your treatment and safety. Be sure to make and go to all appointments, and call your doctor if you are having problems. It's also a good idea to know your test results and keep a list of the medicines you take.  How can you care for yourself at home?  Work with your doctor to come up with a bladder training program that is right for you. You may use one or more of the following methods.  Delayed urination  In the beginning, try to keep from urinating for 5 minutes after you first feel the need to go.  While you wait, take deep, slow breaths to relax. Kegel exercises can also help you delay the need to go to the bathroom.  After some practice, when you can easily wait 5 minutes to urinate, try to wait 10 minutes before you urinate.  Slowly increase the waiting period until you are able to control when you have to urinate.  Scheduled urination  Empty your bladder when you first wake up in the morning.  Schedule times throughout the day when you will urinate.  Start by going to the bathroom every hour, even if you don't need to go.  Slowly increase the time between trips to the bathroom.  When you have found a schedule that works well for you, keep doing it.  If you wake up during the night and have to urinate, do it. Apply your schedule to waking hours only.  Kegel exercises  These tighten and strengthen pelvic muscles, which can help you control the flow of urine. (If doing these exercises causes pain, stop doing them and talk with your doctor.) To do Kegel exercises:  Squeeze your muscles as if you were trying not to pass gas. Or  "squeeze your muscles as if you were stopping the flow of urine. Your belly, legs, and buttocks shouldn't move.  Hold the squeeze for 3 seconds, then relax for 5 to 10 seconds.  Start with 3 seconds, then add 1 second each week until you are able to squeeze for 10 seconds.  Repeat the exercise 10 times a session. Do 3 to 8 sessions a day.  When should you call for help?  Watch closely for changes in your health, and be sure to contact your doctor if:    Your incontinence is getting worse.     You do not get better as expected.   Where can you learn more?  Go to https://www.Tapjoy.net/patiented  Enter V684 in the search box to learn more about \"Bladder Training: Care Instructions.\"  Current as of: March 1, 2023               Content Version: 13.7    4493-3019 [employer].   Care instructions adapted under license by your healthcare professional. If you have questions about a medical condition or this instruction, always ask your healthcare professional. [employer] disclaims any warranty or liability for your use of this information.      Your Health Risk Assessment indicates you feel you are not in good emotional health.    Recreation   Recreation is not limited to sports and team events. It includes any activity that provides relaxation, interest, enjoyment, and exercise. Recreation provides an outlet for physical, mental, and social energy. It can give a sense of worth and achievement. It can help you stay healthy.    Mental Exercise and Social Involvement  Mental and emotional health is as important as physical health. Keep in touch with friends and family. Stay as active as possible. Continue to learn and challenge yourself.   Things you can do to stay mentally active are:  Learn something new, like a foreign language or musical instrument.   Play SCRABBLE or do crossword puzzles. If you cannot find people to play these games with you at home, you can play them with others on your " computer through the Internet.   Join a games club--anything from card games to chess or checkers or lawn bowling.   Start a new hobby.   Go back to school.   Volunteer.   Read.   Keep up with world events.

## 2023-08-21 NOTE — PROGRESS NOTES
"SUBJECTIVE:   Minerva is a 87 year old who presents for Preventive Visit.      8/21/2023     9:21 AM   Additional Questions   Roomed by Abril Carreno     Irritation under the left breast, used desitin, which helped for a little bit, but then got more red.  Covered with gauze.  Bathes once per week due to severe dry skin, starting to break down under breast.  Sore where her ileostomy is also, under the bag there is similar red irritated skin.  In general, she has tender skin, dry skin, psoriasis.  Routinely uses Desitin as needed.    Coughing on occasion, some memory problems, nothing new or significant of concern.    Did not take her BP med this morning, which is why BP is so high.    Recently saw cardiology, has some carotid blockages, says she will not do surgery for this, started on Plavix.    Are you in the first 12 months of your Medicare coverage?  No    Healthy Habits:     In general, how would you rate your overall health?  Fair    Frequency of exercise:  None    Do you usually eat at least 4 servings of fruit and vegetables a day, include whole grains    & fiber and avoid regularly eating high fat or \"junk\" foods?  Yes    Taking medications regularly:  Yes    Medication side effects:  None    Ability to successfully perform activities of daily living:  Transportation requires assistance, shopping requires assistance, preparing meals requires assistance, housework requires assistance, bathing requires assistance and laundry requires assistance    Home Safety:  Lack of grab bars in the bathroom    Hearing Impairment:  Difficulty following a conversation in a noisy restaurant or crowded room and difficulty understanding soft or whispered speech    In the past 6 months, have you been bothered by leaking of urine? Yes    In general, how would you rate your overall mental or emotional health?  Fair    Additional concerns today:  Yes        Have you ever done Advance Care Planning? (For example, a Health Directive, " POLST, or a discussion with a medical provider or your loved ones about your wishes): No, advance care planning information given to patient to review.  Patient declined advance care planning discussion at this time.      Fall risk  Fallen 2 or more times in the past year?: Yes  Any fall with injury in the past year?: Yes    Cognitive Screening   1) Repeat 3 items (Leader, Season, Table)    2) Clock draw: NORMAL  3) 3 item recall: Recalls NO objects   Results: 0 items recalled: PROBABLE COGNITIVE IMPAIRMENT, **INFORM PROVIDER**    Mini-CogTM Copyright S Warren. Licensed by the author for use in [facility]; reprinted with permission (soob@[facility]). All rights reserved.            Reviewed and updated as needed this visit by clinical staff   Tobacco  Allergies  Meds  Problems             Reviewed and updated as needed this visit by Provider      Problems            Social History     Tobacco Use    Smoking status: Former     Packs/day: 1.00     Years: 44.00     Pack years: 44.00     Types: Cigarettes     Start date: 1952     Quit date: 1996     Years since quittin.6    Smokeless tobacco: Never   Substance Use Topics    Alcohol use: Not Currently           2023     8:59 AM   Alcohol Use   Prescreen: >3 drinks/day or >7 drinks/week? Not Applicable     Do you have a current opioid prescription? No  Do you use any other controlled substances or medications that are not prescribed by a provider? None    PT reports rash under L side of breast.      Current providers sharing in care for this patient include:   Patient Care Team:  Ludivina Chamberlain MD as PCP - General (Family Medicine)  Ludivina Chamberlain MD as Assigned PCP  Artur Webber RN as Personal Advocate & Liaison (PAL) (Nurse)  Cata Mclain MD as MD (Nephrology)  Sadi Lamar MD as MD (Critical Care)  Cata Mclain MD as Assigned Nephrology Provider  Mary Gonzalez DO as Assigned  Heart and Vascular Provider  Ne Schuster, RN as Registered Nurse  Raine Iqbal RN as Specialty Care Coordinator (Nephrology)    The following health maintenance items are reviewed in Epic and correct as of today:  Health Maintenance   Topic Date Due    SPIROMETRY  Never done    COVID-19 Vaccine (1) Never done    Pneumococcal Vaccine: 65+ Years (1 - PCV) Never done    DTAP/TDAP/TD IMMUNIZATION (1 - Tdap) Never done    ZOSTER IMMUNIZATION (1 of 2) Never done    MICROALBUMIN  08/23/2023    INFLUENZA VACCINE (1) 09/01/2023    BMP  11/07/2023    HEMOGLOBIN  02/07/2024    PHQ-9  02/21/2024    ANNUAL REVIEW OF HM ORDERS  05/23/2024    LIPID  06/12/2024    ALT  08/07/2024    CBC  08/07/2024    MEDICARE ANNUAL WELLNESS VISIT  08/21/2024    FALL RISK ASSESSMENT  08/21/2024    HF ACTION PLAN  08/21/2026    ADVANCE CARE PLANNING  08/21/2028    PARATHYROID  Completed    PHOSPHORUS  Completed    TSH W/FREE T4 REFLEX  Completed    COPD ACTION PLAN  Completed    DEPRESSION ACTION PLAN  Completed    URINALYSIS  Completed    ALK PHOS  Completed    IPV IMMUNIZATION  Aged Out    MENINGITIS IMMUNIZATION  Aged Out     Lab work is in process  Labs reviewed in EPIC  BP Readings from Last 3 Encounters:   08/21/23 (!) 188/67   08/11/23 120/60   08/07/23 (!) 165/70    Wt Readings from Last 3 Encounters:   08/21/23 75.8 kg (167 lb 1.6 oz)   08/11/23 75.8 kg (167 lb 3.2 oz)   08/07/23 76.2 kg (168 lb)                  Patient Active Problem List   Diagnosis    Chronic bronchitis (H)    Anemia due to stage 4 chronic kidney disease (H)    Cardiac pacemaker in situ    Ileostomy status (H)    Hyperlipidemia LDL goal <100    Anxiety    Recurrent major depressive disorder, in full remission (H)    History of ulcerative colitis    Psoriasis    Benign essential hypertension    Chronic heart failure with preserved ejection fraction (H)    Osteoarthritis of multiple joints    Recurrent UTI    Kyphoscoliosis    Parathyroid gland disorder (H)     Sick sinus syndrome (H)    S/P total knee arthroplasty, left    S/P total knee replacement, right    Vitamin D deficiency    Iron deficiency anemia, unspecified iron deficiency anemia type    Generalized anxiety disorder    Morbid obesity (H)     Past Surgical History:   Procedure Laterality Date    CATARACT EXTRACTION Bilateral     COLONOSCOPY      FEMUR FRACTURE SURGERY Right     2010    ILEOSTOMY      IMPLANT PACEMAKER      left knee replacement      MULTIPLE TOOTH EXTRACTIONS      right knee replacement Right     SPINAL FUSION      TONSILLECTOMY      TUBAL LIGATION         Social History     Tobacco Use    Smoking status: Former     Packs/day: 1.00     Years: 44.00     Pack years: 44.00     Types: Cigarettes     Start date: 1952     Quit date: 1996     Years since quittin.6    Smokeless tobacco: Never   Substance Use Topics    Alcohol use: Not Currently     Family History   Problem Relation Age of Onset    Cerebrovascular Disease Mother     Diabetes Mother     Hypertension Mother     Cancer Mother     Hypertension Father     Cerebrovascular Disease Sister     Hypertension Sister     Hypertension Sister     Breast Cancer Sister     Dementia Sister     Hypertension Brother     Cancer Brother     Hypertension Brother     Cancer Brother     Emphysema Brother          Current Outpatient Medications   Medication Sig Dispense Refill    acetaminophen (TYLENOL) 650 MG CR tablet Take 1,300 mg by mouth every morning      albuterol (PROAIR HFA/PROVENTIL HFA/VENTOLIN HFA) 108 (90 Base) MCG/ACT inhaler Inhale 2 puffs into the lungs every 4 hours as needed for shortness of breath, wheezing or cough 18 g 3    amLODIPine (NORVASC) 10 MG tablet Take 1 tablet (10 mg) by mouth daily 90 tablet 3    atorvastatin (LIPITOR) 40 MG tablet Take 1 tablet (40 mg) by mouth daily 30 tablet 11    Bioflavonoid Products (VITAMIN C) CHEW       budesonide (PULMICORT) 0.5 MG/2ML neb solution Take 2 mLs (0.5 mg) by nebulization 2  times daily (Patient taking differently: Take 0.5 mg by nebulization 2 times daily Pt uses up to 4 times daily) 360 mL 3    carvedilol (COREG) 25 MG tablet Take 1.5 tablets (37.5 mg) by mouth 2 times daily (with meals) 180 tablet 3    citalopram (CELEXA) 20 MG tablet Take 1.5 tablets (30 mg) by mouth daily 135 tablet 0    cloNIDine (CATAPRES) 0.1 MG tablet Take 0.5 tablets (0.05 mg) by mouth 2 times daily 180 tablet 3    clopidogrel (PLAVIX) 75 MG tablet Take 1 tablet (75 mg) by mouth daily 30 tablet 11    Cranberry 450 MG TABS       doxazosin (CARDURA) 1 MG tablet Take 1 tablet (1 mg) by mouth At Bedtime 90 tablet 3    doxycycline hyclate (VIBRAMYCIN) 100 MG capsule Take 1 capsule (100 mg) by mouth three times a week 39 capsule 3    ferrous sulfate (FEROSUL) 325 (65 Fe) MG tablet Take 2 tablets (650 mg) by mouth daily (with breakfast) 180 tablet 3    furosemide (LASIX) 40 MG tablet Take 1 tablet (40 mg) by mouth 2 times daily 180 tablet 3    ipratropium - albuterol 0.5 mg/2.5 mg/3 mL (DUONEB) 0.5-2.5 (3) MG/3ML neb solution Take 1 vial (3 mLs) by nebulization every 6 hours 180 mL 3    montelukast (SINGULAIR) 10 MG tablet Take 1 tablet (10 mg) by mouth At Bedtime 90 tablet 3    multivitamin w/minerals (THERA-VIT-M) tablet Take 1 tablet by mouth daily      nystatin (MYCOSTATIN) 622321 UNIT/GM external powder Apply topically 3 times daily as needed for other (rash) 60 g 1    Ostomy Supplies (ADAPT) PSTE Use with new ostomy pouch and drain  g 3    Ostomy Supplies (PREMIER DRAINABLE POUCH 22MM) Pouch MISC Use as directed every 3-4 days 5 each 12    sodium bicarbonate 650 MG tablet Take 1 tablet (650 mg) by mouth 2 times daily for 180 days 180 tablet 1     Allergies   Allergen Reactions    Acetaminophen-Codeine     Penicillin G      Recent Labs   Lab Test 08/07/23  0924 07/25/23  1417 07/18/23  1011 07/07/23  1131 06/27/23  1405 06/12/23  0826 05/23/23  3159   LDL  --   --   --   --   --  95  --    HDL  --   --    "--   --   --  52  --    TRIG  --   --   --   --   --  72  --    ALT 11  --  16  --  14  --  21   CR 2.55* 2.69* 2.74*   < > 2.34* 2.55* 2.95*   GFRESTIMATED 18* 17* 16*   < > 20* 18* 15*   POTASSIUM 5.0 4.6 4.8   < > 5.1 4.5 5.0   TSH  --   --   --   --   --   --  1.04    < > = values in this interval not displayed.        Mammogram Screening - Patient over age 75, has elected to discontinue screenings.  Pertinent mammograms are reviewed under the imaging tab.    Review of Systems   Constitutional:  Negative for chills and fever.   HENT:  Negative for congestion, ear pain, hearing loss and sore throat.    Eyes:  Negative for pain and visual disturbance.   Respiratory:  Positive for cough and shortness of breath.    Cardiovascular:  Positive for peripheral edema. Negative for chest pain and palpitations.   Gastrointestinal:  Negative for abdominal pain, constipation, diarrhea, heartburn, hematochezia and nausea.   Breasts:  Negative for tenderness, breast mass and discharge.   Genitourinary:  Negative for dysuria, frequency, genital sores, hematuria, pelvic pain, urgency, vaginal bleeding and vaginal discharge.   Musculoskeletal:  Positive for arthralgias. Negative for joint swelling and myalgias.   Skin:  Negative for rash.   Neurological:  Positive for weakness. Negative for dizziness, headaches and paresthesias.   Psychiatric/Behavioral:  Negative for mood changes. The patient is not nervous/anxious.          OBJECTIVE:   BP (!) 188/67 (BP Location: Right arm, Patient Position: Sitting, Cuff Size: Adult Regular)   Pulse 62   Temp 97.8  F (36.6  C) (Oral)   Resp 18   Ht 1.499 m (4' 11\")   Wt 75.8 kg (167 lb 1.6 oz)   SpO2 100%   BMI 33.75 kg/m   Estimated body mass index is 33.75 kg/m  as calculated from the following:    Height as of this encounter: 1.499 m (4' 11\").    Weight as of this encounter: 75.8 kg (167 lb 1.6 oz).  Physical Exam  GENERAL: healthy, alert and no distress  EYES: Eyes grossly normal to " "inspection  RESP: mild crackles at bilateral bases, expiratory wheeze in right upper lung, normal respiratory effort, oxygen sats 100% on RA.  CV: regular rates and rhythm and 3-4+ bilateral lower extremity pitting edema to knees    ABDOMEN: bowel sounds normal  SKIN: Raw beefy red rash on left lower breast and skin underneath it, starting to breakdown, weeping.  Red rash on lower right abdomen, underneath ostomy bag.  PSYCH: affect normal/bright    Diagnostic Test Results:  Labs reviewed in Epic    ASSESSMENT / PLAN:       ICD-10-CM    1. Encounter for Medicare annual wellness exam  Z00.00       2. Candidal intertrigo  B37.2 nystatin (MYCOSTATIN) 998540 UNIT/GM external powder      3. Other emphysema (H)  J43.8 ipratropium - albuterol 0.5 mg/2.5 mg/3 mL (DUONEB) 0.5-2.5 (3) MG/3ML neb solution     Spirometry, Breathing Capacity     COPD ACTION PLAN      4. Chronic bronchitis, unspecified chronic bronchitis type (H)  J42 ipratropium - albuterol 0.5 mg/2.5 mg/3 mL (DUONEB) 0.5-2.5 (3) MG/3ML neb solution     COPD ACTION PLAN      5. Sick sinus syndrome (H)  I49.5 Pacemaker in place      6. Chronic heart failure with preserved ejection fraction (H)  I50.32 HEART FAILURE ACTION PLAN      7. Recurrent major depressive disorder, in full remission (H)  F33.42 DEPRESSION ACTION PLAN (DAP)          Patient has been advised of split billing requirements and indicates understanding: Yes      COUNSELING:  Reviewed preventive health counseling, as reflected in patient instructions      BMI:   Estimated body mass index is 33.75 kg/m  as calculated from the following:    Height as of this encounter: 1.499 m (4' 11\").    Weight as of this encounter: 75.8 kg (167 lb 1.6 oz).   Weight management plan: Discussed healthy diet and exercise guidelines      She reports that she quit smoking about 27 years ago. Her smoking use included cigarettes. She started smoking about 71 years ago. She has a 44.00 pack-year smoking history. She has " never used smokeless tobacco.      Appropriate preventive services were discussed with this patient, including applicable screening as appropriate for cardiovascular disease, diabetes, osteopenia/osteoporosis, and glaucoma.  As appropriate for age/gender, discussed screening for colorectal cancer, prostate cancer, breast cancer, and cervical cancer. Checklist reviewing preventive services available has been given to the patient.    Reviewed patients plan of care and provided an AVS. The Intermediate Care Plan ( asthma action plan, low back pain action plan, and migraine action plan) for Arpita meets the Care Plan requirement. This Care Plan has been established and reviewed with the Patient and son and other:daughter in law .          Ludivina Canales MD  [employer] [employer]    Identified Health Risks:  I have reviewed Opioid Use Disorder and Substance Use Disorder risk factors and made any needed referrals. Answers submitted by the patient for this visit:  Patient Health Questionnaire (Submitted on 8/21/2023)  If you checked off any problems, how difficult have these problems made it for you to do your work, take care of things at home, or get along with other people?: Not difficult at all  PHQ9 TOTAL SCORE: 0  ZENA-7 (Submitted on 8/21/2023)  ZENA 7 TOTAL SCORE: 3  The patient was provided with suggestions to help her develop a healthy physical lifestyle.  She is at risk for lack of exercise and has been provided with information to increase physical activity for the benefit of her well-being.  The patient reports that she has difficulty with activities of daily living. I have asked that the patient make a follow up appointment in 26 weeks where this issue will be further evaluated and addressed.  The patient was provided with written information regarding signs of hearing loss.  Information on urinary incontinence and treatment options given to patient.  The patient was provided with  suggestions to help her develop a healthy emotional lifestyle.

## 2023-08-21 NOTE — LETTER
My Heart Failure Action Plan  Name: Arpita Rocha   YOB: 1935  Date: 8/21/2023   My doctor:   Ludivina Chamberlain [address] [address] [phone] My Diagnosis: HF-pEF (EF > 40%)  My Ejection Fraction:   Lab Results   Component Value Date    LVEF 60-65% 07/25/2023     Over 50%  My Exercise Goal: Start exercise slowly - to begin, do a few minutes of exercise, several times a day. Increase your time and speed [id number] to build tolerance, with a goal of 30 minutes of exercise daily. Steady, slow, and consistent exercise is both safe and healthy. Stop and rest when you feel tired or become short of breath. Do not push yourself on days when you don t feel well.       My Weight Plan:   Wt Readings from Last 2 Encounters:   08/21/23 75.8 kg (167 lb 1.6 oz)   08/11/23 75.8 kg (167 lb 3.2 oz)     Weigh yourself daily using the same scale. If you gain more than 2 pounds in 24 hours or 5 pounds in 7 days. call the clinic    My Diet Goal: No added salt    Emergency Room Visits:    Our goal is to improve your quality of life and help you avoid a visit to the emergency room or hospital.  If we work together, we can achieve this goal. But, if you feel you need to call 911 or go to the emergency room, please do so.  If you go to the emergency room, please bring your list of medicines and your daily weight chart with you.    Each day ask yourself:  Is my weight up?  Do I have swelling?  Do I have trouble breathing?  How did I sleep?  Other problems?       GREEN ZONE     Weight gained is no more than 2 pounds a day or 5 pounds a in 7 days.  No swelling in feet, ankles, legs or stomach.  No more swelling than usual.  No more trouble breathing than usual.  No change in my sleep.  No other problems. What should I do?  I am doing fine. I will take my medicine, follow my diet, see my doctor, exercise, and watch for symptoms.            YELLOW ZONE         Weight gain of more than 2 pounds in one day or 5 pounds in 7 days.  New swelling in ankle, leg, knee or thigh.  Bloating in belly, pants feel tighter.  Swelling in hands or face.  Coughing or trouble breathing while walking or talking.  Harder to breathe last night.  Have trouble sleeping, wake up short of breath.  Unusually tired.  Not eating.  Nausea, vomiting, or diarrhea  Pain in my chest or bad  leg cramps.  Feel weak or dizzy. What should I do?  I need to take action and call my doctor or nurse today.                 RED ZONE         Weight gain of 5 pounds overnight.  Chest pain or pressure that does not go away.  Feel less alert.  Wheezing or have trouble breathing when at rest.  Cannot sleep lying down.  Cannot take my medicines.  Pass out or faint. What should I do?  I need to call my doctor or nurse now!  Call 911 if I have chest pain or cannot breathe.

## 2023-09-06 NOTE — TELEPHONE ENCOUNTER
Prescription approved per [facility] Refill Protocol.  08/21/2023 Ludivina Chamberlain MD Office Visit Return in about 6 months (around 2/21/2024) for Med check.       Glory Alvarez RN

## 2023-09-11 NOTE — TELEPHONE ENCOUNTER
- No record of immunizations on file  - Awaiting patient to provide copy of immunizations if they have them.     Nando Webber, RN  Patient Advocate Liaison (PAL)  [facility]  [phone]    09/11/2023 at 1:28 PM

## 2023-09-13 NOTE — TELEPHONE ENCOUNTER
Pt seen in device clinic today to establish care.  Pt noted to have one HVR episode logged, EGM shows 8 beat NSVT with V rates 160's bpm.  Pt does not recall symptoms at time of event.  Prescribed Coreg (37.5mg po BID with meals).      Unclear if pt has had NSVT in the past, records unable to be located showing previous device checks from location in [place], [place].  Will route to primary cardiologist per protocol with first noted NSVT on PPM devices.  Recent ECHO from 7/26/23 shows EF of 60-65%.    RODRÍGUEZ Roldan

## 2023-09-15 NOTE — TELEPHONE ENCOUNTER
Please see MyChart message in reference to immunizations. Looks like she has had the Pneumococcal 23 and the 13, would you recommend another? Routed to PCP, Please review and advise.     Thank you,    Nando Webber RN    09/15/2023 at 8:11 AM

## 2023-09-18 NOTE — TELEPHONE ENCOUNTER
I entered her immunizations from the form sent and she is NOT needing a booster of pneumococcal vaccines at this time.  She is up to date on these.    However, she is due for a tetanus vaccine (recommend Tdap not Td), the flu shot this year, and the COVID vaccine.

## 2023-09-20 NOTE — TELEPHONE ENCOUNTER
Message reviewed by Dr Gonzalez 9/14/23 and completed at 1352.  No new orders received.  Will close encounter.      RODRÍGUEZ Roldan

## 2023-09-20 NOTE — TELEPHONE ENCOUNTER
Please see [employer] message in reference to yeast infection spread. Routed to PCP, Please review and advise.     Thank you,    Nando Webber RN    09/20/2023 at 8:53 AM

## 2023-09-25 NOTE — PROGRESS NOTES
Follow-up with anemia management service:    Labs and clinic appt for today (9/25/23) was canceled. Has not yet been rescheduled.  Hgb has been stable.  Will follow up in  2 weeks.        Latest Ref Rng & Units 5/23/2023     1:59 PM 6/12/2023     8:26 AM 7/18/2023    10:11 AM 8/7/2023     9:24 AM   Anemia   Hemoglobin 11.7 - 15.7 g/dL 10.9  10.4  9.8  9.7    Ferritin 11 - 328 ng/mL  1,342   1,379          Follow-up call date: 10/9/23    Ne Schuster RN   Anemia Services  [facility]  [email or web address]   Office : [phone]  Fax: [phone]

## 2023-09-25 NOTE — TELEPHONE ENCOUNTER
- Awaiting response on what supplier to send DME to .    Nando Webber RN  Patient Advocate Liaison (PAL)  [facility]  [phone]    09/25/2023 at 10:51 AM

## 2023-09-26 NOTE — TELEPHONE ENCOUNTER
Faxed DME order to [facility] location from [facility] ([phone]) 09/26/2023 at 3:21 PM.    Notified patient that it was sent in to DME supplier.     Nando Webber RN  Patient Advocate Liaison (PAL)  [facility]  [phone]    09/26/2023 at 3:24 PM

## 2023-09-26 NOTE — TELEPHONE ENCOUNTER
- Routing to PCP to sign off on the DME order for the nebulizer supplies.    - Please route back and PAL RN can fax over to supplier.    Nando Webber RN  Patient Advocate Liaison (PAL)  [facility]  [phone]    09/26/2023 at 11:56 AM

## 2023-09-28 NOTE — PROGRESS NOTES
Pulmonary Clinic Note    Date of Service: 2023     Chief Complaint   Patient presents with    New Patient     COPD exacerbation       A/P:  88F being seen for COPD. PFTs w/ mild obstruction and moderate diffusion limitation. DLCO not corrected for Hgb and she is known to be anemic. Most recent chest imaging clear. Today, would like to start her on inhaler therapy to reduce need for nebulizers.     - start LABA-LAMA (Anoro) daily  - continue prn albuterol and nebs  - OK to substitute medications based on formulary     History:  88F being seen for COPD. She is prescribed duonebs, budesonide nebs, albuterol, and montelukast. She thinks her nebulizers are very helpful. KWAN w/ minimal-moderate activity. No SOB at rest. No chest pain or tightness. Hears wheezing. Occasional cough w/ clear/yellow sputum. No nocturnal cough. No orthopnea or PND. She was hospitalized in April back in [place] for pneumonia and was discharged w/ O2, 3L, but has been off for the past 6 weeks. Checks SpO2 at home typically mid-90s. No other hospitalizations. Using her nebs twice daily. Previously was on inhalers, but was told by her pharmacist to go onto nebulizers based on cost. She feels she is at her baseline.     Smokin pack year history, quit ~    Bird exposure: no             Animal exposure: no, prior cats        Inhalation exposure: no    Occupation: retired, former nurses aide                           10 point review of systems negative, aside from that mentioned in HPI.    BP (!) 166/79   Pulse 63   Resp 18   Wt 75.3 kg (166 lb)   SpO2 94%   BMI 33.53 kg/m    Gen: well-appearing  HEENT: Mallampati I  Card: RRR  Pulm: clear bilaterally   Abd: soft  MSK: no edema, no acute joint abnormality   Skin: no obvious rash  Psych: normal affect  Neuro: alert and oriented     Labs:  Personally reviewed    Imaging/Studies: Personally reviewed  PFTs (2023) - mild obstruction, normal lung volumes, moderate reduction in DLCOunc  CXR  (2023) - no acute disease    TTE (2023) - EF 60-65%, grade I diastolic dysfunction    Past Medical History:   Diagnosis Date    Ulcerative pancolitis (H) 2023     Past Surgical History:   Procedure Laterality Date    CATARACT EXTRACTION Bilateral     COLONOSCOPY      FEMUR FRACTURE SURGERY Right     2010    ILEOSTOMY      IMPLANT PACEMAKER      left knee replacement      MULTIPLE TOOTH EXTRACTIONS      right knee replacement Right     SPINAL FUSION      TONSILLECTOMY      TUBAL LIGATION       Family History   Problem Relation Age of Onset    Cerebrovascular Disease Mother     Diabetes Mother     Hypertension Mother     Cancer Mother     Hypertension Father     Cerebrovascular Disease Sister     Hypertension Sister     Hypertension Sister     Breast Cancer Sister     Dementia Sister     Hypertension Brother     Cancer Brother     Hypertension Brother     Cancer Brother     Emphysema Brother      Social History     Socioeconomic History    Marital status:      Spouse name: Not on file    Number of children: Not on file    Years of education: Not on file    Highest education level: Not on file   Occupational History    Not on file   Tobacco Use    Smoking status: Former     Packs/day: 1.00     Years: 44.00     Pack years: 44.00     Types: Cigarettes     Start date: 1952     Quit date: 1996     Years since quittin.7    Smokeless tobacco: Never   Vaping Use    Vaping Use: Never used   Substance and Sexual Activity    Alcohol use: Not Currently    Drug use: Never    Sexual activity: Not Currently     Partners: Male   Other Topics Concern    Not on file   Social History Narrative    Not on file     Social Determinants of Health     Financial Resource Strain: Low Risk  (2023)    Overall Financial Resource Strain (CARDIA)     Difficulty of Paying Living Expenses: Not very hard   Food Insecurity: No Food Insecurity (2023)    Hunger Vital Sign     Worried About Running Out of Food in the  Last Year: Never true     Ran Out of Food in the Last Year: Never true   Transportation Needs: No Transportation Needs (8/14/2023)    PRAPARE - Transportation     Lack of Transportation (Medical): No     Lack of Transportation (Non-Medical): No   Physical Activity: Inactive (8/14/2023)    Exercise Vital Sign     Days of Exercise per Week: 0 days     Minutes of Exercise per Session: 0 min   Stress: No Stress Concern Present (8/14/2023)    [employer] [place] [employer] - Occupational Stress Questionnaire     Feeling of Stress : Not at all   Social Connections: Moderately Isolated (8/14/2023)    Social Connection and Isolation Panel [NHANES]     Frequency of Communication with Friends and Family: Twice a week     Frequency of Social Gatherings with Friends and Family: More than three times a week     Attends Temple Services: More than 4 times per year     Active Member of Clubs or Organizations: No     Attends Club or Organization Meetings: Not on file     Marital Status:    Interpersonal Safety: Not on file   Housing Stability: Low Risk  (8/14/2023)    Housing Stability Vital Sign     Unable to Pay for Housing in the Last Year: No     Number of Places Lived in the Last Year: 2     Unstable Housing in the Last Year: No       50 minutes spent reviewing chart, reviewing test results, talking with and examining patient, formulating plan, and documentation on the day of the encounter.    Sadi Lamar MD  Pulmonary and Critical Care Medicine  [facility]

## 2023-09-29 NOTE — PROGRESS NOTES
Arpita Rocha comes into clinic today at the request of Dr. Lamar, Ordering Provider for PFT    This service provided today was under the supervising provider of the day Dr. Lamar, who was available if needed.    Vadim Casiano, RT

## 2023-09-29 NOTE — LETTER
2023         RE: Arpita Rocha  [address]  [place] [address]        Dear Colleague,    Thank you for referring your patient, Arpita Rocha, to the [facility] [place]. Please see a copy of my visit note below.    Pulmonary Clinic Note    Date of Service: 2023     Chief Complaint   Patient presents with    New Patient     COPD exacerbation       A/P:  88F being seen for COPD. PFTs w/ mild obstruction and moderate diffusion limitation. DLCO not corrected for Hgb and she is known to be anemic. Most recent chest imaging clear. Today, would like to start her on inhaler therapy to reduce need for nebulizers.     - start LABA-LAMA (Anoro) daily  - continue prn albuterol and nebs  - OK to substitute medications based on formulary     History:  88F being seen for COPD. She is prescribed duonebs, budesonide nebs, albuterol, and montelukast. She thinks her nebulizers are very helpful. KWAN w/ minimal-moderate activity. No SOB at rest. No chest pain or tightness. Hears wheezing. Occasional cough w/ clear/yellow sputum. No nocturnal cough. No orthopnea or PND. She was hospitalized in April back in [place] for pneumonia and was discharged w/ O2, 3L, but has been off for the past 6 weeks. Checks SpO2 at home typically mid-90s. No other hospitalizations. Using her nebs twice daily. Previously was on inhalers, but was told by her pharmacist to go onto nebulizers based on cost. She feels she is at her baseline.     Smokin pack year history, quit ~    Bird exposure: no             Animal exposure: no, prior cats        Inhalation exposure: no    Occupation: retired, former nurses aide                           10 point review of systems negative, aside from that mentioned in HPI.    BP (!) 166/79   Pulse 63   Resp 18   Wt 75.3 kg (166 lb)   SpO2 94%   BMI 33.53 kg/m    Gen: well-appearing  HEENT: Mallampati I  Card: RRR  Pulm: clear bilaterally   Abd: soft  MSK: no edema, no  acute joint abnormality   Skin: no obvious rash  Psych: normal affect  Neuro: alert and oriented     Labs:  Personally reviewed    Imaging/Studies: Personally reviewed  PFTs (2023) - mild obstruction, normal lung volumes, moderate reduction in DLCOunc  CXR (2023) - no acute disease    TTE (2023) - EF 60-65%, grade I diastolic dysfunction    Past Medical History:   Diagnosis Date    Ulcerative pancolitis (H) 2023     Past Surgical History:   Procedure Laterality Date    CATARACT EXTRACTION Bilateral     COLONOSCOPY      FEMUR FRACTURE SURGERY Right     2010    ILEOSTOMY      IMPLANT PACEMAKER      left knee replacement      MULTIPLE TOOTH EXTRACTIONS      right knee replacement Right     SPINAL FUSION      TONSILLECTOMY      TUBAL LIGATION       Family History   Problem Relation Age of Onset    Cerebrovascular Disease Mother     Diabetes Mother     Hypertension Mother     Cancer Mother     Hypertension Father     Cerebrovascular Disease Sister     Hypertension Sister     Hypertension Sister     Breast Cancer Sister     Dementia Sister     Hypertension Brother     Cancer Brother     Hypertension Brother     Cancer Brother     Emphysema Brother      Social History     Socioeconomic History    Marital status:      Spouse name: Not on file    Number of children: Not on file    Years of education: Not on file    Highest education level: Not on file   Occupational History    Not on file   Tobacco Use    Smoking status: Former     Packs/day: 1.00     Years: 44.00     Pack years: 44.00     Types: Cigarettes     Start date: 1952     Quit date: 1996     Years since quittin.7    Smokeless tobacco: Never   Vaping Use    Vaping Use: Never used   Substance and Sexual Activity    Alcohol use: Not Currently    Drug use: Never    Sexual activity: Not Currently     Partners: Male   Other Topics Concern    Not on file   Social History Narrative    Not on file     Social Determinants of Health      Financial Resource Strain: Low Risk  (8/14/2023)    Overall Financial Resource Strain (CARDIA)     Difficulty of Paying Living Expenses: Not very hard   Food Insecurity: No Food Insecurity (8/14/2023)    Hunger Vital Sign     Worried About Running Out of Food in the Last Year: Never true     Ran Out of Food in the Last Year: Never true   Transportation Needs: No Transportation Needs (8/14/2023)    PRAPARE - Transportation     Lack of Transportation (Medical): No     Lack of Transportation (Non-Medical): No   Physical Activity: Inactive (8/14/2023)    Exercise Vital Sign     Days of Exercise per Week: 0 days     Minutes of Exercise per Session: 0 min   Stress: No Stress Concern Present (8/14/2023)    [employer] [place] [employer] - Occupational Stress Questionnaire     Feeling of Stress : Not at all   Social Connections: Moderately Isolated (8/14/2023)    Social Connection and Isolation Panel [NHANES]     Frequency of Communication with Friends and Family: Twice a week     Frequency of Social Gatherings with Friends and Family: More than three times a week     Attends Worship Services: More than 4 times per year     Active Member of Clubs or Organizations: No     Attends Club or Organization Meetings: Not on file     Marital Status:    Interpersonal Safety: Not on file   Housing Stability: Low Risk  (8/14/2023)    Housing Stability Vital Sign     Unable to Pay for Housing in the Last Year: No     Number of Places Lived in the Last Year: 2     Unstable Housing in the Last Year: No     50 minutes spent reviewing chart, reviewing test results, talking with and examining patient, formulating plan, and documentation on the day of the encounter.        Again, thank you for allowing me to participate in the care of your patient.      Sincerely,    Sadi Lamar MD

## 2023-09-29 NOTE — PATIENT INSTRUCTIONS
Thank you for coming to pulmonary clinic. Your pulmonary function tests show mild obstruction consistent with COPD. Your chest imaging is normal. I would like you to start Anoro daily. You may use albuterol or nebulizers as needed for worsening shortness of breath or cough. I will see you back in 6 months.

## 2023-10-06 NOTE — PROGRESS NOTES
Labs have not been scheduled. Has not started MANUELA.  Will follow up in one month.  If no labs in Oct 2023, close Anemia Services.     Ne Schuster RN   Anemia Services  [facility]  [email or web address]   Office : [phone]  Fax: [phone]

## 2023-11-01 NOTE — PATIENT INSTRUCTIONS
Tylenol - take 1000 mg every 6 hours - maximum 6 tablets a day   Take Tramadol if pain is more severe as needed.

## 2023-11-01 NOTE — PROGRESS NOTES
Assessment & Plan     Hip pain, right  Assess X rays   No obvious fracture, OA of the hip, hardware in the femur , no definite loosening.   With her significant symptoms of pain, unable to bear weight, recommend assessment with ortho   Refer for home care services   Start Tramadol in addition to Tylenol. Advised for side effects.   - XR Hip Right 2-3 Views; Future  - Orthopedic  Referral; Future  - traMADol (ULTRAM) 50 MG tablet; Take 1 tablet (50 mg) by mouth every 6 hours as needed for severe pain  - Home Care Referral             See Patient Instructions    Jaylon Ching MD  [facility] JESSICA Palma is a 88 year old, presenting for the following health issues:  Musculoskeletal Problem (Pt states she is having  pain on her right leg  by groin area. sx started week ago pt have hard time walking and lifting rt leg.)        11/1/2023     8:47 AM   Additional Questions   Roomed by [email or web address]   Accompanied by sons barney and charis mcintyre daughter in law         11/1/2023     8:47 AM   Patient Reported Additional Medications   Patient reports taking the following new medications tylenol       History of Present Illness       Reason for visit:  Possible pulled groin muscle  Symptom onset:  1-3 days ago  Symptoms include:  Pain in groin area wirh sitting, standing. And walking  Symptom intensity:  Moderate  Symptom progression:  Staying the same  Had these symptoms before:  No  What makes it worse:  Movement  What makes it better:  Tylenol and reclining   but Tylenol only  works about 3 hours    She eats 2-3 servings of fruits and vegetables daily.She consumes 0 sweetened beverage(s) daily.She exercises with enough effort to increase her heart rate 9 or less minutes per day.  She exercises with enough effort to increase her heart rate 3 or less days per week.   She is taking medications regularly.           Presents with pain in the right inguinal area radiating to the medial  thigh and knee.   Pain started 2-3 days ago, after walking and climbing stairs. Has moved to her son's house and they have split level living , needs to go up and down 6 stairs.   No falls. Usually ambulates with a walker, because of pain related to OA.   Has history of right femoral fracture, post ORIF with medullary marine and screws.   Has history of labral tear of the right hip in the past.   Has knee OA with past arthroplasty bipolar. No swelling of the knee, no skin hematomas.   Pain is worse with trying to put weight on the leg and with trying to flex the hip.   Takes Tylenol 1000 mg every 6 hours, not helping with pain control. At rest symptoms resolve.     Has h/o HTN. on medical treatment. BP has been controlled. No side effects from medications. No CP, HA, dizziness. good compliance with medications and low salt diet.  Has H/O hyperlipidemia. On medical treatment and diet. No side effects. No muscle weakness, myalgias or upset stomach.   Has h/o anemia and CRF. Symptoms include fatigue. Monitoring BP, BG, medications, avoiding OTC NSAIDs. Needs periodic recheck of kidney function.    Review of Systems   Constitutional, HEENT, cardiovascular, pulmonary, gi and gu systems are negative, except as otherwise noted.      Objective    /55 (BP Location: Left arm, Patient Position: Sitting, Cuff Size: Adult Regular)   Pulse 63   Temp 97.6  F (36.4  C) (Oral)   Resp 16   Wt 75.3 kg (166 lb)   SpO2 96%   BMI 33.53 kg/m    Body mass index is 33.53 kg/m .  Physical Exam   GENERAL: elderly, frail , alert and no distress  ABDOMEN: soft, nontender, no hepatosplenomegaly, no masses and bowel sounds normal  MS: no gross musculoskeletal defects noted, no edema  Right inguinal and upper thigh pain with active and passive hip flexion, lateral rotation is with mild pain , unable to walk   NEURO: no focal deficits , mentation intact and speech normal    Allied Health/Nurse Visit on 09/29/2023   Component Date Value Ref  Range Status    FVC-Pred 09/29/2023 1.82  L Final    FVC-Pre 09/29/2023 1.36  L Final    FVC-%Pred-Pre 09/29/2023 74  % Final    FEV1-Pre 09/29/2023 0.86  L Final    FEV1-%Pred-Pre 09/29/2023 61  % Final    FEV1FVC-Pred 09/29/2023 78  % Final    FEV1FVC-Pre 09/29/2023 63  % Final    FEFMax-Pred 09/29/2023 3.44  L/sec Final    FEFMax-Pre 09/29/2023 2.43  L/sec Final    FEFMax-%Pred-Pre 09/29/2023 70  % Final    FEF2575-Pred 09/29/2023 1.18  L/sec Final    FEF2575-Pre 09/29/2023 0.41  L/sec Final    YKD2926-%Pred-Pre 09/29/2023 34  % Final    ExpTime-Pre 09/29/2023 6.27  sec Final    FIFMax-Pre 09/29/2023 2.42  L/sec Final    VC-Pred 09/29/2023 2.34  L Final    VC-Pre 09/29/2023 1.38  L Final    VC-%Pred-Pre 09/29/2023 58  % Final    IC-Pred 09/29/2023 1.16  L Final    IC-Pre 09/29/2023 1.15  L Final    IC-%Pred-Pre 09/29/2023 99  % Final    ERV-Pred 09/29/2023 0.58  L Final    ERV-Pre 09/29/2023 0.23  L Final    ERV-%Pred-Pre 09/29/2023 39  % Final    FRCN2-Pred 09/29/2023 2.44  L Final    FRCN2-Pre 09/29/2023 2.26  L Final    FRCN2-%Pred-Pre 09/29/2023 92  % Final    RVN2-Pred 09/29/2023 2.09  L Final    RVN2-Pre 09/29/2023 2.03  L Final    RVN2-%Pred-Pre 09/29/2023 97  % Final    TLCN2-Pred 09/29/2023 4.10  L Final    TLCN2-Pre 09/29/2023 3.41  L Final    TLCN2-%Pred-Pre 09/29/2023 83  % Final    FEV1FEV6-Pred 09/29/2023 77  % Final    FEV1FEV6-Pre 09/29/2023 63  % Final    DLCOunc-Pred 09/29/2023 15.74  ml/min/mmHg Final    DLCOunc-Pre 09/29/2023 7.80  ml/min/mmHg Final    DLCOunc-%Pred-Pre 09/29/2023 49  % Final    VA-Pre 09/29/2023 2.72  L Final    VA-%Pred-Pre 09/29/2023 74  % Final    FEV1SVC-Pred 09/29/2023 60  % Final    FEV1SVC-Pre 09/29/2023 62  % Final

## 2023-11-01 NOTE — TELEPHONE ENCOUNTER
Home Care is calling regarding an established patient with [facility].       Requesting orders from: Ludivina Chamberlain  Provider is following patient: No       Orders Requested  Delay in start of care to date: 11/1/23 due to 11/7/23    ROUTED TO PCP, PLEASE CONFIRM DELAY IN START OF CARE, FAMILY REQUESTING, WANTS TO START NEXT WEEK, MAY LVOVM INFORMING RY    Information was gathered and will be sent to provider for review.      RN will contact Home Care with information after provider review.  Confirmed ok to leave a detailed message with call back.  Contact information confirmed and updated as needed.    Leslie Almanza RN

## 2023-11-03 NOTE — PATIENT INSTRUCTIONS
Thank you for choosing [facility]!    DR. LEBLANC'S CLINIC LOCATIONS:     [place]  TRIAGE LINE: [phone] [address] APPOINTMENTS: [phone]   [place] [address] RADIOLOGY: [phone]   (Mondays & Tuesdays) HAND THERAPY: [phone]    PHYSICAL THERAPY: [phone]   [place] BILLING QUESTIONS: [phone] [address] FAX: [phone]   [place] [address]    (Thursdays & Fridays)       -Tylenol 1000mg TID  -Prednisone 40mg x5 days  -Tramadol up to q8h as needed for severe pain in between Tylenol doses. Can take with stool softener such as colace to prevent constipation

## 2023-11-03 NOTE — PROGRESS NOTES
ASSESSMENT & PLAN    Minerva was seen today for pain.    Diagnoses and all orders for this visit:    Lumbar radiculopathy, acute  -     XR Lumbar Spine 2-3 Views*; Future  -     predniSONE (DELTASONE) 20 MG tablet; Take 2 tablets (40 mg) by mouth daily    Hip pain, right  -     Orthopedic  Referral    Acute right-sided low back pain with right-sided sciatica      88-year-old female presents with 3 weeks of atraumatic right lower back pain and right lower extremity pain.  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  On physical exam, she has difficulty ambulating due to severe pain, positive straight leg raise on the right, and I am unable to do a hip exam due to severe pain.  She does have diminished sensation in the L4 and L5 dermatome on the right.  I suspect her symptoms are primarily due to an acute lumbar radiculopathy, though unable to rule out spinal compression fracture or an intra-articular hip pathology due to inability to perform a physical exam.  X-ray of the lumbar spine was performed today, which is difficult to interpret due to severe osteopenia, however shows no apparent hardware malfunction with significant bony alkalosis.    Plan:  - We will provide with prednisone 40 mg daily for 5 days to help with acute radicular symptoms  - Discussed that she could schedule Tylenol 3 times daily not to exceed 3 g daily, and could take tramadol intermittently between doses if needed for severe pain  - If no improvement in symptoms by next week, patient will contact me via [employer] with an update and we will plan to order a CT for further imaging of the lumbar spine to rule out compression fracture    Return if symptoms worsen or fail to improve.      Dr. Christine Keen, DO  [facility]  "Physicians  Sports Medicine     -----  Chief Complaint   Patient presents with    Right Hip - Pain       SUBJECTIVE  Arpita Rocha is a/an 88 year old  female who is seen in consultation at the request of  Jaylon Ching M.D. for evaluation of right hip pain.  Onset was 3 weeks. Pain is located right lower back that radiates down her outer hip to the ankle. Symptoms are not worsened by anything she notices.  She has tried Tylenol and Tramadol. Associated symptoms include radiating pain down the leg, denies numbness and tingling.    The patient is seen with her son  and daughter in law who helps provide the history.    Prior injury/Surgical history of affected joint: Broke her femur about 10 years ago. Has had bilateral knee replacement in the past greater than 3 years ago.   Social History/Occupation: Retired    REVIEW OF SYSTEMS:  Pertinent positives/negative: As stated above in HPI    OBJECTIVE:  Ht 1.499 m (4' 11\")   Wt 75.3 kg (166 lb)   BMI 33.53 kg/m     General: Alert and in no distress  Skin: no visable rashes  CV: Extremities appear well perfused   Resp: normal respiratory effort, no conversational dyspnea   Psych: normal mood, affect  MSK:  RIGHT Hip Exam  Inspection:    No swelling, bruising, discoloration, or obvious deformity   Palpation:    Tenderness to palpation of right lower back  Active Range of Motion:   Unable to perform due to pain.  Attempted passive range of motion discontinued due to severe pain  Strength:  Unable to perform due to pain.  Able to dorsiflex, plantarflex, and activate EHL bilaterally.  Unable to perform straight leg raise  Special Tests:  Unable to perform special testing due to pain  - Lumbar spine exam reveals positive straight leg raise on the right, diffuse tenderness to palpation of midline lumbar spine, diminished sensation in the L4 and L5 dermatomes on the right.       RADIOLOGY:  Final results and radiologist's interpretation available in the [facility] " record.  Images below were personally reviewed and discussed with the patient in the office today.  My personal interpretation of the performed imaging: X-ray of the right hip ROM 11/1/2023 reveals old, healed femoral shaft visualization of prior hardware with no apparent displacement.  Lumbar spine hardware is also observed.  Mild-moderate degenerative changes at the hip joint.  X-ray of the lumbar spine performed in clinic today reveals significant thoracolumbar scoliosis status post spinal fusion.  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    Review of prior external note(s) from - primary care

## 2023-11-03 NOTE — LETTER
11/3/2023         RE: Arpita Rocha  [address]  [place] [address]        Dear Colleague,    Thank you for referring your patient, Arpita Rocha, to the [facility] [place]. Please see a copy of my visit note below.    ASSESSMENT & PLAN    Minerva was seen today for pain.    Diagnoses and all orders for this visit:    Lumbar radiculopathy, acute  -     XR Lumbar Spine 2-3 Views*; Future  -     predniSONE (DELTASONE) 20 MG tablet; Take 2 tablets (40 mg) by mouth daily    Hip pain, right  -     Orthopedic  Referral    Acute right-sided low back pain with right-sided sciatica      88-year-old female presents with 3 weeks of atraumatic right lower back pain and right lower extremity pain.  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  On physical exam, she has difficulty ambulating due to severe pain, positive straight leg raise on the right, and I am unable to do a hip exam due to severe pain.  She does have diminished sensation in the L4 and L5 dermatome on the right.  I suspect her symptoms are primarily due to an acute lumbar radiculopathy, though unable to rule out spinal compression fracture or an intra-articular hip pathology due to inability to perform a physical exam.  X-ray of the lumbar spine was performed today, which is difficult to interpret due to severe osteopenia, however shows no apparent hardware malfunction with significant bony alkalosis.    Plan:  - We will provide with prednisone 40 mg daily for 5 days to help with acute radicular symptoms  - Discussed that she could schedule Tylenol 3 times daily not to exceed 3 g daily, and could take tramadol intermittently between doses if needed for severe pain  - If no improvement in symptoms by next week, patient  "will contact me via [employer] with an update and we will plan to order a CT for further imaging of the lumbar spine to rule out compression fracture    Return if symptoms worsen or fail to improve.      Dr. Christine Keen, DO  [facility]  Sports Medicine     -----  Chief Complaint   Patient presents with     Right Hip - Pain       SUBJECTIVE  Arpita Rocha is a/an 88 year old  female who is seen in consultation at the request of  Jaylon Ching M.D. for evaluation of right hip pain.  Onset was 3 weeks. Pain is located right lower back that radiates down her outer hip to the ankle. Symptoms are not worsened by anything she notices.  She has tried Tylenol and Tramadol. Associated symptoms include radiating pain down the leg, denies numbness and tingling.    The patient is seen with her son  and daughter in law who helps provide the history.    Prior injury/Surgical history of affected joint: Broke her femur about 10 years ago. Has had bilateral knee replacement in the past greater than 3 years ago.   Social History/Occupation: Retired    REVIEW OF SYSTEMS:  Pertinent positives/negative: As stated above in HPI    OBJECTIVE:  Ht 1.499 m (4' 11\")   Wt 75.3 kg (166 lb)   BMI 33.53 kg/m     General: Alert and in no distress  Skin: no visable rashes  CV: Extremities appear well perfused   Resp: normal respiratory effort, no conversational dyspnea   Psych: normal mood, affect  MSK:  RIGHT Hip Exam  Inspection:    No swelling, bruising, discoloration, or obvious deformity   Palpation:    Tenderness to palpation of right lower back  Active Range of Motion:   Unable to perform due to pain.  Attempted passive range of motion discontinued due to severe pain  Strength:  Unable to perform due to pain.  Able to dorsiflex, plantarflex, and activate EHL bilaterally.  Unable to perform straight leg raise  Special Tests:  Unable to perform special testing due to pain  - Lumbar spine exam reveals " positive straight leg raise on the right, diffuse tenderness to palpation of midline lumbar spine, diminished sensation in the L4 and L5 dermatomes on the right.       RADIOLOGY:  Final results and radiologist's interpretation available in the [facility] health record.  Images below were personally reviewed and discussed with the patient in the office today.  My personal interpretation of the performed imaging: X-ray of the right hip ROM 11/1/2023 reveals old, healed femoral shaft visualization of prior hardware with no apparent displacement.  Lumbar spine hardware is also observed.  Mild-moderate degenerative changes at the hip joint.  X-ray of the lumbar spine performed in clinic today reveals significant thoracolumbar scoliosis status post spinal fusion.  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    Review of prior external note(s) from - primary care             Again, thank you for allowing me to participate in the care of your patient.        Sincerely,        Christine Keen, DO

## 2023-11-03 NOTE — TELEPHONE ENCOUNTER
Called and spoke with Mari and relayed the approval of delay of care per Dr. Jose Canales.     RODRÍGUEZ Guerra verbalizes understanding, is agreeable with plan. She denies any further questions or concerns at this time.     Renetta VARGAS RN   PAL (Patient Advocate Liaison)  [facility]  [phone]

## 2023-11-08 NOTE — TELEPHONE ENCOUNTER
Willing to follow?  OK for below orders?  Sangeeta Simmons RN      Home Care is calling regarding an established patient with  [facility].        11/8/2023     3:59 PM   Home Care Information   Home Care agency [facility] [phone]     Requesting orders from: Ludivina Chamberlain  Provider is following patient: No       Orders Requested    Physical Therapy  Request for initial certification (first set of orders)   Frequency:  5 visits over the next 8 weeks    Information was gathered and will be sent to provider for review.  RN will contact Home Care with information after provider review.  Confirmed ok to leave a detailed message with call back.  Contact information confirmed and updated as needed.    Sangeeta Simmons RN

## 2023-11-08 NOTE — PROGRESS NOTES
Referred by Dr. Cata Mclain on 8/07/2023     On August 10, 2023, Minerva's son reported that Minerva wants to monitor labs further before starting MANUELA.    Lab appointments scheduled since that time show in the system to have been cancelled.    Minerva will be discharged from Anemia Services at this time d/t noncompliance with lab draws.        Latest Ref Rng & Units 5/23/2023     1:59 PM 6/12/2023     8:26 AM 7/18/2023    10:11 AM 8/7/2023     9:24 AM   Anemia   Hemoglobin 11.7 - 15.7 g/dL 10.9  10.4  9.8  9.7    Ferritin 11 - 328 ng/mL  1,342   1,379        Anemia labs discontinued, therapy plans cancelled, removed from active patient list, and referring provider notified.    Carrie Leopold, RN BSN  Anemia Services  [facility]  [email or web address]  Office: [phone]  Fax [phone]  **NOTE: Anemia Management Services staff will be out of the office on Thanksgiving, 11/23 and Friday, 11/24.   Please reach out to your nurse care coordinator for any questions or concerns during this time.

## 2023-11-15 NOTE — TELEPHONE ENCOUNTER
SW was out with patient today     About 2 weeks ago patient was having pain in her leg, patient was seen in [place] by another provider who had recommended Ortho.     Patient then recently saw YVES today who is recommending home care. Need to set up a F2F or video call with PCP to discuss the leg pain and why she needs home care orders per SW + doing a POLST. Patient would like to set up a virtual visit for convenience.     Scheduled appt per request. No new symptoms.    Nov 20, 2023  5:00 PM  (Arrive by 4:55 PM)  Provider Visit with April Justina Canales MD  [facility] ([facility] ) [phone]     Nando Webber RN  Patient Advocate Liaison (PAL)  [facility]  [phone]    11/15/2023 at 4:47 PM

## 2023-11-15 NOTE — TELEPHONE ENCOUNTER
Patient Quality Outreach Health Maintenance - PAL RN    Summary:    PAL RN contacted pt regarding overdue health maintenance    Patient is due/failing the following:   Diabetes -  Microalbumin      Topic Date Due    Diptheria Tetanus Pertussis (DTAP/TDAP/TD) Vaccine (1 - Tdap) 05/07/2010     Health Maintenance Due   Topic Date Due    RSV VACCINE (Pregnancy & 60+) (1 - 1-dose 60+ series) Never done    DTAP/TDAP/TD IMMUNIZATION (1 - Tdap) 05/07/2010    MICROALBUMIN  08/23/2023    BMP  11/07/2023    HEMOGLOBIN  02/07/2024     Type of outreach:    Phone, spoke to patient/parent. Patient/parent will call back to schedule. Going to think about the immunizations at this time    - Advised patient if any questions or concerns comes up to call the PAL RN.   - Patient given opportunity to ask questions and at this time there is nothing further needed.     Questions for provider review:    None      Nando Webber, RN  Patient Advocate Liaison (PAL)  [facility]  [phone]    11/15/2023 at 4:48 PM

## 2023-11-20 PROBLEM — G89.29 CHRONIC RIGHT-SIDED LOW BACK PAIN WITH RIGHT-SIDED SCIATICA: Status: ACTIVE | Noted: 2023-01-01

## 2023-11-20 PROBLEM — M54.41 CHRONIC RIGHT-SIDED LOW BACK PAIN WITH RIGHT-SIDED SCIATICA: Status: ACTIVE | Noted: 2023-01-01

## 2023-11-20 NOTE — PROGRESS NOTES
Minerva is a 88 year old who is being evaluated via a billable video visit.      How would you like to obtain your AVS? MyChart  If the video visit is dropped, the invitation should be resent by: Send to e-mail at: [email or web address]  Will anyone else be joining your video visit? No Both Sons with her, Daquan and Bright          Assessment & Plan     Chronic right-sided low back pain with right-sided sciatica  Patient is currently receiving home PT, but is needing assistance with bathing and other ADLs.  New home care referral placed.  Placing spine  referral for nonoperative management and ordering CT.  Recommendations pending results.  - Home Care Referral  - Spine  Referral; Future  - CT Lumbar Spine w/o Contrast; Future    Kyphoscoliosis  As above.  - Home Care Referral  - Spine  Referral; Future  - CT Lumbar Spine w/o Contrast; Future    POLST (Physician Orders for Life-Sustaining Treatment)  Reviewed information with patient and sons Daquan and Bright.  Patient is able to make her own medical decisions at this time.  She wishes to be DNR/DNI with selective treatments available (IV antibiotics, IV fluids, etc.)  She does not want tube feeds.  POLST completed per provider.  Son will  form for patient/healthcare agent to sign.      45 minutes spent by me on the date of the encounter doing chart review, history and exam, documentation and further activities per the note       See Patient Instructions    Ludivina Canales MD  [facility]    Alex Palma is a 88 year old, presenting for the following health issues:  No chief complaint on file.      11/20/2023     4:47 PM   Additional Questions   Roomed by Kathy GRAJEDA     Pain History:  When did you first notice your pain? month   Have you seen anyone else for your pain? Yes - ortho  How has your pain affected your ability to work? Not applicable  Where in your body do you have pain? Back  Pain  Onset/Duration: 3-4 weeks  Description:   Location of pain: low back right  Character of pain: sharp  Pain radiation: radiates into the right leg  New numbness or weakness in legs, not attributed to pain: no   Intensity: Currently 4/10  Progression of Symptoms: same  History:   Specific cause: turning/bending  Pain interferes with job: N/A  History of back problems: previous spinal surgery - years ago, as well as marine in the leg  Any previous MRI or X-rays: Yes- at [facility].  Date this month in [place], inconclusive because of the medal, may need US or MRI  Sees a specialist for back pain: [facility]   Alleviating factors:   Improved by: Prednisone     Precipitating factors:  Worsened by: movement, sleeps in recliner, painful to even go to the bathroom   Therapies tried and outcome: Tramadol and acetaminophen (Tylenol)    Accompanying Signs & Symptoms:  Risk of Fracture: Age >64  Risk of Cauda Equina: None  Risk of Infection: None  Risk of Cancer: None  Risk of Ankylosing Spondylitis: Onset at age <35, male, AND morning back stiffness No      Medication Followup of Tramadol -pain level about 4/10  Taking Medication as prescribed: NO-not taking much at all   Side Effects:  None  Medication Helping Symptoms:  NO, prednisone helped with inflammation, starts in the lower back, radiates to the right leg and goes all the way down to the foot, negative for numbness, hard to put weight on leg. No fall or injury, not sure if twisted wrong. Did see ortho already.   Patient presents with:  Orders: For Home health care  Consult For: To go over  POLST   Musculoskeletal Problem: Leg pain -follow up from visit from [place], prescribed steroids for 5 days, and referral to ortho, better but still having pain, Tylenol every 6 hours    Was recommended to get CT if pain did not improve to rule out compression fracture.  Steroids did help for 5 days during burst, but pain returned.  She tried a heat pack in the past and  it was worse for her.  She did find benefit with ice, so she would like to give that a try.    Would like to get home care in place- 3 weeks ago she had sharp pains in right leg, seen in [place] to evaluate Hip pain, XR negative.  Given 10 tablets of Tramadol, has 8 out of 10 still.  Pain 4-5/10 currently.  SW recommended home care for bathing and ADLs.  Does have home care for PT.    Has living will, will bring in copy for chart.    Review of Systems   Constitutional, HEENT, cardiovascular, pulmonary, gi and gu systems are negative, except as otherwise noted.      Objective           Vitals:  No vitals were obtained today due to virtual visit.    Physical Exam   GENERAL: Healthy, alert and no distress  EYES: Eyes grossly normal to inspection.  No discharge or erythema, or obvious scleral/conjunctival abnormalities.  RESP: No audible wheeze, cough, or visible cyanosis.  No visible retractions or increased work of breathing.    SKIN: Visible skin clear. No significant rash, abnormal pigmentation or lesions.  NEURO: Cranial nerves grossly intact.  Mentation and speech appropriate for age.  PSYCH: Mentation appears normal, affect normal/bright, judgement and insight intact, normal speech and appearance well-groomed.    Reviewed imaging and chart            Video-Visit Details    Type of service:  Video Visit   Video Start Time:  5:15 PM  Video End Time: 5:51 PM    Originating Location (pt. Location): Home    Distant Location (provider location):  On-site  Platform used for Video Visit: [employer]

## 2023-11-20 NOTE — PATIENT INSTRUCTIONS
1.  Topical analgesics: BenGay, Biofreeze, Aspercreme, IcyHot, Tiger Balm, Diclofenac Gel (Voltaren Gel)    2.  Salonpas patches (Lidocaine patches)

## 2023-11-21 NOTE — TELEPHONE ENCOUNTER
Forms/Letter Request    Type of form/letter:  POLST    Have you been seen for this request: Yes     Do we have the form/letter: Yes:     Who is the form from? Patient    Where did/will the form come from? Patient or family brought in       When is form/letter needed by: ASAP    How would you like the form/letter returned:     Patient Notified form requests are processed in 3-5 business days:Yes    Could we send this information to you in [employer] or would you prefer to receive a phone call?:   Patient would prefer a phone call   Okay to leave a detailed message?: Yes at Other phone number:  [phone] *    
Form placed up front, ready for . Ruth Behrens.   
Signed, returned to gold TC.  Yesterday I completed one on a gold paper, I thought it was supposed to be picked up by them.  If we still have it, please put it in shredder.  
28-Jul-2023 11:25

## 2023-11-27 NOTE — TELEPHONE ENCOUNTER
Reason for Call:  Other call back    Detailed comments: 3 visit total within 5 weeks. Verbal request didn't want to be hon hold with clinic please call back    Phone Number Patient can be reached at: Other phone number:  [phone]  confidential v/m    Best Time: any    Can we leave a detailed message on this number? YES    Call taken on 11/27/2023 at 11:00 AM by Justa Cisneros

## 2023-11-28 NOTE — TELEPHONE ENCOUNTER
Home Care is calling regarding an established patient with [facility].        11/8/2023     3:59 PM   Home Care Information   Home Care agency [facility] [phone]     Requesting orders from: Ludivina Chamberlain  Provider is following patient: Yes  Is this a 60-day recertification request?  No    Orders Requested    Occupational Therapy  Request for initial certification (first set of orders)   Frequency:  3 visits within 5 weeks     Verbal orders given.  Home Care will send orders for provider to sign. This was already approved below. VM from OT on PAL RN line confirmed will send the orders via fax to the clinic.    Nando Webber RN  Patient Advocate Liaison (PAL)  [facility]  [phone]    11/28/2023 at 9:33 AM

## 2023-12-04 NOTE — TELEPHONE ENCOUNTER
Spoke to [facility]. They cannot see much in EPIC so would need someone to fax lab orders to them. Labs faxed to [phone].

## 2023-12-04 NOTE — TELEPHONE ENCOUNTER
"S-(situation):     Breathing change    B-(background):     Patient with hx of COPD, over last couple of days starting to have a little more work in breathing, but still able to perform normal ADLs at home without concern. Family wants to get a head of any issues with COPD exacerbation or pneumonia and would like to have a provider assess lung sounds and possible chest xr.     A-(assessment):     Breathing a little more labored, \"rattling cough\", SPO2 93% today    Complaining of:  Cough, slight difficulty in breathing,     Denies:  CP, SOB, fever, congestion, lethargy, dizziness, lightheadedness, confusion.    R-(recommendations):     Advised if symptoms progress and the patient becomes more short of breath, wheezing, fever, should be assessed more emergently in the emergency room.  If continues on this course with current symptoms, scheduled appointment with provider 12/5/23.  Advised to call PAL RN if any of the symptoms change.     Dec 05, 2023  8:30 AM  (Arrive by 8:10 AM)  Provider Visit with Janell Willoughby PA-C  [facility] ([facility] ) [phone]     Nando Webber RN  Patient Advocate Liaison (PAL)  [facility]  [phone]    12/04/2023 at 10:14 AM   "

## 2023-12-05 NOTE — PROGRESS NOTES
Assessment & Plan     Pneumonia of right lower lobe due to infectious organism  Exam is consistent with possible pneumonia.Ordered x-ray of the chest to assess further but should be treated with antibiotics as noted below.  Dosing adjusted due to kidney function.  Shortness of breath could be multifactorial. Reassuring that hemoglobin is improved compared to previous. Normal white count.  X-ray is showing opacity vs lymphadenopathy in the right lung recommending CT to assess further. Ordered for patient.  - XR Chest 2 Views; Future  - cefdinir (OMNICEF) 300 MG capsule; Take 1 capsule (300 mg) by mouth daily for 10 days  - azithromycin (ZITHROMAX) 250 MG tablet; Take 2 tablets (500 mg) by mouth daily for 1 day, THEN 1 tablet (250 mg) daily for 4 days.    CKD (chronic kidney disease) stage 4, GFR 15-29 ml/min (H)  Follows nephrology and has upcoming appointment. Labs obtained today. Nephology to review.  - CBC with Platelets & Differential  - Comprehensive metabolic panel  - Albumin Random Urine Quantitative with Creat Ratio  - Protein  random urine  - UA with Microscopic  - Phosphorus    Review of the result(s) of each unique test - las creatinine and GFR, today's CBC  Ordering of each unique test  Prescription drug management      Janell Willoughby PA-C  [facility]    Alex Palma is a 88 year old, presenting for the following health issues:  Cough        12/5/2023     8:06 AM   Additional Questions   Roomed by Melisa REINOSO       History of Present Illness       Reason for visit:  Sob cough  Symptom onset:  1-2 weeks ago    She eats 2-3 servings of fruits and vegetables daily.She consumes 0 sweetened beverage(s) daily.She exercises with enough effort to increase her heart rate 9 or less minutes per day.  She exercises with enough effort to increase her heart rate 3 or less days per week. She is missing 2 dose(s) of medications per week.       Acute Illness  Acute illness concerns:  "cough, SOB (walking and getting dresssed)  Onset/Duration: 2 weeks  Symptoms:  Fever: No  Chills/Sweats: No  Headache (location?): No  Sinus Pressure: No  Conjunctivitis:  No  Ear Pain: no  Rhinorrhea: No  Congestion: No  Sore Throat: No  Cough: YES-productive of clear sputum, productive of yellow sputum, with shortness of breath, barking, worsening over time  Wheeze: YES  Decreased Appetite: No  Nausea: No  Vomiting: No  Diarrhea: No  Dysuria/Freq.: No  Dysuria or Hematuria: No  Fatigue/Achiness: YES  Sick/Strep Exposure: No  Therapies tried and outcome: None    She denies any chest pain.    Review of Systems   Respiratory:  Positive for cough, shortness of breath and wheezing.          Objective    BP (!) 140/70   Pulse 60   Temp 97.5  F (36.4  C) (Oral)   Resp 16   Ht 1.435 m (4' 8.5\")   Wt 77 kg (169 lb 12.8 oz)   SpO2 91%   BMI 37.40 kg/m    Body mass index is 37.4 kg/m .  Physical Exam   GENERAL: No acute distress, breathing comfortably.  HEENT: Normocephalic  CARDIAC: Regular rate and rhythm.  PULMONARY: Crackles right lower lobe, expiratory wheezes throughout lung fields. Occasional rhonchi.  NEURO: Alert and non-focal                  "

## 2023-12-06 NOTE — TELEPHONE ENCOUNTER
Called and spoke with Daquan, pt's son (CTC),     -Relayed results message below from Janell Willoughby PA-C.     -Instructed Daquan to call and follow-up with cardiology and pulmonology regarding results.   -Offered assistance with scheduling a visit for exam to assess right breast. -Daquan declines at this time until pt recovers from pneumonia.     -Instructed to call Nando MORALES RN at [phone] with any follow-up questions or concerns.     Daquan wrote down the result message. He verbalizes understanding and denies any further questions or concerns at this time.     RODRÍGUEZ Garcia (Patient Advocate Liaison)  [facility]  [phone]

## 2023-12-06 NOTE — TELEPHONE ENCOUNTER
----- Message from Janell Willoughby PA-C sent at 12/6/2023  1:13 PM CST -----  Please call patient and let her know that her CT of the chest shows possible pneumonia (continue antibiotics as prescribed).   There is a small nodule in the right upper lung and we should follow up with repeat CT in a year to recheck.  The heart is enlarged and there is signs of possible pulmonary hypertension- enlarged pulmonary arteries (this is an elevation in the blood pressure in arteries of the lungs). This would be something to discuss with cardiology and pulmonology at future appointments (they are typically the specialists that diagnose and manage this).  There is some thickening of the skin of the right breast. I would recommend an exam to assess this and consider a mammogram.

## 2023-12-08 NOTE — H&P
[facility]    History and Physical - Hospitalist Service       Date of Admission:  12/8/2023    Assessment & Plan      Arpita Rocha is a 88 year old female with PMH COPD, HFpEF, cardiorenal syndrome, sick sinus syndrome has pacemaker, HTN, CKD stage 4, depression who presents with SOB.     Recently presented to her PCP on 12/05 for shortness of breath and cough.  Thought to be due to possible pneumonia, chest x-ray was ordered started on cefdinir and azithromycin. Outpatient CT done on 12/6 notes cardiomegaly, enlarged pulmonary arteries, trace left pleural effusion, mild bibasilar linear opacities may be due to resolving pneumonia and linear atelectasis/scarring.    Presented this morning with worsening SOB.  Approximately 2 to 3 weeks ago had an increase in her productive cough and shortness of breath especially with exertion.  States she has a chronic productive cough and shortness of breath with exertion but both of these have worsened recently.  Denies any recent fevers, sinus congestion, sore throat, or upper respiratory illness type symptoms.  Denies any chest pain, palpitations.  No changes with her diet.  Recently moved from [place] in May to live with her family here in the [facility].    Acute hypoxic respiratory failure   Acute on chronic heart failure exacerbation  Possible COPD exacerbation  Suspect multifactorial. Does appear hypervolemic and having a acute heart failure exacerbation. BNP in July was 1,345 now increased to 5097 along with increasing pulmonary edema on CXR and lower leg edema. Weight at home typically around 168. Unclear cause compliant with lasix, no changes in diet, no recent chest pain. Also question if she is having a COPD exacerbation as well. Increased productive cough and SOB over the last several weeks. Expiratory wheezing noted on physical exam. Diagnosed with pneumonia by PCP on 12/5 but have lower suspicion for this with no fevers,  "leukocytosis and based on CXR. Viral respiratory panel negative. CT chest on 12/06 does not note PE but was without contrast.   - repeat echocardiogram   - continuous pulse ox   - obtain sputum culture if able   - continue pta abx cefdinir and azithromycin  - start PO prednisone 40 mg daily   - scheduled duo nebs  - continue pta Singulair, ellipta, budesonide BID  - wean oxygen as tolerated   - received IV lasix 40 mg in ED, continue IV lasix 40 mg BID, monitor kidney function  - daily weights, strict I&O  - blood cultures   - UA pending     HTN  Sick sinus syndrome  Permanent pacemaker  Cardiorenal syndrome  Follows with cardiology. Saw Dr. Gonzalez in July.   - continue pta amlodipine, coreg, clonidine, plavix   - held chlorthalidone while receiving IV lasix     CKD stage 4   Follows with nephrology outpatient. Stable CKD for several years.  - continue pta sodium bicarbonate     Macrocytic anemia        Diet:  cardiac diet  DVT Prophylaxis: Pneumatic Compression Devices  Henderson Catheter: Not present  Lines: None     Cardiac Monitoring: None  Code Status:  DNR/DNI    Clinically Significant Risk Factors Present on Admission                # Drug Induced Platelet Defect: home medication list includes an antiplatelet medication   # Hypertension: Noted on problem list      # Obesity: Estimated body mass index is 37.4 kg/m  as calculated from the following:    Height as of 12/5/23: 1.435 m (4' 8.5\").    Weight as of 12/5/23: 77 kg (169 lb 12.8 oz).       # COPD: noted on problem list  # Pacemaker present       Disposition Plan likely 2+ days pending improvement of respiratory status    The patient's care was discussed with the Patient and Patient's Family.    IRASEMA Hawley PA-C  Hospitalist Service  [facility]  Securely message with Leon (more info)  Text page via [facility] Paging/Directory     ______________________________________________________________________    Chief Complaint   SOB  Cough "     History is obtained from the patient    History of Present Illness   Arpita Rocha is a 88 year old female with PMH COPD, HFpEF, cardiorenal syndrome, sick sinus syndrome has pacemaker, HTN, CKD stage 4, depression who presents with SOB.     Recently presented to her PCP on 12/05 for shortness of breath and cough.  Thought to be due to possible pneumonia, chest x-ray was ordered started on cefdinir and azithromycin. Outpatient CT done on 12/6 notes cardiomegaly, enlarged pulmonary arteries, trace left pleural effusion, mild bibasilar linear opacities may be due to resolving pneumonia and linear atelectasis/scarring.    Presented this morning with worsening SOB.  Approximately 2 to 3 weeks ago had an increase in her productive cough and shortness of breath especially with exertion.  States she has a chronic productive cough and shortness of breath with exertion but both of these have worsened recently.  Denies any recent fevers, sinus congestion, sore throat, or upper respiratory illness type symptoms.  Denies any chest pain, palpitations.  No changes with her diet.  Recently moved from [place] in May to live [place].    In the ED, afebrile, blood pressure hypertensive 190/107, heart rate 60, respirations 26, oxygen 98% on non-rebreather.  CMP notable for chloride of 109, CO2 21, creatinine 2.15, calcium 8.7.  BNP 5097.  Troponin 83.  CBC notable for hemoglobin of 10.6, .  Viral respiratory panel negative.  Chest x-ray shows increased hazy bilateral pulmonary opacities that may be pulmonary edema, small bibasilar pleural fluid slightly increased, correlate with CHF, pneumonia not excluded.    Past Medical History    Past Medical History:   Diagnosis Date    Ulcerative pancolitis (H) 05/23/2023     Past Surgical History   Past Surgical History:   Procedure Laterality Date    CATARACT EXTRACTION Bilateral     COLONOSCOPY      FEMUR FRACTURE SURGERY Right     2010     ILEOSTOMY      IMPLANT PACEMAKER      left knee replacement      MULTIPLE TOOTH EXTRACTIONS      right knee replacement Right     SPINAL FUSION      TONSILLECTOMY      TUBAL LIGATION         Prior to Admission Medications   Prior to Admission Medications   Prescriptions Last Dose Informant Patient Reported? Taking?   Bioflavonoid Products (VITAMIN C) CHEW   Yes No   Cranberry 450 MG TABS   Yes No   Ostomy Supplies (ADAPT) PSTE   No No   Sig: Use with new ostomy pouch and drain PRN   Ostomy Supplies (PREMIER DRAINABLE POUCH 22MM) Pouch MISC   No No   Sig: Use as directed every 3-4 days   acetaminophen (TYLENOL) 650 MG CR tablet   Yes No   Sig: Take 1,300 mg by mouth every morning   albuterol (PROAIR HFA/PROVENTIL HFA/VENTOLIN HFA) 108 (90 Base) MCG/ACT inhaler   No No   Sig: Inhale 2 puffs into the lungs every 4 hours as needed for shortness of breath, wheezing or cough   amLODIPine (NORVASC) 10 MG tablet   No No   Sig: Take 1 tablet (10 mg) by mouth daily   atorvastatin (LIPITOR) 40 MG tablet   No No   Sig: Take 1 tablet (40 mg) by mouth daily   azithromycin (ZITHROMAX) 250 MG tablet   No No   Sig: Take 2 tablets (500 mg) by mouth daily for 1 day, THEN 1 tablet (250 mg) daily for 4 days.   budesonide (PULMICORT) 0.5 MG/2ML neb solution   No No   Sig: Take 2 mLs (0.5 mg) by nebulization 2 times daily   Patient taking differently: Take 0.5 mg by nebulization 2 times daily Pt uses up to 4 times daily   carvedilol (COREG) 25 MG tablet   No No   Sig: Take 1.5 tablets (37.5 mg) by mouth 2 times daily (with meals)   cefdinir (OMNICEF) 300 MG capsule   No No   Sig: Take 1 capsule (300 mg) by mouth daily for 10 days   citalopram (CELEXA) 20 MG tablet   No No   Sig: TAKE 1.5 TAB BY MOUTH EVERY DAY   cloNIDine (CATAPRES) 0.1 MG tablet   No No   Sig: Take 0.5 tablets (0.05 mg) by mouth 2 times daily   clopidogrel (PLAVIX) 75 MG tablet   No No   Sig: Take 1 tablet (75 mg) by mouth daily   doxazosin (CARDURA) 1 MG tablet   No No    Sig: Take 1 tablet (1 mg) by mouth At Bedtime   doxycycline hyclate (VIBRAMYCIN) 100 MG capsule   No No   Sig: Take 1 capsule (100 mg) by mouth three times a week   ferrous sulfate (FEROSUL) 325 (65 Fe) MG tablet   No No   Sig: Take 2 tablets (650 mg) by mouth daily (with breakfast)   furosemide (LASIX) 40 MG tablet   No No   Sig: Take 1 tablet (40 mg) by mouth 2 times daily   ipratropium - albuterol 0.5 mg/2.5 mg/3 mL (DUONEB) 0.5-2.5 (3) MG/3ML neb solution   No No   Sig: Take 1 vial (3 mLs) by nebulization every 6 hours   ketoconazole (NIZORAL) 2 % external cream   No No   Sig: Apply topically 2 times daily   montelukast (SINGULAIR) 10 MG tablet   No No   Sig: Take 1 tablet (10 mg) by mouth At Bedtime   multivitamin w/minerals (THERA-VIT-M) tablet   Yes No   Sig: Take 1 tablet by mouth daily   nystatin (MYCOSTATIN) 595115 UNIT/GM external powder   No No   Sig: Apply topically 3 times daily as needed for other (rash)   potassium chloride ER (KLOR-CON M) 20 MEQ CR tablet   Yes No   sodium bicarbonate 650 MG tablet   No No   Sig: Take 1 tablet (650 mg) by mouth 2 times daily for 180 days   umeclidinium-vilanterol (ANORO ELLIPTA) 62.5-25 MCG/ACT oral inhaler   No No   Sig: Inhale 1 puff into the lungs daily      Facility-Administered Medications: None      Social History   I have reviewed this patient's social history and updated it with pertinent information if needed.  Social History     Tobacco Use    Smoking status: Former     Packs/day: 1.00     Years: 44.00     Additional pack years: 0.00     Total pack years: 44.00     Types: Cigarettes     Start date: 1952     Quit date: 1996     Years since quittin.9    Smokeless tobacco: Never   Vaping Use    Vaping Use: Never used   Substance Use Topics    Alcohol use: Not Currently    Drug use: Never     Allergies   Allergies   Allergen Reactions    Acetaminophen-Codeine     Penicillin G       Physical Exam   Vital Signs: Temp: 97.9  F (36.6  C) Temp src:  Temporal BP: (!) 190/107 Pulse: 60   Resp: 26 SpO2: 98 % O2 Device: Non-rebreather mask    Weight: 0 lbs 0 oz    GENERAL:  Alert, Comfortable, persistent productive cough. Sitting up in bed.   PSYCH: pleasant, oriented.  HEENT:  Normocephalic, No scleral icterus or conjunctival injection  HEART:  Normal S1, S2 with no murmur, RRR  LUNGS:  Increased Respiratory effort. Diffuse rhonchi and expiratory wheezing. persistent productive cough  ABDOMEN:  Soft, non-tender, non distended. No peritoneal signs.   EXTREMITIES:  +1 pitting edema bilaterally lower legs, no pain with palpation  SKIN:  Warm, dry to touch  NEUROLOGIC:  Speech clear, alert & orientated x 4, no focal deficits.   Medical Decision Making       MANAGEMENT DISCUSSED with the following over the past 24 hours: patient, ED provider   NOTE(S)/MEDICAL RECORDS REVIEWED over the past 24 hours: labs, imaging, progress notes       Data     I have personally reviewed the following data over the past 24 hrs:    6.7  \   10.6 (L)   / 164     145 109 (H) 66.1 (H) /  123 (H)   4.7 21 (L) 2.15 (H) \     ALT: 14 AST: 25 AP: 77 TBILI: 0.3   ALB: 4.0 TOT PROTEIN: 6.3 (L) LIPASE: N/A     Trop: 83 (H) BNP: 5,097 (H)     Procal: N/A CRP: N/A Lactic Acid: 1.2         Imaging results reviewed over the past 24 hrs:   Recent Results (from the past 24 hour(s))   XR Chest Port 1 View    Narrative    CHEST PORTABLE 1 VIEW   12/8/2023 8:33 AM     HISTORY: Dyspnea.    COMPARISON: 12/5/2023.      Impression    IMPRESSION: Interval increased hazy bilateral pulmonary opacities that  may be pulmonary edema. Mild cardiomegaly. Small bibasilar pleural  fluid may be just slightly increased as well. Correlate with CHF.  Pneumonia not excluded. Stable spine fusion. Stable left chest  pacemaker.

## 2023-12-08 NOTE — PROVIDER NOTIFICATION
"MD Bob paged via [employer] messaging:    \"Pt just arrived on unit, BP is continuing to climb (last was 198/68). Would you like her to have PRN for BP?\"  "

## 2023-12-08 NOTE — ED NOTES
[facility]  ED Nurse Handoff Report    ED Chief complaint: Breathing Problem (/)  . ED Diagnosis:   Final diagnoses:   Acute respiratory failure with hypoxia (H)   Acute on chronic diastolic congestive heart failure (H)       Allergies:   Allergies   Allergen Reactions    Acetaminophen-Codeine     Penicillin G        Code Status: Needs documentation    Activity level - Baseline/Home:  standby.  Activity Level - Current:   assist of 1.   Lift room needed: No.   Bariatric: No   Needed: No   Isolation: No.   Infection: Not Applicable.     Respiratory status: Oximask    Vital Signs (within 30 minutes):   Vitals:    12/08/23 0809   BP: (!) 190/107   Pulse: 60   Resp: 26   Temp: 97.9  F (36.6  C)   TempSrc: Temporal   SpO2: 98%       Cardiac Rhythm:  ,      Pain level:    Patient confused: No.   Patient Falls Risk: patient and family education.   Elimination Status: Using Purewick  Patient Report - Initial Complaint: SOB.   Focused Assessment: SOB, generalized weakness, wet cough    Abnormal Results:   Labs Ordered and Resulted from Time of ED Arrival to Time of ED Departure   COMPREHENSIVE METABOLIC PANEL - Abnormal       Result Value    Sodium 145      Potassium 4.7      Carbon Dioxide (CO2) 21 (*)     Anion Gap 15      Urea Nitrogen 66.1 (*)     Creatinine 2.15 (*)     GFR Estimate 22 (*)     Calcium 8.7 (*)     Chloride 109 (*)     Glucose 123 (*)     Alkaline Phosphatase 77      AST 25      ALT 14      Protein Total 6.3 (*)     Albumin 4.0      Bilirubin Total 0.3     TROPONIN T, HIGH SENSITIVITY - Abnormal    Troponin T, High Sensitivity 83 (*)    NT PROBNP INPATIENT - Abnormal    N terminal Pro BNP Inpatient 5,097 (*)    CBC WITH PLATELETS AND DIFFERENTIAL - Abnormal    WBC Count 6.7      RBC Count 3.20 (*)     Hemoglobin 10.6 (*)     Hematocrit 33.5 (*)      (*)     MCH 33.1 (*)     MCHC 31.6      RDW 13.0      Platelet Count 164      % Neutrophils 73      % Lymphocytes 12       % Monocytes 10      % Eosinophils 3      % Basophils 1      % Immature Granulocytes 1      NRBCs per 100 WBC 0      Absolute Neutrophils 5.0      Absolute Lymphocytes 0.8      Absolute Monocytes 0.6      Absolute Eosinophils 0.2      Absolute Basophils 0.0      Absolute Immature Granulocytes 0.0      Absolute NRBCs 0.0     LACTIC ACID WHOLE BLOOD - Normal    Lactic Acid 1.2     INFLUENZA A/B, RSV, & SARS-COV2 PCR - Normal    Influenza A PCR Negative      Influenza B PCR Negative      RSV PCR Negative      SARS CoV2 PCR Negative     ROUTINE UA WITH MICROSCOPIC REFLEX TO CULTURE   BLOOD CULTURE   BLOOD CULTURE        XR Chest Port 1 View   Preliminary Result   IMPRESSION: Interval increased hazy bilateral pulmonary opacities that   may be pulmonary edema. Mild cardiomegaly. Small bibasilar pleural   fluid may be just slightly increased as well. Correlate with CHF.   Pneumonia not excluded. Stable spine fusion. Stable left chest   pacemaker.          Treatments provided: diagnostics, lasix, oxygen  Family Comments: Sons at bedside  OBS brochure/video discussed/provided to patient:  N/A  ED Medications:   Medications   furosemide (LASIX) injection 40 mg (40 mg Intravenous $Given 12/8/23 0940)       Drips infusing:  No  For the majority of the shift this patient was Green.   Interventions performed were N/A.    Sepsis treatment initiated: No    Cares/treatment/interventions/medications to be completed following ED care: Oxygen weaning. education    ED Nurse Name: Yunier Craft RN  10:54 AM    RECEIVING UNIT ED HANDOFF REVIEW    Above ED Nurse Handoff Report was reviewed: Yes  Reviewed by: Pierre Diaz RN on December 8, 2023 at 4:21 PM

## 2023-12-08 NOTE — PHARMACY-ADMISSION MEDICATION HISTORY
Pharmacist Admission Medication History    Admission medication history is complete. The information provided in this note is only as accurate as the sources available at the time of the update.    Information Source(s): Patient via in-person and printed med list provided by family  Pertinent Information: taking a course of PO antibiotic currently, azithromycin and cefdinir    Changes made to PTA medication list:  Added: chlorthalidone  Deleted: KCl, albuterol inhaler  Changed: None    Medication Affordability:  Not including over the counter (OTC) medications, was there a time in the past 3 months when you did not take your medications as prescribed because of cost?: No    Allergies reviewed with patient and updates made in EHR: no  Medication History Completed By: Teddy Sutherland RPH 12/8/2023 11:31 AM    Prior to Admission medications    Medication Sig Last Dose Taking? Auth Provider Long Term End Date   acetaminophen (TYLENOL) 650 MG CR tablet Take 1,300 mg by mouth every morning 12/7/2023 at am Yes Reported, Patient     amLODIPine (NORVASC) 10 MG tablet Take 1 tablet (10 mg) by mouth daily 12/7/2023 at am Yes Coming Ludivina Canales MD Yes    atorvastatin (LIPITOR) 40 MG tablet Take 1 tablet (40 mg) by mouth daily 12/7/2023 at am Yes Mary Gonzalez,  Yes    azithromycin (ZITHROMAX Z-KELL) 250 MG tablet Take 250-500 mg by mouth See Admin Instructions Take 2 tablets (500 mg) by mouth daily for day 1, then 1 tablet (250 mg) daily for 4 days 12/7/2023 Yes Unknown, Entered By History     budesonide (PULMICORT) 0.5 MG/2ML neb solution Take 2 mLs (0.5 mg) by nebulization 2 times daily  Patient taking differently: Take 0.5 mg by nebulization 2 times daily Pt uses up to 4 times daily 12/7/2023 at x 1 Yes Coming Ludivina Canales MD Yes    carvedilol (COREG) 25 MG tablet Take 1.5 tablets (37.5 mg) by mouth 2 times daily (with meals) 12/7/2023 at x 1 Yes Cata Mclain MD Yes    cefdinir (OMNICEF) 300  MG capsule Take 1 capsule (300 mg) by mouth daily for 10 days 12/7/2023 at x 1 Yes Janell Willoughby PA-C  12/15/23   chlorthalidone (HYGROTON) 25 MG tablet Take 25 mg by mouth daily 12/7/2023 at am\ Yes Unknown, Entered By History No    citalopram (CELEXA) 20 MG tablet TAKE 1.5 TAB BY MOUTH EVERY DAY 12/7/2023 at am Yes Artie Up PA-C Yes    cloNIDine (CATAPRES) 0.1 MG tablet Take 0.5 tablets (0.05 mg) by mouth 2 times daily 12/7/2023 at x 1 Yes Cata Mclain MD Yes    clopidogrel (PLAVIX) 75 MG tablet Take 1 tablet (75 mg) by mouth daily 12/7/2023 at am Yes Mary Gonzalez DO Yes    doxazosin (CARDURA) 1 MG tablet Take 1 tablet (1 mg) by mouth At Bedtime 12/7/2023 at hs Yes Coming Ludivina Canales MD Yes    ferrous sulfate (FEROSUL) 325 (65 Fe) MG tablet Take 2 tablets (650 mg) by mouth daily (with breakfast) 12/7/2023 at am Yes Cata Mclain MD     furosemide (LASIX) 40 MG tablet Take 1 tablet (40 mg) by mouth 2 times daily 12/7/2023 at m Yes Mary Gonzalez DO Yes    ipratropium - albuterol 0.5 mg/2.5 mg/3 mL (DUONEB) 0.5-2.5 (3) MG/3ML neb solution Take 1 vial (3 mLs) by nebulization every 6 hours 12/7/2023 at x 4 Yes Coming Ludivina Canales MD Yes    ketoconazole (NIZORAL) 2 % external cream Apply topically 2 times daily 12/7/2023 at x 2 Yes Coming Ludivina Canales MD     montelukast (SINGULAIR) 10 MG tablet Take 1 tablet (10 mg) by mouth At Bedtime 12/7/2023 at hs Yes Coming Ludivina Canales MD Yes    multivitamin w/minerals (THERA-VIT-M) tablet Take 1 tablet by mouth daily 12/7/2023 at am Yes Reported, Patient     nystatin (MYCOSTATIN) 928378 UNIT/GM external powder Apply topically 3 times daily as needed for other (rash) Past Week Yes Coming Ludivina Canales MD     sodium bicarbonate 650 MG tablet Take 1 tablet (650 mg) by mouth 2 times daily for 180 days 12/8/2023 at x 1 Yes Cata Mclain MD  2/3/24   umeclidinium-vilanterol (ANORO ELLIPTA) 62.5-25  MCG/ACT oral inhaler Inhale 1 puff into the lungs daily 12/8/2023 at am Yes Sadi Lamar MD     Cranberry 450 MG TABS    Reported, Patient     doxycycline hyclate (VIBRAMYCIN) 100 MG capsule Take 1 capsule (100 mg) by mouth three times a week 12/6/2023 at am  Coming Ludivina Canales MD     Ostomy Supplies (ADAPT) PSTE Use with new ostomy pouch and drain PRN   Coming Ludivina Canales MD Yes    Ostomy Supplies (PREMIER DRAINABLE POUCH 22MM) Pouch MISC Use as directed every 3-4 days   Coming Ludivina Canales MD Yes

## 2023-12-08 NOTE — CONSULTS
[facility]    Received [facility] Clinic Consult from SAE Hawley.    Reviewed Minerva's chart and note they are admitted on 12/8/23 for HFpEF and resp failure. Echocardiogram on 12/8/23 shows LVEF 55%.  This is their first admission(s) in the past year for concerns of heart failure.    Given above information, Minerva meets criteria for general cardiology as patients EF is >40%.     Did not meet with Minerva as she was in ER at time consult was reviewed. Message left on cell phone to follow-up on hospital follow-up appt.     Patient's primary insurance:  Medicare and [employer] of [place]    Pt reports the following HF symptoms prior to admission:  Increased shortness of breath, bilateral leg swelling and cough (was on antibiotics prior to admit.)    Living situation:  With son and DIL    Med set up:      Scale available at home:      Barriers to HF follow-up:        Medication review: Patient is not on both Entresto and a SGLT2 Inhibitor (Jardiance, Farxiga, Invokana), Pharmacy Liaison Consult placed for coverage review.   **Coverage check sent 12/8/23     CM/HF education topics we reviewed: No education done as she was in ER        **FLOOR RN - PLEASE PROVIDE FOLDER**  Education materials provided:  Low sodium food and drink handout  Low sodium food product examples  Already prepared low salt meal delivery services  HF stoplight tool  Guide to HF booklet        I have contacted scheduling to arrange general cardiology follow-up within 3-5 days of discharge.       Future Appointments   Date Time Provider Department Center   12/13/2023  2:00 PM [facility]   12/18/2023 12:00 AM GRADY DCR2 [facility]   12/18/2023  2:20 PM [facility]1 [facility]   3/29/2024  9:30 AM Sadi Lamar MD [facility]   .      Please call with any further questions.    ANKIT CrooksN RN   [facility]- [place], [place]  [facility] Care Coordinator  [phone]

## 2023-12-08 NOTE — TELEPHONE ENCOUNTER
Scheduling - Please call pt / Son or daughter to make hospital FU appt with Dr Gonzalez or Gen Josh    Order for both in (who ever has soonest opening)        Ngoc Ortiz, ANKITN, RN 5:04 PM 12/08/23

## 2023-12-08 NOTE — ED PROVIDER NOTES
History     Chief Complaint:  Breathing Problem (/)       The history is provided by the patient, the spouse and the EMS personnel.      Arpita Rocha is a 88 year old female, with a history of COPD and pacemaker, who presents to the ED via EMS for worsening shortness of breath about approximately one week. Patient reports a productive cough and has been taking antibiotics Zithromax and Omnicef for 2 days. Denies fever, pain or sick contacts. Reports she takes diuretics. Denies taking routine steroids for her COPD. Denies history of blood clots.       Independent Historian:   Son - reports patient had pneumonia in April 2023, later improved and was off supplemental oxygen for 3 months until 2 weeks ago. Reports using nebulizer and inhaler for symptoms. Saw pulmonologist a week ago and XR results were inconclusive, decided to get a CT on Wednesday (12/6) and results suggested another pneumonia. Reports long history of swelling in patient's legs.     EMS - They report patient currently feels better. Reports oxygen was 77% at home.     Review of External Notes:   Office visit reviewed from December 5, 2023 when the patient was diagnosed with right lower lobe pneumonia and started on azithromycin and Omnicef.    Medications:    Norvasc  Lipitor  Pulmicort  Coreg  Celexa  Catapres  Plavix   Cardura  Lasix  Singulair    Past Medical History:    Ulcerative pancolitis   CHF  Hypertension  Anemia due to stage 4 CKD  Anxiety  Obesity  Psoriasis  Depression  Cardiac pacemaker in situ  Hyperlipidemia  Osteoarthritis    Past Surgical History:    Cataracts  Colonoscopy  Tonsillectomy  Femur fracture surgery  Ileostomy  Pacemaker  Bilateral knee replacement  Multiple tooth extractions  Spinal fusion  Tubal ligation      Physical Exam   Patient Vitals for the past 24 hrs:   BP Temp Temp src Pulse Resp SpO2   12/08/23 1430 (!) 189/81 98.6  F (37  C) Oral 61 22 --   12/08/23 1300 (!) 164/127 -- -- 60 21 --   12/08/23 1100 (!)  190/67 -- -- 60 -- 95 %   12/08/23 1000 (!) 193/70 -- -- 60 14 96 %   12/08/23 0900 (!) 181/72 -- -- 59 15 95 %   12/08/23 0809 (!) 190/107 97.9  F (36.6  C) Temporal 60 26 98 %      Physical Exam  Constitutional:       General: She is not in acute distress.     Appearance: Normal appearance. She is not diaphoretic.   HENT:      Head: Atraumatic.      Right Ear: External ear normal.      Left Ear: External ear normal.      Nose: Nose normal.      Mouth/Throat:      Mouth: Mucous membranes are moist.      Pharynx: No oropharyngeal exudate or posterior oropharyngeal erythema.   Eyes:      General: No scleral icterus.     Conjunctiva/sclera: Conjunctivae normal.      Pupils: Pupils are equal, round, and reactive to light.   Cardiovascular:      Rate and Rhythm: Normal rate and regular rhythm.      Heart sounds: Normal heart sounds.   Pulmonary:      Effort: No respiratory distress.      Breath sounds: Normal breath sounds.   Abdominal:      General: Abdomen is flat. There is no distension.      Tenderness: There is no abdominal tenderness.   Musculoskeletal:      Cervical back: Neck supple.      Right lower leg: Edema present.      Left lower leg: Edema present.   Skin:     General: Skin is warm.      Capillary Refill: Capillary refill takes less than 2 seconds.      Findings: No rash.   Neurological:      General: No focal deficit present.      Mental Status: She is alert and oriented to person, place, and time.   Psychiatric:         Mood and Affect: Mood normal.         Behavior: Behavior normal.           Emergency Department Course   ECG  ECG taken at 0812, ECG read at 0815  Atrial-paced rhythm. Left axis deviation. Left ventricular hypertrophy with QRS widening and repolarization abnormality (R in aVL, [place] product). Abnormal ECG.   No significant change as compared to prior, dated 07/07/2023.  Rate 60 bpm. HI interval 120 ms. QRS duration 118 ms. QT/QTc 430/430/ ms. P-R-T axes 8 -43 89.      Imaging:  Echocardiogram Complete   Final Result      XR Chest Port 1 View   Final Result   IMPRESSION: Interval increased hazy bilateral pulmonary opacities that   may be pulmonary edema. Mild cardiomegaly. Small bibasilar pleural   fluid may be just slightly increased as well. Correlate with CHF.   Pneumonia not excluded. Stable spine fusion. Stable left chest   pacemaker.      BONY SMITH MD            SYSTEM ID:  [id number]       Lower Extremity Venous Duplex Right    (Results Pending)      Laboratory:  Labs Ordered and Resulted from Time of ED Arrival to Time of ED Departure   COMPREHENSIVE METABOLIC PANEL - Abnormal       Result Value    Sodium 145      Potassium 4.7      Carbon Dioxide (CO2) 21 (*)     Anion Gap 15      Urea Nitrogen 66.1 (*)     Creatinine 2.15 (*)     GFR Estimate 22 (*)     Calcium 8.7 (*)     Chloride 109 (*)     Glucose 123 (*)     Alkaline Phosphatase 77      AST 25      ALT 14      Protein Total 6.3 (*)     Albumin 4.0      Bilirubin Total 0.3     TROPONIN T, HIGH SENSITIVITY - Abnormal    Troponin T, High Sensitivity 83 (*)    NT PROBNP INPATIENT - Abnormal    N terminal Pro BNP Inpatient 5,097 (*)    CBC WITH PLATELETS AND DIFFERENTIAL - Abnormal    WBC Count 6.7      RBC Count 3.20 (*)     Hemoglobin 10.6 (*)     Hematocrit 33.5 (*)      (*)     MCH 33.1 (*)     MCHC 31.6      RDW 13.0      Platelet Count 164      % Neutrophils 73      % Lymphocytes 12      % Monocytes 10      % Eosinophils 3      % Basophils 1      % Immature Granulocytes 1      NRBCs per 100 WBC 0      Absolute Neutrophils 5.0      Absolute Lymphocytes 0.8      Absolute Monocytes 0.6      Absolute Eosinophils 0.2      Absolute Basophils 0.0      Absolute Immature Granulocytes 0.0      Absolute NRBCs 0.0     LACTIC ACID WHOLE BLOOD - Normal    Lactic Acid 1.2     INFLUENZA A/B, RSV, & SARS-COV2 PCR - Normal    Influenza A PCR Negative      Influenza B PCR Negative      RSV PCR Negative      SARS CoV2 PCR  Negative     ROUTINE UA WITH MICROSCOPIC REFLEX TO CULTURE   BLOOD CULTURE   BLOOD CULTURE   RESPIRATORY AEROBIC BACTERIAL CULTURE      Emergency Department Course & Assessments:         Interventions:  Medications   atorvastatin (LIPITOR) tablet 40 mg (has no administration in time range)   sodium bicarbonate tablet 650 mg (has no administration in time range)   montelukast (SINGULAIR) tablet 10 mg (has no administration in time range)   umeclidinium-vilanterol (ANORO ELLIPTA) 62.5-25 MCG/ACT oral inhaler 1 puff (has no administration in time range)   furosemide (LASIX) tablet 40 mg ( Oral Automatically Held 12/11/23 2000)   carvedilol (COREG) tablet 37.5 mg (has no administration in time range)   amLODIPine (NORVASC) tablet 10 mg (has no administration in time range)   citalopram (celeXA) tablet 20 mg (has no administration in time range)   cloNIDine (CATAPRES) half-tab 0.05 mg (has no administration in time range)   clopidogrel (PLAVIX) tablet 75 mg (has no administration in time range)   doxazosin (CARDURA) tablet 1 mg (has no administration in time range)   lidocaine 1 % 0.1-1 mL (has no administration in time range)   lidocaine (LMX4) cream (has no administration in time range)   sodium chloride (PF) 0.9% PF flush 3 mL (has no administration in time range)   sodium chloride (PF) 0.9% PF flush 3 mL (has no administration in time range)   senna-docusate (SENOKOT-S/PERICOLACE) 8.6-50 MG per tablet 1 tablet (has no administration in time range)     Or   senna-docusate (SENOKOT-S/PERICOLACE) 8.6-50 MG per tablet 2 tablet (has no administration in time range)   calcium carbonate (TUMS) chewable tablet 1,000 mg (has no administration in time range)   ipratropium - albuterol 0.5 mg/2.5 mg/3 mL (DUONEB) neb solution 3 mL (has no administration in time range)   albuterol (PROVENTIL) neb solution 2.5 mg (has no administration in time range)   Continuing beta blocker from home medication list OR beta blocker order already  placed during this visit (has no administration in time range)   Reason ACE/ARB/ARNI order not selected (has no administration in time range)   - MEDICATION INSTRUCTIONS - (has no administration in time range)   furosemide (LASIX) injection 40 mg (has no administration in time range)   guaiFENesin-dextromethorphan (ROBITUSSIN DM) 100-10 MG/5ML syrup 10 mL (10 mLs Oral $Given 12/8/23 8959)   benzocaine-menthol (CHLORASEPTIC) 6-10 MG lozenge 1 lozenge (has no administration in time range)   ondansetron (ZOFRAN ODT) ODT tab 4 mg (has no administration in time range)     Or   ondansetron (ZOFRAN) injection 4 mg (has no administration in time range)   acetaminophen (TYLENOL) tablet 650 mg (has no administration in time range)     Or   acetaminophen (TYLENOL) Suppository 650 mg (has no administration in time range)   chlorthalidone (HYGROTON) tablet 25 mg ( Oral Automatically Held 12/11/23 0800)   azithromycin (ZITHROMAX) tablet 250 mg (250 mg Oral $Given 12/8/23 8039)   predniSONE (DELTASONE) tablet 40 mg (40 mg Oral $Given 12/8/23 6189)   cefTRIAXone (ROCEPHIN) 1 g vial to attach to  mL bag for ADULTS or NS 50 mL bag for PEDS (1 g Intravenous $New Bag 12/8/23 5109)   furosemide (LASIX) injection 40 mg (40 mg Intravenous $Given 12/8/23 0940)   perflutren diluted in saline (DEFINITY) injection 2 mL (2 mLs Intravenous $Given 12/8/23 0217)   sodium chloride (PF) 0.9% PF flush 10 mL (10 mLs Intravenous $Given 12/8/23 0819)        Assessments:  0804 I obtained history and examined the patient as noted above.    Independent Interpretation (X-rays, CTs, rhythm strip):  X-ray dependently interpreted.  There is pulmonary edema.    Consultations/Discussion of Management or Tests:  1017 I spoke with Keturah Hawley PA-C, of the hospitalist team, regarding the patient. They will accept the patient for admission.        Disposition:  The patient was admitted to the hospital under the care of Dr. Mel Bob     Impression & Plan       Medical Decision Making:  This patient is an 88-year-old who presents to the emergency department for evaluation of dyspnea worsening over the last few days.  She appears volume overloaded and appears to be in CHF.  She was initiated on diuresis here.  She received nitroglycerin prehospital.  Troponin is elevated but her story is not consistent with acute coronary syndrome.  She received aspirin prehospital.    The patient will be admitted to hospital service.      Diagnosis:    ICD-10-CM    1. Acute respiratory failure with hypoxia (H)  J96.01       2. Acute on chronic diastolic congestive heart failure (H)  I50.33            Scribe Disclosure:  Luda VALERO, am serving as a scribe at 8:34 AM on 12/8/2023 to document services personally performed by Shadi Li MD based on my observations and the provider's statements to me.   12/8/2023   Shadi Li MD McRoberts, Sean Edward, MD  12/08/23 1324

## 2023-12-09 NOTE — CONSULTS
Care Management Initial Consult    General Information  Assessment completed with: Patient, Children,    Type of CM/SW Visit: Initial Assessment    Primary Care Provider verified and updated as needed: Yes   Readmission within the last 30 days: no previous admission in last 30 days      Reason for Consult: discharge planning  Advance Care Planning:            Communication Assessment  Patient's communication style: spoken language (English or Bilingual)             Cognitive  Cognitive/Neuro/Behavioral: WDL                      Living Environment:   People in home: child(alejandro), adult     Current living Arrangements: house      Able to return to prior arrangements: yes       Family/Social Support:  Care provided by: self, child(alejandro), homecare agency  Provides care for: no one, unable/limited ability to care for self  Marital Status:   Children          Description of Support System: Supportive, Involved    Support Assessment: Adequate family and caregiver support    Current Resources:   Patient receiving home care services: Yes     Community Resources: None  Equipment currently used at home:    Supplies currently used at home: None    Employment/Financial:  Employment Status:          Financial Concerns:             Does the patient's insurance plan have a 3 day qualifying hospital stay waiver?  No    Lifestyle & Psychosocial Needs:  Social Determinants of Health     Food Insecurity: Low Risk  (12/5/2023)    Food Insecurity     Within the past 12 months, did you worry that your food would run out before you got money to buy more?: No     Within the past 12 months, did the food you bought just not last and you didn t have money to get more?: No   Depression: Not at risk (8/21/2023)    PHQ-2     PHQ-2 Score: 0   Housing Stability: Low Risk  (12/5/2023)    Housing Stability     Do you have housing? : Yes     Are you worried about losing your housing?: No   Tobacco Use: Medium Risk (12/5/2023)    Patient History      Smoking Tobacco Use: Former     Smokeless Tobacco Use: Never     Passive Exposure: Not on file   Financial Resource Strain: Low Risk  (12/5/2023)    Financial Resource Strain     Within the past 12 months, have you or your family members you live with been unable to get utilities (heat, electricity) when it was really needed?: No   Alcohol Use: Not At Risk (8/14/2023)    AUDIT-C     Frequency of Alcohol Consumption: Never     Average Number of Drinks: Patient does not drink     Frequency of Binge Drinking: Never   Transportation Needs: Low Risk  (12/5/2023)    Transportation Needs     Within the past 12 months, has lack of transportation kept you from medical appointments, getting your medicines, non-medical meetings or appointments, work, or from getting things that you need?: No   Physical Activity: Inactive (8/14/2023)    Exercise Vital Sign     Days of Exercise per Week: 0 days     Minutes of Exercise per Session: 0 min   Interpersonal Safety: Not on file   Stress: No Stress Concern Present (8/14/2023)    [employer] - Occupational Stress Questionnaire     Feeling of Stress : Not at all   Social Connections: Moderately Isolated (8/14/2023)    Social Connection and Isolation Panel [NHANES]     Frequency of Communication with Friends and Family: Twice a week     Frequency of Social Gatherings with Friends and Family: More than three times a week     Attends [employer] Services: More than 4 times per year     Active Member of Clubs or Organizations: No     Attends Club or Organization Meetings: Not on file     Marital Status:        Functional Status:  Prior to admission patient needed assistance:   Dependent ADLs:: Ambulation-walker, Bathing  Dependent IADLs:: Cleaning, Cooking, Transportation  Assesssment of Functional Status: Not at baseline with ADL Functioning    Mental Health Status:          Chemical Dependency Status:                Values/Beliefs:  Spiritual, Cultural  Beliefs, [employer] Practices, Values that affect care:                 Additional Information:    Pt admitted with acute respiratory failure with hypoxia, noted to have unplanned readmission risk of 20%. SW met with pt, daughter in law, and son at the bedside due to elevated risk score. Pt reports that she lives at home with her other son. Pt reports that she is not on O2 at baseline and has a walker at home. Pt receives homecare services through [facility] and they assist with showering 1x per week. Pt daughter in law reports that she is concerned if her homecare is no longer covered under insurance that they would need additional services. Pt daughter in law inquired about services available to pt. SW provided information on private duty homecare as family reports that pt has some assets and money at this time. Provided MA application and EW resources and explained a spend down. Provided [facility] resource guide. Pt/family express understanding of resources and deny any other questions. Pt family will transport at discharge. Placed note in [facility] sticky to verify services.    Plan for discharge to pts sons home with home health through [facility] and family transport. SW following.     TA Neal, SW  Inpatient Care Coordination  Emergency Room /Float  [phone]  Monique Tolentino, [facility]

## 2023-12-09 NOTE — PROGRESS NOTES
"[facility] RCAT     Date: 12/09/23  Admission Dx: CHF/COPD exacerbation  Pulmonary History: COPD, CHF  Home Nebulizer/MDI Use: Pulmicort BID (patient uses up to four times daily). Duoneb Q6. Anoro every day.   Home Oxygen: na  Acuity Level (RCAT flow sheet): 3  Aerosol Therapy initiated: Duoneb changed to QID. Alb Q2PRN. Anoro every day.   Pulmonary Hygiene initiated: cough and deep breathe  Volume Expansion initiated: IS Q2  Current Oxygen Requirements: 3 L Oxymask  Current SpO2: 97%  Re-evaluation date: 12/12  Patient Education: Patient educated on the importance of nebs and how to recognize when PRN is needed.       See \"RT Assessments\" flow sheet for patient assessment scoring and Acuity Level Details.           "

## 2023-12-09 NOTE — PLAN OF CARE
BP (!) 138/35 (BP Location: Right arm, Patient Position: Semi-Smith's, Cuff Size: Adult Regular)   Pulse 60   Temp 98  F (36.7  C) (Oral)   Resp 18   Wt 75 kg (165 lb 6.4 oz)   SpO2 96%   BMI 36.43 kg/m      Pt A&Ox4. A1 GB/W. Denies pain. KWAN. Lung sounds coarse. On 96% 4L oximyzer cannula. On azithromycin and ceftriaxone for PNA. Bumex 1mg q8h, BP high 191/64 - scheduled BP meds given, recheck 138/35. On tele. A paced w/BBB. subcutaneous heparin q8h. PT following. Purwick in place. Blood cultures pending.Trop 82, Cr 2.09, K 4.9, mag 2 - recheck labs in AM. Discharge TBD.       Goal Outcome Evaluation:      Plan of Care Reviewed With: patient    Overall Patient Progress: improvingOverall Patient Progress: improving      Heart Failure Care Map  GOALS TO BE MET BEFORE DISCHARGE:    1. Decrease congestion and/or edema with diuretic therapy to achieve near optimal volume status.     Dyspnea improved: No, further care required to meet this goal. Please explain KWAN   Edema improved: Yes, satisfactory for discharge.        Last 24 hour I/O:   Intake/Output Summary (Last 24 hours) at 12/9/2023 1336  Last data filed at 12/9/2023 1300  Gross per 24 hour   Intake 480 ml   Output 1250 ml   Net -770 ml           Net I/O and Weights since admission:   11/09 1500 - 12/09 1459  In: 480 [P.O.:480]  Out: 1250 [Urine:1000]  Net: -770     Vitals:    12/09/23 0546   Weight: 75 kg (165 lb 6.4 oz)       2.  O2 sats > 90% on room air, or at prior home O2 therapy level.      Able to wean O2 this shift to keep sats above 90%?: No, further care required to meet this goal. Please explain on 4L nasal oxymizer   Does patient use Home O2? No          Current oxygenation status:   SpO2: 96 %     O2 Device: Oxymizer cannula, Oxygen Delivery: 4 LPM    3.  Tolerates ambulation and mobility near baseline.     Ambulation: No, further care required to meet this goal. Please explain PT ordered today.   Times patient ambulated with staff this  shift: 3    Please review the Heart Failure Care Map for additional HF goal outcomes.    Narda Echevarria RN  12/9/2023

## 2023-12-09 NOTE — PROGRESS NOTES
[facility]    Hospitalist Progress Note             Date of Admission:  12/8/2023                   Day of hospitalization: 1    Assessment and Plan:      Arpita Rocha is a 88 year old female with PMH COPD, HFpEF, cardiorenal syndrome, sick sinus syndrome has pacemaker, HTN, CKD stage 4, depression who presents with SOB.      Recently presented to her PCP on 12/05 for shortness of breath and cough. Thought to be due to possible pneumonia, chest x-ray was ordered started on cefdinir and azithromycin. Outpatient CT done on 12/6 notes cardiomegaly, enlarged pulmonary arteries, trace left pleural effusion, mild bibasilar linear opacities may be due to resolving pneumonia and linear atelectasis/scarring.     Presented 12/8 with worsening SOB.  Approximately 2 to 3 weeks ago had an increase in her productive cough and shortness of breath especially with exertion.  States she has a chronic productive cough and shortness of breath with exertion but both of these have worsened recently.  Denies any recent fevers, sinus congestion, sore throat, or upper respiratory illness type symptoms.  Denies any chest pain, palpitations.  No changes with her diet.  Recently moved from [place] in [place] to live with [place]    CXR showed hazy bilateral pulmonary opacities, small bibasilar pleural effusions.   Labs showing bun/creat of 66/2.15, bnp of 5,097, troponin of 83, cbc hemoglobin of 10.6, hematocrit of 33.8, platelets of 154    Patient given IV lasix and admitted for management of acute hypoxic respiratory failure.     Acute hypoxic respiratory failure   Acute on chronic diastolic heart failure exacerbation  Possible COPD exacerbation  Pneumonia   Etiology multifactorial for her symptoms, suspect major component of heart failure. CXR volume overload, recent CT chest 12/6 showed Mild bibasilar linear opacities, may be due to resolving pneumonia and linear  atelectasis/scarring. With productive cough and ongoing hypoxia, changed to IV antibiotics   - hypoxia seems to be improving  - echocardiogram EF 55%, mild mod MR  - continuous pulse ox   - antibiotics changed ceftriaxone and azithromycin, will continue, sputum growing gram positive cocci  - will continue prednisone, suspect will do a short 3-4 day course  - continue duo nebs  - continue pta Singulair, ellipta, budesonide BID  - wean oxygen as tolerated   - change lasix to bumex 1 mg every 8 hours, may consider nephrology input pending renal function and diuresis.   - daily weights, strict I&O    HTN  Sick sinus syndrome  Permanent pacemaker  Cardiorenal syndrome  Follows with cardiology. Saw Dr. Gonzalez in July.   - continue pta amlodipine, coreg, clonidine, plavix. Clonidine increased due to persistent hypertension  - held chlorthalidone while receiving IV bumex     Elevated troponin  - likely NSTEMI type II in the setting of CKD, monitor. Patient without chest pain    CKD stage 4   -creatinine at baseline 2.15-2.74, monitor  -Follows with nephrology outpatient. Stable CKD for several years.  - continue pta sodium bicarbonate   - monitor renal function with diuresis    Macrocytic anemia  -current hemoglobin at 10.7    Deconditioning  - PT/OT     # Code status: DNR/DNI  # Anticipated discharge date and Disposition: 1-2 days  # DVT: Heparin   # IVF: none                       Mel Bob MD  Text Page (7am - 6pm, M-F)               Subjective   Chief Complaint:  Sob  Subjective: +cough, sob improving, no chest pain, no fevers, chills, nausea, vomiting or abdominal pain        Objective   BP (!) 138/35 (BP Location: Right arm, Patient Position: Semi-Smith's, Cuff Size: Adult Regular)   Pulse 60   Temp 98  F (36.7  C) (Oral)   Resp 18   Wt 75 kg (165 lb 6.4 oz)   SpO2 96%   BMI 36.43 kg/m       Physical Exam  General: Pt in NAD, normal appearance  HEENT: OP clear MMM, no JVD  Lungs: Clear to Auscultation  "Bilateral, normal breathing  without accessory muscle usage, no wheezing, rhonchi or crackles  Cardiac: +S1, S2, RRR, +murmur, +2 edema  Abdominal: normal bowel sounds, NT/ND  Skin: warm, dry, normal turgor, no rash  Psyche: A& O x3, appropriate affect             Intake/Output Summary (Last 24 hours) at 12/9/2023 1314  Last data filed at 12/9/2023 1200  Gross per 24 hour   Intake --   Output 1250 ml   Net -1250 ml           Labs and Imaging Results:      Recent Labs   Lab 12/09/23  0543 12/08/23  0812   WBC 4.0 6.7   HGB 10.7* 10.6*    164        Recent Labs   Lab 12/09/23  0543 12/08/23  0812    145   CO2 22 21*   BUN 62.8* 66.1*      No results for input(s): \"INR\", \"PTT\" in the last 168 hours.   No results for input(s): \"CKMB\" in the last 168 hours.    Invalid input(s): \"TROPONINT\"     Recent Labs   Lab 12/08/23 0812 12/05/23  0858   ALBUMIN 4.0 3.8   AST 25 24   ALT 14 16   ALKPHOS 77 81        Micro:     Radio:  US Lower Extremity Venous Duplex Right   Final Result   IMPRESSION:   No deep venous thrombosis in the right lower extremity.      Echocardiogram Complete   Final Result      XR Chest Port 1 View   Final Result   IMPRESSION: Interval increased hazy bilateral pulmonary opacities that   may be pulmonary edema. Mild cardiomegaly. Small bibasilar pleural   fluid may be just slightly increased as well. Correlate with CHF.   Pneumonia not excluded. Stable spine fusion. Stable left chest   pacemaker.      BONY SMITH MD            SYSTEM ID:  [id number]              Medications:      Scheduled Meds:     amLODIPine  10 mg Oral Daily    atorvastatin  40 mg Oral Daily    azithromycin  250 mg Oral Daily    bumetanide  1 mg Intravenous Q8H    carvedilol  37.5 mg Oral BID w/meals    cefTRIAXone  1 g Intravenous Q24H    [Held by provider] chlorthalidone  25 mg Oral Daily    citalopram  20 mg Oral Daily    cloNIDine  0.1 mg Oral BID    clopidogrel  75 mg Oral Daily    doxazosin  1 mg Oral At Bedtime    " [Held by provider] furosemide  40 mg Oral BID    ipratropium - albuterol 0.5 mg/2.5 mg/3 mL  3 mL Nebulization 4x daily    montelukast  10 mg Oral At Bedtime    predniSONE  40 mg Oral Daily    sodium bicarbonate  650 mg Oral BID    sodium chloride (PF)  3 mL Intracatheter Q8H    umeclidinium-vilanterol  1 puff Inhalation Daily     Continuous Infusions:     - MEDICATION INSTRUCTIONS -      - MEDICATION INSTRUCTIONS -      ACE/ARB/ARNI NOT PRESCRIBED       PRN Meds:  - MEDICATION INSTRUCTIONS -, acetaminophen **OR** acetaminophen, albuterol, sore throat, calcium carbonate, - MEDICATION INSTRUCTIONS -, guaiFENesin-dextromethorphan, hydrALAZINE, lidocaine 4%, lidocaine (buffered or not buffered), ondansetron **OR** ondansetron, ACE/ARB/ARNI NOT PRESCRIBED, senna-docusate **OR** senna-docusate, sodium chloride (PF)

## 2023-12-09 NOTE — PLAN OF CARE
Heart Failure Care Map  GOALS TO BE MET BEFORE DISCHARGE:    1. Decrease congestion and/or edema with diuretic therapy to achieve near optimal volume status.     Dyspnea improved: No, further care required to meet this goal. Please explain KWAN   Edema improved: Yes, satisfactory for discharge.        Last 24 hour I/O:   Intake/Output Summary (Last 24 hours) at 12/9/2023 0433  Last data filed at 12/8/2023 1842  Gross per 24 hour   Intake --   Output 100 ml   Net -100 ml           Net I/O and Weights since admission:   11/09 0700 - 12/09 0659  In: -   Out: 100   Net: -100   There were no vitals filed for this visit.    2.  O2 sats > 90% on room air, or at prior home O2 therapy level.      Able to wean O2 this shift to keep sats above 90%?: No, further care required to meet this goal. Please explain 4 L   Does patient use Home O2? No          Current oxygenation status:   SpO2: 93 %     O2 Device: Oxymask, Oxygen Delivery: 4 LPM    3.  Tolerates ambulation and mobility near baseline.     Ambulation: No, further care required to meet this goal. Please explain not out of bed   Times patient ambulated with staff this shift: 0    Please review the Heart Failure Care Map for additional HF goal outcomes.    Lore Angulo RN  12/9/2023      A&O. VSS. Denies pain. Remains on 4 L O2 NC. LS caorse/ expiratory wheezes. Trace BLE edema. Purewick in place. Ileostomy. 1800 ml fluid restriction. IV lasix, Zithromax and Rocephin.

## 2023-12-09 NOTE — PLAN OF CARE
Shift : 0763-4188  BP (!) 143/48 (BP Location: Right arm)   Pulse 62   Temp 98.1  F (36.7  C) (Oral)   Resp 20   SpO2 93%       Shift Summary; Occurences, Discussions & Interventions of Note : Monitored pt wheezing. Pt reports respiratory effort is significantly improved. Pt declined PM neb until request, pt also declined PRN expectorant.  Pt has a congested cough, I did a 1 time wand suction on pt to help pull some secretions out. Pt is still pending on a sputum sample, previous sample contaminated with saliva. Pt offered to collect sample, cup left with pt.     Assessment Findings of Note : Expt wheeze. Redness chandni on coccyx.   Tele: 1st degree AV block.       Goal Outcome Evaluation:      Plan of Care Reviewed With: patient    Overall Patient Progress: improvingOverall Patient Progress: improving

## 2023-12-10 NOTE — PLAN OF CARE
VSS on 5L NC. Pt A/O. Infrequent productive cough. LS clear and Dim. AP irreg. Trace BLE edema. Tele 100% A paced, BBB, prolonged QT. Purewick at night. 2gm NA diet. One assist GB and W. Wean from oxygen and plan to discharge to pts sons home with home health through [facility] and family transport

## 2023-12-10 NOTE — PLAN OF CARE
BP (!) 153/51 (BP Location: Left arm)   Pulse 60   Temp 97.8  F (36.6  C) (Oral)   Resp 20   Wt 75.7 kg (166 lb 14.2 oz)   SpO2 96%   BMI 36.76 kg/m      Pt A&Ox4. Denies pain. A1 GB/W. On tele. A paced w/BBB on Plavix. Lung sounds coarse and wheezes. On 5L NC. Chest xray done today - no changes. On prednisone, Doxycycline q12h, rocephin q24h, QID duonebs, heparin subcu q8h, Bumex dose increased 2mg q8h. Nephrology consulted today. PT following. 1800 FR. 2gm NA diet. Discharge TBD.        Goal Outcome Evaluation:      Plan of Care Reviewed With: patient    Overall Patient Progress: no changeOverall Patient Progress: no change

## 2023-12-10 NOTE — PLAN OF CARE
Shift : 2182-8487  BP (!) 178/52 (BP Location: Left arm)   Pulse 67   Temp 98.1  F (36.7  C)   Resp 18   Wt 75 kg (165 lb 6.4 oz)   SpO2 97%   BMI 36.43 kg/m      Shift Summary; Occurences, Discussions & Interventions of Note : Pt hypertensive, scheduled coreg given, recheck; , pt denied pain, SOB. No wheezing with the use of scheduled neb treatment. Pt ambulating well with Assist of 1, GB and Walker. Gauze placed around NC at point of contact around ears.   Pt coughing is now infrequent compared to frequent cough yesterday, showing improvement.     Assessment Findings of Note : Redness chandni of coccyx. Protective mep applied, purwic in to manage incontinence, pt tolerating well. Redness/rash in folds of ari area, but pt is without redness at site of purwix.    Considerations : Repo. Encourage pt rest tonight!    Plan : Continue oral abx. Continue neb treatment.       Goal Outcome Evaluation:      Plan of Care Reviewed With: patient    Overall Patient Progress: improvingOverall Patient Progress: improving

## 2023-12-10 NOTE — CONSULTS
[facility]    Nephrology Consultation     Date of Admission:  12/8/2023    Assessment & Plan     Arpita Rocha is a 88 year old female who was admitted on 12/8/2023.     Assessment:  1) CKD stage IV:    Likely related to age, HTN, underlying co-morbidities. Stable in stage IV for prolonged time. Follows with [facility] Dr. Mclain, last visit 8/23. Last change at that visit was adding sodium bicarb to regimen. Also referred to anemia clinic with iron replete status, ongoinig anemia around 10.0 Hgb. Minimally proteinuric.     Current GFR in baseline range.     2) Hypervolemia:   lasix 40mg BID at baseline.  Patient with some possible mild hypervolemia based on chest imaging and exam.  And patient was given 40 mg Lasix in the ER, 40 mg IV every 12 and then switched to Bumex 1 mg every 8 hours.  The change to Bumex did not increase intensity of the loop diuretic dosing overall.  Can continue to treat the hypervolemia although I do not think this is much of her presentation related to the dyspnea.  Much more infectious process or COPD with the wheezing are the predominant factors here.     Other notable comorbidities of COPD with exacerbation, possible pneumonia, history of HFpEF.  Does have chronic anemia and stable with hemoglobin 10.7.    Plan/Recs:  1) increasing Bumex to 2 mg IV every 8 hours for today, readdress tomorrow  2) Daily BMPs, I's and O's, daily weights  3) will follow    Donnie Zhang DO  [facility] - Nephrology  [phone]  --------------------------------------------------------------------------------------------  Reason for Consult     I was asked to see the patient for CKD stage IV, diuretic management      History is obtained from the patient and chart review.      History of Present Illness     Arpita Rocha is a 88 year old female who presents with dyspnea.  Patient with known chronic kidney disease stage IV, cardiorenal syndrome with preserved ejection  fraction as well as COPD.  Cylinder to have recent infectious process and admitted with worsening dyspnea.  Treating for pneumonia, COPD exacerbation as well as acute diastolic heart failure.  Imaging with some possible mild hypovolemia.    Given 40mg IV lasix in ED, then 40mg q 12 hrs and now 1mg bumex q 8 hrs. 1000 ml urine yesterday, 800 so far today. Weight at 75.7kg (167lbs). Weight 8/23 at 167 lbs, home weihgts reported 168lbs. patient moved 5 months ago to live with daughter.  Came here on Lasix 20 mg daily and chlorthalidone 25 mg daily.  Transferred and establish care with nephrology with [place] and initial visit the chlorthalidone was stopped, Lasix increased to 40 mg daily.  This has subsequent been increased to 40 mg twice daily which is the PTA dose.    12/8 TTE: normal IVF size with preserved respiratory variability. Chest CT 12/6 with trace pleural left effusion. Admission CXR with some increased effusion, possible pulm edema.     Today the patient remains with dyspnea, productive cough which is unchanged over last few weeks.  Is at her baseline weight and does not feel she has any significant edema.    Past Medical History   I have reviewed this patient's medical history and updated it with pertinent information if needed.   Past Medical History:   Diagnosis Date    Ulcerative pancolitis (H) 05/23/2023       Past Surgical History   I have reviewed this patient's surgical history and updated it with pertinent information if needed.  Past Surgical History:   Procedure Laterality Date    CATARACT EXTRACTION Bilateral     COLONOSCOPY      FEMUR FRACTURE SURGERY Right     2010    ILEOSTOMY      IMPLANT PACEMAKER      left knee replacement      MULTIPLE TOOTH EXTRACTIONS      right knee replacement Right     SPINAL FUSION      TONSILLECTOMY      TUBAL LIGATION         Prior to Admission Medications   Prior to Admission Medications   Prescriptions Last Dose Informant Patient Reported? Taking?   Cranberry  450 MG TABS   Yes No   Ostomy Supplies (ADAPT) PSTE   No No   Sig: Use with new ostomy pouch and drain PRN   Ostomy Supplies (PREMIER DRAINABLE POUCH 22MM) Pouch MISC   No No   Sig: Use as directed every 3-4 days   acetaminophen (TYLENOL) 650 MG CR tablet 12/7/2023 at am  Yes Yes   Sig: Take 1,300 mg by mouth every morning   amLODIPine (NORVASC) 10 MG tablet 12/7/2023 at am  No Yes   Sig: Take 1 tablet (10 mg) by mouth daily   atorvastatin (LIPITOR) 40 MG tablet 12/7/2023 at am  No Yes   Sig: Take 1 tablet (40 mg) by mouth daily   azithromycin (ZITHROMAX Z-KELL) 250 MG tablet 12/7/2023  Yes Yes   Sig: Take 250-500 mg by mouth See Admin Instructions Take 2 tablets (500 mg) by mouth daily for day 1, then 1 tablet (250 mg) daily for 4 days   budesonide (PULMICORT) 0.5 MG/2ML neb solution 12/7/2023 at x 1  No Yes   Sig: Take 2 mLs (0.5 mg) by nebulization 2 times daily   Patient taking differently: Take 0.5 mg by nebulization 2 times daily Pt uses up to 4 times daily   carvedilol (COREG) 25 MG tablet 12/7/2023 at x 1  No Yes   Sig: Take 1.5 tablets (37.5 mg) by mouth 2 times daily (with meals)   cefdinir (OMNICEF) 300 MG capsule 12/7/2023 at x 1  No Yes   Sig: Take 1 capsule (300 mg) by mouth daily for 10 days   chlorthalidone (HYGROTON) 25 MG tablet 12/7/2023 at am\  Yes Yes   Sig: Take 25 mg by mouth daily   citalopram (CELEXA) 20 MG tablet 12/7/2023 at am  No Yes   Sig: TAKE 1.5 TAB BY MOUTH EVERY DAY   cloNIDine (CATAPRES) 0.1 MG tablet 12/7/2023 at x 1  No Yes   Sig: Take 0.5 tablets (0.05 mg) by mouth 2 times daily   clopidogrel (PLAVIX) 75 MG tablet 12/7/2023 at am  No Yes   Sig: Take 1 tablet (75 mg) by mouth daily   doxazosin (CARDURA) 1 MG tablet 12/7/2023 at hs  No Yes   Sig: Take 1 tablet (1 mg) by mouth At Bedtime   doxycycline hyclate (VIBRAMYCIN) 100 MG capsule 12/6/2023 at am  No No   Sig: Take 1 capsule (100 mg) by mouth three times a week   ferrous sulfate (FEROSUL) 325 (65 Fe) MG tablet 12/7/2023 at am   No Yes   Sig: Take 2 tablets (650 mg) by mouth daily (with breakfast)   furosemide (LASIX) 40 MG tablet 12/7/2023 at m  No Yes   Sig: Take 1 tablet (40 mg) by mouth 2 times daily   ipratropium - albuterol 0.5 mg/2.5 mg/3 mL (DUONEB) 0.5-2.5 (3) MG/3ML neb solution 12/7/2023 at x 4  No Yes   Sig: Take 1 vial (3 mLs) by nebulization every 6 hours   ketoconazole (NIZORAL) 2 % external cream 12/7/2023 at x 2  No Yes   Sig: Apply topically 2 times daily   montelukast (SINGULAIR) 10 MG tablet 12/7/2023 at hs  No Yes   Sig: Take 1 tablet (10 mg) by mouth At Bedtime   multivitamin w/minerals (THERA-VIT-M) tablet 12/7/2023 at am  Yes Yes   Sig: Take 1 tablet by mouth daily   nystatin (MYCOSTATIN) 367330 UNIT/GM external powder Past Week  No Yes   Sig: Apply topically 3 times daily as needed for other (rash)   sodium bicarbonate 650 MG tablet 12/8/2023 at x 1  No Yes   Sig: Take 1 tablet (650 mg) by mouth 2 times daily for 180 days   umeclidinium-vilanterol (ANORO ELLIPTA) 62.5-25 MCG/ACT oral inhaler 12/8/2023 at am  No Yes   Sig: Inhale 1 puff into the lungs daily      Facility-Administered Medications: None     Allergies   Allergies   Allergen Reactions    Acetaminophen-Codeine     Penicillin G        Social History   I have reviewed this patient's social history and updated it with pertinent information if needed. Arpita Rocha  reports that she quit smoking about 27 years ago. Her smoking use included cigarettes. She started smoking about 71 years ago. She has a 44.00 pack-year smoking history. She has never used smokeless tobacco. She reports that she does not currently use alcohol. She reports that she does not use drugs.    Family History   I have reviewed this patient's family history and updated it with pertinent information if needed.   Family History   Problem Relation Age of Onset    Cerebrovascular Disease Mother     Diabetes Mother     Hypertension Mother     Cancer Mother     Hypertension Father      "Cerebrovascular Disease Sister     Hypertension Sister     Hypertension Sister     Breast Cancer Sister     Dementia Sister     Hypertension Brother     Cancer Brother     Hypertension Brother     Cancer Brother     Emphysema Brother        Review of Systems   The 10 point Review of Systems is negative other than noted in the HPI.     Physical Exam   Temp: 97.8  F (36.6  C) Temp src: Oral BP: (!) 153/51 Pulse: 61   Resp: 18 SpO2: 94 % O2 Device: Nasal cannula Oxygen Delivery: 5 LPM  Vital Signs with Ranges  Temp:  [97.7  F (36.5  C)-98.1  F (36.7  C)] 97.8  F (36.6  C)  Pulse:  [61-67] 61  Resp:  [18] 18  BP: (149-188)/(51-68) 153/51  SpO2:  [94 %-98 %] 94 %  166 lbs 14.21 oz    GENERAL: healthy, alert, NAD, sitting in chair no distress  HEENT:  Normocephalic. No gross abnormalities.  Pupils equal.    CV: RRR, 1/6 systolic murmur trace dependent, no clicks, gallops, or rubs, trace dependent edema  RESP: Clear bilaterally with good efforts  GI: Abdomen soft/nt/nd, BS normal. No masses, organomegaly  MUSCULOSKELETAL: extremities nl - no gross deformities noted  SKIN: no suspicious lesions or rashes, dry to touch  NEURO: Grossly nonfocal  PSYCH: mood good, affect appropriate    Data   BMP  Recent Labs   Lab 12/10/23  0623 12/09/23  0543 12/08/23  0812 12/05/23  0858    140 145 145   POTASSIUM 4.7 4.9 4.7 5.4*   CHLORIDE 105 106 109* 110*   CANELO 8.3* 8.6* 8.7* 9.4   CO2 23 22 21* 22   BUN 77.3* 62.8* 66.1* 63.6*   CR 2.18* 2.06* 2.15* 2.49*   GLC 96 118* 123* 98     Phos@[email or web address](phos:4)  CBC)  Recent Labs   Lab 12/10/23  0623 12/09/23  0543 12/08/23  0812 12/05/23  0858   WBC 6.1 4.0 6.7 5.1   HGB 10.7* 10.7* 10.6* 10.9*   HCT 33.7* 33.8* 33.5* 33.6*   * 105* 105* 103*    154 164 157     Recent Labs   Lab 12/08/23  0812   AST 25   ALT 14   ALKPHOS 77   BILITOTAL 0.3     No lab results found in last 7 days.  No results found for: \"D2VIT\", \"D3VIT\", \"DTOT\"  Recent Labs   Lab 12/10/23  0623   HGB 10.7* " "  HCT 33.7*   *     No results for input(s): \"PTHI\" in the last 168 hours.    "

## 2023-12-10 NOTE — PROGRESS NOTES
[facility]    Hospitalist Progress Note             Date of Admission:  12/8/2023                   Day of hospitalization: 2    Assessment and Plan:      Arpita Rocha is a 88 year old female with PMH COPD, HFpEF, cardiorenal syndrome, sick sinus syndrome has pacemaker, HTN, CKD stage 4, depression who presents with SOB.      Recently presented to her PCP on 12/05 for shortness of breath and cough. Thought to be due to possible pneumonia, chest x-ray was ordered started on cefdinir and azithromycin. Outpatient CT done on 12/6 notes cardiomegaly, enlarged pulmonary arteries, trace left pleural effusion, mild bibasilar linear opacities may be due to resolving pneumonia and linear atelectasis/scarring.     Presented 12/8 with worsening SOB.  Approximately 2 to 3 weeks ago had an increase in her productive cough and shortness of breath especially with exertion.  States she has a chronic productive cough and shortness of breath with exertion but both of these have worsened recently.  Denies any recent fevers, sinus congestion, sore throat, or upper respiratory illness type symptoms.  Denies any chest pain, palpitations.  No changes with her diet.  Recently moved from [place] in [place] live [place]    CXR showed hazy bilateral pulmonary opacities, small bibasilar pleural effusions.   Labs showing bun/creat of 66/2.15, bnp of 5,097, troponin of 83, cbc hemoglobin of 10.6, hematocrit of 33.8, platelets of 154    Patient given IV lasix and admitted for management of acute hypoxic respiratory failure.     Acute hypoxic respiratory failure   Acute on chronic diastolic heart failure exacerbation  COPD exacerbation  Pneumonia   Etiology multifactorial for her symptoms, suspect major component of heart failure, with COPD, CXR volume overload, recent CT chest 12/6 showed Mild bibasilar linear opacities, may be due to un resolving pneumonia and linear  atelectasis/scarring. Chest xray today, shows persistent findings of pulmonary edema  -continue IV antibiotics  - sputum growing gram positive cocci  - hypoxia stagnant at 5L, symptomatically improving  - echocardiogram EF 55%, mild mod MR  - continuous pulse ox   - today switched azithromycin to doxycycline due to QT prolongation.  - will continue prednisone  - continue duo nebs  - continue pta Singulair, ellipta, budesonide BID  - wean oxygen as tolerated   - change lasix to bumex 1 mg every 8 hours, nephrology consulted due to CKD with difficult diuresis, they have increased it to Bumex 2 every 8 hours. i  - daily weights, strict I&O    HTN  Sick sinus syndrome  Permanent pacemaker  Cardiorenal syndrome  Follows with cardiology. Saw Dr. Gonzalez in July.   - continue pta amlodipine, coreg, clonidine, plavix.   - held chlorthalidone while receiving IV bumex     Elevated troponin  - likely NSTEMI type II in the setting of CKD, monitor. Patient without chest pain    CKD stage 4   -creatinine at baseline 2.15-2.74, monitor  -Follows with nephrology outpatient. Stable CKD for several years.  - continue pta sodium bicarbonate   - monitor renal function with diuresis  - nephrology consulted, increased Bumex to 2 mg every 8 hours for today    Macrocytic anemia  -current hemoglobin at 10.7    Deconditioning  - PT/OT- home with home care physical therapy    # Code status: DNR/DNI  # Anticipated discharge date and Disposition: 1-2 days  # DVT: Heparin   # IVF: none                       Mel Bob MD  Text Page (7am - 6pm, M-F)               Subjective   Chief Complaint:  Sob  Subjective: +cough, sob improving, no chest pain, no fevers, chills, nausea, vomiting or abdominal pain. Asked when can be discharged.        Objective   BP (!) 153/51 (BP Location: Left arm)   Pulse 60   Temp 97.8  F (36.6  C) (Oral)   Resp 20   Wt 75.7 kg (166 lb 14.2 oz)   SpO2 96%   BMI 36.76 kg/m       Physical Exam  General: Pt in NAD,  "normal appearance  HEENT: OP clear MMM, no JVD  Lungs:coarse breath sounds, +wheezing, normal breathing  without accessory muscle usage, no wheezing, rhonchi  Cardiac: +S1, S2, RRR, +murmur, +1 edema  Abdominal: normal bowel sounds, NT/ND  Skin: warm, dry, normal turgor, no rash  Psyche: A& O x3, appropriate affect             Intake/Output Summary (Last 24 hours) at 12/9/2023 1314  Last data filed at 12/9/2023 1200  Gross per 24 hour   Intake --   Output 1250 ml   Net -1250 ml           Labs and Imaging Results:      Recent Labs   Lab 12/10/23  0623 12/09/23  0543   WBC 6.1 4.0   HGB 10.7* 10.7*    154        Recent Labs   Lab 12/10/23  0623 12/09/23  0543    140   CO2 23 22   BUN 77.3* 62.8*      No results for input(s): \"INR\", \"PTT\" in the last 168 hours.   No results for input(s): \"CKMB\" in the last 168 hours.    Invalid input(s): \"TROPONINT\"     Recent Labs   Lab 12/08/23  0812 12/05/23  0858   ALBUMIN 4.0 3.8   AST 25 24   ALT 14 16   ALKPHOS 77 81        Micro:     Radio:  XR Chest Port 1 View   Final Result   IMPRESSION: No significant interval change. Stable cardiac enlargement and pulmonary venous congestion. Predominantly interstitial opacities in both mid and lower lungs again suggest pulmonary edema, although an infectious process could also have this    appearance Small bilateral pleural effusions.. Dual-lead cardiac device left chest wall is unchanged. Aortic calcification. Postoperative changes of spinal marine placement throughout the visualized thoracolumbar spine. No pneumothorax.         US Lower Extremity Venous Duplex Right   Final Result   IMPRESSION:   No deep venous thrombosis in the right lower extremity.      Echocardiogram Complete   Final Result      XR Chest Port 1 View   Final Result   IMPRESSION: Interval increased hazy bilateral pulmonary opacities that   may be pulmonary edema. Mild cardiomegaly. Small bibasilar pleural   fluid may be just slightly increased as well. " Correlate with CHF.   Pneumonia not excluded. Stable spine fusion. Stable left chest   pacemaker.      BONY SMITH MD            SYSTEM ID:  [id number]              Medications:      Scheduled Meds:     amLODIPine  10 mg Oral Daily    atorvastatin  40 mg Oral Daily    bumetanide  2 mg Intravenous Q8H    carvedilol  37.5 mg Oral BID w/meals    cefTRIAXone  1 g Intravenous Q24H    [Held by provider] chlorthalidone  25 mg Oral Daily    citalopram  20 mg Oral Daily    cloNIDine  0.05 mg Oral BID    clopidogrel  75 mg Oral Daily    doxazosin  1 mg Oral At Bedtime    doxycycline hyclate  100 mg Oral Q12H [facility] (08/20)    [Held by provider] furosemide  40 mg Oral BID    heparin ANTICOAGULANT  5,000 Units Subcutaneous Q8H    ipratropium - albuterol 0.5 mg/2.5 mg/3 mL  3 mL Nebulization 4x daily    montelukast  10 mg Oral At Bedtime    predniSONE  40 mg Oral Daily    sodium bicarbonate  650 mg Oral BID    sodium chloride (PF)  3 mL Intracatheter Q8H    umeclidinium-vilanterol  1 puff Inhalation Daily     Continuous Infusions:     - MEDICATION INSTRUCTIONS -      - MEDICATION INSTRUCTIONS -      ACE/ARB/ARNI NOT PRESCRIBED       PRN Meds:  - MEDICATION INSTRUCTIONS -, acetaminophen **OR** acetaminophen, albuterol, sore throat, calcium carbonate, - MEDICATION INSTRUCTIONS -, guaiFENesin-dextromethorphan, hydrALAZINE, lidocaine 4%, lidocaine (buffered or not buffered), ondansetron **OR** ondansetron, ACE/ARB/ARNI NOT PRESCRIBED, senna-docusate **OR** senna-docusate, sodium chloride (PF)

## 2023-12-10 NOTE — PLAN OF CARE
Shift : 8088-0493  /46 (BP Location: Right arm, Patient Position: Semi-Smith's, Cuff Size: Adult Regular)   Pulse 61   Temp 97.8  F (36.6  C) (Oral)   Resp 22   Wt 75.7 kg (166 lb 14.2 oz)   SpO2 97%   BMI 36.76 kg/m      Shift Summary; Occurences, Discussions & Interventions of Note : Pt reports declining today in terms of her lungs. Minerva is more conjested, and with more coughing than yesterday. Specifically I assessed that she is more course on the R Upper anterior lung than the L. Expiratory wheeze posterior. Pt on scheduled nebs. Periodic suctioning to assist with with mucous expectoration.  Alternatively, Minerva's dependent edema is significant't improved, only scant edema of the ankles.     Assessment Findings of Note : Expiratory coarseness. Wheeze.  Redness on coccyx, protective mep in place. Dry skin of heels, lotion applied.     Goal Outcome Evaluation:      Plan of Care Reviewed With: patient    Overall Patient Progress: no changeOverall Patient Progress: no change    Outcome Evaluation: Pt reports declining today in terms of her lungs. Minerva is more conjested, and with more coughing than yesterday. Alternatively, Minerva's dependent edema is significant't improved, only scant edema of the ankles.

## 2023-12-10 NOTE — PROGRESS NOTES
12/10/23 0828   Appointment Info   Signing Clinician's Name / Credentials (PT) Jacqueline Granados DPT   Living Environment   People in Home child(alejandro), adult   Current Living Arrangements house   Home Accessibility stairs within home   Number of Stairs, Within Home, Primary eight   Stair Railings, Within Home, Primary railings on both sides of stairs   Transportation Anticipated family or friend will provide   Living Environment Comments Lives in split level home with son and DIL; no steps to enter at garage door but then x8 steps to access living level; uses 2WW for all mobility, family provide assist on stairs for safety, receives assist x1/wk with showers, IND with dressing and toileting; family provide all IADLs   Self-Care   Usual Activity Tolerance moderate   Current Activity Tolerance poor   Equipment Currently Used at Home walker, rolling;shower chair   Fall history within last six months no   Activity/Exercise/Self-Care Comment currently receiving home PT/OT   General Information   Onset of Illness/Injury or Date of Surgery 12/08/23   Referring Physician Mel Bob MD   Patient/Family Therapy Goals Statement (PT) to go home soon   Pertinent History of Current Problem (include personal factors and/or comorbidities that impact the POC) 88 year old female with PMH COPD, HFpEF, cardiorenal syndrome, sick sinus syndrome has pacemaker, HTN, CKD stage 4, depression who presents with SOB; found to have PNA   Existing Precautions/Restrictions oxygen therapy device and L/min  (currently on 5L at PT eval, baseline RA)   General Observations supine, NAD, asking for assist to boost up in bed to eat breakfast which is on its way   Cognition   Affect/Mental Status (Cognition) WFL   Orientation Status (Cognition) oriented x 3   Follows Commands (Cognition) WFL   Cognitive Status Comments [place], ensure hearing aides in   Pain Assessment   Patient Currently in Pain No   Integumentary/Edema   Integumentary/Edema  Comments see RN assessment   Posture    Posture Forward head position;Protracted shoulders;Kyphosis   Range of Motion (ROM)   Range of Motion ROM is WFL   Strength (Manual Muscle Testing)   Strength (Manual Muscle Testing) Deficits observed during functional mobility   Strength Comments decreased activity tolerance   Bed Mobility   Bed Mobility supine-sit   Supine-Sit Greer (Bed Mobility) supervision   Assistive Device (Bed Mobility) bed rails  (HOB slightly elevated)   Comment, (Bed Mobility) cues to avoid breath holding   Transfers   Transfers sit-stand transfer   Sit-Stand Transfer   Sit-Stand [place] (Transfers) contact guard   Assistive Device (Sit-Stand Transfers) walker, front-wheeled   Comment, (Sit-Stand Transfer) cues to avoid pulling to walker   Gait/Stairs (Locomotion)   Greer Level (Gait) contact guard   Assistive Device (Gait) walker, front-wheeled   Distance in Feet (Gait) 4'   Pattern (Gait) step-through   Comment, (Gait/Stairs) amb only to bedside chair, O2 sats on 5L dropped to 85%   Balance   Balance Comments Good static sitting and standing balance   Clinical Impression   Criteria for Skilled Therapeutic Intervention Yes, treatment indicated   PT Diagnosis (PT) decreased functional mobility   Influenced by the following impairments weakness, decreased activity tolerance   Functional limitations due to impairments bed mobility, transfers, ambulation, stair climbing   Clinical Presentation (PT Evaluation Complexity) stable   Clinical Presentation Rationale clinical judgement   Clinical Decision Making (Complexity) low complexity   Planned Therapy Interventions (PT) balance training;bed mobility training;gait training;home exercise program;neuromuscular re-education;patient/family education;ROM (range of motion);stair training;strengthening;transfer training;progressive activity/exercise;risk factor education;home program guidelines   Risk & Benefits of therapy have been explained  evaluation/treatment results reviewed;care plan/treatment goals reviewed;risks/benefits reviewed;current/potential barriers reviewed;participants voiced agreement with care plan;participants included;patient   PT Total Evaluation Time   PT Eval, Low Complexity Minutes (50833) 14   Physical Therapy Goals   PT Frequency Daily   PT Predicted Duration/Target Date for Goal Attainment 12/17/23   PT Goals Bed Mobility;Transfers;Gait;Stairs   PT: Bed Mobility   (no bed mobility goal  written as pt reports she sleeps in recliner)   PT: Transfers Supervision/stand-by assist;Sit to/from stand;Bed to/from chair;Assistive device   PT: Gait Supervision/stand-by assist;Rolling walker;50 feet   PT: Stairs Minimal assist;8 stairs;Rail on both sides   Interventions   Interventions Quick Adds   (no tx initiated d/t pt fatigued  with transfer to chair and wanting to wait for more activity until after breakfast)   PT Discharge Planning   PT Plan progress IND with transfers, inc amb distance; trial stairs  when able, monitor O2 sats with activity   PT Discharge Recommendation (DC Rec) home with assist;home with home care physical therapy   PT Rationale for DC Rec Anticipate with further medical management, pt will be able to return home with family assist and resumption of home therapy.  Pt states her son works from home and always has  family assist on stairs and with IADLs, rec family resume this prior level of assist at pt's discharge   PT Brief overview of current status CGA with 2WW, short distance ambulation, monitor O2 sats   PT Equipment Needed at Discharge   (has appropriate equip already)   Total Session Time   Total Session Time (sum of timed and untimed services) 14

## 2023-12-11 NOTE — CONSULTS
Consulted to run a test claim for Entresto & SGLT2-I drugs.    Patient has pharmacy benefits through [employer] Medicare Part D with $280 RX deductible. This insurance plan is not contracted with [facility]--patient will have to receive medications from other preferred/home pharmacy. Per insurance, the following are covered and preferred under the patient's plans:     Before Deductible Met After Deductible Met Coverage Gap/[place] Catastrophic Coverage   Entresto* $438/mo $158/mo $164/mo $0   Farxiga* $414/mo $134/mo $139/mo $0   Jardiance** $420/mo $140/mo $146/mo $0        The following are not covered:  Invokana*  Steglatro    *Free first month voucher/fill available at discharge pharmacy for Entresto, Farxiga, & Invokana.  **Free 14-day voucher/fill available at discharge pharmacy for Jardiance.    Please feel free to contact me with any other test claims, prior authorizations, or insurance questions regarding outpatient medications.     Thanks!      Jackie Chand CPhT  Discharge Pharmacy Liaison  [facility]  Pronouns: She/Her/Hers  (covering for Chrissy Lyles/Riki Discharge Pharmacy Liaison)    Securely message with Vocera, Epic Secure Chat, or [email or web address]  Phone: [phone]  Fax: [phone]  [email or web address]

## 2023-12-11 NOTE — PROGRESS NOTES
Assessment and Plan:     CKD-4: associated with HT. Follows at  with Dr. Mclain. Seen by Dr. Zhang 12/10/23 after presenting with shortness of breath. Being treated with IV bumex.     Cr: 2.06 > 2.18 > 2.32. eGFR 20. Na 140, K 4.5, HCO3 23.   I/O 1040/2000.     Date/Time Weight Weight Method   12/11/23 0618 75.2 kg (165 lb 11.2 oz) Standing scale   12/10/23 0658 75.7 kg (166 lb 14.2 oz) Bed scale   12/09/23 0546 75 kg (165 lb 6.4 oz)            On oral bicarbonate. Will change bumex to po.     The patient tells me she does not want dialysis at any time in the future. We agreed that we will manage her medically.      Interval History:   Hypertension: remains hypertensive. On amlodipine, bumex IV, coreg, clonidine, doxazosin. Will stop doxazosin given risk of orthostatic hypotension. Increase clonidine dose.     Anemia:  Hgb 10.1. B12 and folate nl 6/23. Iron and Tsat nl 8/23. Ferritin elevated 8.23. Will treat with EPO as needed for Hgb > 10.0.     Resp distress: pt on rocephin, doxycycline.               Review of Systems:   Less short of breath today.  She has some dyspnea on exertion but not when she is at rest.  She is eating well without any nausea or vomiting.  She is sleeping well.          Medications:      amLODIPine  10 mg Oral Daily    atorvastatin  40 mg Oral Daily    bumetanide  2 mg Intravenous Q8H    carvedilol  37.5 mg Oral BID w/meals    cefTRIAXone  1 g Intravenous Q24H    citalopram  20 mg Oral Daily    cloNIDine  0.05 mg Oral BID    clopidogrel  75 mg Oral Daily    doxazosin  1 mg Oral At Bedtime    doxycycline hyclate  100 mg Oral Q12H Pending sale to [facility] (08/20)    heparin ANTICOAGULANT  5,000 Units Subcutaneous Q12H    ipratropium - albuterol 0.5 mg/2.5 mg/3 mL  3 mL Nebulization 4x daily    montelukast  10 mg Oral At Bedtime    [START ON 12/12/2023] predniSONE  20 mg Oral Daily    sodium bicarbonate  650 mg Oral BID    sodium chloride (PF)  3 mL Intracatheter Q8H    umeclidinium-vilanterol  1 puff  Inhalation Daily      - MEDICATION INSTRUCTIONS -      - MEDICATION INSTRUCTIONS -      ACE/ARB/ARNI NOT PRESCRIBED       Current active medications and PTA medications reviewed, see medication list for details.            Physical Exam:   Vitals were reviewed  Patient Vitals for the past 24 hrs:   BP Temp Temp src Pulse Resp SpO2 Weight   23 0924 -- -- -- -- -- 92 % --   23 0751 (!) 162/57 98  F (36.7  C) Oral 68 18 91 % --   23 0618 -- -- -- -- -- -- 75.2 kg (165 lb 11.2 oz)   12/10/23 2343 (!) 152/52 97.7  F (36.5  C) Oral 66 20 96 % --   12/10/23 2032 (!) 151/52 -- -- -- -- -- --   12/10/23 1931 -- -- -- -- -- 99 % --   12/10/23 1828 (!) 170/56 -- -- 60 -- -- --   12/10/23 1709 (!) 148/50 98  F (36.7  C) Oral 61 20 96 % --   12/10/23 1554 -- -- -- 61 22 97 % --   12/10/23 1300 134/46 -- -- -- -- 95 % --   12/10/23 1220 -- -- -- 60 20 96 % --       Temp:  [97.7  F (36.5  C)-98  F (36.7  C)] 98  F (36.7  C)  Pulse:  [60-68] 68  Resp:  [18-22] 18  BP: (134-170)/(46-57) 162/57  SpO2:  [91 %-99 %] 92 %    Temperatures:  Current - Temp: 98  F (36.7  C); Max - Temp  Av.9  F (36.6  C)  Min: 97.7  F (36.5  C)  Max: 98  F (36.7  C)  Respiration range: Resp  Av  Min: 18  Max: 22  Pulse range: Pulse  Av.7  Min: 60  Max: 68  Blood pressure range: Systolic (24hrs), Av , Min:134 , Max:170   ; Diastolic (24hrs), Av, Min:46, Max:57    Pulse oximetry range: SpO2  Av.3 %  Min: 91 %  Max: 99 %    I/O last 3 completed shifts:  In: 1040 [P.O.:1040]  Out: 1950 [Urine:1550; Stool:400]      Intake/Output Summary (Last 24 hours) at 2023 1142  Last data filed at 2023 1017  Gross per 24 hour   Intake 920 ml   Output 2100 ml   Net -1180 ml     Alert and sitting comfortably in the chair, nasal cannula O2  Lungs with diminished breath sounds in the bases but no wheezes  Cardiac exam regular rhythm normal S1-S2 no murmur, she has moderate jugular venous distention  Lower extremities  varicose veins, 1+ edema         Wt Readings from Last 4 Encounters:   12/11/23 75.2 kg (165 lb 11.2 oz)   12/05/23 77 kg (169 lb 12.8 oz)   11/03/23 75.3 kg (166 lb)   11/01/23 75.3 kg (166 lb)          Data:          Lab Results   Component Value Date     12/11/2023     12/10/2023     12/09/2023    Lab Results   Component Value Date    CHLORIDE 105 12/11/2023    CHLORIDE 105 12/10/2023    CHLORIDE 106 12/09/2023    Lab Results   Component Value Date    BUN 82.2 12/11/2023    BUN 77.3 12/10/2023    BUN 62.8 12/09/2023      Lab Results   Component Value Date    POTASSIUM 4.5 12/11/2023    POTASSIUM 4.7 12/10/2023    POTASSIUM 4.9 12/09/2023    Lab Results   Component Value Date    CO2 23 12/11/2023    CO2 23 12/10/2023    CO2 22 12/09/2023    Lab Results   Component Value Date    CR 2.32 12/11/2023    CR 2.18 12/10/2023    CR 2.06 12/09/2023        Recent Labs   Lab Test 12/11/23  0656 12/10/23  0623 12/09/23  0543   WBC 5.6 6.1 4.0   HGB 10.1* 10.7* 10.7*   HCT 31.8* 33.7* 33.8*   * 104* 105*    155 154     Recent Labs   Lab Test 12/08/23  0812 12/05/23  0858 08/07/23  0924   AST 25 24 22   ALT 14 16 11   ALKPHOS 77 81 85   BILITOTAL 0.3 0.3 0.2       Recent Labs   Lab Test 12/11/23  0656 12/10/23  0623 12/09/23  0543   MAG 2.0 2.0 2.0     Recent Labs   Lab Test 12/05/23  0858 06/12/23  0826 05/23/23  1359   PHOS 4.6* 4.7* 4.8*     Recent Labs   Lab Test 12/11/23  0656 12/10/23  0623 12/09/23  0543   CANELO 8.3* 8.3* 8.6*       Lab Results   Component Value Date    CANELO 8.3 (L) 12/11/2023     Lab Results   Component Value Date    WBC 5.6 12/11/2023    HGB 10.1 (L) 12/11/2023    HCT 31.8 (L) 12/11/2023     (H) 12/11/2023     12/11/2023     Lab Results   Component Value Date     12/11/2023    POTASSIUM 4.5 12/11/2023    CHLORIDE 105 12/11/2023    CO2 23 12/11/2023    GLC 90 12/11/2023     Lab Results   Component Value Date    BUN 82.2 (H) 12/11/2023    CR 2.32 (H)  12/11/2023     Lab Results   Component Value Date    MAG 2.0 12/11/2023     Lab Results   Component Value Date    PHOS 4.6 (H) 12/05/2023       Creatinine   Date Value Ref Range Status   12/11/2023 2.32 (H) 0.51 - 0.95 mg/dL Final   12/10/2023 2.18 (H) 0.51 - 0.95 mg/dL Final   12/09/2023 2.06 (H) 0.51 - 0.95 mg/dL Final   12/08/2023 2.15 (H) 0.51 - 0.95 mg/dL Final   12/05/2023 2.49 (H) 0.51 - 0.95 mg/dL Final   08/07/2023 2.55 (H) 0.51 - 0.95 mg/dL Final       Attestation:  I have reviewed today's vital signs, notes, medications, labs and imaging.     Williams Joya MD

## 2023-12-11 NOTE — TELEPHONE ENCOUNTER
Appt made.    Closing this encounter        Future Appointments   Date Time Provider Department Center   12/12/2023 11:15 AM Jacqueline Granados, PT [facility]   12/18/2023 12:00 AM GRADY DCR2 [facility]   12/18/2023  2:20 PM [id number] [facility]   1/26/2024  8:30 AM [facility]   3/29/2024  9:30 AM Sadi Lamar MD [facility]       ALEJANDRA Crooks, RN 4:48 PM 12/11/23

## 2023-12-11 NOTE — PROGRESS NOTES
[facility]    Medicine Progress Note - Hospitalist Service    Date of Admission:  12/8/2023    Assessment & Plan      Arpita Rocha is a 88 year old female with PMH COPD, HFpEF, cardiorenal syndrome, sick sinus syndrome has pacemaker, HTN, CKD stage 4, depression who presents with SOB.      Recently presented to her PCP on 12/05 for shortness of breath and cough. Thought to be due to possible pneumonia, chest x-ray was ordered started on cefdinir and azithromycin. Outpatient CT done on 12/6 notes cardiomegaly, enlarged pulmonary arteries, trace left pleural effusion, mild bibasilar linear opacities may be due to resolving pneumonia and linear atelectasis/scarring.     Presented 12/8 with worsening SOB.  Approximately 2 to 3 weeks ago had an increase in her productive cough and shortness of breath especially with exertion.  States she has a chronic productive cough and shortness of breath with exertion but both of these have worsened recently.  Denies any recent fevers, sinus congestion, sore throat, or upper respiratory illness type symptoms.  Denies any chest pain, palpitations.  No changes with her diet.  Recently moved from [place] in May [place] live [place]     CXR showed hazy bilateral pulmonary opacities, small bibasilar pleural effusions.   Labs showing bun/creat of 66/2.15, bnp of 5,097, troponin of 83, cbc hemoglobin of 10.6, hematocrit of 33.8, platelets of 154     Patient given IV lasix and admitted for management of acute hypoxic respiratory failure.     Acute hypoxic respiratory failure   Acute on chronic diastolic heart failure exacerbation  COPD exacerbation  Pneumonia   Etiology is being suspected to be multifactorial for her symptoms, suspect major component of heart failure, with COPD, CXR volume overload, recent CT chest 12/6 showed Mild bibasilar linear opacities, may be due to un resolving pneumonia and linear atelectasis/scarring.  Chest xray today, shows persistent findings of pulmonary edema  -continue IV antibiotics.  Currently receiving ceftriaxone and doxycycline  - sputum growing gram positive cocci  -Continue to work on titrating down and tapering her oxygen support.  Previously at 5 to 6 L by nasal cannula currently down to 2 to 3 L  - echocardiogram EF 55%, mild mod MR  - continuous pulse ox   -No further wheezing today.  Will decrease dose of prednisone to 20 mg.  Expected just a short course of steroids  - continue duo nebs  - continue pta Singulair, ellipta, budesonide BID  -appreciate input from nephrology service.  Currently being optimized with her earlier hypovolemic state and receiving IV diuretics with creatinine permitting while receiving IV Bumex    - daily weights, strict I&O     HTN  Sick sinus syndrome  Permanent pacemaker  Cardiorenal syndrome  Follows with cardiology. Saw Dr. Gonzalez in July.   - continue pta amlodipine, coreg, clonidine, plavix.   - held chlorthalidone while receiving IV bumex     Elevated troponin  - likely NSTEMI type II in the setting of CKD, monitor. Patient without chest pain     CKD stage 4   -creatinine at baseline 2.15-2.74, monitor  -Follows with nephrology outpatient. Stable CKD for several years.  - continue pta sodium bicarbonate   - monitor renal function with diuresis  - nephrology consulted, increased Bumex to 2 mg every 8 hours for today     Macrocytic anemia  -current hemoglobin at 10.7     Deconditioning  - PT/OT- home with home care physical therapy  -She needs to demonstrate safe ambulation before hospital disposition as she lives with her daughter-in-law and son at home and usually ambulates with no issues doing her activities of daily living at home.     # Code status: DNR/DNI  # Anticipated discharge date and Disposition: 1-2 days pending improvement  # DVT: Heparin   # IVF: none                             Diet: Combination Diet 2 gm NA Diet (and additional linked orders)  Fluid  "restriction 1800 ML FLUID (and additional linked orders)    DVT Prophylaxis: Heparin SQ  Henderson Catheter: Not present  Lines: None     Cardiac Monitoring: ACTIVE order. Indication: Acute decompensated heart failure (48 hours)  Code Status: No CPR- Do NOT Intubate      Clinically Significant Risk Factors                  # Hypertension: Noted on problem list  # Acute heart failure with preserved ejection fraction: heart failure noted on problem list, last echo with EF >50%, and receiving IV diuretics       # Obesity: Estimated body mass index is 36.49 kg/m  as calculated from the following:    Height as of 12/5/23: 1.435 m (4' 8.5\").    Weight as of this encounter: 75.2 kg (165 lb 11.2 oz)., PRESENT ON ADMISSION     # COPD: noted on problem list  # Pacemaker present       Disposition Plan     Expected Discharge Date:   Arpita is hopeful for hospital discharge at least in the next 1 to 2 days when she establish improvement with her oxygenation, and can demonstrate safe ambulation as she lives at home with family  Destination: home with family;home with help/services              Olman Webster MD, MD  Hospitalist Service  [facility]  Securely message with [employer] (more info)  Text page via [employer] Paging/Directory   ______________________________________________________________________    Interval History   I assumed medicine service care today.  Seen and examined.  Chart reviewed.  Case discussed with nursing service as nursing was graciously present at bedside during my encounter as well.  I met this extremely delightful lady while she sitting comfortably in the chair for hospital bed.  Patient's family updated as her daughter-in-law present at bedside as well.  She thinks she is improving as she endorses no further wheezing, no worsening coughing spells and decreasing oxygen support.  Slept decent overnight.  Reportedly able to tolerate oral diet with no nausea or vomiting.  Voiding freely. "  Denies any chest pain, abdominal pain.  Remains afebrile.  Currently between 2 to 3 L of oxygen by nasal cannula today.    Physical Exam   Vital Signs: Temp: 98  F (36.7  C) Temp src: Oral BP: (!) 162/57 Pulse: 68   Resp: 18 SpO2: 91 % O2 Device: Nasal cannula Oxygen Delivery: 4 LPM  Weight: 165 lbs 11.2 oz    HEENT; Atraumatic, normocephalic, pinkish conjuctiva, pupils bilateral reactive   Skin: warm and moist, no rashes  Lungs: equal chest expansion, no wheezing, fair air entry, minimal fine crackles bilateral bases  Heart: normal rate, normal rhythm, no rubs or gallops.   Abdomen: normal bowel sounds, no tenderness, no peritoneal signs, no guarding  Extremities: no deformities, no edema   Neuro; follow commands, alert and oriented x3, spontaneous speech, coherent, moves all extremities spontaneously  Psych; no hallucination, euthymic mood, not agitated      Medical Decision Making       50 MINUTES SPENT BY ME on the date of service doing chart review, history, exam, documentation & further activities per the note.  MANAGEMENT DISCUSSED with the following over the past 24 hours: Yes   NOTE(S)/MEDICAL RECORDS REVIEWED over the past 24 hours: Yes       Data     I have personally reviewed the following data over the past 24 hrs:    5.6  \   10.1 (L)   / 156     140 105 82.2 (H) /  90   4.5 23 2.32 (H) \       Imaging results reviewed over the past 24 hrs:   Recent Results (from the past 24 hour(s))   XR Chest Port 1 View    Narrative    EXAM: XR CHEST PORTABLE 1 VIEW  LOCATION: [facility]  DATE: 12/10/2023    INDICATION: Shortness of breath.  COMPARISON: 12/08/2020.      Impression    IMPRESSION: No significant interval change. Stable cardiac enlargement and pulmonary venous congestion. Predominantly interstitial opacities in both mid and lower lungs again suggest pulmonary edema, although an infectious process could also have this   appearance Small bilateral pleural effusions.. Dual-lead  cardiac device left chest wall is unchanged. Aortic calcification. Postoperative changes of spinal marine placement throughout the visualized thoracolumbar spine. No pneumothorax.

## 2023-12-11 NOTE — PROGRESS NOTES
[facility]    Patient is currently receiving home care services from [facility]. The patient is currently receiving RN PT OT HHA services.  and home health team have been notified of patient admission. [facility] Liaison will continue to follow patient during stay. If appropriate provide orders to resume home care at time of discharge.    GILBERT Winter, LPN  Home Care Liaison   Cell: [phone]

## 2023-12-11 NOTE — PROGRESS NOTES
Pt A & O x 4. Denies pain. Tele: A paced. Continues to have steroids and IV antibiotics, Nebs. Receiving IV diuretics. Nephrology and PT following. Pure wick in place. Tolerating diet. Discharge pending.

## 2023-12-11 NOTE — PLAN OF CARE
BP (!) 162/57 (BP Location: Right arm)   Pulse 68   Temp 98  F (36.7  C) (Oral)   Resp 18   Wt 75.2 kg (165 lb 11.2 oz)   SpO2 93%   BMI 36.49 kg/m      Neuro: A/O X4  Cardiac: Tele- Apaced, BBB  Lungs: 2L NC  GI: ileostomy   : WNL  Pain: denies  IV: saline locked  Meds: bumex, rocephin   Labs/tests: K 4.5, Mg 2.0  Diet: 2g Na/no caffeine, 1800 fluid restriction   Activity: assist x1/gb/wk  Misc: K and Mg protocol. Strict I&Os  Plan: Currently resting in bed, able to make need known.

## 2023-12-12 NOTE — PROGRESS NOTES
"Clinical Nutrition Services Brief Note - MST score >/= 2     Arpita Rocha is a 88 year old female screened by the dietitian d/t MST score of 2 d/t \"unsure\" of possible weight loss.     Per documented weight history, patient's weight stable over past 8 months with expected fluctuations. Appears to have lost a non-significant amount of weight between July 2022 and April 2023 though is noted to have heart failure, which may contribute.  Wt Readings from Last 20 Encounters:  12/12/23 : 74.7 kg (164 lb 10.9 oz)  12/05/23 : 77 kg (169 lb 12.8 oz)  11/03/23 : 75.3 kg (166 lb)  11/01/23 : 75.3 kg (166 lb)  09/29/23 : 75.3 kg (166 lb)  08/21/23 : 75.8 kg (167 lb 1.6 oz)  08/11/23 : 75.8 kg (167 lb 3.2 oz)  08/07/23 : 76.2 kg (168 lb)  07/07/23 : 74.9 kg (165 lb 1.6 oz)  06/12/23 : 76.3 kg (168 lb 3.2 oz)  04/30/23 : 78 kb (172 lb)  04/26/23 : 76.8 kg (169 lb 6.4 oz)  07/21/22 : 79.8 kg (176 lb)    Due to no weight loss, RD will not sign on at this time, but will re-screen in 7-10 days if patient remains hospitalized. RD is available by consult if further nutrition intervention warranted in the meantime.    Priyanka Ramos, CHLOE, LD, [facility]  Pager - 3rd floor/ICU: [phone]  Pager - All other floors: [phone]  Pager - Weekend/holiday: [phone]  Office: [phone]           "

## 2023-12-12 NOTE — PROGRESS NOTES
"[facility]     Date: 12/12/23  Admission Dx: COPD exacerbation  Pulmonary History: COPD hx, Former smoker  Home Nebulizer/MDI Use: Pulmicort BID, Duoneb Q6 hrs/x4 day, Anoro QD  Home Oxygen: None  Acuity Level (RCAT flow sheet): 3  Aerosol Therapy initiated: Duoneb QID, Albuterol Q2 prn, Anoro QD      Pulmonary Hygiene initiated: Coughing techniques      Volume Expansion initiated: Incentive Spirometry      Current Oxygen Requirements: Nasal cannula 3 LPM  Current SpO2: 99%    Re-evaluation date: 12/15/23    Patient Education: Discussed use and benefits of respiratory medications.       See \"RT Assessments\" flow sheet for patient assessment scoring and Acuity Level Details.           "

## 2023-12-12 NOTE — PROGRESS NOTES
[facility]    Medicine Progress Note - Hospitalist Service    Date of Admission:  12/8/2023    Assessment & Plan      Arpita Rocha is a 88 year old female with PMH COPD, HFpEF, cardiorenal syndrome, sick sinus syndrome has pacemaker, HTN, CKD stage 4, depression who presents with SOB.      Recently presented to her PCP on 12/05 for shortness of breath and cough. Thought to be due to possible pneumonia, chest x-ray was ordered started on cefdinir and azithromycin. Outpatient CT done on 12/6 notes cardiomegaly, enlarged pulmonary arteries, trace left pleural effusion, mild bibasilar linear opacities may be due to resolving pneumonia and linear atelectasis/scarring.     Presented 12/8 with worsening SOB.  Approximately 2 to 3 weeks ago had an increase in her productive cough and shortness of breath especially with exertion.  States she has a chronic productive cough and shortness of breath with exertion but both of these have worsened recently.  Denies any recent fevers, sinus congestion, sore throat, or upper respiratory illness type symptoms.  Denies any chest pain, palpitations.  No changes with her diet.  Recently moved from [place] in May to live [place]     CXR showed hazy bilateral pulmonary opacities, small bibasilar pleural effusions.   Labs showing bun/creat of 66/2.15, bnp of 5,097, troponin of 83, cbc hemoglobin of 10.6, hematocrit of 33.8, platelets of 154     Patient given IV lasix and admitted for management of acute hypoxic respiratory failure.     Acute hypoxic respiratory failure   Acute on chronic diastolic heart failure exacerbation  COPD exacerbation  Pneumonia   Etiology is being suspected to be multifactorial for her symptoms, suspect major component of heart failure, with COPD, CXR volume overload, recent CT chest 12/6 showed Mild bibasilar linear opacities, may be due to un resolving pneumonia and linear atelectasis/scarring.  Chest xray today, shows persistent findings of pulmonary edema  -continue IV antibiotics.  Currently receiving ceftriaxone and doxycycline  - sputum growing gram positive cocci  -Likely can finish systemic antibiotics in the next 24 hours  -Continue to work on titrating down and tapering her oxygen support.  Previously at 5 to 6 L by nasal cannula currently down to 2 to 3 L  - echocardiogram EF 55%, mild mod MR  - continuous pulse ox   -Wheezing has resolved.  Prednisone was decreased earlier to 20 mg daily.  Expected just a short course of steroids  - continue duo nebs  - continue pta Singulair, ellipta, budesonide BID  -appreciate input from nephrology service.   -Creatinine remained permitting.  Diuresis switched to oral route    - daily weights, strict I&O     HTN  Sick sinus syndrome  Permanent pacemaker  Cardiorenal syndrome  Follows with cardiology. Saw Dr. Gonzalez in July.   - continue pta amlodipine, coreg, clonidine, plavix.   - held chlorthalidone while receiving IV bumex     Elevated troponin  - likely NSTEMI type II in the setting of CKD, monitor. Patient without chest pain     CKD stage 4   -creatinine at baseline 2.15-2.74, monitor  -Follows with nephrology outpatient. Stable CKD for several years.  - continue pta sodium bicarbonate   - monitor renal function with diuresis  - nephrology consulted, increased Bumex to 2 mg every 8 hours for today     Macrocytic anemia  -current hemoglobin at 10.7     Deconditioning  - PT/OT- home with home care physical therapy  -She needs to demonstrate safe ambulation before hospital disposition as she lives with her daughter-in-law and son at home and usually ambulates with no issues doing her activities of daily living at home.     # Code status: DNR/DNI  # Anticipated discharge date and Disposition: Anticipating in the next 24 hours  # DVT: Heparin   # IVF: none                             Diet: Combination Diet 2 gm NA Diet (and additional linked orders)  Fluid  "restriction 1800 ML FLUID (and additional linked orders)    DVT Prophylaxis: Heparin SQ  Henderson Catheter: Not present  Lines: None     Cardiac Monitoring: ACTIVE order. Indication: Acute decompensated heart failure (48 hours)  Code Status: No CPR- Do NOT Intubate      Clinically Significant Risk Factors                  # Hypertension: Noted on problem list  # Acute heart failure with preserved ejection fraction: heart failure noted on problem list, last echo with EF >50%, and receiving IV diuretics       # Obesity: Estimated body mass index is 36.27 kg/m  as calculated from the following:    Height as of 12/5/23: 1.435 m (4' 8.5\").    Weight as of this encounter: 74.7 kg (164 lb 10.9 oz).      # COPD: noted on problem list    # Pacemaker present       Disposition Plan     Expected Discharge Date:   Arpita is hopeful for hospital discharge at least in the next 1 to 2 days when she establish improvement with her oxygenation, and can demonstrate safe ambulation as she lives at home with family  Destination: home with family;home with help/services              Olman Webster MD, MD  Hospitalist Service  [facility]  Securely message with [employer] (more info)  Text page via [employer] Paging/Directory   ______________________________________________________________________    Interval History   Continuing medicine service care today.  Seen and examined.  Chart reviewed.  Case discussed with nursing service.  I have following this pleasant lady sitting comfortably in hospital recliner and endorsing no new symptomatology.  She is in good spirits as she continues to show significant improvement.  She mentioned that she is tolerating oral diet with no complaints of any nausea or vomiting.  Still on oxygen support much improved 2 L by nasal cannula.  Remains afebrile.  No mental status changes reported overnight.    Voiding freely, had a decent bowel movement earlier.  No bleeding tendencies.  Denies any " diarrhea.    Afebrile    Physical Exam   Vital Signs: Temp: 97.7  F (36.5  C) Temp src: Oral BP: (!) 199/70 Pulse: 60   Resp: 20 SpO2: 99 % O2 Device: Nasal cannula Oxygen Delivery: 3 LPM  Weight: 164 lbs 10.94 oz    HEENT; Atraumatic, normocephalic, pinkish conjuctiva, pupils bilateral reactive   Skin: warm and moist, no rashes  Lungs: equal chest expansion, no wheezing, fair air entry, minimal fine crackles bilateral bases  Heart: normal rate, normal rhythm, no rubs or gallops.   Abdomen: normal bowel sounds, no tenderness, no peritoneal signs, no guarding  Extremities: no deformities, no edema   Neuro; follow commands, alert and oriented x3, spontaneous speech, coherent, moves all extremities spontaneously  Psych; no hallucination, euthymic mood, not agitated      Medical Decision Making       45 MINUTES SPENT BY ME on the date of service doing chart review, history, exam, documentation & further activities per the note.  MANAGEMENT DISCUSSED with the following over the past 24 hours: yes   NOTE(S)/MEDICAL RECORDS REVIEWED over the past 24 hours: yes       Data     I have personally reviewed the following data over the past 24 hrs:    N/A  \   N/A   / 178     142 103 93.7 (H) /  92   4.5 25 2.19 (H) \       Imaging results reviewed over the past 24 hrs:   No results found for this or any previous visit (from the past 24 hour(s)).

## 2023-12-12 NOTE — PLAN OF CARE
Goal Outcome Evaluation:      Plan of Care Reviewed With: patient    Overall Patient Progress: no changeOverall Patient Progress: no change     Temp: 97.6  F (36.4  C) Temp src: Oral BP: (!) 163/54 Pulse: 81   Resp: 18 SpO2: 98 % O2 Device: Nasal cannula Oxygen Delivery: 2 LPM    A&Ox4, denies pain. Ambulated in hallway on shift, A1 gb walker on 3L O2. Usually on 2L but becomes dyspneic with exertion. Strict I and O, 2 g sodium diet 1800 fluid restriction maintained. Tele apaced with BBB. Purwick in place at bedtime. Ileostomy-pt does own cares. Pt most likely will return home w/ son and daughter in law with home care when ready.

## 2023-12-12 NOTE — PLAN OF CARE
Goal Outcome Evaluation:      Plan of Care Reviewed With: patient      Shift: 11pm-7am    Vitals: Temp: 97.7  F (36.5  C) Temp src: Oral BP: (!) 151/56 Pulse: 69   Resp: 20 SpO2: 97 % O2 Device: Nasal cannula Oxygen Delivery: 3 LPM (bump up by RN)      Orientation: alert  Pain: denies  Tele: 100 % A paced   Activity: Assist of 1 w/ gb and walker    Resp: on 3L NC   Diet: 2g Na diet   How to take meds: whole w/ fluids  GI & : illeostomy and purewick in place   Protocol: potassium and magnesium   Skin: scattered bruising   Lines: IV on right - saline locked

## 2023-12-12 NOTE — PROGRESS NOTES
Assessment and Plan:     CKD-4: Associated with hypertension.  Followed at the [facility].  She does not want dialysis in the future.  Labs show sodium 142, potassium 4.5, bicarbonate 25.  Creatinine is 2.19 with estimated GFR 21, stable over the last 4 days.  I/O 800/1150 UO.  Weight is stable over the last 4 days.  She is on sodium bicarbonate 650 mg twice a day and torsemide 20 mg twice a day.            Interval History:   Hypertension: Blood pressure remains high.  She is on amlodipine, carvedilol, clonidine, torsemide.  I will increase the dose of clonidine.     Anemia:    Respiratory distress:        ID: She is on Rocephin and doxycycline.           Review of Systems:   She feels her breathing is stable, she is on O2 per nasal cannula.  She is up and around with physical therapy.  She is eating well.          Medications:      amLODIPine  10 mg Oral Daily    atorvastatin  40 mg Oral Daily    carvedilol  37.5 mg Oral BID w/meals    cefTRIAXone  1 g Intravenous Q24H    citalopram  20 mg Oral Daily    cloNIDine  0.1 mg Oral BID    clopidogrel  75 mg Oral Daily    doxycycline hyclate  100 mg Oral Q12H [facility] (08[facility])    heparin ANTICOAGULANT  5,000 Units Subcutaneous Q12H    ipratropium - albuterol 0.5 mg/2.5 mg/3 mL  3 mL Nebulization 4x daily    montelukast  10 mg Oral At Bedtime    predniSONE  20 mg Oral Daily    sodium bicarbonate  650 mg Oral BID    sodium chloride (PF)  3 mL Intracatheter Q8H    torsemide  20 mg Oral BID    umeclidinium-vilanterol  1 puff Inhalation Daily      - MEDICATION INSTRUCTIONS -      - MEDICATION INSTRUCTIONS -      ACE/ARB/ARNI NOT PRESCRIBED       Current active medications and PTA medications reviewed, see medication list for details.            Physical Exam:   Vitals were reviewed  Patient Vitals for the past 24 hrs:   BP Temp Temp src Pulse Resp SpO2 Weight   12/12/23 0814 -- -- -- -- 20 99 % --   12/12/23 0719 (!) 199/70 97.7  F (36.5  C) Oral 60 18 97 % --    23 0620 -- -- -- -- -- -- 74.7 kg (164 lb 10.9 oz)   23 0010 (!) 151/56 97.7  F (36.5  C) Oral 69 20 97 % --   23 -- -- -- 81 18 98 % --   23 1755 (!) 163/54 -- -- 67 -- -- --   23 1610 -- -- -- -- -- 97 % --   23 1546 (!) 146/49 97.6  F (36.4  C) Oral 60 20 97 % --   23 1224 -- -- -- -- -- 93 % --   23 1204 -- -- -- -- -- 93 % --       Temp:  [97.6  F (36.4  C)-97.7  F (36.5  C)] 97.7  F (36.5  C)  Pulse:  [60-81] 60  Resp:  [18-20] 20  BP: (146-199)/(49-70) 199/70  SpO2:  [93 %-99 %] 99 %    Temperatures:  Current - Temp: 97.7  F (36.5  C); Max - Temp  Av.7  F (36.5  C)  Min: 97.6  F (36.4  C)  Max: 97.7  F (36.5  C)  Respiration range: Resp  Av.2  Min: 18  Max: 20  Pulse range: Pulse  Av.4  Min: 60  Max: 81  Blood pressure range: Systolic (24hrs), Av , Min:146 , Max:199   ; Diastolic (24hrs), Av, Min:49, Max:70    Pulse oximetry range: SpO2  Av.4 %  Min: 93 %  Max: 99 %    I/O last 3 completed shifts:  In: 800 [P.O.:800]  Out: 600 [Urine:350; Stool:250]      Intake/Output Summary (Last 24 hours) at 2023 1126  Last data filed at 2023 0700  Gross per 24 hour   Intake 560 ml   Output 650 ml   Net -90 ml       She is up in the chair on nasal cannula O2 and is in no distress  Lungs show prolonged expiratory phase with scattered rhonchi and wheezing  Cardiac exam regular rhythm normal S1-S2 no murmur no jugular venous distention  Lower extremities trace edema         Wt Readings from Last 4 Encounters:   23 74.7 kg (164 lb 10.9 oz)   23 77 kg (169 lb 12.8 oz)   23 75.3 kg (166 lb)   23 75.3 kg (166 lb)          Data:          Lab Results   Component Value Date     2023     2023     12/10/2023    Lab Results   Component Value Date    CHLORIDE 103 2023    CHLORIDE 105 2023    CHLORIDE 105 12/10/2023    Lab Results   Component Value Date    BUN 93.7 2023     BUN 82.2 12/11/2023    BUN 77.3 12/10/2023      Lab Results   Component Value Date    POTASSIUM 4.5 12/12/2023    POTASSIUM 4.9 12/11/2023    POTASSIUM 4.5 12/11/2023    Lab Results   Component Value Date    CO2 25 12/12/2023    CO2 23 12/11/2023    CO2 23 12/10/2023    Lab Results   Component Value Date    CR 2.19 12/12/2023    CR 2.32 12/11/2023    CR 2.18 12/10/2023        Recent Labs   Lab Test 12/12/23  0729 12/11/23  0656 12/10/23  0623 12/09/23  0543   WBC  --  5.6 6.1 4.0   HGB  --  10.1* 10.7* 10.7*   HCT  --  31.8* 33.7* 33.8*   MCV  --  104* 104* 105*    156 155 154     Recent Labs   Lab Test 12/08/23  0812 12/05/23  0858 08/07/23  0924   AST 25 24 22   ALT 14 16 11   ALKPHOS 77 81 85   BILITOTAL 0.3 0.3 0.2       Recent Labs   Lab Test 12/12/23  0729 12/11/23  1604 12/11/23  0656   MAG 1.9 1.8 2.0     Recent Labs   Lab Test 12/12/23  0729 12/05/23  0858 06/12/23  0826   PHOS 4.1 4.6* 4.7*     Recent Labs   Lab Test 12/12/23  0729 12/11/23  0656 12/10/23  0623   CANELO 8.3* 8.3* 8.3*       Lab Results   Component Value Date    CANELO 8.3 (L) 12/12/2023     Lab Results   Component Value Date    WBC 5.6 12/11/2023    HGB 10.1 (L) 12/11/2023    HCT 31.8 (L) 12/11/2023     (H) 12/11/2023     12/12/2023     Lab Results   Component Value Date     12/12/2023    POTASSIUM 4.5 12/12/2023    CHLORIDE 103 12/12/2023    CO2 25 12/12/2023    GLC 92 12/12/2023     Lab Results   Component Value Date    BUN 93.7 (H) 12/12/2023    CR 2.19 (H) 12/12/2023     Lab Results   Component Value Date    MAG 1.9 12/12/2023     Lab Results   Component Value Date    PHOS 4.1 12/12/2023       Creatinine   Date Value Ref Range Status   12/12/2023 2.19 (H) 0.51 - 0.95 mg/dL Final   12/11/2023 2.32 (H) 0.51 - 0.95 mg/dL Final   12/10/2023 2.18 (H) 0.51 - 0.95 mg/dL Final   12/09/2023 2.06 (H) 0.51 - 0.95 mg/dL Final   12/08/2023 2.15 (H) 0.51 - 0.95 mg/dL Final   12/05/2023 2.49 (H) 0.51 - 0.95 mg/dL Final        Attestation:  I have reviewed today's vital signs, notes, medications, labs and imaging.     Williams Joya MD

## 2023-12-13 NOTE — PROGRESS NOTES
Ok for discharge.  Follow up for CKD-4 with [facility], where she has already established care.

## 2023-12-13 NOTE — PLAN OF CARE
BP (!) 184/65 (BP Location: Right arm)   Pulse 62   Temp 97.9  F (36.6  C) (Oral)   Resp 16   Wt 74 kg (163 lb 3.2 oz)   SpO2 93%   BMI 35.94 kg/m      VSS, PT cleared for discharge by provider. RN reviewed discharge paperwork with PT and her son, and both verbalized understanding. PT left via PC @1500.

## 2023-12-13 NOTE — PLAN OF CARE
Physical Therapy Discharge Summary    Reason for therapy discharge:    Discharged to home with home therapy.    Progress towards therapy goal(s). See goals on Care Plan in [facility] electronic health record for goal details.  Goals not met.  Barriers to achieving goals:   discharge from facility.    Therapy recommendation(s):    Continued therapy is recommended.  Rationale/Recommendations:  Rec home with assist and HHPT to progress strength and IND mobility.

## 2023-12-13 NOTE — PLAN OF CARE
/49 (BP Location: Right arm)   Pulse 66   Temp 98.2  F (36.8  C) (Oral)   Resp 18   Wt 74.7 kg (164 lb 10.9 oz)   SpO2 95%   BMI 36.27 kg/m      VSS, PT denies SoB, CP, pain. Weaned to 2LNC. A&Ox4. Ambulating well with assist 1. Plan for discharge tomorrow.

## 2023-12-13 NOTE — DISCHARGE SUMMARY
"[facility]  Hospitalist Discharge Summary      Date of Admission:  12/8/2023  Date of Discharge:  12/13/2023  Discharging Provider: Cullen Meléndez DO  Discharge Service: Hospitalist Service    Discharge Diagnoses   Community-acquired pneumonia.  Acute hypoxic respiratory failure.  Acute exacerbation of chronic heart failure with preserved ejection fraction.    Acute COPD exacerbation.  Hypertension.  Chronic kidney disease stage IV.  Type II myocardial infarction due to demand ischemia.  History of cardiorenal syndrome.  History of sick sinus syndrome.  Chronic macrocytic anemia.  Deconditioning.  Permanent pacemaker.        Clinically Significant Risk Factors     # Obesity: Estimated body mass index is 35.94 kg/m  as calculated from the following:    Height as of 12/5/23: 1.435 m (4' 8.5\").    Weight as of this encounter: 74 kg (163 lb 3.2 oz).       Follow-ups Needed After Discharge   Follow-up Appointments     Follow-up and recommended labs and tests       Follow up with primary care provider, Ludivina Canales, within 7 days   for hospital follow- up.  The following labs/tests are recommended: BMP   within 7 days.            Discharge Disposition   Discharged to home  Condition at discharge: Stable    Hospital Course   Arpita Rocha is a 88 year old female with PMH COPD, HFpEF, cardiorenal syndrome, sick sinus syndrome has pacemaker, HTN, CKD stage 4, depression who presents with SOB.      Recently presented to her PCP on 12/05 for shortness of breath and cough. Thought to be due to possible pneumonia, chest x-ray was ordered started on cefdinir and azithromycin. Outpatient CT done on 12/6 notes cardiomegaly, enlarged pulmonary arteries, trace left pleural effusion, mild bibasilar linear opacities may be due to resolving pneumonia and linear atelectasis/scarring.     Presented 12/8 with worsening SOB.  Approximately 2 to 3 weeks ago had an increase in her productive cough and " shortness of breath especially with exertion.  States she has a chronic productive cough and shortness of breath with exertion but both of these have worsened recently.  Denies any recent fevers, sinus congestion, sore throat, or upper respiratory illness type symptoms.  Denies any chest pain, palpitations.  No changes with her diet.  Recently moved from [place] in May to live with her family here in the [facility] [place].    She was found to have pneumonia, COPD exacerbation, and acute CHF exacerbation.  She initially was started on IV furosemide for diuresis.  Started on prednisone for COPD.  Was continued on azithromycin initially for pneumonia.  Previous cefdinir changed to IV ceftriaxone.  She has completed azithromycin course.  Was given 3 additional days of IV doxycycline.  Symptoms much improved.  Initially quiring supplemental oxygen.  No longer requiring supplemental oxygen at discharge.  IV ceftriaxone can now switch back to cefdinir to complete 10-day course of cephalosporins.    She was seen in consultation by nephrology during hospital stay.  CHF exacerbation improved.  Back on oral diuretics at this time.  Her previous diuretic has been switched to torsemide 20 mg twice a day.  Continue at discharge.  Previous clonidine has been increased to 0.2 mg twice a day.  Continue at discharge.  Taper off steroids with additional prednisone 10 mg a day for 2 more days.  Follow-up with primary care provider within 1 week.  Recheck metabolic panel within 1 week.  Follow-up with nephrology as previously scheduled.    Consultations This Hospital Stay   CORE CLINIC EVALUATION IP CONSULT  RESPIRATORY CARE IP CONSULT  PHARMACY LIAISON FOR MEDICATION COVERAGE CONSULT  PHYSICAL THERAPY ADULT IP CONSULT  CARE MANAGEMENT / SOCIAL WORK IP CONSULT  NEPHROLOGY IP CONSULT    Code Status   No CPR- Do NOT Intubate    Time Spent on this Encounter     I spent 45 minutes with Ms. Josefina and working on discharge on 12/13/2023.        Cullen Meléndez [facility] MEDICAL SURGICAL  [address] [place] [address]  Phone: [phone]  Fax: [phone]  ______________________________________________________________________    Physical Exam   Vital Signs: Temp: 97.9  F (36.6  C) Temp src: Oral BP: (!) 184/65 Pulse: 62   Resp: 18 SpO2: 97 % O2 Device: Nasal cannula Oxygen Delivery: 2 LPM  Weight: 163 lbs 3.2 oz  Gen:  NAD, A&Ox3.  Eyes:  PERRL, sclera anicteric.  OP:  MMM, no lesions.  Neck:  Supple.  CV:  Regular, no murmurs.  Lung: A few scattered crackles at bases b/l, normal effort.  Ab:  +BS, soft.  Skin:  Warm, dry to touch.  No rash.  Ext: Mild non pitting edema LE b/l.         Primary Care Physician   Ludivina Canales    Discharge Orders      Basic metabolic panel     Home Care Referral      Reason for your hospital stay    Pneumonia.     Follow-up and recommended labs and tests     Follow up with primary care provider, Ludivina Canales, within 7 days for hospital follow- up.  The following labs/tests are recommended: BMP within 7 days.     Activity    Your activity upon discharge: activity as tolerated     Diet    Follow this diet upon discharge: 2g salt       Discharge Medications   Current Discharge Medication List        START taking these medications    Details   predniSONE (DELTASONE) 10 MG tablet Take 1 tablet (10 mg) by mouth daily for 2 days  Qty: 2 tablet, Refills: 0    Associated Diagnoses: Acute respiratory failure with hypoxia (H)      torsemide (DEMADEX) 20 MG tablet Take 1 tablet (20 mg) by mouth 2 times daily for 30 days  Qty: 60 tablet, Refills: 0    Associated Diagnoses: Acute respiratory failure with hypoxia (H)           CONTINUE these medications which have CHANGED    Details   cloNIDine (CATAPRES) 0.2 MG tablet Take 1 tablet (0.2 mg) by mouth 2 times daily for 30 days  Qty: 60 tablet, Refills: 0    Associated Diagnoses: Acute respiratory failure with hypoxia (H)            CONTINUE these medications which have NOT CHANGED    Details   acetaminophen (TYLENOL) 650 MG CR tablet Take 1,300 mg by mouth every morning      amLODIPine (NORVASC) 10 MG tablet Take 1 tablet (10 mg) by mouth daily  Qty: 90 tablet, Refills: 3    Associated Diagnoses: Benign essential hypertension      atorvastatin (LIPITOR) 40 MG tablet Take 1 tablet (40 mg) by mouth daily  Qty: 30 tablet, Refills: 11    Associated Diagnoses: Hyperlipidemia LDL goal <100; Bilateral carotid artery stenosis      budesonide (PULMICORT) 0.5 MG/2ML neb solution Take 2 mLs (0.5 mg) by nebulization 2 times daily  Qty: 360 mL, Refills: 3    Associated Diagnoses: Chronic bronchitis, unspecified chronic bronchitis type (H)      carvedilol (COREG) 25 MG tablet Take 1.5 tablets (37.5 mg) by mouth 2 times daily (with meals)  Qty: 180 tablet, Refills: 3    Associated Diagnoses: Benign essential hypertension; Chronic heart failure with preserved ejection fraction (H)      cefdinir (OMNICEF) 300 MG capsule Take 1 capsule (300 mg) by mouth daily for 10 days  Qty: 10 capsule, Refills: 0    Associated Diagnoses: Pneumonia of right lower lobe due to infectious organism      citalopram (CELEXA) 20 MG tablet TAKE 1.5 TAB BY MOUTH EVERY DAY  Qty: 135 tablet, Refills: 1    Associated Diagnoses: Recurrent major depressive disorder, in full remission (H24); Generalized anxiety disorder      clopidogrel (PLAVIX) 75 MG tablet Take 1 tablet (75 mg) by mouth daily  Qty: 30 tablet, Refills: 11    Associated Diagnoses: Bilateral carotid artery stenosis      ferrous sulfate (FEROSUL) 325 (65 Fe) MG tablet Take 2 tablets (650 mg) by mouth daily (with breakfast)  Qty: 180 tablet, Refills: 3    Associated Diagnoses: Iron deficiency anemia, unspecified iron deficiency anemia type      ipratropium - albuterol 0.5 mg/2.5 mg/3 mL (DUONEB) 0.5-2.5 (3) MG/3ML neb solution Take 1 vial (3 mLs) by nebulization every 6 hours  Qty: 180 mL, Refills: 3    Associated  Diagnoses: Other emphysema (H); Chronic bronchitis, unspecified chronic bronchitis type (H)      montelukast (SINGULAIR) 10 MG tablet Take 1 tablet (10 mg) by mouth At Bedtime  Qty: 90 tablet, Refills: 3    Associated Diagnoses: Chronic bronchitis, unspecified chronic bronchitis type (H)      multivitamin w/minerals (THERA-VIT-M) tablet Take 1 tablet by mouth daily      nystatin (MYCOSTATIN) 576620 UNIT/GM external powder Apply topically 3 times daily as needed for other (rash)  Qty: 60 g, Refills: 1    Associated Diagnoses: Candidal intertrigo      sodium bicarbonate 650 MG tablet Take 1 tablet (650 mg) by mouth 2 times daily for 180 days  Qty: 180 tablet, Refills: 1    Associated Diagnoses: CKD (chronic kidney disease) stage 4, GFR 15-29 ml/min (H)      umeclidinium-vilanterol (ANORO ELLIPTA) 62.5-25 MCG/ACT oral inhaler Inhale 1 puff into the lungs daily  Qty: 1 each, Refills: 5    Associated Diagnoses: Chronic obstructive pulmonary disease, unspecified COPD type (H)      Cranberry 450 MG TABS       doxycycline hyclate (VIBRAMYCIN) 100 MG capsule Take 1 capsule (100 mg) by mouth three times a week  Qty: 39 capsule, Refills: 3    Associated Diagnoses: Chronic bronchitis, unspecified chronic bronchitis type (H)      Ostomy Supplies (ADAPT) PSTE Use with new ostomy pouch and drain PRN  Qty: 120 g, Refills: 3    Associated Diagnoses: Ileostomy status (H); Ileostomy care (H)      Ostomy Supplies (PREMIER DRAINABLE POUCH 22MM) Pouch MISC Use as directed every 3-4 days  Qty: 5 each, Refills: 12    Associated Diagnoses: Ileostomy care (H)           STOP taking these medications       azithromycin (ZITHROMAX Z-KELL) 250 MG tablet Comments:   Reason for Stopping:         chlorthalidone (HYGROTON) 25 MG tablet Comments:   Reason for Stopping:         doxazosin (CARDURA) 1 MG tablet Comments:   Reason for Stopping:         furosemide (LASIX) 40 MG tablet Comments:   Reason for Stopping:         ketoconazole (NIZORAL) 2 %  external cream Comments:   Reason for Stopping:             Allergies   Allergies   Allergen Reactions    Acetaminophen-Codeine     Penicillin G

## 2023-12-13 NOTE — PROGRESS NOTES
Care Management Discharge Note    Discharge Date: 12/13/2023       Discharge Disposition: Home, Home Care    Discharge Services:  [facility] RN/PT    Discharge DME:  none    Discharge Transportation: family or friend will provide    Patient/Family in Agreement with the Plan: yes    Handoff Referral Completed: Yes    Additional Information:  CM Following for discharge planning. Patient has orders to discharge home today with resumption of home care RN/Physical Therapy/Occupational Therapy. She was open to services with [facility] prior to hospitalization. Orders were sent via [facility] to [facility] and they were notified of patient discharge. They will continue to follow and reach out to patient to arrange a time for service. No further CM needs at this time. Patient will discharge back home with son. [facility] will cont to follow as needed.             Inez Sarkar RN BSN CM  Inpatient Care Coordination  [facility]  [phone]

## 2023-12-13 NOTE — PLAN OF CARE
Goal Outcome Evaluation:      Plan of Care Reviewed With: patient      Shift: 7pm-7am    Vitals: Temp: 97.6  F (36.4  C) Temp src: Axillary BP: (!) 147/52 Pulse: 61   Resp: 16 SpO2: 95 % O2 Device: Nasal cannula Oxygen Delivery: 2 LPM     Orientation: alert  Pain: denies  Tele: 100% A paced   Activity: SBA  Resp: on 2L   Diet: 2g Na diet  How to take meds: whole w/ fluids   GI & : continent of urine, ileostomy in place   Protocol: potassium and magnesium   Lines: IV on right arm - saline locked   Plan:  possible discharge today

## 2023-12-13 NOTE — PROGRESS NOTES
[facility]     Met with patient at bedside to discuss plans for home care services at discharge. Discharge 12/13/23. Patient agrees to have [facility] provide HHA/SN/PT/OT services. Patient care support center processed referral.  Patient verbalized understanding that they will receive a phone call from [facility] to schedule initial home care visit. Anticipated start of care date is [within 24-48 hours of discharge]. Patient has 24 hour phone number for [facility] to call with any questions or concerns.    GILBERT Winter, LPN  Home Care Liaison   Cell: [phone]

## 2023-12-14 NOTE — TELEPHONE ENCOUNTER
Please call to assist with scheduling provider hospital discharge follow up appointment.     Thank you,     Carol Paige, RN Care Coordinator  [facility], [place], [place]  Email: [email or web address]  Phone: [phone]

## 2023-12-14 NOTE — PROGRESS NOTES
"Clinic Care Coordination Contact  [facility]: Post-Discharge Note  SITUATION                                                      Admission:    Admission Date: 12/08/23   Reason for Admission: Community-acquired pneumonia.  Acute hypoxic respiratory failure.  Acute exacerbation of chronic heart failure with preserved ejection fraction.    Acute COPD exacerbation.  Hypertension.  Chronic kidney disease stage IV.  Type II myocardial infarction due to demand ischemia.  History of cardiorenal syndrome.  History of sick sinus syndrome.  Chronic macrocytic anemia.  Deconditioning.  Permanent pacemaker.  Discharge:   Discharge Date: 12/13/23  Discharge Diagnosis: Community-acquired pneumonia.  Acute hypoxic respiratory failure.  Acute exacerbation of chronic heart failure with preserved ejection fraction.    Acute COPD exacerbation.  Hypertension.  Chronic kidney disease stage IV.  Type II myocardial infarction due to demand ischemia.  History of cardiorenal syndrome.  History of sick sinus syndrome.  Chronic macrocytic anemia.  Deconditioning.  Permanent pacemaker.    BACKGROUND                                                      Per hospital discharge summary and inpatient provider notes:  See notes    ASSESSMENT           Discharge Assessment  How are you doing now that you are home?: \"doing good, glad to be back home\"  How are your symptoms? (Red Flag symptoms escalate to triage hotline per guidelines): Improved  Do you feel your condition is stable enough to be safe at home until your provider visit?: Yes  Does the patient have their discharge instructions? : Yes  Does the patient have questions regarding their discharge instructions? : No  Were you started on any new medications or were there changes to any of your previous medications? : Yes  Does the patient have all of their medications?: Yes  Do you have questions regarding any of your medications? : No  Do you have all of your needed medical supplies or " equipment (DME)?  (i.e. oxygen tank, CPAP, cane, etc.): Yes  Discharge follow-up appointment scheduled within 14 calendar days? : No  Is patient agreeable to assistance with scheduling? : No         Post-op (Clinicians Only)  Did the patient have surgery or a procedure: No    Home Care coming this morning between  am. Denies fever, chills, increased shortness of breath and/or any other concerns.     PLAN                                                      Outpatient Plan:  RNCC will send message request to care team to assist with scheduling provider hospital discharge follow up appointment. No further Care Coordination outreaches scheduled at this time, patient/caregiver was provided with this writer's number and encouraged to call with future needs or questions.     Future Appointments   Date Time Provider Department Center   12[facility]20[facility] 12[facility]   12/18/2023  2:20 PM [id number] [facility]   1/26/2024  8:30 AM Kinza Shirley APRN CNP [facility] PSA CLIN   3/29/2024  9:30 AM Sadi Lamar MD [facility]     For any urgent concerns, please contact our 24 hour nurse triage line: [phone] ([phone])       Carol Paige RN

## 2023-12-15 NOTE — TELEPHONE ENCOUNTER
Home Care is calling regarding an established patient with  [facility].        11/8/2023     3:59 PM   Home Care Information   Home Care agency [facility] [phone]     Requesting orders from: Ludivina Chamberlain  Provider is following patient: Yes  Is this a 60-day recertification request?  No    Orders Requested    Skilled Nursing  Request for resumption in care.     1 time a week for 2 weeks, then 1 time every other week for 2 weeks with 2 prn visits    Physical Therapy  Request for initial evaluation and treatment (one time)     Occupational Therapy  Request for initial evaluation and treatment (one time)     HHA (Home Health Aide)  Request for resumption in care.     1 visit a week for 3 weeks    Verbal orders given for PT/OT eval and treats.  Information was gathered for Skilled nursing/home health aide orders.  Provider review needed.  RN will contact Home Care with information after provider review.  Confirmed ok to leave a detailed message with call back.[phone] RODRÍGUEZ Guerra with [facility] confirmed confidential line  Contact information confirmed and updated as needed.    Routing to PCP to review and advise on requested orders.    Raine Bergman RN

## 2023-12-15 NOTE — TELEPHONE ENCOUNTER
Patient Quality Outreach Health Maintenance - PAL RN    Summary:    PAL RN contacted pt regarding overdue health maintenance    Patient is due/failing the following:       Topic Date Due    Diptheria Tetanus Pertussis (DTAP/TDAP/TD) Vaccine (1 - Tdap) 05/07/2010     Health Maintenance Due   Topic Date Due    RSV VACCINE (Pregnancy & 60+) (1 - 1-dose 60+ series) Never done    DTAP/TDAP/TD IMMUNIZATION (1 - Tdap) 05/07/2010     Type of outreach:    Chart review performed, no outreach needed. Patient considering vaccination at this time    Questions for provider review:    None     Nando Webber RN  Patient Advocate Liaison (PAL)  [facility]  [phone]    12/15/2023 at 3:31 PM

## 2023-12-15 NOTE — TELEPHONE ENCOUNTER
- Called and spoke with Mari with [facility],   - Relayed message below, no other questions at this time. Will wait for faxed orders.    Nando Webber RN  Patient Advocate Liaison (PAL)  [facility]  [phone]    12/15/2023 at 3:37 PM

## 2023-12-19 PROBLEM — N18.4 CKD (CHRONIC KIDNEY DISEASE) STAGE 4, GFR 15-29 ML/MIN (H): Status: ACTIVE | Noted: 2023-01-01

## 2023-12-19 NOTE — TELEPHONE ENCOUNTER
Visit with nephrology was cancelled (it looks like perhaps was the same day patient was discharged from the hospital). Can we help patient get this rescheduled?

## 2023-12-19 NOTE — PROGRESS NOTES
Assessment & Plan     Acute respiratory failure with hypoxia (H)  Refilled of the torsemide and clonidine placed for patient today. Blood pressure looks good. She is feeling better. Still has coughing episodes but much improved. Declines further medications for the cough. She has been using her nebulizers at home  Discussed RSV vaccine which I recommended for her. She will look into this at the pharmacy.  - Basic metabolic panel  (Ca, Cl, CO2, Creat, Gluc, K, Na, BUN); Future  - cloNIDine (CATAPRES) 0.2 MG tablet; Take 1 tablet (0.2 mg) by mouth 2 times daily  - torsemide (DEMADEX) 20 MG tablet; Take 1 tablet (20 mg) by mouth 2 times daily    Acute on chronic diastolic congestive heart failure (H)  Has been limiting fluids. Recheck BMP today. Has follow up with cardiology in about a month.  - Basic metabolic panel  (Ca, Cl, CO2, Creat, Gluc, K, Na, BUN); Future    Pneumonia of right lower lobe due to infectious organism  Has completed antibiotics.    Benign essential hypertension   Better controlled with the clonidine. I will defer to cardiology to manage.    CKD (chronic kidney disease) stage 4, GFR 15-29 ml/min (H)   Recheck BMP today. Will work to get rescheduled with nephrology (had visit on 12/13/23 but was just discharged that day).      Review of external notes as documented elsewhere in note  Review of the result(s) of each unique test - most recent labs from hospitalization reviewed  Ordering of each unique test  Prescription drug management       MED REC REQUIRED  Post Medication Reconciliation Status:  Discharge medications reconciled, continue medications without change    Janell Willoughby PA-C  [facility]      Alex Palma is a 88 year old, presenting for the following health issues:  Hospital F/U        12/19/2023    10:47 AM   Additional Questions   Roomed by Melisa REINOSO   Here today with son.    HPI         12/14/2023     9:34 AM   Post Discharge Outreach   Admission  "Date 12/8/2023   Reason for Admission Community-acquired pneumonia.  Acute hypoxic respiratory failure.  Acute exacerbation of chronic heart failure with preserved ejection fraction.    Acute COPD exacerbation.  Hypertension.  Chronic kidney disease stage IV.  Type II myocardial infarction due to demand ischemia.  History of cardiorenal syndrome.  History of sick sinus syndrome.  Chronic macrocytic anemia.  Deconditioning.  Permanent pacemaker.   Discharge Date 12/13/2023   Discharge Diagnosis Community-acquired pneumonia.  Acute hypoxic respiratory failure.  Acute exacerbation of chronic heart failure with preserved ejection fraction.    Acute COPD exacerbation.  Hypertension.  Chronic kidney disease stage IV.  Type II myocardial infarction due to demand ischemia.  History of cardiorenal syndrome.  History of sick sinus syndrome.  Chronic macrocytic anemia.  Deconditioning.  Permanent pacemaker.   How are you doing now that you are home? \"doing good, glad to be back home\"   How are your symptoms? (Red Flag symptoms escalate to triage hotline per guidelines) Improved   Do you feel your condition is stable enough to be safe at home until your provider visit? Yes   Does the patient have their discharge instructions?  Yes   Does the patient have questions regarding their discharge instructions?  No   Were you started on any new medications or were there changes to any of your previous medications?  Yes   Does the patient have all of their medications? Yes   Do you have questions regarding any of your medications?  No   Do you have all of your needed medical supplies or equipment (DME)?  (i.e. oxygen tank, CPAP, cane, etc.) Yes   Discharge follow-up appointment scheduled within 14 calendar days?  No     Hospital Follow-up Visit:    Hospital/Nursing Home/IP Rehab Facility: [facility]  Date of Admission: 12/08/2023   Date of Discharge: 12/13/2023  Reason(s) for Admission: Acute on chronic diastolic " "congestive heart failure (H)     Was your hospitalization related to COVID-19? No   Problems taking medications regularly:  None  Medication changes since discharge: Prednisone 10 mg, torsemide 20 mg  Problems adhering to non-medication therapy:  None    Patient is an 88-year-old female with history of COPD, heart failure with preserved EF, cardiorenal syndrome, sick sinus syndrome with pacemaker, hypertension, stage IV chronic kidney disease and depression who presents today for follow-up from hospitalization.  Patient seen and evaluated on 12/5/2023 for worsening cough and shortness of breath.  At that time she was treated for pneumonia as an outpatient.  She was later admitted on 12/8/2023 due to continued symptoms.  Patient was found to have pneumonia, COPD exacerbation and acute CHF exacerbation.  She was started on IV furosemide as well as prednisone.  She was continued on azithromycin and started on IV ceftriaxone.  She also completed 3 days of IV doxycycline.  Oral diuretic was changed to torsemide.  Clonidine also adjusted to 0.2 mg twice daily.    She is still having some episodes of coughing (which is still productive).     She reports the back pain that she has had previously has improved with more movement.    Summary of hospitalization:  [facility] discharge summary reviewed  Diagnostic Tests/Treatments reviewed.  Follow up needed: BMP  Other Healthcare Providers Involved in Patient s Care:         Specialist appointment - Has visits with cardiology and pulmonology scheduled. Likely needs nephology  Update since discharge: improved.         Plan of care communicated with patient and family               Review of Systems   GENERAL:  No fevers  RESP:  As noted in HPI        Objective    /56 (BP Location: Right arm, Patient Position: Chair, Cuff Size: Adult Regular)   Pulse 60   Temp 97.3  F (36.3  C) (Oral)   Resp 10   Ht 1.448 m (4' 9\")   Wt 74.3 kg (163 lb 12.8 oz)   SpO2 " 97%   BMI 35.45 kg/m    Body mass index is 35.45 kg/m .  Physical Exam   GENERAL: No acute distress  HEENT: Normocephalic  CARDIAC: Regular rate and rhythm  PULMONARY: Lungs are clear to auscultation bilaterally. No wheezes, rhonchi or crackles.  NEURO: Alert and non-focal

## 2023-12-20 NOTE — TELEPHONE ENCOUNTER
"Dr. Jose Canales- Please review and advise on home care orders requested.     \"Home Health Care     Reason for call:  OT requesting 1 visit for recertifiction in next 2 weeks.     Orders are needed for this patient.  OT: yes     Pt Provider: Jose Canales     Phone Number Homecare Nurse can be reached at: [phone] [employer]        Could we send this information to you in [facility] or would you prefer to receive a phone call?:   No preference   Okay to leave a detailed message?: Yes at Other phone number:  [phone] [employer]\"    Routed to PCP    Renetta VARGAS RN   PAL (Patient Advocate Liaison)  [facility]      "

## 2023-12-20 NOTE — TELEPHONE ENCOUNTER
Home Health Care    Reason for call:  OT requesting 1 visit for recertifiction in next 2 weeks.    Orders are needed for this patient.  OT: yes    Pt Provider: Jose Canales    Phone Number Homecare Nurse can be reached at: [phone] [facility]      Could we send this information to you in [facility] or would you prefer to receive a phone call?:   No preference   Okay to leave a detailed message?: Yes at Other phone number:  [phone] [facility]

## 2023-12-20 NOTE — TELEPHONE ENCOUNTER
Call placed to ZAID Aguirre with [facility] care.     -Left message with approval of home care orders requested on confidential vm. Instructed Carla to call back and speak to pt's PAL RODRÍGUEZ Collier at [phone] with any questions.     RODRÍGUEZ Garcia (Patient Advocate Liaison)  [facility]

## 2023-12-21 NOTE — TELEPHONE ENCOUNTER
See telephone encounter from today 12/21/2023 from Nephrology. Will keep eye on encounter to ensure that Patient gets rescheduled with Nephrology.    Nando Webber RN  Patient Advocate Liaison (PAL)  [facility]  [phone]    12/21/2023 at 10:33 AM

## 2023-12-21 NOTE — TELEPHONE ENCOUNTER
ZITA and sent My Chart message to schedule follow up CKD video visit with Dr. Mclain. Replaces the one on 12.13.23 as she was in the hospital. labs to be done by in home nurse. // first attempt 12.21.2023 MCE

## 2023-12-26 NOTE — TELEPHONE ENCOUNTER
- Upon chart review, patient has scheduled with Nephrology    Feb 28, 2024  3:00 PM  (Arrive by 2:45 PM)  Return Nephrology with Cata Mclain MD  [facility] [place] ([facility] ) [phone]     - Call out to check in on patient, and advised that patient had some low BP this morning after taking the Clonidine. Patient has been fine since the episode about an hour ago, but wanted to update PCP/last seen provider to see if there is any specific instruction that they should follow.    - Advised that if she is still feeling faint, lightheaded or has more episodes of low BP, they should reach out to the PAL RN again to update.    - Routing message over to Dr. MCCANN to see if there is anything else that is recommended.    Nando Webber RN  Patient Advocate Liaison (PAL)  [facility]  [phone]    12/26/2023 at 9:32 AM

## 2023-12-27 NOTE — TELEPHONE ENCOUNTER
- Called and relayed message below, states that patient will check her BP before she takes the clonidine (taken in morning and evening).    - Patient agreeable to plan and will check her BP before taking the clonidine moving forward.    Nando Webber RN  Patient Advocate Liaison (PAL)  [facility]  [phone]    12/27/2023 at 9:59 AM

## 2023-12-31 NOTE — PATIENT INSTRUCTIONS
It was a pleasure taking care of you today.  I've included a brief summary of our discussion and care plan from today's visit below.  Please review this information with your primary care provider.  _______________________________________________________________________    My recommendations are summarized as follows:  -Keep the amount of sodium in your diet at 2.4 g/day   -Keep a Blood Pressure diary by taking your blood pressure twice a day as instructed  Please make sure that you are using a validated blood pressure device by checking that it is the case at: https://www.validatebp.org/  -Avoid all NSAID's. Examples include Ibuprofen (Advil, Motrin), naprosyn (Aleve), celebrex among others. Acetaminophen (Tylenol) is ok with maximum dose in 24 hours of 3200 mg.  -Healthy lifestyle measures will keep your kidney's functioning at their current best. This includes regular exercise, weight loss and smoking cessation if you smoke.   -Do not exceed one alcoholic drink per day  -If for any reason, you are at risk of volume depletion/dehydration (high grade fevers, severe vomiting or diarrhea) stop furosemide till you get better  -Decrease clonidine to 1/2 tab twice daily and increase carvedilol to 1.5 tablet (37.5 mg) twice daily  -Please start sodium bicarbonate 1 tablet twice daily  -I referred you to the anemia clinic and to the CKD journey      To schedule imaging please call [phone]     To schedule your lab appointment at the [facility], please call       Return to Nephrology Clinic in 6 weeks with repeat labs to review your progress.    _______________________________________________________________________    Who do I call with any questions after my visit?    Please be in touch if there are any further questions that arise following today's visit.  There are multiple ways to contact your nephrology care team.      During business hours, you may reach your Nephrology LPN Care  Coordinator, Makenna, at .      To schedule or reschedule an appointment, please call [phone].    You can always send a secure message through FIXO.  FIXO messages are answered by your nurse or doctor typically within 24 hours.  Please allow extra time on weekends and holidays.      For urgent/emergent questions after business hours, you may reach the on-call Nephrology Fellow by contacting the [facility]  at [phone].     How will I get the results of any tests ordered?    You will receive all of your results.  If you have signed up for [employer], any tests ordered at your visit will be available to you after your physician reviews them.  Typically this takes 1-2 weeks.  If there are urgent results that require a change in your care plan, your physician or nurse will call you to discuss the next steps.      What is FIXO?  FIXO is a secure way for you to access all of your healthcare records from the [facility].  It is a web based computer program, so you can sign on to it from any location.  It also allows you to send secure messages to your care team.  I recommend signing up for FIXO access if you have not already done so and are comfortable with using a computer.      How do I schedule a follow-up visit?  If you did not schedule a follow-up visit today, please call [phone] to schedule a follow-up office visit.        Sincerely,      Dr. Cata Mclain  [facility]  Division of Nephrology and Hypertension    100

## 2024-01-01 ENCOUNTER — APPOINTMENT (OUTPATIENT)
Dept: GENERAL RADIOLOGY | Facility: CLINIC | Age: 89
DRG: 291 | End: 2024-01-01
Attending: STUDENT IN AN ORGANIZED HEALTH CARE EDUCATION/TRAINING PROGRAM
Payer: MEDICARE

## 2024-01-01 ENCOUNTER — APPOINTMENT (OUTPATIENT)
Dept: GENERAL RADIOLOGY | Facility: CLINIC | Age: 89
DRG: 291 | End: 2024-01-01
Attending: EMERGENCY MEDICINE
Payer: MEDICARE

## 2024-01-01 ENCOUNTER — PATIENT OUTREACH (OUTPATIENT)
Dept: FAMILY MEDICINE | Facility: CLINIC | Age: 89
End: 2024-01-01
Payer: MEDICARE

## 2024-01-01 ENCOUNTER — TELEPHONE (OUTPATIENT)
Dept: FAMILY MEDICINE | Facility: CLINIC | Age: 89
End: 2024-01-01
Payer: MEDICARE

## 2024-01-01 ENCOUNTER — PATIENT OUTREACH (OUTPATIENT)
Dept: CARDIOLOGY | Facility: CLINIC | Age: 89
End: 2024-01-01
Payer: MEDICARE

## 2024-01-01 ENCOUNTER — MEDICAL CORRESPONDENCE (OUTPATIENT)
Dept: HEALTH INFORMATION MANAGEMENT | Facility: CLINIC | Age: 89
End: 2024-01-01
Payer: MEDICARE

## 2024-01-01 ENCOUNTER — TELEPHONE (OUTPATIENT)
Dept: CARDIOLOGY | Facility: CLINIC | Age: 89
End: 2024-01-01

## 2024-01-01 ENCOUNTER — MYC MEDICAL ADVICE (OUTPATIENT)
Dept: FAMILY MEDICINE | Facility: CLINIC | Age: 89
End: 2024-01-01
Payer: MEDICARE

## 2024-01-01 ENCOUNTER — MEDICAL CORRESPONDENCE (OUTPATIENT)
Dept: HEALTH INFORMATION MANAGEMENT | Facility: CLINIC | Age: 89
End: 2024-01-01

## 2024-01-01 ENCOUNTER — TELEPHONE (OUTPATIENT)
Dept: FAMILY MEDICINE | Facility: CLINIC | Age: 89
End: 2024-01-01

## 2024-01-01 ENCOUNTER — MYC REFILL (OUTPATIENT)
Dept: FAMILY MEDICINE | Facility: CLINIC | Age: 89
End: 2024-01-01
Payer: MEDICARE

## 2024-01-01 ENCOUNTER — ALLIED HEALTH/NURSE VISIT (OUTPATIENT)
Dept: CARDIOLOGY | Facility: CLINIC | Age: 89
End: 2024-01-01
Payer: MEDICARE

## 2024-01-01 ENCOUNTER — NURSE TRIAGE (OUTPATIENT)
Dept: FAMILY MEDICINE | Facility: CLINIC | Age: 89
End: 2024-01-01

## 2024-01-01 ENCOUNTER — PATIENT OUTREACH (OUTPATIENT)
Dept: CARDIOLOGY | Facility: CLINIC | Age: 89
End: 2024-01-01

## 2024-01-01 ENCOUNTER — ANCILLARY PROCEDURE (OUTPATIENT)
Dept: CARDIOLOGY | Facility: CLINIC | Age: 89
End: 2024-01-01
Attending: INTERNAL MEDICINE
Payer: MEDICARE

## 2024-01-01 ENCOUNTER — LAB (OUTPATIENT)
Dept: LAB | Facility: CLINIC | Age: 89
End: 2024-01-01
Payer: MEDICARE

## 2024-01-01 ENCOUNTER — DOCUMENTATION ONLY (OUTPATIENT)
Dept: CARDIOLOGY | Facility: CLINIC | Age: 89
End: 2024-01-01

## 2024-01-01 ENCOUNTER — APPOINTMENT (OUTPATIENT)
Dept: PHYSICAL THERAPY | Facility: CLINIC | Age: 89
DRG: 291 | End: 2024-01-01
Attending: INTERNAL MEDICINE
Payer: MEDICARE

## 2024-01-01 ENCOUNTER — TELEPHONE (OUTPATIENT)
Dept: CARDIOLOGY | Facility: CLINIC | Age: 89
End: 2024-01-01
Payer: MEDICARE

## 2024-01-01 ENCOUNTER — LAB REQUISITION (OUTPATIENT)
Dept: LAB | Facility: CLINIC | Age: 89
End: 2024-01-01
Payer: MEDICARE

## 2024-01-01 ENCOUNTER — APPOINTMENT (OUTPATIENT)
Dept: ULTRASOUND IMAGING | Facility: CLINIC | Age: 89
DRG: 291 | End: 2024-01-01
Attending: EMERGENCY MEDICINE
Payer: MEDICARE

## 2024-01-01 ENCOUNTER — HOSPITAL ENCOUNTER (INPATIENT)
Facility: CLINIC | Age: 89
LOS: 1 days | DRG: 291 | End: 2024-08-20
Attending: EMERGENCY MEDICINE | Admitting: INTERNAL MEDICINE
Payer: MEDICARE

## 2024-01-01 ENCOUNTER — OFFICE VISIT (OUTPATIENT)
Dept: FAMILY MEDICINE | Facility: CLINIC | Age: 89
End: 2024-01-01
Payer: MEDICARE

## 2024-01-01 ENCOUNTER — DOCUMENTATION ONLY (OUTPATIENT)
Dept: CARDIOLOGY | Facility: CLINIC | Age: 89
End: 2024-01-01
Payer: MEDICARE

## 2024-01-01 ENCOUNTER — APPOINTMENT (OUTPATIENT)
Dept: PHYSICAL THERAPY | Facility: CLINIC | Age: 89
DRG: 291 | End: 2024-01-01
Payer: MEDICARE

## 2024-01-01 ENCOUNTER — VIRTUAL VISIT (OUTPATIENT)
Dept: FAMILY MEDICINE | Facility: CLINIC | Age: 89
End: 2024-01-01
Payer: MEDICARE

## 2024-01-01 ENCOUNTER — APPOINTMENT (OUTPATIENT)
Dept: OCCUPATIONAL THERAPY | Facility: CLINIC | Age: 89
DRG: 291 | End: 2024-01-01
Payer: MEDICARE

## 2024-01-01 ENCOUNTER — ANCILLARY PROCEDURE (OUTPATIENT)
Dept: GENERAL RADIOLOGY | Facility: CLINIC | Age: 89
End: 2024-01-01
Attending: NURSE PRACTITIONER
Payer: MEDICARE

## 2024-01-01 ENCOUNTER — PATIENT OUTREACH (OUTPATIENT)
Dept: CARE COORDINATION | Facility: CLINIC | Age: 89
End: 2024-01-01
Payer: MEDICARE

## 2024-01-01 ENCOUNTER — PATIENT OUTREACH (OUTPATIENT)
Dept: NEPHROLOGY | Facility: CLINIC | Age: 89
End: 2024-01-01
Payer: MEDICARE

## 2024-01-01 ENCOUNTER — VIRTUAL VISIT (OUTPATIENT)
Dept: CARDIOLOGY | Facility: CLINIC | Age: 89
End: 2024-01-01
Attending: NURSE PRACTITIONER
Payer: MEDICARE

## 2024-01-01 ENCOUNTER — TELEPHONE (OUTPATIENT)
Dept: NEPHROLOGY | Facility: CLINIC | Age: 89
End: 2024-01-01

## 2024-01-01 ENCOUNTER — OFFICE VISIT (OUTPATIENT)
Dept: CARDIOLOGY | Facility: CLINIC | Age: 89
End: 2024-01-01
Attending: PHYSICIAN ASSISTANT
Payer: MEDICARE

## 2024-01-01 ENCOUNTER — APPOINTMENT (OUTPATIENT)
Dept: OCCUPATIONAL THERAPY | Facility: CLINIC | Age: 89
DRG: 291 | End: 2024-01-01
Attending: INTERNAL MEDICINE
Payer: MEDICARE

## 2024-01-01 ENCOUNTER — OFFICE VISIT (OUTPATIENT)
Dept: PULMONOLOGY | Facility: CLINIC | Age: 89
End: 2024-01-01
Attending: STUDENT IN AN ORGANIZED HEALTH CARE EDUCATION/TRAINING PROGRAM
Payer: MEDICARE

## 2024-01-01 ENCOUNTER — MYC REFILL (OUTPATIENT)
Dept: FAMILY MEDICINE | Facility: CLINIC | Age: 89
End: 2024-01-01

## 2024-01-01 ENCOUNTER — THERAPY VISIT (OUTPATIENT)
Dept: OCCUPATIONAL THERAPY | Facility: CLINIC | Age: 89
End: 2024-01-01
Attending: PHYSICIAN ASSISTANT
Payer: MEDICARE

## 2024-01-01 ENCOUNTER — OFFICE VISIT (OUTPATIENT)
Dept: CARDIOLOGY | Facility: CLINIC | Age: 89
End: 2024-01-01
Payer: MEDICARE

## 2024-01-01 ENCOUNTER — MYC MEDICAL ADVICE (OUTPATIENT)
Dept: CARDIOLOGY | Facility: CLINIC | Age: 89
End: 2024-01-01

## 2024-01-01 ENCOUNTER — HOSPITAL ENCOUNTER (INPATIENT)
Facility: CLINIC | Age: 89
LOS: 6 days | Discharge: HOME HEALTH | DRG: 291 | End: 2024-05-16
Attending: EMERGENCY MEDICINE | Admitting: STUDENT IN AN ORGANIZED HEALTH CARE EDUCATION/TRAINING PROGRAM
Payer: MEDICARE

## 2024-01-01 ENCOUNTER — APPOINTMENT (OUTPATIENT)
Dept: CARDIOLOGY | Facility: CLINIC | Age: 89
DRG: 291 | End: 2024-01-01
Attending: INTERNAL MEDICINE
Payer: MEDICARE

## 2024-01-01 VITALS
BODY MASS INDEX: 34.19 KG/M2 | SYSTOLIC BLOOD PRESSURE: 105 MMHG | DIASTOLIC BLOOD PRESSURE: 50 MMHG | HEART RATE: 60 BPM | WEIGHT: 158 LBS | OXYGEN SATURATION: 93 %

## 2024-01-01 VITALS
BODY MASS INDEX: 34.39 KG/M2 | WEIGHT: 159.4 LBS | DIASTOLIC BLOOD PRESSURE: 52 MMHG | HEART RATE: 60 BPM | HEIGHT: 57 IN | OXYGEN SATURATION: 95 % | SYSTOLIC BLOOD PRESSURE: 102 MMHG

## 2024-01-01 VITALS
SYSTOLIC BLOOD PRESSURE: 152 MMHG | BODY MASS INDEX: 33.87 KG/M2 | HEART RATE: 59 BPM | WEIGHT: 156.53 LBS | OXYGEN SATURATION: 92 % | TEMPERATURE: 99.6 F | DIASTOLIC BLOOD PRESSURE: 51 MMHG | RESPIRATION RATE: 18 BRPM

## 2024-01-01 VITALS
SYSTOLIC BLOOD PRESSURE: 140 MMHG | HEIGHT: 57 IN | WEIGHT: 153.6 LBS | BODY MASS INDEX: 33.14 KG/M2 | OXYGEN SATURATION: 98 % | DIASTOLIC BLOOD PRESSURE: 69 MMHG | HEART RATE: 60 BPM

## 2024-01-01 VITALS
HEART RATE: 60 BPM | RESPIRATION RATE: 17 BRPM | BODY MASS INDEX: 34.78 KG/M2 | WEIGHT: 161.2 LBS | TEMPERATURE: 97.8 F | DIASTOLIC BLOOD PRESSURE: 64 MMHG | HEIGHT: 57 IN | SYSTOLIC BLOOD PRESSURE: 148 MMHG | OXYGEN SATURATION: 94 %

## 2024-01-01 VITALS
WEIGHT: 162.92 LBS | OXYGEN SATURATION: 95 % | DIASTOLIC BLOOD PRESSURE: 69 MMHG | TEMPERATURE: 98 F | HEIGHT: 56 IN | RESPIRATION RATE: 20 BRPM | HEART RATE: 60 BPM | BODY MASS INDEX: 36.65 KG/M2 | SYSTOLIC BLOOD PRESSURE: 174 MMHG

## 2024-01-01 VITALS
SYSTOLIC BLOOD PRESSURE: 158 MMHG | HEIGHT: 56 IN | WEIGHT: 159 LBS | HEART RATE: 60 BPM | BODY MASS INDEX: 35.77 KG/M2 | DIASTOLIC BLOOD PRESSURE: 64 MMHG | OXYGEN SATURATION: 92 %

## 2024-01-01 DIAGNOSIS — I10 ESSENTIAL HYPERTENSION: ICD-10-CM

## 2024-01-01 DIAGNOSIS — N18.4 CKD (CHRONIC KIDNEY DISEASE) STAGE 4, GFR 15-29 ML/MIN (H): ICD-10-CM

## 2024-01-01 DIAGNOSIS — M79.89 SWELLING OF RIGHT UPPER EXTREMITY: ICD-10-CM

## 2024-01-01 DIAGNOSIS — J42 CHRONIC BRONCHITIS, UNSPECIFIED CHRONIC BRONCHITIS TYPE (H): ICD-10-CM

## 2024-01-01 DIAGNOSIS — I50.32 CHRONIC DIASTOLIC HEART FAILURE (H): Primary | ICD-10-CM

## 2024-01-01 DIAGNOSIS — I65.23 BILATERAL CAROTID ARTERY STENOSIS: ICD-10-CM

## 2024-01-01 DIAGNOSIS — I50.32 CHRONIC DIASTOLIC HEART FAILURE (H): ICD-10-CM

## 2024-01-01 DIAGNOSIS — E78.5 HYPERLIPIDEMIA LDL GOAL <100: ICD-10-CM

## 2024-01-01 DIAGNOSIS — E87.20 METABOLIC ACIDOSIS: ICD-10-CM

## 2024-01-01 DIAGNOSIS — Z66 DNR (DO NOT RESUSCITATE): ICD-10-CM

## 2024-01-01 DIAGNOSIS — I10 BENIGN ESSENTIAL HYPERTENSION: ICD-10-CM

## 2024-01-01 DIAGNOSIS — I50.32 CHRONIC DIASTOLIC (CONGESTIVE) HEART FAILURE (H): ICD-10-CM

## 2024-01-01 DIAGNOSIS — Z95.0 PACEMAKER: ICD-10-CM

## 2024-01-01 DIAGNOSIS — I50.33 ACUTE ON CHRONIC DIASTOLIC CONGESTIVE HEART FAILURE (H): ICD-10-CM

## 2024-01-01 DIAGNOSIS — I50.32 CHRONIC HEART FAILURE WITH PRESERVED EJECTION FRACTION (H): ICD-10-CM

## 2024-01-01 DIAGNOSIS — E87.5 HYPERKALEMIA: ICD-10-CM

## 2024-01-01 DIAGNOSIS — J96.01 ACUTE RESPIRATORY FAILURE WITH HYPOXIA (H): ICD-10-CM

## 2024-01-01 DIAGNOSIS — Z02.9 ADMINISTRATIVE ENCOUNTER: Primary | ICD-10-CM

## 2024-01-01 DIAGNOSIS — I50.30 HEART FAILURE WITH PRESERVED EJECTION FRACTION, NYHA CLASS I (H): ICD-10-CM

## 2024-01-01 DIAGNOSIS — J43.8 OTHER EMPHYSEMA (H): ICD-10-CM

## 2024-01-01 DIAGNOSIS — Z53.9 DIAGNOSIS NOT YET DEFINED: Primary | ICD-10-CM

## 2024-01-01 DIAGNOSIS — I50.30 HEART FAILURE WITH PRESERVED EJECTION FRACTION, NYHA CLASS I (H): Primary | ICD-10-CM

## 2024-01-01 DIAGNOSIS — J44.89 CHRONIC BRONCHITIS WITH COPD (CHRONIC OBSTRUCTIVE PULMONARY DISEASE) (H): Primary | ICD-10-CM

## 2024-01-01 DIAGNOSIS — R06.02 SHORTNESS OF BREATH: ICD-10-CM

## 2024-01-01 DIAGNOSIS — I49.5 SICK SINUS SYNDROME (H): ICD-10-CM

## 2024-01-01 DIAGNOSIS — Z13.71 SCREENING FOR GENETIC DISEASE CARRIER STATUS: Primary | ICD-10-CM

## 2024-01-01 DIAGNOSIS — R79.89 ELEVATED TROPONIN: ICD-10-CM

## 2024-01-01 DIAGNOSIS — J44.9 CHRONIC OBSTRUCTIVE PULMONARY DISEASE, UNSPECIFIED COPD TYPE (H): Primary | ICD-10-CM

## 2024-01-01 DIAGNOSIS — N18.4 CKD (CHRONIC KIDNEY DISEASE) STAGE 4, GFR 15-29 ML/MIN (H): Primary | ICD-10-CM

## 2024-01-01 DIAGNOSIS — I34.0 MITRAL VALVE INSUFFICIENCY, UNSPECIFIED ETIOLOGY: ICD-10-CM

## 2024-01-01 DIAGNOSIS — I13.0 HYPERTENSIVE HEART AND CHRONIC KIDNEY DISEASE WITH HEART FAILURE AND STAGE 1 THROUGH STAGE 4 CHRONIC KIDNEY DISEASE, OR UNSPECIFIED CHRONIC KIDNEY DISEASE (H): ICD-10-CM

## 2024-01-01 DIAGNOSIS — I50.9 ACUTE ON CHRONIC CONGESTIVE HEART FAILURE, UNSPECIFIED HEART FAILURE TYPE (H): ICD-10-CM

## 2024-01-01 DIAGNOSIS — N18.9 ACUTE RENAL FAILURE SUPERIMPOSED ON CHRONIC KIDNEY DISEASE, UNSPECIFIED ACUTE RENAL FAILURE TYPE, UNSPECIFIED CKD STAGE (H): ICD-10-CM

## 2024-01-01 DIAGNOSIS — I50.33 ACUTE ON CHRONIC DIASTOLIC CONGESTIVE HEART FAILURE (H): Primary | ICD-10-CM

## 2024-01-01 DIAGNOSIS — J41.0 SIMPLE CHRONIC BRONCHITIS (H): ICD-10-CM

## 2024-01-01 DIAGNOSIS — E87.5 HYPERKALEMIA: Primary | ICD-10-CM

## 2024-01-01 DIAGNOSIS — J96.01 ACUTE RESPIRATORY FAILURE WITH HYPOXIA (H): Primary | ICD-10-CM

## 2024-01-01 DIAGNOSIS — R79.89 ELEVATED BRAIN NATRIURETIC PEPTIDE (BNP) LEVEL: ICD-10-CM

## 2024-01-01 DIAGNOSIS — J44.1 COPD EXACERBATION (H): ICD-10-CM

## 2024-01-01 DIAGNOSIS — E87.4 MIXED ACID BASE BALANCE DISORDER: Primary | ICD-10-CM

## 2024-01-01 DIAGNOSIS — Z99.81 DEPENDENCE ON SUPPLEMENTAL OXYGEN: ICD-10-CM

## 2024-01-01 DIAGNOSIS — F41.1 GENERALIZED ANXIETY DISORDER: ICD-10-CM

## 2024-01-01 DIAGNOSIS — Z93.2 ILEOSTOMY STATUS (H): ICD-10-CM

## 2024-01-01 DIAGNOSIS — I10 BENIGN ESSENTIAL HYPERTENSION: Primary | ICD-10-CM

## 2024-01-01 DIAGNOSIS — I50.32 CHRONIC HEART FAILURE WITH PRESERVED EJECTION FRACTION (H): Primary | ICD-10-CM

## 2024-01-01 DIAGNOSIS — I89.0 LYMPHEDEMA: Primary | ICD-10-CM

## 2024-01-01 DIAGNOSIS — R06.00 DYSPNEA, UNSPECIFIED TYPE: ICD-10-CM

## 2024-01-01 DIAGNOSIS — D50.9 IRON DEFICIENCY ANEMIA, UNSPECIFIED IRON DEFICIENCY ANEMIA TYPE: Primary | ICD-10-CM

## 2024-01-01 DIAGNOSIS — Z78.9 DNI (DO NOT INTUBATE): ICD-10-CM

## 2024-01-01 DIAGNOSIS — N17.9 ACUTE RENAL FAILURE SUPERIMPOSED ON CHRONIC KIDNEY DISEASE, UNSPECIFIED ACUTE RENAL FAILURE TYPE, UNSPECIFIED CKD STAGE (H): ICD-10-CM

## 2024-01-01 DIAGNOSIS — Z93.2 ILEOSTOMY STATUS (H): Primary | ICD-10-CM

## 2024-01-01 DIAGNOSIS — D50.9 IRON DEFICIENCY ANEMIA, UNSPECIFIED IRON DEFICIENCY ANEMIA TYPE: ICD-10-CM

## 2024-01-01 DIAGNOSIS — J44.89 CHRONIC BRONCHITIS WITH COPD (CHRONIC OBSTRUCTIVE PULMONARY DISEASE) (H): ICD-10-CM

## 2024-01-01 DIAGNOSIS — N39.0 ACUTE UTI: ICD-10-CM

## 2024-01-01 DIAGNOSIS — F33.42 RECURRENT MAJOR DEPRESSIVE DISORDER, IN FULL REMISSION (H): ICD-10-CM

## 2024-01-01 DIAGNOSIS — J44.1 COPD WITH ACUTE EXACERBATION (H): ICD-10-CM

## 2024-01-01 DIAGNOSIS — J43.8 OTHER EMPHYSEMA (H): Primary | ICD-10-CM

## 2024-01-01 DIAGNOSIS — I50.33 ACUTE ON CHRONIC DIASTOLIC (CONGESTIVE) HEART FAILURE (H): ICD-10-CM

## 2024-01-01 DIAGNOSIS — E21.5: ICD-10-CM

## 2024-01-01 DIAGNOSIS — E66.01 MORBID OBESITY (H): ICD-10-CM

## 2024-01-01 DIAGNOSIS — J42 CHRONIC BRONCHITIS, UNSPECIFIED CHRONIC BRONCHITIS TYPE (H): Primary | ICD-10-CM

## 2024-01-01 LAB
ALBUMIN UR-MCNC: 200 MG/DL
ANION GAP SERPL CALCULATED.3IONS-SCNC: 12 MMOL/L (ref 7–15)
ANION GAP SERPL CALCULATED.3IONS-SCNC: 13 MMOL/L (ref 7–15)
ANION GAP SERPL CALCULATED.3IONS-SCNC: 14 MMOL/L (ref 7–15)
ANION GAP SERPL CALCULATED.3IONS-SCNC: 15 MMOL/L (ref 7–15)
ANION GAP SERPL CALCULATED.3IONS-SCNC: 16 MMOL/L (ref 7–15)
ANION GAP SERPL CALCULATED.3IONS-SCNC: 16 MMOL/L (ref 7–15)
ANION GAP SERPL CALCULATED.3IONS-SCNC: 17 MMOL/L (ref 7–15)
APPEARANCE UR: ABNORMAL
ATRIAL RATE - MUSE: 60 BPM
ATRIAL RATE - MUSE: 60 BPM
ATRIAL RATE - MUSE: 62 BPM
ATRIAL RATE - MUSE: 95 BPM
B-OH-BUTYR SERPL-SCNC: 0.74 MMOL/L
BACTERIA #/AREA URNS HPF: ABNORMAL /HPF
BACTERIA SPT CULT: ABNORMAL
BACTERIA UR CULT: ABNORMAL
BASE EXCESS BLDV CALC-SCNC: -10.9 MMOL/L (ref -3–3)
BASOPHILS # BLD AUTO: 0 10E3/UL (ref 0–0.2)
BASOPHILS # BLD AUTO: 0.1 10E3/UL (ref 0–0.2)
BASOPHILS NFR BLD AUTO: 0 %
BASOPHILS NFR BLD AUTO: 1 %
BILIRUB UR QL STRIP: NEGATIVE
BUN SERPL-MCNC: 101.3 MG/DL (ref 8–23)
BUN SERPL-MCNC: 103.7 MG/DL (ref 8–23)
BUN SERPL-MCNC: 106.1 MG/DL (ref 8–23)
BUN SERPL-MCNC: 64.6 MG/DL (ref 8–23)
BUN SERPL-MCNC: 67.8 MG/DL (ref 8–23)
BUN SERPL-MCNC: 69.4 MG/DL (ref 8–23)
BUN SERPL-MCNC: 74.6 MG/DL (ref 8–23)
BUN SERPL-MCNC: 77.7 MG/DL (ref 8–23)
BUN SERPL-MCNC: 79.2 MG/DL (ref 8–23)
BUN SERPL-MCNC: 80.7 MG/DL (ref 8–23)
BUN SERPL-MCNC: 92.4 MG/DL (ref 8–23)
BUN SERPL-MCNC: 93 MG/DL (ref 8–23)
BUN SERPL-MCNC: 94.8 MG/DL (ref 8–23)
BUN SERPL-MCNC: 95.4 MG/DL (ref 8–23)
BUN SERPL-MCNC: 95.4 MG/DL (ref 8–23)
BUN SERPL-MCNC: 95.8 MG/DL (ref 8–23)
CALCIUM SERPL-MCNC: 8.1 MG/DL (ref 8.8–10.2)
CALCIUM SERPL-MCNC: 8.1 MG/DL (ref 8.8–10.2)
CALCIUM SERPL-MCNC: 8.2 MG/DL (ref 8.8–10.2)
CALCIUM SERPL-MCNC: 8.4 MG/DL (ref 8.8–10.2)
CALCIUM SERPL-MCNC: 8.5 MG/DL (ref 8.8–10.2)
CALCIUM SERPL-MCNC: 8.5 MG/DL (ref 8.8–10.4)
CALCIUM SERPL-MCNC: 8.6 MG/DL (ref 8.8–10.4)
CALCIUM SERPL-MCNC: 8.8 MG/DL (ref 8.8–10.2)
CALCIUM SERPL-MCNC: 8.8 MG/DL (ref 8.8–10.2)
CALCIUM SERPL-MCNC: 8.8 MG/DL (ref 8.8–10.4)
CALCIUM SERPL-MCNC: 8.9 MG/DL (ref 8.8–10.2)
CALCIUM SERPL-MCNC: 8.9 MG/DL (ref 8.8–10.2)
CALCIUM SERPL-MCNC: 9.1 MG/DL (ref 8.8–10.2)
CALCIUM SERPL-MCNC: 9.1 MG/DL (ref 8.8–10.2)
CALCIUM SERPL-MCNC: 9.2 MG/DL (ref 8.8–10.4)
CALCIUM SERPL-MCNC: 9.4 MG/DL (ref 8.8–10.2)
CHLORIDE SERPL-SCNC: 103 MMOL/L (ref 98–107)
CHLORIDE SERPL-SCNC: 103 MMOL/L (ref 98–107)
CHLORIDE SERPL-SCNC: 104 MMOL/L (ref 98–107)
CHLORIDE SERPL-SCNC: 105 MMOL/L (ref 98–107)
CHLORIDE SERPL-SCNC: 106 MMOL/L (ref 98–107)
CHLORIDE SERPL-SCNC: 107 MMOL/L (ref 98–107)
CHLORIDE SERPL-SCNC: 107 MMOL/L (ref 98–107)
CHLORIDE SERPL-SCNC: 108 MMOL/L (ref 98–107)
CHLORIDE SERPL-SCNC: 109 MMOL/L (ref 98–107)
CHLORIDE SERPL-SCNC: 110 MMOL/L (ref 98–107)
CHLORIDE SERPL-SCNC: 98 MMOL/L (ref 98–107)
COLOR UR AUTO: YELLOW
CREAT SERPL-MCNC: 2.39 MG/DL (ref 0.51–0.95)
CREAT SERPL-MCNC: 2.48 MG/DL (ref 0.51–0.95)
CREAT SERPL-MCNC: 2.52 MG/DL (ref 0.51–0.95)
CREAT SERPL-MCNC: 2.54 MG/DL (ref 0.51–0.95)
CREAT SERPL-MCNC: 2.55 MG/DL (ref 0.51–0.95)
CREAT SERPL-MCNC: 2.55 MG/DL (ref 0.51–0.95)
CREAT SERPL-MCNC: 2.63 MG/DL (ref 0.51–0.95)
CREAT SERPL-MCNC: 2.66 MG/DL (ref 0.51–0.95)
CREAT SERPL-MCNC: 2.74 MG/DL (ref 0.51–0.95)
CREAT SERPL-MCNC: 2.75 MG/DL (ref 0.51–0.95)
CREAT SERPL-MCNC: 2.82 MG/DL (ref 0.51–0.95)
CREAT SERPL-MCNC: 2.94 MG/DL (ref 0.51–0.95)
CREAT SERPL-MCNC: 3.08 MG/DL (ref 0.51–0.95)
CREAT SERPL-MCNC: 3.65 MG/DL (ref 0.51–0.95)
CREAT SERPL-MCNC: 3.8 MG/DL (ref 0.51–0.95)
CREAT SERPL-MCNC: 3.98 MG/DL (ref 0.51–0.95)
DEPRECATED HCO3 PLAS-SCNC: 17 MMOL/L (ref 22–29)
DEPRECATED HCO3 PLAS-SCNC: 18 MMOL/L (ref 22–29)
DEPRECATED HCO3 PLAS-SCNC: 19 MMOL/L (ref 22–29)
DEPRECATED HCO3 PLAS-SCNC: 20 MMOL/L (ref 22–29)
DEPRECATED HCO3 PLAS-SCNC: 21 MMOL/L (ref 22–29)
DEPRECATED HCO3 PLAS-SCNC: 24 MMOL/L (ref 22–29)
DEPRECATED HCO3 PLAS-SCNC: 24 MMOL/L (ref 22–29)
DIASTOLIC BLOOD PRESSURE - MUSE: NORMAL MMHG
EGFRCR SERPLBLD CKD-EPI 2021: 10 ML/MIN/1.73M2
EGFRCR SERPLBLD CKD-EPI 2021: 11 ML/MIN/1.73M2
EGFRCR SERPLBLD CKD-EPI 2021: 12 ML/MIN/1.73M2
EGFRCR SERPLBLD CKD-EPI 2021: 14 ML/MIN/1.73M2
EGFRCR SERPLBLD CKD-EPI 2021: 15 ML/MIN/1.73M2
EGFRCR SERPLBLD CKD-EPI 2021: 16 ML/MIN/1.73M2
EGFRCR SERPLBLD CKD-EPI 2021: 17 ML/MIN/1.73M2
EGFRCR SERPLBLD CKD-EPI 2021: 17 ML/MIN/1.73M2
EGFRCR SERPLBLD CKD-EPI 2021: 18 ML/MIN/1.73M2
EGFRCR SERPLBLD CKD-EPI 2021: 19 ML/MIN/1.73M2
EOSINOPHIL # BLD AUTO: 0.1 10E3/UL (ref 0–0.7)
EOSINOPHIL # BLD AUTO: 0.1 10E3/UL (ref 0–0.7)
EOSINOPHIL NFR BLD AUTO: 1 %
EOSINOPHIL NFR BLD AUTO: 2 %
ERYTHROCYTE [DISTWIDTH] IN BLOOD BY AUTOMATED COUNT: 12.9 % (ref 10–15)
ERYTHROCYTE [DISTWIDTH] IN BLOOD BY AUTOMATED COUNT: 13 % (ref 10–15)
ERYTHROCYTE [DISTWIDTH] IN BLOOD BY AUTOMATED COUNT: 13 % (ref 10–15)
ERYTHROCYTE [DISTWIDTH] IN BLOOD BY AUTOMATED COUNT: 13.1 % (ref 10–15)
ERYTHROCYTE [DISTWIDTH] IN BLOOD BY AUTOMATED COUNT: 13.3 % (ref 10–15)
ERYTHROCYTE [DISTWIDTH] IN BLOOD BY AUTOMATED COUNT: 13.4 % (ref 10–15)
ERYTHROCYTE [DISTWIDTH] IN BLOOD BY AUTOMATED COUNT: 13.6 % (ref 10–15)
FLUAV RNA SPEC QL NAA+PROBE: NEGATIVE
FLUAV RNA SPEC QL NAA+PROBE: NEGATIVE
FLUBV RNA RESP QL NAA+PROBE: NEGATIVE
FLUBV RNA RESP QL NAA+PROBE: NEGATIVE
GLUCOSE BLDC GLUCOMTR-MCNC: 133 MG/DL (ref 70–99)
GLUCOSE BLDC GLUCOMTR-MCNC: 143 MG/DL (ref 70–99)
GLUCOSE BLDC GLUCOMTR-MCNC: 146 MG/DL (ref 70–99)
GLUCOSE BLDC GLUCOMTR-MCNC: 146 MG/DL (ref 70–99)
GLUCOSE BLDC GLUCOMTR-MCNC: 149 MG/DL (ref 70–99)
GLUCOSE BLDC GLUCOMTR-MCNC: 151 MG/DL (ref 70–99)
GLUCOSE BLDC GLUCOMTR-MCNC: 153 MG/DL (ref 70–99)
GLUCOSE BLDC GLUCOMTR-MCNC: 162 MG/DL (ref 70–99)
GLUCOSE BLDC GLUCOMTR-MCNC: 165 MG/DL (ref 70–99)
GLUCOSE BLDC GLUCOMTR-MCNC: 172 MG/DL (ref 70–99)
GLUCOSE BLDC GLUCOMTR-MCNC: 173 MG/DL (ref 70–99)
GLUCOSE BLDC GLUCOMTR-MCNC: 179 MG/DL (ref 70–99)
GLUCOSE BLDC GLUCOMTR-MCNC: 180 MG/DL (ref 70–99)
GLUCOSE BLDC GLUCOMTR-MCNC: 181 MG/DL (ref 70–99)
GLUCOSE BLDC GLUCOMTR-MCNC: 183 MG/DL (ref 70–99)
GLUCOSE BLDC GLUCOMTR-MCNC: 192 MG/DL (ref 70–99)
GLUCOSE BLDC GLUCOMTR-MCNC: 293 MG/DL (ref 70–99)
GLUCOSE SERPL-MCNC: 104 MG/DL (ref 70–99)
GLUCOSE SERPL-MCNC: 107 MG/DL (ref 70–99)
GLUCOSE SERPL-MCNC: 107 MG/DL (ref 70–99)
GLUCOSE SERPL-MCNC: 110 MG/DL (ref 70–99)
GLUCOSE SERPL-MCNC: 110 MG/DL (ref 70–99)
GLUCOSE SERPL-MCNC: 113 MG/DL (ref 70–99)
GLUCOSE SERPL-MCNC: 115 MG/DL (ref 70–99)
GLUCOSE SERPL-MCNC: 127 MG/DL (ref 70–99)
GLUCOSE SERPL-MCNC: 133 MG/DL (ref 70–99)
GLUCOSE SERPL-MCNC: 145 MG/DL (ref 70–99)
GLUCOSE SERPL-MCNC: 155 MG/DL (ref 70–99)
GLUCOSE SERPL-MCNC: 165 MG/DL (ref 70–99)
GLUCOSE SERPL-MCNC: 88 MG/DL (ref 70–99)
GLUCOSE SERPL-MCNC: 91 MG/DL (ref 70–99)
GLUCOSE SERPL-MCNC: 95 MG/DL (ref 70–99)
GLUCOSE SERPL-MCNC: 99 MG/DL (ref 70–99)
GLUCOSE UR STRIP-MCNC: NEGATIVE MG/DL
GRAM STAIN RESULT: ABNORMAL
HCO3 BLDV-SCNC: 16 MMOL/L (ref 21–28)
HCO3 BLDV-SCNC: 18 MMOL/L (ref 21–28)
HCO3 SERPL-SCNC: 17 MMOL/L (ref 22–29)
HCO3 SERPL-SCNC: 18 MMOL/L (ref 22–29)
HCO3 SERPL-SCNC: 18 MMOL/L (ref 22–29)
HCO3 SERPL-SCNC: 20 MMOL/L (ref 22–29)
HCO3 SERPL-SCNC: 21 MMOL/L (ref 22–29)
HCT VFR BLD AUTO: 27.5 % (ref 35–47)
HCT VFR BLD AUTO: 29 % (ref 35–47)
HCT VFR BLD AUTO: 29.3 % (ref 35–47)
HCT VFR BLD AUTO: 30.2 % (ref 35–47)
HCT VFR BLD AUTO: 30.8 % (ref 35–47)
HCT VFR BLD AUTO: 31.9 % (ref 35–47)
HCT VFR BLD AUTO: 33.7 % (ref 35–47)
HGB BLD-MCNC: 10.8 G/DL (ref 11.7–15.7)
HGB BLD-MCNC: 11.4 G/DL (ref 11.7–15.7)
HGB BLD-MCNC: 8.8 G/DL (ref 11.7–15.7)
HGB BLD-MCNC: 9 G/DL (ref 11.7–15.7)
HGB BLD-MCNC: 9.1 G/DL (ref 11.7–15.7)
HGB BLD-MCNC: 9.3 G/DL (ref 11.7–15.7)
HGB BLD-MCNC: 9.9 G/DL (ref 11.7–15.7)
HGB BLD-MCNC: 9.9 G/DL (ref 11.7–15.7)
HGB UR QL STRIP: NEGATIVE
HOLD SPECIMEN: NORMAL
IMM GRANULOCYTES # BLD: 0 10E3/UL
IMM GRANULOCYTES # BLD: 0 10E3/UL
IMM GRANULOCYTES NFR BLD: 0 %
IMM GRANULOCYTES NFR BLD: 0 %
INTERPRETATION ECG - MUSE: NORMAL
KETONES UR STRIP-MCNC: NEGATIVE MG/DL
LACTATE BLD-SCNC: 0.8 MMOL/L
LACTATE SERPL-SCNC: 0.7 MMOL/L (ref 0.7–2)
LEUKOCYTE ESTERASE UR QL STRIP: ABNORMAL
LVEF ECHO: NORMAL
LYMPHOCYTES # BLD AUTO: 0.6 10E3/UL (ref 0.8–5.3)
LYMPHOCYTES # BLD AUTO: 1.9 10E3/UL (ref 0.8–5.3)
LYMPHOCYTES NFR BLD AUTO: 10 %
LYMPHOCYTES NFR BLD AUTO: 14 %
MCH RBC QN AUTO: 32.6 PG (ref 26.5–33)
MCH RBC QN AUTO: 32.6 PG (ref 26.5–33)
MCH RBC QN AUTO: 32.7 PG (ref 26.5–33)
MCH RBC QN AUTO: 32.8 PG (ref 26.5–33)
MCH RBC QN AUTO: 32.9 PG (ref 26.5–33)
MCH RBC QN AUTO: 33 PG (ref 26.5–33)
MCH RBC QN AUTO: 33.2 PG (ref 26.5–33)
MCHC RBC AUTO-ENTMCNC: 30.1 G/DL (ref 31.5–36.5)
MCHC RBC AUTO-ENTMCNC: 30.3 G/DL (ref 31.5–36.5)
MCHC RBC AUTO-ENTMCNC: 31 G/DL (ref 31.5–36.5)
MCHC RBC AUTO-ENTMCNC: 31.7 G/DL (ref 31.5–36.5)
MCHC RBC AUTO-ENTMCNC: 32 G/DL (ref 31.5–36.5)
MCHC RBC AUTO-ENTMCNC: 32.1 G/DL (ref 31.5–36.5)
MCHC RBC AUTO-ENTMCNC: 32.7 G/DL (ref 31.5–36.5)
MCV RBC AUTO: 100 FL (ref 78–100)
MCV RBC AUTO: 102 FL (ref 78–100)
MCV RBC AUTO: 104 FL (ref 78–100)
MCV RBC AUTO: 104 FL (ref 78–100)
MCV RBC AUTO: 106 FL (ref 78–100)
MCV RBC AUTO: 107 FL (ref 78–100)
MCV RBC AUTO: 108 FL (ref 78–100)
MDC_IDC_EPISODE_DTM: NORMAL
MDC_IDC_EPISODE_DURATION: 10 S
MDC_IDC_EPISODE_ID: NORMAL
MDC_IDC_EPISODE_TYPE: NORMAL
MDC_IDC_EPISODE_TYPE_INDUCED: NO
MDC_IDC_LEAD_CONNECTION_STATUS: NORMAL
MDC_IDC_LEAD_IMPLANT_DT: NORMAL
MDC_IDC_LEAD_LOCATION: NORMAL
MDC_IDC_LEAD_LOCATION_DETAIL_1: NORMAL
MDC_IDC_LEAD_LOCATION_DETAIL_1: NORMAL
MDC_IDC_LEAD_MFG: NORMAL
MDC_IDC_LEAD_MODEL: NORMAL
MDC_IDC_LEAD_POLARITY_TYPE: NORMAL
MDC_IDC_LEAD_SERIAL: NORMAL
MDC_IDC_LEAD_SERIAL: NORMAL
MDC_IDC_MSMT_BATTERY_DTM: NORMAL
MDC_IDC_MSMT_BATTERY_REMAINING_LONGEVITY: 12 MO
MDC_IDC_MSMT_BATTERY_REMAINING_LONGEVITY: 18 MO
MDC_IDC_MSMT_BATTERY_REMAINING_LONGEVITY: 24 MO
MDC_IDC_MSMT_BATTERY_REMAINING_PERCENTAGE: 22 %
MDC_IDC_MSMT_BATTERY_REMAINING_PERCENTAGE: 34 %
MDC_IDC_MSMT_BATTERY_REMAINING_PERCENTAGE: 38 %
MDC_IDC_MSMT_BATTERY_STATUS: NORMAL
MDC_IDC_MSMT_LEADCHNL_RA_IMPEDANCE_VALUE: 352 OHM
MDC_IDC_MSMT_LEADCHNL_RA_IMPEDANCE_VALUE: 363 OHM
MDC_IDC_MSMT_LEADCHNL_RA_IMPEDANCE_VALUE: 371 OHM
MDC_IDC_MSMT_LEADCHNL_RA_PACING_THRESHOLD_AMPLITUDE: 0.7 V
MDC_IDC_MSMT_LEADCHNL_RA_PACING_THRESHOLD_PULSEWIDTH: 0.4 MS
MDC_IDC_MSMT_LEADCHNL_RV_IMPEDANCE_VALUE: 378 OHM
MDC_IDC_MSMT_LEADCHNL_RV_IMPEDANCE_VALUE: 387 OHM
MDC_IDC_MSMT_LEADCHNL_RV_IMPEDANCE_VALUE: 399 OHM
MDC_IDC_MSMT_LEADCHNL_RV_PACING_THRESHOLD_AMPLITUDE: 0.8 V
MDC_IDC_MSMT_LEADCHNL_RV_PACING_THRESHOLD_AMPLITUDE: 1.1 V
MDC_IDC_MSMT_LEADCHNL_RV_PACING_THRESHOLD_AMPLITUDE: 1.1 V
MDC_IDC_MSMT_LEADCHNL_RV_PACING_THRESHOLD_PULSEWIDTH: 0.4 MS
MDC_IDC_PG_IMPLANT_DTM: NORMAL
MDC_IDC_PG_MFG: NORMAL
MDC_IDC_PG_MODEL: NORMAL
MDC_IDC_PG_SERIAL: NORMAL
MDC_IDC_PG_TYPE: NORMAL
MDC_IDC_SESS_CLINIC_NAME: NORMAL
MDC_IDC_SESS_DTM: NORMAL
MDC_IDC_SESS_TYPE: NORMAL
MDC_IDC_SET_BRADY_AT_MODE_SWITCH_MODE: NORMAL
MDC_IDC_SET_BRADY_AT_MODE_SWITCH_RATE: 170 {BEATS}/MIN
MDC_IDC_SET_BRADY_LOWRATE: 60 {BEATS}/MIN
MDC_IDC_SET_BRADY_MAX_SENSOR_RATE: 120 {BEATS}/MIN
MDC_IDC_SET_BRADY_MAX_TRACKING_RATE: 120 {BEATS}/MIN
MDC_IDC_SET_BRADY_MODE: NORMAL
MDC_IDC_SET_BRADY_PAV_DELAY_LOW: 250 MS
MDC_IDC_SET_BRADY_SAV_DELAY_LOW: 200 MS
MDC_IDC_SET_LEADCHNL_RA_PACING_AMPLITUDE: 2 V
MDC_IDC_SET_LEADCHNL_RA_PACING_POLARITY: NORMAL
MDC_IDC_SET_LEADCHNL_RA_PACING_PULSEWIDTH: 0.4 MS
MDC_IDC_SET_LEADCHNL_RA_SENSING_ADAPTATION_MODE: NORMAL
MDC_IDC_SET_LEADCHNL_RA_SENSING_POLARITY: NORMAL
MDC_IDC_SET_LEADCHNL_RA_SENSING_SENSITIVITY: 0.75 MV
MDC_IDC_SET_LEADCHNL_RV_PACING_AMPLITUDE: 1.5 V
MDC_IDC_SET_LEADCHNL_RV_PACING_AMPLITUDE: 1.6 V
MDC_IDC_SET_LEADCHNL_RV_PACING_AMPLITUDE: 1.6 V
MDC_IDC_SET_LEADCHNL_RV_PACING_CAPTURE_MODE: NORMAL
MDC_IDC_SET_LEADCHNL_RV_PACING_POLARITY: NORMAL
MDC_IDC_SET_LEADCHNL_RV_PACING_PULSEWIDTH: 0.4 MS
MDC_IDC_SET_LEADCHNL_RV_SENSING_ADAPTATION_MODE: NORMAL
MDC_IDC_SET_LEADCHNL_RV_SENSING_POLARITY: NORMAL
MDC_IDC_SET_LEADCHNL_RV_SENSING_SENSITIVITY: 2.5 MV
MDC_IDC_SET_ZONE_DETECTION_INTERVAL: 375 MS
MDC_IDC_SET_ZONE_STATUS: NORMAL
MDC_IDC_SET_ZONE_TYPE: NORMAL
MDC_IDC_SET_ZONE_VENDOR_TYPE: NORMAL
MDC_IDC_STAT_AT_BURDEN_PERCENT: 0 %
MDC_IDC_STAT_AT_DTM_END: NORMAL
MDC_IDC_STAT_AT_DTM_START: NORMAL
MDC_IDC_STAT_BRADY_DTM_END: NORMAL
MDC_IDC_STAT_BRADY_DTM_START: NORMAL
MDC_IDC_STAT_BRADY_RA_PERCENT_PACED: 99 %
MDC_IDC_STAT_BRADY_RV_PERCENT_PACED: 2 %
MDC_IDC_STAT_BRADY_RV_PERCENT_PACED: 3 %
MDC_IDC_STAT_BRADY_RV_PERCENT_PACED: 3 %
MDC_IDC_STAT_EPISODE_RECENT_COUNT: 0
MDC_IDC_STAT_EPISODE_RECENT_COUNT: 1
MDC_IDC_STAT_EPISODE_RECENT_COUNT_DTM_END: NORMAL
MDC_IDC_STAT_EPISODE_RECENT_COUNT_DTM_START: NORMAL
MDC_IDC_STAT_EPISODE_TYPE: NORMAL
MDC_IDC_STAT_EPISODE_VENDOR_TYPE: NORMAL
MONOCYTES # BLD AUTO: 0.4 10E3/UL (ref 0–1.3)
MONOCYTES # BLD AUTO: 0.9 10E3/UL (ref 0–1.3)
MONOCYTES NFR BLD AUTO: 7 %
MONOCYTES NFR BLD AUTO: 7 %
MUCOUS THREADS #/AREA URNS LPF: PRESENT /LPF
NEUTROPHILS # BLD AUTO: 10.2 10E3/UL (ref 1.6–8.3)
NEUTROPHILS # BLD AUTO: 5 10E3/UL (ref 1.6–8.3)
NEUTROPHILS NFR BLD AUTO: 78 %
NEUTROPHILS NFR BLD AUTO: 81 %
NITRATE UR QL: NEGATIVE
NRBC # BLD AUTO: 0 10E3/UL
NRBC # BLD AUTO: 0 10E3/UL
NRBC BLD AUTO-RTO: 0 /100
NRBC BLD AUTO-RTO: 0 /100
NT-PROBNP SERPL-MCNC: 2383 PG/ML (ref 0–1800)
NT-PROBNP SERPL-MCNC: 5381 PG/ML (ref 0–1800)
NT-PROBNP SERPL-MCNC: ABNORMAL PG/ML (ref 0–1800)
O2/TOTAL GAS SETTING VFR VENT: 5 %
OXYHGB MFR BLDV: 85 % (ref 70–75)
P AXIS - MUSE: -14 DEGREES
P AXIS - MUSE: -27 DEGREES
P AXIS - MUSE: -8 DEGREES
P AXIS - MUSE: 34 DEGREES
PCO2 BLDV: 38 MM HG (ref 40–50)
PCO2 BLDV: 41 MM HG (ref 40–50)
PH BLDV: 7.21 [PH] (ref 7.32–7.43)
PH BLDV: 7.3 [PH] (ref 7.32–7.43)
PH UR STRIP: 7.5 [PH] (ref 5–7)
PLATELET # BLD AUTO: 141 10E3/UL (ref 150–450)
PLATELET # BLD AUTO: 143 10E3/UL (ref 150–450)
PLATELET # BLD AUTO: 144 10E3/UL (ref 150–450)
PLATELET # BLD AUTO: 146 10E3/UL (ref 150–450)
PLATELET # BLD AUTO: 156 10E3/UL (ref 150–450)
PLATELET # BLD AUTO: 170 10E3/UL (ref 150–450)
PLATELET # BLD AUTO: 175 10E3/UL (ref 150–450)
PO2 BLDV: 16 MM HG (ref 25–47)
PO2 BLDV: 56 MM HG (ref 25–47)
POTASSIUM SERPL-SCNC: 4 MMOL/L (ref 3.4–5.3)
POTASSIUM SERPL-SCNC: 4.3 MMOL/L (ref 3.4–5.3)
POTASSIUM SERPL-SCNC: 4.8 MMOL/L (ref 3.4–5.3)
POTASSIUM SERPL-SCNC: 4.9 MMOL/L (ref 3.4–5.3)
POTASSIUM SERPL-SCNC: 5 MMOL/L (ref 3.4–5.3)
POTASSIUM SERPL-SCNC: 5 MMOL/L (ref 3.4–5.3)
POTASSIUM SERPL-SCNC: 5.1 MMOL/L (ref 3.4–5.3)
POTASSIUM SERPL-SCNC: 5.1 MMOL/L (ref 3.4–5.3)
POTASSIUM SERPL-SCNC: 5.2 MMOL/L (ref 3.4–5.3)
POTASSIUM SERPL-SCNC: 5.3 MMOL/L (ref 3.4–5.3)
POTASSIUM SERPL-SCNC: 5.4 MMOL/L (ref 3.4–5.3)
POTASSIUM SERPL-SCNC: 5.4 MMOL/L (ref 3.4–5.3)
POTASSIUM SERPL-SCNC: 5.6 MMOL/L (ref 3.4–5.3)
POTASSIUM SERPL-SCNC: 5.7 MMOL/L (ref 3.4–5.3)
POTASSIUM SERPL-SCNC: 5.8 MMOL/L (ref 3.4–5.3)
POTASSIUM SERPL-SCNC: 5.9 MMOL/L (ref 3.4–5.3)
POTASSIUM SERPL-SCNC: 6.2 MMOL/L (ref 3.4–5.3)
POTASSIUM SERPL-SCNC: 6.3 MMOL/L (ref 3.4–5.3)
POTASSIUM SERPL-SCNC: 6.4 MMOL/L (ref 3.4–5.3)
PR INTERVAL - MUSE: 112 MS
PR INTERVAL - MUSE: 130 MS
PR INTERVAL - MUSE: 196 MS
PR INTERVAL - MUSE: 198 MS
PROCALCITONIN SERPL IA-MCNC: 0.16 NG/ML
QRS DURATION - MUSE: 112 MS
QRS DURATION - MUSE: 118 MS
QRS DURATION - MUSE: 118 MS
QRS DURATION - MUSE: 132 MS
QT - MUSE: 374 MS
QT - MUSE: 418 MS
QT - MUSE: 422 MS
QT - MUSE: 440 MS
QTC - MUSE: 422 MS
QTC - MUSE: 424 MS
QTC - MUSE: 440 MS
QTC - MUSE: 469 MS
R AXIS - MUSE: -39 DEGREES
R AXIS - MUSE: -42 DEGREES
R AXIS - MUSE: -45 DEGREES
R AXIS - MUSE: -45 DEGREES
RBC # BLD AUTO: 2.7 10E6/UL (ref 3.8–5.2)
RBC # BLD AUTO: 2.75 10E6/UL (ref 3.8–5.2)
RBC # BLD AUTO: 2.79 10E6/UL (ref 3.8–5.2)
RBC # BLD AUTO: 2.82 10E6/UL (ref 3.8–5.2)
RBC # BLD AUTO: 3.01 10E6/UL (ref 3.8–5.2)
RBC # BLD AUTO: 3.02 10E6/UL (ref 3.8–5.2)
RBC # BLD AUTO: 3.25 10E6/UL (ref 3.8–5.2)
RBC URINE: 3 /HPF
RSV RNA SPEC NAA+PROBE: NEGATIVE
RSV RNA SPEC NAA+PROBE: NEGATIVE
SAO2 % BLDV: 19 % (ref 70–75)
SAO2 % BLDV: 86.1 % (ref 70–75)
SARS-COV-2 RNA RESP QL NAA+PROBE: NEGATIVE
SARS-COV-2 RNA RESP QL NAA+PROBE: NEGATIVE
SODIUM SERPL-SCNC: 136 MMOL/L (ref 135–145)
SODIUM SERPL-SCNC: 136 MMOL/L (ref 135–145)
SODIUM SERPL-SCNC: 137 MMOL/L (ref 135–145)
SODIUM SERPL-SCNC: 139 MMOL/L (ref 135–145)
SODIUM SERPL-SCNC: 140 MMOL/L (ref 135–145)
SODIUM SERPL-SCNC: 141 MMOL/L (ref 135–145)
SP GR UR STRIP: 1.01 (ref 1–1.03)
SQUAMOUS EPITHELIAL: <1 /HPF
SYSTOLIC BLOOD PRESSURE - MUSE: NORMAL MMHG
T AXIS - MUSE: 119 DEGREES
T AXIS - MUSE: 127 DEGREES
T AXIS - MUSE: 67 DEGREES
T AXIS - MUSE: 95 DEGREES
TROPONIN T SERPL HS-MCNC: 106 NG/L
TROPONIN T SERPL HS-MCNC: 108 NG/L
TROPONIN T SERPL HS-MCNC: 109 NG/L
TROPONIN T SERPL HS-MCNC: 110 NG/L
TROPONIN T SERPL HS-MCNC: 182 NG/L
TROPONIN T SERPL HS-MCNC: 231 NG/L
TROPONIN T SERPL HS-MCNC: 266 NG/L
UROBILINOGEN UR STRIP-MCNC: NORMAL MG/DL
VENTRICULAR RATE- MUSE: 60 BPM
VENTRICULAR RATE- MUSE: 60 BPM
VENTRICULAR RATE- MUSE: 62 BPM
VENTRICULAR RATE- MUSE: 95 BPM
WBC # BLD AUTO: 13.1 10E3/UL (ref 4–11)
WBC # BLD AUTO: 4.6 10E3/UL (ref 4–11)
WBC # BLD AUTO: 5.2 10E3/UL (ref 4–11)
WBC # BLD AUTO: 6.2 10E3/UL (ref 4–11)
WBC # BLD AUTO: 7.8 10E3/UL (ref 4–11)
WBC # BLD AUTO: 7.9 10E3/UL (ref 4–11)
WBC # BLD AUTO: 9.9 10E3/UL (ref 4–11)
WBC CLUMPS #/AREA URNS HPF: PRESENT /HPF
WBC URINE: >182 /HPF

## 2024-01-01 PROCEDURE — 99458 RPM TX MGMT EA ADDL 20 MIN: CPT | Performed by: INTERNAL MEDICINE

## 2024-01-01 PROCEDURE — 80048 BASIC METABOLIC PNL TOTAL CA: CPT | Performed by: NURSE PRACTITIONER

## 2024-01-01 PROCEDURE — 250N000009 HC RX 250: Performed by: EMERGENCY MEDICINE

## 2024-01-01 PROCEDURE — G0179 MD RECERTIFICATION HHA PT: HCPCS | Performed by: FAMILY MEDICINE

## 2024-01-01 PROCEDURE — 84132 ASSAY OF SERUM POTASSIUM: CPT | Performed by: INTERNAL MEDICINE

## 2024-01-01 PROCEDURE — 80048 BASIC METABOLIC PNL TOTAL CA: CPT | Performed by: PHYSICIAN ASSISTANT

## 2024-01-01 PROCEDURE — 96375 TX/PRO/DX INJ NEW DRUG ADDON: CPT

## 2024-01-01 PROCEDURE — 83880 ASSAY OF NATRIURETIC PEPTIDE: CPT | Performed by: EMERGENCY MEDICINE

## 2024-01-01 PROCEDURE — 94640 AIRWAY INHALATION TREATMENT: CPT | Mod: 76

## 2024-01-01 PROCEDURE — 99207 PR APP CREDIT; MD BILLING SHARED VISIT: CPT | Performed by: INTERNAL MEDICINE

## 2024-01-01 PROCEDURE — 250N000009 HC RX 250: Performed by: STUDENT IN AN ORGANIZED HEALTH CARE EDUCATION/TRAINING PROGRAM

## 2024-01-01 PROCEDURE — 999N000157 HC STATISTIC RCP TIME EA 10 MIN

## 2024-01-01 PROCEDURE — 99457 RPM TX MGMT 1ST 20 MIN: CPT | Performed by: INTERNAL MEDICINE

## 2024-01-01 PROCEDURE — 250N000013 HC RX MED GY IP 250 OP 250 PS 637: Performed by: INTERNAL MEDICINE

## 2024-01-01 PROCEDURE — 97530 THERAPEUTIC ACTIVITIES: CPT | Mod: GO | Performed by: REHABILITATION PRACTITIONER

## 2024-01-01 PROCEDURE — 250N000011 HC RX IP 250 OP 636: Performed by: INTERNAL MEDICINE

## 2024-01-01 PROCEDURE — 84484 ASSAY OF TROPONIN QUANT: CPT | Performed by: INTERNAL MEDICINE

## 2024-01-01 PROCEDURE — 99285 EMERGENCY DEPT VISIT HI MDM: CPT | Mod: 25

## 2024-01-01 PROCEDURE — 80048 BASIC METABOLIC PNL TOTAL CA: CPT | Performed by: STUDENT IN AN ORGANIZED HEALTH CARE EDUCATION/TRAINING PROGRAM

## 2024-01-01 PROCEDURE — 80048 BASIC METABOLIC PNL TOTAL CA: CPT | Performed by: INTERNAL MEDICINE

## 2024-01-01 PROCEDURE — 97535 SELF CARE MNGMENT TRAINING: CPT | Mod: GO | Performed by: REHABILITATION PRACTITIONER

## 2024-01-01 PROCEDURE — 250N000012 HC RX MED GY IP 250 OP 636 PS 637: Performed by: STUDENT IN AN ORGANIZED HEALTH CARE EDUCATION/TRAINING PROGRAM

## 2024-01-01 PROCEDURE — 94640 AIRWAY INHALATION TREATMENT: CPT

## 2024-01-01 PROCEDURE — 120N000013 HC R&B IMCU

## 2024-01-01 PROCEDURE — 80048 BASIC METABOLIC PNL TOTAL CA: CPT | Mod: ORL | Performed by: PHYSICIAN ASSISTANT

## 2024-01-01 PROCEDURE — 93296 REM INTERROG EVL PM/IDS: CPT | Performed by: INTERNAL MEDICINE

## 2024-01-01 PROCEDURE — 99232 SBSQ HOSP IP/OBS MODERATE 35: CPT | Performed by: INTERNAL MEDICINE

## 2024-01-01 PROCEDURE — 99222 1ST HOSP IP/OBS MODERATE 55: CPT | Performed by: INTERNAL MEDICINE

## 2024-01-01 PROCEDURE — 120N000001 HC R&B MED SURG/OB

## 2024-01-01 PROCEDURE — 36415 COLL VENOUS BLD VENIPUNCTURE: CPT | Performed by: INTERNAL MEDICINE

## 2024-01-01 PROCEDURE — 250N000011 HC RX IP 250 OP 636: Performed by: EMERGENCY MEDICINE

## 2024-01-01 PROCEDURE — 97535 SELF CARE MNGMENT TRAINING: CPT | Mod: GO | Performed by: OCCUPATIONAL THERAPIST

## 2024-01-01 PROCEDURE — 84145 PROCALCITONIN (PCT): CPT | Performed by: EMERGENCY MEDICINE

## 2024-01-01 PROCEDURE — 93010 ELECTROCARDIOGRAM REPORT: CPT | Performed by: INTERNAL MEDICINE

## 2024-01-01 PROCEDURE — 87637 SARSCOV2&INF A&B&RSV AMP PRB: CPT | Performed by: EMERGENCY MEDICINE

## 2024-01-01 PROCEDURE — 250N000013 HC RX MED GY IP 250 OP 250 PS 637: Performed by: STUDENT IN AN ORGANIZED HEALTH CARE EDUCATION/TRAINING PROGRAM

## 2024-01-01 PROCEDURE — 71046 X-RAY EXAM CHEST 2 VIEWS: CPT

## 2024-01-01 PROCEDURE — 85027 COMPLETE CBC AUTOMATED: CPT | Performed by: INTERNAL MEDICINE

## 2024-01-01 PROCEDURE — 36415 COLL VENOUS BLD VENIPUNCTURE: CPT | Performed by: EMERGENCY MEDICINE

## 2024-01-01 PROCEDURE — 97140 MANUAL THERAPY 1/> REGIONS: CPT | Mod: GO | Performed by: OCCUPATIONAL THERAPIST

## 2024-01-01 PROCEDURE — 250N000009 HC RX 250: Performed by: INTERNAL MEDICINE

## 2024-01-01 PROCEDURE — 99207 PR NO CHARGE LOS: CPT

## 2024-01-01 PROCEDURE — 85027 COMPLETE CBC AUTOMATED: CPT | Performed by: STUDENT IN AN ORGANIZED HEALTH CARE EDUCATION/TRAINING PROGRAM

## 2024-01-01 PROCEDURE — 84484 ASSAY OF TROPONIN QUANT: CPT | Performed by: EMERGENCY MEDICINE

## 2024-01-01 PROCEDURE — 97165 OT EVAL LOW COMPLEX 30 MIN: CPT | Mod: GO | Performed by: OCCUPATIONAL THERAPIST

## 2024-01-01 PROCEDURE — 81001 URINALYSIS AUTO W/SCOPE: CPT | Performed by: EMERGENCY MEDICINE

## 2024-01-01 PROCEDURE — 99214 OFFICE O/P EST MOD 30 MIN: CPT | Performed by: PHYSICIAN ASSISTANT

## 2024-01-01 PROCEDURE — 99454 REM MNTR PHYSIOL PARAM 16-30: CPT | Performed by: INTERNAL MEDICINE

## 2024-01-01 PROCEDURE — 80048 BASIC METABOLIC PNL TOTAL CA: CPT | Performed by: EMERGENCY MEDICINE

## 2024-01-01 PROCEDURE — 96374 THER/PROPH/DIAG INJ IV PUSH: CPT

## 2024-01-01 PROCEDURE — 36415 COLL VENOUS BLD VENIPUNCTURE: CPT | Performed by: NURSE PRACTITIONER

## 2024-01-01 PROCEDURE — 84132 ASSAY OF SERUM POTASSIUM: CPT | Performed by: PHYSICIAN ASSISTANT

## 2024-01-01 PROCEDURE — 99214 OFFICE O/P EST MOD 30 MIN: CPT | Mod: 95 | Performed by: FAMILY MEDICINE

## 2024-01-01 PROCEDURE — 71046 X-RAY EXAM CHEST 2 VIEWS: CPT | Mod: TC | Performed by: RADIOLOGY

## 2024-01-01 PROCEDURE — 93010 ELECTROCARDIOGRAM REPORT: CPT | Mod: XE | Performed by: INTERNAL MEDICINE

## 2024-01-01 PROCEDURE — 99223 1ST HOSP IP/OBS HIGH 75: CPT | Performed by: CLINICAL NURSE SPECIALIST

## 2024-01-01 PROCEDURE — 82010 KETONE BODYS QUAN: CPT | Performed by: STUDENT IN AN ORGANIZED HEALTH CARE EDUCATION/TRAINING PROGRAM

## 2024-01-01 PROCEDURE — 83880 ASSAY OF NATRIURETIC PEPTIDE: CPT | Mod: ORL | Performed by: PHYSICIAN ASSISTANT

## 2024-01-01 PROCEDURE — 258N000003 HC RX IP 258 OP 636: Performed by: INTERNAL MEDICINE

## 2024-01-01 PROCEDURE — 36415 COLL VENOUS BLD VENIPUNCTURE: CPT | Performed by: STUDENT IN AN ORGANIZED HEALTH CARE EDUCATION/TRAINING PROGRAM

## 2024-01-01 PROCEDURE — 99207 PR NO BILLABLE SERVICE THIS VISIT: CPT | Performed by: INTERNAL MEDICINE

## 2024-01-01 PROCEDURE — 99214 OFFICE O/P EST MOD 30 MIN: CPT | Performed by: STUDENT IN AN ORGANIZED HEALTH CARE EDUCATION/TRAINING PROGRAM

## 2024-01-01 PROCEDURE — 250N000012 HC RX MED GY IP 250 OP 636 PS 637: Performed by: INTERNAL MEDICINE

## 2024-01-01 PROCEDURE — 99453 REM MNTR PHYSIOL PARAM SETUP: CPT | Performed by: INTERNAL MEDICINE

## 2024-01-01 PROCEDURE — 99215 OFFICE O/P EST HI 40 MIN: CPT | Performed by: NURSE PRACTITIONER

## 2024-01-01 PROCEDURE — 99443 PR PHYSICIAN TELEPHONE EVALUATION 21-30 MIN: CPT | Mod: 93 | Performed by: FAMILY MEDICINE

## 2024-01-01 PROCEDURE — 99233 SBSQ HOSP IP/OBS HIGH 50: CPT | Performed by: INTERNAL MEDICINE

## 2024-01-01 PROCEDURE — 93005 ELECTROCARDIOGRAM TRACING: CPT

## 2024-01-01 PROCEDURE — 87070 CULTURE OTHR SPECIMN AEROBIC: CPT | Performed by: STUDENT IN AN ORGANIZED HEALTH CARE EDUCATION/TRAINING PROGRAM

## 2024-01-01 PROCEDURE — 99223 1ST HOSP IP/OBS HIGH 75: CPT | Mod: AI | Performed by: INTERNAL MEDICINE

## 2024-01-01 PROCEDURE — 80048 BASIC METABOLIC PNL TOTAL CA: CPT | Mod: ORL | Performed by: FAMILY MEDICINE

## 2024-01-01 PROCEDURE — 258N000003 HC RX IP 258 OP 636: Performed by: STUDENT IN AN ORGANIZED HEALTH CARE EDUCATION/TRAINING PROGRAM

## 2024-01-01 PROCEDURE — 83605 ASSAY OF LACTIC ACID: CPT | Performed by: STUDENT IN AN ORGANIZED HEALTH CARE EDUCATION/TRAINING PROGRAM

## 2024-01-01 PROCEDURE — 250N000011 HC RX IP 250 OP 636: Performed by: STUDENT IN AN ORGANIZED HEALTH CARE EDUCATION/TRAINING PROGRAM

## 2024-01-01 PROCEDURE — 82803 BLOOD GASES ANY COMBINATION: CPT

## 2024-01-01 PROCEDURE — 99238 HOSP IP/OBS DSCHRG MGMT 30/<: CPT | Performed by: INTERNAL MEDICINE

## 2024-01-01 PROCEDURE — 71045 X-RAY EXAM CHEST 1 VIEW: CPT

## 2024-01-01 PROCEDURE — 97530 THERAPEUTIC ACTIVITIES: CPT | Mod: GP | Performed by: PHYSICAL THERAPIST

## 2024-01-01 PROCEDURE — 80048 BASIC METABOLIC PNL TOTAL CA: CPT | Mod: ORL | Performed by: NURSE PRACTITIONER

## 2024-01-01 PROCEDURE — 93294 REM INTERROG EVL PM/LDLS PM: CPT | Performed by: INTERNAL MEDICINE

## 2024-01-01 PROCEDURE — 99223 1ST HOSP IP/OBS HIGH 75: CPT | Mod: AI | Performed by: STUDENT IN AN ORGANIZED HEALTH CARE EDUCATION/TRAINING PROGRAM

## 2024-01-01 PROCEDURE — 99239 HOSP IP/OBS DSCHRG MGMT >30: CPT | Performed by: INTERNAL MEDICINE

## 2024-01-01 PROCEDURE — 99215 OFFICE O/P EST HI 40 MIN: CPT | Mod: 95 | Performed by: NURSE PRACTITIONER

## 2024-01-01 PROCEDURE — 85018 HEMOGLOBIN: CPT | Mod: ORL | Performed by: PHYSICIAN ASSISTANT

## 2024-01-01 PROCEDURE — 255N000002 HC RX 255 OP 636: Performed by: STUDENT IN AN ORGANIZED HEALTH CARE EDUCATION/TRAINING PROGRAM

## 2024-01-01 PROCEDURE — 97161 PT EVAL LOW COMPLEX 20 MIN: CPT | Mod: GP | Performed by: PHYSICAL THERAPIST

## 2024-01-01 PROCEDURE — 93306 TTE W/DOPPLER COMPLETE: CPT | Mod: 26 | Performed by: INTERNAL MEDICINE

## 2024-01-01 PROCEDURE — G0463 HOSPITAL OUTPT CLINIC VISIT: HCPCS

## 2024-01-01 PROCEDURE — 258N000001 HC RX 258: Performed by: STUDENT IN AN ORGANIZED HEALTH CARE EDUCATION/TRAINING PROGRAM

## 2024-01-01 PROCEDURE — 999N000208 ECHOCARDIOGRAM COMPLETE

## 2024-01-01 PROCEDURE — 258N000001 HC RX 258: Performed by: INTERNAL MEDICINE

## 2024-01-01 PROCEDURE — C8929 TTE W OR WO FOL WCON,DOPPLER: HCPCS

## 2024-01-01 PROCEDURE — 83880 ASSAY OF NATRIURETIC PEPTIDE: CPT | Mod: ORL | Performed by: NURSE PRACTITIONER

## 2024-01-01 PROCEDURE — 250N000011 HC RX IP 250 OP 636: Performed by: CLINICAL NURSE SPECIALIST

## 2024-01-01 PROCEDURE — 36415 COLL VENOUS BLD VENIPUNCTURE: CPT | Performed by: PHYSICIAN ASSISTANT

## 2024-01-01 PROCEDURE — 87086 URINE CULTURE/COLONY COUNT: CPT | Performed by: EMERGENCY MEDICINE

## 2024-01-01 PROCEDURE — 97530 THERAPEUTIC ACTIVITIES: CPT | Mod: GP

## 2024-01-01 PROCEDURE — 93971 EXTREMITY STUDY: CPT | Mod: RT

## 2024-01-01 PROCEDURE — 85025 COMPLETE CBC W/AUTO DIFF WBC: CPT | Performed by: EMERGENCY MEDICINE

## 2024-01-01 PROCEDURE — 82805 BLOOD GASES W/O2 SATURATION: CPT | Performed by: INTERNAL MEDICINE

## 2024-01-01 RX ORDER — HYDROMORPHONE HYDROCHLORIDE 1 MG/ML
0.5 INJECTION, SOLUTION INTRAMUSCULAR; INTRAVENOUS; SUBCUTANEOUS
Status: DISCONTINUED | OUTPATIENT
Start: 2024-01-01 | End: 2024-08-21 | Stop reason: HOSPADM

## 2024-01-01 RX ORDER — NICOTINE POLACRILEX 4 MG
15-30 LOZENGE BUCCAL
Status: DISCONTINUED | OUTPATIENT
Start: 2024-01-01 | End: 2024-01-01

## 2024-01-01 RX ORDER — AMOXICILLIN 250 MG
1 CAPSULE ORAL 2 TIMES DAILY PRN
Status: DISCONTINUED | OUTPATIENT
Start: 2024-01-01 | End: 2024-01-01 | Stop reason: HOSPADM

## 2024-01-01 RX ORDER — FERROUS SULFATE 325(65) MG
325 TABLET, DELAYED RELEASE (ENTERIC COATED) ORAL DAILY
Status: ON HOLD | COMMUNITY
Start: 2024-01-01 | End: 2024-01-01

## 2024-01-01 RX ORDER — BUDESONIDE 0.5 MG/2ML
0.5 INHALANT ORAL 2 TIMES DAILY
Qty: 360 ML | Refills: 0 | Status: SHIPPED | OUTPATIENT
Start: 2024-01-01

## 2024-01-01 RX ORDER — FUROSEMIDE 10 MG/ML
40 INJECTION INTRAMUSCULAR; INTRAVENOUS EVERY 8 HOURS
Status: DISCONTINUED | OUTPATIENT
Start: 2024-01-01 | End: 2024-08-21 | Stop reason: HOSPADM

## 2024-01-01 RX ORDER — DEXTROSE MONOHYDRATE 25 G/50ML
25-50 INJECTION, SOLUTION INTRAVENOUS
Status: DISCONTINUED | OUTPATIENT
Start: 2024-01-01 | End: 2024-01-01

## 2024-01-01 RX ORDER — PROCHLORPERAZINE 25 MG
12.5 SUPPOSITORY, RECTAL RECTAL EVERY 12 HOURS PRN
Status: DISCONTINUED | OUTPATIENT
Start: 2024-01-01 | End: 2024-01-01 | Stop reason: HOSPADM

## 2024-01-01 RX ORDER — ONDANSETRON 4 MG/1
4 TABLET, ORALLY DISINTEGRATING ORAL EVERY 6 HOURS PRN
Status: DISCONTINUED | OUTPATIENT
Start: 2024-01-01 | End: 2024-08-21 | Stop reason: HOSPADM

## 2024-01-01 RX ORDER — NALOXONE HYDROCHLORIDE 0.4 MG/ML
0.2 INJECTION, SOLUTION INTRAMUSCULAR; INTRAVENOUS; SUBCUTANEOUS
Status: DISCONTINUED | OUTPATIENT
Start: 2024-01-01 | End: 2024-08-21 | Stop reason: HOSPADM

## 2024-01-01 RX ORDER — IPRATROPIUM BROMIDE AND ALBUTEROL SULFATE 2.5; .5 MG/3ML; MG/3ML
3 SOLUTION RESPIRATORY (INHALATION)
Status: DISCONTINUED | OUTPATIENT
Start: 2024-01-01 | End: 2024-08-21 | Stop reason: HOSPADM

## 2024-01-01 RX ORDER — AMOXICILLIN 250 MG
2 CAPSULE ORAL 2 TIMES DAILY PRN
Status: DISCONTINUED | OUTPATIENT
Start: 2024-01-01 | End: 2024-01-01 | Stop reason: HOSPADM

## 2024-01-01 RX ORDER — DEXTROSE MONOHYDRATE 25 G/50ML
50 INJECTION, SOLUTION INTRAVENOUS ONCE
Status: COMPLETED | OUTPATIENT
Start: 2024-01-01 | End: 2024-01-01

## 2024-01-01 RX ORDER — AZITHROMYCIN 500 MG/5ML
500 INJECTION, POWDER, LYOPHILIZED, FOR SOLUTION INTRAVENOUS EVERY 24 HOURS
Status: DISCONTINUED | OUTPATIENT
Start: 2024-01-01 | End: 2024-01-01

## 2024-01-01 RX ORDER — SENNOSIDES 8.6 MG
1 TABLET ORAL 2 TIMES DAILY PRN
Status: DISCONTINUED | OUTPATIENT
Start: 2024-01-01 | End: 2024-01-01

## 2024-01-01 RX ORDER — NALOXONE HYDROCHLORIDE 0.4 MG/ML
0.4 INJECTION, SOLUTION INTRAMUSCULAR; INTRAVENOUS; SUBCUTANEOUS
Status: DISCONTINUED | OUTPATIENT
Start: 2024-01-01 | End: 2024-01-01

## 2024-01-01 RX ORDER — CITALOPRAM HYDROBROMIDE 20 MG/1
TABLET ORAL
Qty: 135 TABLET | Refills: 1 | Status: SHIPPED | OUTPATIENT
Start: 2024-01-01

## 2024-01-01 RX ORDER — NICOTINE POLACRILEX 4 MG
15-30 LOZENGE BUCCAL
Status: DISCONTINUED | OUTPATIENT
Start: 2024-01-01 | End: 2024-01-01 | Stop reason: HOSPADM

## 2024-01-01 RX ORDER — HYDROMORPHONE HCL IN WATER/PF 6 MG/30 ML
0.4 PATIENT CONTROLLED ANALGESIA SYRINGE INTRAVENOUS
Status: DISCONTINUED | OUTPATIENT
Start: 2024-01-01 | End: 2024-01-01 | Stop reason: HOSPADM

## 2024-01-01 RX ORDER — CLOPIDOGREL BISULFATE 75 MG/1
75 TABLET ORAL DAILY
Qty: 90 TABLET | Refills: 3 | Status: SHIPPED | OUTPATIENT
Start: 2024-01-01

## 2024-01-01 RX ORDER — GUAIFENESIN 200 MG/10ML
200 LIQUID ORAL EVERY 4 HOURS PRN
Qty: 180 ML | Refills: 0 | Status: SHIPPED | OUTPATIENT
Start: 2024-01-01 | End: 2024-01-01

## 2024-01-01 RX ORDER — ONDANSETRON 2 MG/ML
4 INJECTION INTRAMUSCULAR; INTRAVENOUS EVERY 6 HOURS PRN
Status: DISCONTINUED | OUTPATIENT
Start: 2024-01-01 | End: 2024-08-21 | Stop reason: HOSPADM

## 2024-01-01 RX ORDER — TORSEMIDE 20 MG/1
20 TABLET ORAL DAILY
Qty: 100 TABLET | Refills: 3 | Status: SHIPPED | OUTPATIENT
Start: 2024-01-01

## 2024-01-01 RX ORDER — BUDESONIDE 0.5 MG/2ML
0.5 INHALANT ORAL 2 TIMES DAILY
Status: DISCONTINUED | OUTPATIENT
Start: 2024-01-01 | End: 2024-01-01

## 2024-01-01 RX ORDER — CLONIDINE HYDROCHLORIDE 0.1 MG/1
0.2 TABLET ORAL 2 TIMES DAILY
Status: DISCONTINUED | OUTPATIENT
Start: 2024-01-01 | End: 2024-01-01

## 2024-01-01 RX ORDER — CLOPIDOGREL BISULFATE 75 MG/1
75 TABLET ORAL DAILY
Status: DISCONTINUED | OUTPATIENT
Start: 2024-01-01 | End: 2024-01-01

## 2024-01-01 RX ORDER — AMLODIPINE BESYLATE 5 MG/1
5 TABLET ORAL 2 TIMES DAILY
Status: DISCONTINUED | OUTPATIENT
Start: 2024-01-01 | End: 2024-01-01 | Stop reason: HOSPADM

## 2024-01-01 RX ORDER — MONTELUKAST SODIUM 10 MG/1
1 TABLET ORAL AT BEDTIME
Qty: 90 TABLET | Refills: 3 | Status: SHIPPED | OUTPATIENT
Start: 2024-01-01

## 2024-01-01 RX ORDER — CARBOXYMETHYLCELLULOSE SODIUM 5 MG/ML
1-2 SOLUTION/ DROPS OPHTHALMIC
Status: DISCONTINUED | OUTPATIENT
Start: 2024-01-01 | End: 2024-08-21 | Stop reason: HOSPADM

## 2024-01-01 RX ORDER — ATORVASTATIN CALCIUM 40 MG/1
40 TABLET, FILM COATED ORAL DAILY
Status: DISCONTINUED | OUTPATIENT
Start: 2024-01-01 | End: 2024-01-01

## 2024-01-01 RX ORDER — IPRATROPIUM BROMIDE AND ALBUTEROL SULFATE 2.5; .5 MG/3ML; MG/3ML
6 SOLUTION RESPIRATORY (INHALATION) ONCE
Status: COMPLETED | OUTPATIENT
Start: 2024-01-01 | End: 2024-01-01

## 2024-01-01 RX ORDER — AZITHROMYCIN 500 MG/5ML
500 INJECTION, POWDER, LYOPHILIZED, FOR SOLUTION INTRAVENOUS ONCE
Status: COMPLETED | OUTPATIENT
Start: 2024-01-01 | End: 2024-01-01

## 2024-01-01 RX ORDER — ACETAMINOPHEN 650 MG/1
650 SUPPOSITORY RECTAL EVERY 4 HOURS PRN
Status: DISCONTINUED | OUTPATIENT
Start: 2024-01-01 | End: 2024-01-01 | Stop reason: HOSPADM

## 2024-01-01 RX ORDER — AMLODIPINE BESYLATE 5 MG/1
5 TABLET ORAL 2 TIMES DAILY
Qty: 180 TABLET | Refills: 3 | Status: ON HOLD | OUTPATIENT
Start: 2024-01-01 | End: 2024-01-01

## 2024-01-01 RX ORDER — ATORVASTATIN CALCIUM 40 MG/1
40 TABLET, FILM COATED ORAL DAILY
Qty: 90 TABLET | Refills: 3 | Status: SHIPPED | OUTPATIENT
Start: 2024-01-01

## 2024-01-01 RX ORDER — FUROSEMIDE 10 MG/ML
60 INJECTION INTRAMUSCULAR; INTRAVENOUS ONCE
Status: COMPLETED | OUTPATIENT
Start: 2024-01-01 | End: 2024-01-01

## 2024-01-01 RX ORDER — DEXTROSE MONOHYDRATE 50 MG/ML
INJECTION, SOLUTION INTRAVENOUS CONTINUOUS
Status: ACTIVE | OUTPATIENT
Start: 2024-01-01 | End: 2024-01-01

## 2024-01-01 RX ORDER — TORSEMIDE 20 MG/1
20 TABLET ORAL DAILY
Qty: 60 TABLET | Refills: 0 | Status: SHIPPED | OUTPATIENT
Start: 2024-01-01 | End: 2024-01-01

## 2024-01-01 RX ORDER — IPRATROPIUM BROMIDE AND ALBUTEROL SULFATE 2.5; .5 MG/3ML; MG/3ML
3 SOLUTION RESPIRATORY (INHALATION)
Status: DISCONTINUED | OUTPATIENT
Start: 2024-01-01 | End: 2024-01-01

## 2024-01-01 RX ORDER — FERROUS SULFATE 325(65) MG
650 TABLET ORAL
Status: DISCONTINUED | OUTPATIENT
Start: 2024-01-01 | End: 2024-01-01

## 2024-01-01 RX ORDER — CALCIUM GLUCONATE 94 MG/ML
2 INJECTION, SOLUTION INTRAVENOUS ONCE
Status: COMPLETED | OUTPATIENT
Start: 2024-01-01 | End: 2024-01-01

## 2024-01-01 RX ORDER — DOXYCYCLINE 100 MG/1
CAPSULE ORAL
Qty: 39 CAPSULE | Refills: 3 | Status: SHIPPED | OUTPATIENT
Start: 2024-01-01 | End: 2024-01-01

## 2024-01-01 RX ORDER — ALBUTEROL SULFATE 0.83 MG/ML
2.5 SOLUTION RESPIRATORY (INHALATION)
Status: DISCONTINUED | OUTPATIENT
Start: 2024-01-01 | End: 2024-01-01

## 2024-01-01 RX ORDER — CLONIDINE HYDROCHLORIDE 0.1 MG/1
0.2 TABLET ORAL 2 TIMES DAILY
Status: DISCONTINUED | OUTPATIENT
Start: 2024-01-01 | End: 2024-01-01 | Stop reason: HOSPADM

## 2024-01-01 RX ORDER — CLONIDINE HYDROCHLORIDE 0.2 MG/1
0.2 TABLET ORAL 2 TIMES DAILY
Qty: 180 TABLET | Refills: 3 | Status: SHIPPED | OUTPATIENT
Start: 2024-01-01

## 2024-01-01 RX ORDER — NALOXONE HYDROCHLORIDE 0.4 MG/ML
0.4 INJECTION, SOLUTION INTRAMUSCULAR; INTRAVENOUS; SUBCUTANEOUS
Status: DISCONTINUED | OUTPATIENT
Start: 2024-01-01 | End: 2024-01-01 | Stop reason: HOSPADM

## 2024-01-01 RX ORDER — IPRATROPIUM BROMIDE AND ALBUTEROL SULFATE 2.5; .5 MG/3ML; MG/3ML
1 SOLUTION RESPIRATORY (INHALATION) EVERY 6 HOURS
Qty: 360 ML | Refills: 0 | Status: SHIPPED | OUTPATIENT
Start: 2024-01-01

## 2024-01-01 RX ORDER — DOXYCYCLINE 100 MG/1
100 CAPSULE ORAL
Qty: 39 CAPSULE | Refills: 3 | Status: SHIPPED | OUTPATIENT
Start: 2024-01-01

## 2024-01-01 RX ORDER — DOXAZOSIN 1 MG/1
1 TABLET ORAL AT BEDTIME
Qty: 90 TABLET | Refills: 3 | Status: SHIPPED | OUTPATIENT
Start: 2024-01-01 | End: 2024-01-01

## 2024-01-01 RX ORDER — BENZONATATE 100 MG/1
100 CAPSULE ORAL 3 TIMES DAILY PRN
Status: DISCONTINUED | OUTPATIENT
Start: 2024-01-01 | End: 2024-01-01 | Stop reason: HOSPADM

## 2024-01-01 RX ORDER — ACETAMINOPHEN 325 MG/1
650 TABLET ORAL EVERY 4 HOURS PRN
Status: DISCONTINUED | OUTPATIENT
Start: 2024-01-01 | End: 2024-01-01 | Stop reason: HOSPADM

## 2024-01-01 RX ORDER — CEFEPIME HYDROCHLORIDE 2 G/1
2 INJECTION, POWDER, FOR SOLUTION INTRAVENOUS EVERY 24 HOURS
Status: DISCONTINUED | OUTPATIENT
Start: 2024-01-01 | End: 2024-01-01

## 2024-01-01 RX ORDER — CEFTRIAXONE 2 G/1
2 INJECTION, POWDER, FOR SOLUTION INTRAMUSCULAR; INTRAVENOUS ONCE
Status: COMPLETED | OUTPATIENT
Start: 2024-01-01 | End: 2024-01-01

## 2024-01-01 RX ORDER — GLYCOPYRROLATE 0.2 MG/ML
0.3 INJECTION, SOLUTION INTRAMUSCULAR; INTRAVENOUS EVERY 4 HOURS PRN
Status: DISCONTINUED | OUTPATIENT
Start: 2024-01-01 | End: 2024-08-21 | Stop reason: HOSPADM

## 2024-01-01 RX ORDER — FERROUS SULFATE 325(65) MG
TABLET, DELAYED RELEASE (ENTERIC COATED) ORAL
Qty: 200 TABLET | Refills: 3 | OUTPATIENT
Start: 2024-01-01

## 2024-01-01 RX ORDER — NITROGLYCERIN 0.4 MG/1
0.4 TABLET SUBLINGUAL EVERY 5 MIN PRN
Status: DISCONTINUED | OUTPATIENT
Start: 2024-01-01 | End: 2024-01-01 | Stop reason: HOSPADM

## 2024-01-01 RX ORDER — DEXTROSE MONOHYDRATE 25 G/50ML
25-50 INJECTION, SOLUTION INTRAVENOUS
Status: DISCONTINUED | OUTPATIENT
Start: 2024-01-01 | End: 2024-01-01 | Stop reason: HOSPADM

## 2024-01-01 RX ORDER — AMLODIPINE BESYLATE 10 MG/1
5 TABLET ORAL 2 TIMES DAILY
Qty: 90 TABLET | Refills: 0 | Status: SHIPPED | OUTPATIENT
Start: 2024-01-01

## 2024-01-01 RX ORDER — METHYLPREDNISOLONE SODIUM SUCCINATE 125 MG/2ML
125 INJECTION, POWDER, LYOPHILIZED, FOR SOLUTION INTRAMUSCULAR; INTRAVENOUS ONCE
Status: DISCONTINUED | OUTPATIENT
Start: 2024-01-01 | End: 2024-01-01

## 2024-01-01 RX ORDER — NALOXONE HYDROCHLORIDE 0.4 MG/ML
0.2 INJECTION, SOLUTION INTRAMUSCULAR; INTRAVENOUS; SUBCUTANEOUS
Status: DISCONTINUED | OUTPATIENT
Start: 2024-01-01 | End: 2024-01-01

## 2024-01-01 RX ORDER — CEFTRIAXONE 2 G/1
2 INJECTION, POWDER, FOR SOLUTION INTRAMUSCULAR; INTRAVENOUS EVERY 24 HOURS
Status: DISCONTINUED | OUTPATIENT
Start: 2024-01-01 | End: 2024-01-01

## 2024-01-01 RX ORDER — IPRATROPIUM BROMIDE AND ALBUTEROL SULFATE 2.5; .5 MG/3ML; MG/3ML
SOLUTION RESPIRATORY (INHALATION)
Status: COMPLETED
Start: 2024-01-01 | End: 2024-01-01

## 2024-01-01 RX ORDER — ALBUTEROL SULFATE 0.83 MG/ML
2.5 SOLUTION RESPIRATORY (INHALATION)
Status: DISCONTINUED | OUTPATIENT
Start: 2024-01-01 | End: 2024-08-21 | Stop reason: HOSPADM

## 2024-01-01 RX ORDER — NALOXONE HYDROCHLORIDE 0.4 MG/ML
0.1 INJECTION, SOLUTION INTRAMUSCULAR; INTRAVENOUS; SUBCUTANEOUS
Status: DISCONTINUED | OUTPATIENT
Start: 2024-01-01 | End: 2024-08-21 | Stop reason: HOSPADM

## 2024-01-01 RX ORDER — CITALOPRAM HYDROBROMIDE 10 MG/1
20 TABLET ORAL DAILY
Status: DISCONTINUED | OUTPATIENT
Start: 2024-01-01 | End: 2024-01-01

## 2024-01-01 RX ORDER — BENZONATATE 100 MG/1
100 CAPSULE ORAL 3 TIMES DAILY PRN
Qty: 30 CAPSULE | Refills: 0 | Status: SHIPPED | OUTPATIENT
Start: 2024-01-01 | End: 2024-01-01

## 2024-01-01 RX ORDER — HYDROMORPHONE HCL IN WATER/PF 6 MG/30 ML
0.2 PATIENT CONTROLLED ANALGESIA SYRINGE INTRAVENOUS
Status: DISCONTINUED | OUTPATIENT
Start: 2024-01-01 | End: 2024-01-01 | Stop reason: HOSPADM

## 2024-01-01 RX ORDER — ACETAMINOPHEN 325 MG/1
650 TABLET ORAL 2 TIMES DAILY
Status: DISCONTINUED | OUTPATIENT
Start: 2024-01-01 | End: 2024-01-01 | Stop reason: HOSPADM

## 2024-01-01 RX ORDER — PROCHLORPERAZINE 25 MG
12.5 SUPPOSITORY, RECTAL RECTAL EVERY 12 HOURS PRN
Status: DISCONTINUED | OUTPATIENT
Start: 2024-01-01 | End: 2024-08-21 | Stop reason: HOSPADM

## 2024-01-01 RX ORDER — ATORVASTATIN CALCIUM 40 MG/1
40 TABLET, FILM COATED ORAL DAILY
Status: DISCONTINUED | OUTPATIENT
Start: 2024-01-01 | End: 2024-01-01 | Stop reason: HOSPADM

## 2024-01-01 RX ORDER — FUROSEMIDE 10 MG/ML
40 INJECTION INTRAMUSCULAR; INTRAVENOUS ONCE
Status: COMPLETED | OUTPATIENT
Start: 2024-01-01 | End: 2024-01-01

## 2024-01-01 RX ORDER — HEPARIN SODIUM 10000 [USP'U]/100ML
0-5000 INJECTION, SOLUTION INTRAVENOUS CONTINUOUS
Status: DISCONTINUED | OUTPATIENT
Start: 2024-01-01 | End: 2024-01-01

## 2024-01-01 RX ORDER — DEXTROSE MONOHYDRATE 25 G/50ML
25 INJECTION, SOLUTION INTRAVENOUS ONCE
Status: COMPLETED | OUTPATIENT
Start: 2024-01-01 | End: 2024-01-01

## 2024-01-01 RX ORDER — LIDOCAINE 40 MG/G
CREAM TOPICAL
Status: DISCONTINUED | OUTPATIENT
Start: 2024-01-01 | End: 2024-01-01

## 2024-01-01 RX ORDER — LIDOCAINE 40 MG/G
CREAM TOPICAL
Status: DISCONTINUED | OUTPATIENT
Start: 2024-01-01 | End: 2024-01-01 | Stop reason: HOSPADM

## 2024-01-01 RX ORDER — ONDANSETRON 2 MG/ML
4 INJECTION INTRAMUSCULAR; INTRAVENOUS EVERY 6 HOURS PRN
Status: DISCONTINUED | OUTPATIENT
Start: 2024-01-01 | End: 2024-01-01 | Stop reason: HOSPADM

## 2024-01-01 RX ORDER — LORAZEPAM 2 MG/ML
0.5 INJECTION INTRAMUSCULAR EVERY 4 HOURS PRN
Status: DISCONTINUED | OUTPATIENT
Start: 2024-01-01 | End: 2024-08-21 | Stop reason: HOSPADM

## 2024-01-01 RX ORDER — GUAIFENESIN 200 MG/10ML
200 LIQUID ORAL EVERY 4 HOURS PRN
Status: DISCONTINUED | OUTPATIENT
Start: 2024-01-01 | End: 2024-01-01 | Stop reason: HOSPADM

## 2024-01-01 RX ORDER — MONTELUKAST SODIUM 10 MG/1
10 TABLET ORAL AT BEDTIME
Status: DISCONTINUED | OUTPATIENT
Start: 2024-01-01 | End: 2024-01-01 | Stop reason: HOSPADM

## 2024-01-01 RX ORDER — CALCIUM CARBONATE 500 MG/1
1000 TABLET, CHEWABLE ORAL 4 TIMES DAILY PRN
Status: DISCONTINUED | OUTPATIENT
Start: 2024-01-01 | End: 2024-01-01

## 2024-01-01 RX ORDER — IPRATROPIUM BROMIDE AND ALBUTEROL SULFATE 2.5; .5 MG/3ML; MG/3ML
3 SOLUTION RESPIRATORY (INHALATION) ONCE
Status: COMPLETED | OUTPATIENT
Start: 2024-01-01 | End: 2024-01-01

## 2024-01-01 RX ORDER — PREDNISONE 10 MG/1
TABLET ORAL
Qty: 19 TABLET | Refills: 0 | Status: SHIPPED | OUTPATIENT
Start: 2024-01-01 | End: 2024-01-01

## 2024-01-01 RX ORDER — DOXYCYCLINE 100 MG/1
100 CAPSULE ORAL EVERY 12 HOURS SCHEDULED
Status: DISCONTINUED | OUTPATIENT
Start: 2024-01-01 | End: 2024-01-01 | Stop reason: HOSPADM

## 2024-01-01 RX ORDER — PROCHLORPERAZINE MALEATE 5 MG
5 TABLET ORAL EVERY 6 HOURS PRN
Status: DISCONTINUED | OUTPATIENT
Start: 2024-01-01 | End: 2024-01-01 | Stop reason: HOSPADM

## 2024-01-01 RX ORDER — LORAZEPAM 2 MG/ML
0.5 INJECTION INTRAMUSCULAR EVERY 4 HOURS PRN
Status: DISCONTINUED | OUTPATIENT
Start: 2024-01-01 | End: 2024-01-01

## 2024-01-01 RX ORDER — BISACODYL 10 MG
10 SUPPOSITORY, RECTAL RECTAL
Status: DISCONTINUED | OUTPATIENT
Start: 2024-08-23 | End: 2024-08-21 | Stop reason: HOSPADM

## 2024-01-01 RX ORDER — TORSEMIDE 20 MG/1
20 TABLET ORAL 2 TIMES DAILY
Qty: 60 TABLET | Refills: 0 | Status: SHIPPED | OUTPATIENT
Start: 2024-01-01 | End: 2024-01-01

## 2024-01-01 RX ORDER — TORSEMIDE 20 MG/1
20 TABLET ORAL DAILY
Qty: 180 TABLET | Refills: 0 | Status: SHIPPED | OUTPATIENT
Start: 2024-01-01 | End: 2024-01-01

## 2024-01-01 RX ORDER — HYDROMORPHONE HCL IN WATER/PF 6 MG/30 ML
0.2 PATIENT CONTROLLED ANALGESIA SYRINGE INTRAVENOUS
Status: DISCONTINUED | OUTPATIENT
Start: 2024-01-01 | End: 2024-01-01

## 2024-01-01 RX ORDER — HYDROMORPHONE HCL IN WATER/PF 6 MG/30 ML
0.4 PATIENT CONTROLLED ANALGESIA SYRINGE INTRAVENOUS
Status: DISCONTINUED | OUTPATIENT
Start: 2024-01-01 | End: 2024-08-21 | Stop reason: HOSPADM

## 2024-01-01 RX ORDER — BUDESONIDE 0.5 MG/2ML
0.5 INHALANT ORAL 2 TIMES DAILY
Status: DISCONTINUED | OUTPATIENT
Start: 2024-01-01 | End: 2024-01-01 | Stop reason: HOSPADM

## 2024-01-01 RX ORDER — PREDNISONE 10 MG/1
TABLET ORAL
Qty: 22 TABLET | Refills: 0 | Status: SHIPPED | OUTPATIENT
Start: 2024-01-01 | End: 2024-01-01

## 2024-01-01 RX ORDER — DOXYCYCLINE 100 MG/1
100 CAPSULE ORAL
COMMUNITY
End: 2024-01-01

## 2024-01-01 RX ORDER — CITALOPRAM HYDROBROMIDE 10 MG/1
30 TABLET ORAL DAILY
Status: DISCONTINUED | OUTPATIENT
Start: 2024-01-01 | End: 2024-01-01 | Stop reason: HOSPADM

## 2024-01-01 RX ORDER — DEXAMETHASONE SODIUM PHOSPHATE 10 MG/ML
10 INJECTION, SOLUTION INTRAMUSCULAR; INTRAVENOUS ONCE
Status: COMPLETED | OUTPATIENT
Start: 2024-01-01 | End: 2024-01-01

## 2024-01-01 RX ORDER — NALOXONE HYDROCHLORIDE 0.4 MG/ML
0.2 INJECTION, SOLUTION INTRAMUSCULAR; INTRAVENOUS; SUBCUTANEOUS
Status: DISCONTINUED | OUTPATIENT
Start: 2024-01-01 | End: 2024-01-01 | Stop reason: HOSPADM

## 2024-01-01 RX ORDER — ALBUTEROL SULFATE 5 MG/ML
10 SOLUTION RESPIRATORY (INHALATION) ONCE
Status: COMPLETED | OUTPATIENT
Start: 2024-01-01 | End: 2024-01-01

## 2024-01-01 RX ORDER — IPRATROPIUM BROMIDE AND ALBUTEROL SULFATE 2.5; .5 MG/3ML; MG/3ML
1 SOLUTION RESPIRATORY (INHALATION) EVERY 6 HOURS
Qty: 180 ML | Refills: 3 | Status: SHIPPED | OUTPATIENT
Start: 2024-01-01 | End: 2024-01-01

## 2024-01-01 RX ORDER — PREDNISONE 20 MG/1
40 TABLET ORAL DAILY
Status: DISCONTINUED | OUTPATIENT
Start: 2024-01-01 | End: 2024-01-01

## 2024-01-01 RX ORDER — FERROUS SULFATE 325(65) MG
TABLET, DELAYED RELEASE (ENTERIC COATED) ORAL
Qty: 200 TABLET | Refills: 3 | Status: SHIPPED | OUTPATIENT
Start: 2024-01-01 | End: 2024-01-01

## 2024-01-01 RX ORDER — FUROSEMIDE 10 MG/ML
20 INJECTION INTRAMUSCULAR; INTRAVENOUS EVERY 12 HOURS
Status: COMPLETED | OUTPATIENT
Start: 2024-01-01 | End: 2024-01-01

## 2024-01-01 RX ORDER — ONDANSETRON 4 MG/1
4 TABLET, ORALLY DISINTEGRATING ORAL EVERY 6 HOURS PRN
Status: DISCONTINUED | OUTPATIENT
Start: 2024-01-01 | End: 2024-01-01 | Stop reason: HOSPADM

## 2024-01-01 RX ORDER — ACETAMINOPHEN 325 MG/1
650 TABLET ORAL 2 TIMES DAILY
Status: DISCONTINUED | OUTPATIENT
Start: 2024-01-01 | End: 2024-01-01

## 2024-01-01 RX ORDER — ATROPINE SULFATE 10 MG/ML
2 SOLUTION/ DROPS OPHTHALMIC
Status: DISCONTINUED | OUTPATIENT
Start: 2024-01-01 | End: 2024-08-21 | Stop reason: HOSPADM

## 2024-01-01 RX ORDER — CARVEDILOL 25 MG/1
37.5 TABLET ORAL 2 TIMES DAILY WITH MEALS
Qty: 180 TABLET | Refills: 4 | Status: SHIPPED | OUTPATIENT
Start: 2024-01-01

## 2024-01-01 RX ORDER — SODIUM BICARBONATE 650 MG/1
650 TABLET ORAL 2 TIMES DAILY
Qty: 180 TABLET | Refills: 0 | Status: SHIPPED | OUTPATIENT
Start: 2024-01-01 | End: 2024-01-01

## 2024-01-01 RX ORDER — LORAZEPAM 2 MG/ML
1 CONCENTRATE ORAL EVERY 4 HOURS PRN
Status: DISCONTINUED | OUTPATIENT
Start: 2024-01-01 | End: 2024-08-21 | Stop reason: HOSPADM

## 2024-01-01 RX ORDER — SODIUM BICARBONATE 650 MG/1
650 TABLET ORAL 2 TIMES DAILY
Status: DISCONTINUED | OUTPATIENT
Start: 2024-01-01 | End: 2024-01-01 | Stop reason: HOSPADM

## 2024-01-01 RX ORDER — CALCIUM GLUCONATE 94 MG/ML
1 INJECTION, SOLUTION INTRAVENOUS ONCE
Status: COMPLETED | OUTPATIENT
Start: 2024-01-01 | End: 2024-01-01

## 2024-01-01 RX ORDER — TORSEMIDE 20 MG/1
20 TABLET ORAL DAILY
Status: DISCONTINUED | OUTPATIENT
Start: 2024-01-01 | End: 2024-01-01

## 2024-01-01 RX ORDER — CALCIUM CARBONATE 500 MG/1
1000 TABLET, CHEWABLE ORAL 4 TIMES DAILY PRN
Status: DISCONTINUED | OUTPATIENT
Start: 2024-01-01 | End: 2024-01-01 | Stop reason: HOSPADM

## 2024-01-01 RX ORDER — AMOXICILLIN 250 MG
1 CAPSULE ORAL 2 TIMES DAILY PRN
Status: DISCONTINUED | OUTPATIENT
Start: 2024-01-01 | End: 2024-01-01

## 2024-01-01 RX ORDER — BUDESONIDE 0.5 MG/2ML
0.5 INHALANT ORAL 2 TIMES DAILY PRN
Status: DISCONTINUED | OUTPATIENT
Start: 2024-01-01 | End: 2024-08-21 | Stop reason: HOSPADM

## 2024-01-01 RX ORDER — CLOPIDOGREL BISULFATE 75 MG/1
75 TABLET ORAL DAILY
Status: DISCONTINUED | OUTPATIENT
Start: 2024-01-01 | End: 2024-01-01 | Stop reason: HOSPADM

## 2024-01-01 RX ORDER — NYSTATIN 100000 [USP'U]/G
POWDER TOPICAL 3 TIMES DAILY PRN
Status: DISCONTINUED | OUTPATIENT
Start: 2024-01-01 | End: 2024-01-01

## 2024-01-01 RX ORDER — DOXAZOSIN 1 MG/1
1 TABLET ORAL AT BEDTIME
Status: DISCONTINUED | OUTPATIENT
Start: 2024-01-01 | End: 2024-01-01 | Stop reason: HOSPADM

## 2024-01-01 RX ORDER — CLONIDINE HYDROCHLORIDE 0.1 MG/1
0.1 TABLET ORAL 2 TIMES DAILY
Qty: 180 TABLET | Refills: 3 | OUTPATIENT
Start: 2024-01-01

## 2024-01-01 RX ORDER — PROCHLORPERAZINE MALEATE 5 MG
5 TABLET ORAL EVERY 6 HOURS PRN
Status: DISCONTINUED | OUTPATIENT
Start: 2024-01-01 | End: 2024-08-21 | Stop reason: HOSPADM

## 2024-01-01 RX ORDER — IPRATROPIUM BROMIDE AND ALBUTEROL SULFATE 2.5; .5 MG/3ML; MG/3ML
1 SOLUTION RESPIRATORY (INHALATION) EVERY 6 HOURS
Status: DISCONTINUED | OUTPATIENT
Start: 2024-01-01 | End: 2024-01-01

## 2024-01-01 RX ORDER — AMLODIPINE BESYLATE 5 MG/1
5 TABLET ORAL 2 TIMES DAILY
Status: DISCONTINUED | OUTPATIENT
Start: 2024-01-01 | End: 2024-01-01

## 2024-01-01 RX ORDER — DOXAZOSIN 1 MG/1
1 TABLET ORAL AT BEDTIME
COMMUNITY
Start: 2024-01-01 | End: 2024-01-01

## 2024-01-01 RX ORDER — AMOXICILLIN 250 MG
2 CAPSULE ORAL 2 TIMES DAILY PRN
Status: DISCONTINUED | OUTPATIENT
Start: 2024-01-01 | End: 2024-01-01

## 2024-01-01 RX ORDER — MONTELUKAST SODIUM 10 MG/1
10 TABLET ORAL AT BEDTIME
Status: DISCONTINUED | OUTPATIENT
Start: 2024-01-01 | End: 2024-01-01

## 2024-01-01 RX ORDER — AMLODIPINE BESYLATE 10 MG/1
5 TABLET ORAL 2 TIMES DAILY
Status: ON HOLD | COMMUNITY
End: 2024-01-01

## 2024-01-01 RX ORDER — OXYCODONE HCL 20 MG/ML
5 CONCENTRATE, ORAL ORAL
Status: DISCONTINUED | OUTPATIENT
Start: 2024-01-01 | End: 2024-08-21 | Stop reason: HOSPADM

## 2024-01-01 RX ORDER — AMLODIPINE BESYLATE 10 MG/1
10 TABLET ORAL DAILY
Qty: 90 TABLET | Refills: 0 | Status: SHIPPED | OUTPATIENT
Start: 2024-01-01 | End: 2024-01-01

## 2024-01-01 RX ORDER — MINERAL OIL/HYDROPHIL PETROLAT
OINTMENT (GRAM) TOPICAL
Status: DISCONTINUED | OUTPATIENT
Start: 2024-01-01 | End: 2024-08-21 | Stop reason: HOSPADM

## 2024-01-01 RX ORDER — UMECLIDINIUM BROMIDE AND VILANTEROL TRIFENATATE 62.5; 25 UG/1; UG/1
1 POWDER RESPIRATORY (INHALATION) DAILY
Qty: 1 EACH | Refills: 11 | Status: SHIPPED | OUTPATIENT
Start: 2024-01-01

## 2024-01-01 RX ADMIN — CLOPIDOGREL BISULFATE 75 MG: 75 TABLET ORAL at 08:25

## 2024-01-01 RX ADMIN — ACETAMINOPHEN 650 MG: 325 TABLET, FILM COATED ORAL at 20:35

## 2024-01-01 RX ADMIN — CITALOPRAM HYDROBROMIDE 30 MG: 10 TABLET ORAL at 09:53

## 2024-01-01 RX ADMIN — CLONIDINE HYDROCHLORIDE 0.2 MG: 0.1 TABLET ORAL at 21:34

## 2024-01-01 RX ADMIN — CLONIDINE HYDROCHLORIDE 0.2 MG: 0.1 TABLET ORAL at 08:26

## 2024-01-01 RX ADMIN — MONTELUKAST 10 MG: 10 TABLET, FILM COATED ORAL at 21:34

## 2024-01-01 RX ADMIN — BUDESONIDE INHALATION 0.5 MG: 0.5 SUSPENSION RESPIRATORY (INHALATION) at 07:41

## 2024-01-01 RX ADMIN — CLONIDINE HYDROCHLORIDE 0.2 MG: 0.1 TABLET ORAL at 08:40

## 2024-01-01 RX ADMIN — MICONAZOLE NITRATE: 20 POWDER TOPICAL at 13:29

## 2024-01-01 RX ADMIN — ACETAMINOPHEN 650 MG: 325 TABLET, FILM COATED ORAL at 21:21

## 2024-01-01 RX ADMIN — DOXYCYCLINE HYCLATE 100 MG: 100 CAPSULE ORAL at 09:52

## 2024-01-01 RX ADMIN — CITALOPRAM HYDROBROMIDE 30 MG: 10 TABLET ORAL at 08:14

## 2024-01-01 RX ADMIN — ACETAMINOPHEN 650 MG: 325 TABLET, FILM COATED ORAL at 08:15

## 2024-01-01 RX ADMIN — ACETAMINOPHEN 650 MG: 325 TABLET, FILM COATED ORAL at 09:55

## 2024-01-01 RX ADMIN — BUDESONIDE 0.5 MG: 0.5 INHALANT RESPIRATORY (INHALATION) at 19:49

## 2024-01-01 RX ADMIN — ACETAMINOPHEN 650 MG: 325 TABLET, FILM COATED ORAL at 09:53

## 2024-01-01 RX ADMIN — MONTELUKAST 10 MG: 10 TABLET, FILM COATED ORAL at 23:56

## 2024-01-01 RX ADMIN — SODIUM BICARBONATE 650 MG TABLET 650 MG: at 08:30

## 2024-01-01 RX ADMIN — FUROSEMIDE 60 MG: 10 INJECTION, SOLUTION INTRAMUSCULAR; INTRAVENOUS at 07:07

## 2024-01-01 RX ADMIN — SODIUM BICARBONATE 650 MG TABLET 650 MG: at 08:13

## 2024-01-01 RX ADMIN — IPRATROPIUM BROMIDE AND ALBUTEROL SULFATE 3 ML: .5; 3 SOLUTION RESPIRATORY (INHALATION) at 08:28

## 2024-01-01 RX ADMIN — DEXTROSE MONOHYDRATE 25 G: 25 INJECTION, SOLUTION INTRAVENOUS at 23:44

## 2024-01-01 RX ADMIN — AMLODIPINE BESYLATE 5 MG: 5 TABLET ORAL at 20:31

## 2024-01-01 RX ADMIN — IPRATROPIUM BROMIDE AND ALBUTEROL SULFATE 6 ML: .5; 3 SOLUTION RESPIRATORY (INHALATION) at 20:40

## 2024-01-01 RX ADMIN — LORAZEPAM 0.5 MG: 2 INJECTION INTRAMUSCULAR; INTRAVENOUS at 00:29

## 2024-01-01 RX ADMIN — AMLODIPINE BESYLATE 5 MG: 5 TABLET ORAL at 23:56

## 2024-01-01 RX ADMIN — TORSEMIDE 20 MG: 20 TABLET ORAL at 09:53

## 2024-01-01 RX ADMIN — ACETAMINOPHEN 650 MG: 325 TABLET, FILM COATED ORAL at 09:20

## 2024-01-01 RX ADMIN — SODIUM BICARBONATE 50 MEQ: 84 INJECTION INTRAVENOUS at 23:53

## 2024-01-01 RX ADMIN — AMLODIPINE BESYLATE 5 MG: 5 TABLET ORAL at 09:13

## 2024-01-01 RX ADMIN — CARVEDILOL 37.5 MG: 25 TABLET, FILM COATED ORAL at 08:16

## 2024-01-01 RX ADMIN — CARVEDILOL 37.5 MG: 25 TABLET, FILM COATED ORAL at 09:55

## 2024-01-01 RX ADMIN — CARVEDILOL 37.5 MG: 25 TABLET, FILM COATED ORAL at 09:20

## 2024-01-01 RX ADMIN — IPRATROPIUM BROMIDE AND ALBUTEROL SULFATE 3 ML: .5; 3 SOLUTION RESPIRATORY (INHALATION) at 19:42

## 2024-01-01 RX ADMIN — LORAZEPAM 0.5 MG: 2 INJECTION INTRAMUSCULAR; INTRAVENOUS at 17:49

## 2024-01-01 RX ADMIN — MONTELUKAST 10 MG: 10 TABLET, FILM COATED ORAL at 21:24

## 2024-01-01 RX ADMIN — AMLODIPINE BESYLATE 5 MG: 5 TABLET ORAL at 09:54

## 2024-01-01 RX ADMIN — IPRATROPIUM BROMIDE AND ALBUTEROL SULFATE 3 ML: .5; 3 SOLUTION RESPIRATORY (INHALATION) at 08:10

## 2024-01-01 RX ADMIN — CITALOPRAM HYDROBROMIDE 30 MG: 10 TABLET ORAL at 09:20

## 2024-01-01 RX ADMIN — DEXTROSE MONOHYDRATE 50 ML: 25 INJECTION, SOLUTION INTRAVENOUS at 04:53

## 2024-01-01 RX ADMIN — FUROSEMIDE 20 MG: 10 INJECTION, SOLUTION INTRAMUSCULAR; INTRAVENOUS at 11:51

## 2024-01-01 RX ADMIN — ACETAMINOPHEN 650 MG: 325 TABLET, FILM COATED ORAL at 08:27

## 2024-01-01 RX ADMIN — CLONIDINE HYDROCHLORIDE 0.2 MG: 0.1 TABLET ORAL at 21:21

## 2024-01-01 RX ADMIN — CARVEDILOL 37.5 MG: 25 TABLET, FILM COATED ORAL at 18:09

## 2024-01-01 RX ADMIN — CARVEDILOL 37.5 MG: 25 TABLET, FILM COATED ORAL at 08:40

## 2024-01-01 RX ADMIN — CLONIDINE HYDROCHLORIDE 0.2 MG: 0.1 TABLET ORAL at 08:13

## 2024-01-01 RX ADMIN — CLOPIDOGREL BISULFATE 75 MG: 75 TABLET ORAL at 09:20

## 2024-01-01 RX ADMIN — DOXYCYCLINE HYCLATE 100 MG: 100 CAPSULE ORAL at 20:36

## 2024-01-01 RX ADMIN — IPRATROPIUM BROMIDE AND ALBUTEROL SULFATE 3 ML: .5; 3 SOLUTION RESPIRATORY (INHALATION) at 16:36

## 2024-01-01 RX ADMIN — IPRATROPIUM BROMIDE AND ALBUTEROL SULFATE 3 ML: .5; 3 SOLUTION RESPIRATORY (INHALATION) at 12:31

## 2024-01-01 RX ADMIN — SODIUM ZIRCONIUM CYCLOSILICATE 10 G: 5 POWDER, FOR SUSPENSION ORAL at 05:49

## 2024-01-01 RX ADMIN — CARVEDILOL 37.5 MG: 25 TABLET, FILM COATED ORAL at 09:13

## 2024-01-01 RX ADMIN — AMLODIPINE BESYLATE 5 MG: 5 TABLET ORAL at 08:28

## 2024-01-01 RX ADMIN — CLONIDINE HYDROCHLORIDE 0.2 MG: 0.1 TABLET ORAL at 09:13

## 2024-01-01 RX ADMIN — MONTELUKAST 10 MG: 10 TABLET, FILM COATED ORAL at 21:56

## 2024-01-01 RX ADMIN — SODIUM BICARBONATE 650 MG TABLET 650 MG: at 09:53

## 2024-01-01 RX ADMIN — FUROSEMIDE 20 MG: 10 INJECTION, SOLUTION INTRAMUSCULAR; INTRAVENOUS at 22:16

## 2024-01-01 RX ADMIN — CARVEDILOL 37.5 MG: 25 TABLET, FILM COATED ORAL at 17:41

## 2024-01-01 RX ADMIN — BUDESONIDE INHALATION 0.5 MG: 0.5 SUSPENSION RESPIRATORY (INHALATION) at 19:38

## 2024-01-01 RX ADMIN — IPRATROPIUM BROMIDE AND ALBUTEROL SULFATE 3 ML: .5; 3 SOLUTION RESPIRATORY (INHALATION) at 09:21

## 2024-01-01 RX ADMIN — ATORVASTATIN CALCIUM 40 MG: 40 TABLET, FILM COATED ORAL at 08:15

## 2024-01-01 RX ADMIN — LORAZEPAM 0.5 MG: 2 INJECTION INTRAMUSCULAR; INTRAVENOUS at 11:42

## 2024-01-01 RX ADMIN — DOXYCYCLINE HYCLATE 100 MG: 100 CAPSULE ORAL at 08:17

## 2024-01-01 RX ADMIN — IPRATROPIUM BROMIDE AND ALBUTEROL SULFATE 6 ML: 2.5; .5 SOLUTION RESPIRATORY (INHALATION) at 20:40

## 2024-01-01 RX ADMIN — IPRATROPIUM BROMIDE AND ALBUTEROL SULFATE 3 ML: .5; 3 SOLUTION RESPIRATORY (INHALATION) at 07:41

## 2024-01-01 RX ADMIN — FUROSEMIDE 40 MG: 10 INJECTION, SOLUTION INTRAVENOUS at 05:58

## 2024-01-01 RX ADMIN — LORAZEPAM 0.5 MG: 2 INJECTION INTRAMUSCULAR; INTRAVENOUS at 18:41

## 2024-01-01 RX ADMIN — CLONIDINE HYDROCHLORIDE 0.2 MG: 0.1 TABLET ORAL at 09:20

## 2024-01-01 RX ADMIN — AMLODIPINE BESYLATE 5 MG: 5 TABLET ORAL at 09:21

## 2024-01-01 RX ADMIN — TORSEMIDE 30 MG: 20 TABLET ORAL at 08:17

## 2024-01-01 RX ADMIN — ATORVASTATIN CALCIUM 40 MG: 40 TABLET, FILM COATED ORAL at 11:51

## 2024-01-01 RX ADMIN — IPRATROPIUM BROMIDE AND ALBUTEROL SULFATE 3 ML: .5; 3 SOLUTION RESPIRATORY (INHALATION) at 08:35

## 2024-01-01 RX ADMIN — BUDESONIDE 0.5 MG: 0.5 INHALANT RESPIRATORY (INHALATION) at 19:42

## 2024-01-01 RX ADMIN — MICONAZOLE NITRATE: 20 POWDER TOPICAL at 11:26

## 2024-01-01 RX ADMIN — IPRATROPIUM BROMIDE AND ALBUTEROL SULFATE 3 ML: .5; 3 SOLUTION RESPIRATORY (INHALATION) at 12:19

## 2024-01-01 RX ADMIN — DOXYCYCLINE HYCLATE 100 MG: 100 CAPSULE ORAL at 09:20

## 2024-01-01 RX ADMIN — SODIUM CHLORIDE 6.7 UNITS: 9 INJECTION, SOLUTION INTRAVENOUS at 23:43

## 2024-01-01 RX ADMIN — CLOPIDOGREL BISULFATE 75 MG: 75 TABLET ORAL at 09:54

## 2024-01-01 RX ADMIN — ALBUTEROL SULFATE 2.5 MG: 2.5 SOLUTION RESPIRATORY (INHALATION) at 14:57

## 2024-01-01 RX ADMIN — PREDNISONE 40 MG: 20 TABLET ORAL at 09:54

## 2024-01-01 RX ADMIN — SODIUM BICARBONATE 650 MG TABLET 650 MG: at 09:20

## 2024-01-01 RX ADMIN — NITROGLYCERIN 0.4 MG: 0.4 TABLET SUBLINGUAL at 23:33

## 2024-01-01 RX ADMIN — DOXAZOSIN 1 MG: 1 TABLET ORAL at 21:34

## 2024-01-01 RX ADMIN — FUROSEMIDE 60 MG: 10 INJECTION, SOLUTION INTRAMUSCULAR; INTRAVENOUS at 21:31

## 2024-01-01 RX ADMIN — BUDESONIDE 0.5 MG: 0.5 INHALANT RESPIRATORY (INHALATION) at 20:17

## 2024-01-01 RX ADMIN — IPRATROPIUM BROMIDE AND ALBUTEROL SULFATE 3 ML: .5; 3 SOLUTION RESPIRATORY (INHALATION) at 07:42

## 2024-01-01 RX ADMIN — ACETAMINOPHEN 650 MG: 325 TABLET, FILM COATED ORAL at 23:04

## 2024-01-01 RX ADMIN — IPRATROPIUM BROMIDE AND ALBUTEROL SULFATE 3 ML: .5; 3 SOLUTION RESPIRATORY (INHALATION) at 13:55

## 2024-01-01 RX ADMIN — DOXYCYCLINE HYCLATE 100 MG: 100 CAPSULE ORAL at 09:53

## 2024-01-01 RX ADMIN — CITALOPRAM HYDROBROMIDE 30 MG: 10 TABLET ORAL at 12:32

## 2024-01-01 RX ADMIN — IPRATROPIUM BROMIDE AND ALBUTEROL SULFATE 3 ML: .5; 3 SOLUTION RESPIRATORY (INHALATION) at 15:37

## 2024-01-01 RX ADMIN — IPRATROPIUM BROMIDE AND ALBUTEROL SULFATE 3 ML: .5; 3 SOLUTION RESPIRATORY (INHALATION) at 16:07

## 2024-01-01 RX ADMIN — CARVEDILOL 37.5 MG: 25 TABLET, FILM COATED ORAL at 23:56

## 2024-01-01 RX ADMIN — DOXAZOSIN 1 MG: 1 TABLET ORAL at 21:08

## 2024-01-01 RX ADMIN — HYDROMORPHONE HYDROCHLORIDE 0.4 MG: 0.2 INJECTION, SOLUTION INTRAMUSCULAR; INTRAVENOUS; SUBCUTANEOUS at 14:55

## 2024-01-01 RX ADMIN — CEFEPIME HYDROCHLORIDE 2 G: 2 INJECTION, POWDER, FOR SOLUTION INTRAVENOUS at 22:13

## 2024-01-01 RX ADMIN — CITALOPRAM HYDROBROMIDE 30 MG: 10 TABLET ORAL at 08:25

## 2024-01-01 RX ADMIN — CITALOPRAM HYDROBROMIDE 20 MG: 10 TABLET ORAL at 18:09

## 2024-01-01 RX ADMIN — SODIUM BICARBONATE 650 MG TABLET 650 MG: at 20:35

## 2024-01-01 RX ADMIN — PREDNISONE 40 MG: 20 TABLET ORAL at 08:25

## 2024-01-01 RX ADMIN — IPRATROPIUM BROMIDE AND ALBUTEROL SULFATE 3 ML: .5; 3 SOLUTION RESPIRATORY (INHALATION) at 20:17

## 2024-01-01 RX ADMIN — BUDESONIDE 0.5 MG: 0.5 INHALANT RESPIRATORY (INHALATION) at 08:40

## 2024-01-01 RX ADMIN — TORSEMIDE 30 MG: 20 TABLET ORAL at 08:25

## 2024-01-01 RX ADMIN — BUDESONIDE 0.5 MG: 0.5 INHALANT RESPIRATORY (INHALATION) at 19:23

## 2024-01-01 RX ADMIN — IPRATROPIUM BROMIDE AND ALBUTEROL SULFATE 3 ML: .5; 3 SOLUTION RESPIRATORY (INHALATION) at 12:17

## 2024-01-01 RX ADMIN — CLOPIDOGREL BISULFATE 75 MG: 75 TABLET ORAL at 08:16

## 2024-01-01 RX ADMIN — ATORVASTATIN CALCIUM 40 MG: 40 TABLET, FILM COATED ORAL at 18:09

## 2024-01-01 RX ADMIN — DOXYCYCLINE HYCLATE 100 MG: 100 CAPSULE ORAL at 21:07

## 2024-01-01 RX ADMIN — DEXAMETHASONE SODIUM PHOSPHATE 10 MG: 10 INJECTION, SOLUTION INTRAMUSCULAR; INTRAVENOUS at 11:07

## 2024-01-01 RX ADMIN — DOXAZOSIN 1 MG: 1 TABLET ORAL at 21:21

## 2024-01-01 RX ADMIN — SODIUM BICARBONATE 650 MG TABLET 650 MG: at 21:06

## 2024-01-01 RX ADMIN — FUROSEMIDE 40 MG: 10 INJECTION, SOLUTION INTRAMUSCULAR; INTRAVENOUS at 13:29

## 2024-01-01 RX ADMIN — DOXAZOSIN 1 MG: 1 TABLET ORAL at 00:46

## 2024-01-01 RX ADMIN — BENZONATATE 100 MG: 100 CAPSULE ORAL at 12:57

## 2024-01-01 RX ADMIN — IPRATROPIUM BROMIDE AND ALBUTEROL SULFATE 3 ML: .5; 3 SOLUTION RESPIRATORY (INHALATION) at 22:06

## 2024-01-01 RX ADMIN — FUROSEMIDE 60 MG: 10 INJECTION, SOLUTION INTRAMUSCULAR; INTRAVENOUS at 23:24

## 2024-01-01 RX ADMIN — MONTELUKAST 10 MG: 10 TABLET, FILM COATED ORAL at 21:21

## 2024-01-01 RX ADMIN — SODIUM BICARBONATE 650 MG TABLET 650 MG: at 21:20

## 2024-01-01 RX ADMIN — BENZONATATE 100 MG: 100 CAPSULE ORAL at 17:46

## 2024-01-01 RX ADMIN — SODIUM BICARBONATE 650 MG TABLET 650 MG: at 20:32

## 2024-01-01 RX ADMIN — CLOPIDOGREL BISULFATE 75 MG: 75 TABLET ORAL at 09:13

## 2024-01-01 RX ADMIN — AMLODIPINE BESYLATE 5 MG: 5 TABLET ORAL at 21:26

## 2024-01-01 RX ADMIN — SODIUM BICARBONATE 650 MG TABLET 650 MG: at 21:34

## 2024-01-01 RX ADMIN — PREDNISONE 50 MG: 20 TABLET ORAL at 08:14

## 2024-01-01 RX ADMIN — HEPARIN SODIUM 850 UNITS/HR: 10000 INJECTION, SOLUTION INTRAVENOUS at 00:57

## 2024-01-01 RX ADMIN — DOXAZOSIN 1 MG: 1 TABLET ORAL at 21:55

## 2024-01-01 RX ADMIN — FUROSEMIDE 40 MG: 10 INJECTION, SOLUTION INTRAVENOUS at 14:23

## 2024-01-01 RX ADMIN — TORSEMIDE 20 MG: 20 TABLET ORAL at 09:20

## 2024-01-01 RX ADMIN — SODIUM BICARBONATE 650 MG TABLET 650 MG: at 09:13

## 2024-01-01 RX ADMIN — AMLODIPINE BESYLATE 5 MG: 5 TABLET ORAL at 09:52

## 2024-01-01 RX ADMIN — CLONIDINE HYDROCHLORIDE 0.2 MG: 0.1 TABLET ORAL at 23:56

## 2024-01-01 RX ADMIN — CLOPIDOGREL BISULFATE 75 MG: 75 TABLET ORAL at 09:52

## 2024-01-01 RX ADMIN — DEXTROSE MONOHYDRATE 300 ML: 100 INJECTION, SOLUTION INTRAVENOUS at 23:46

## 2024-01-01 RX ADMIN — DOXYCYCLINE HYCLATE 100 MG: 100 CAPSULE ORAL at 20:31

## 2024-01-01 RX ADMIN — IPRATROPIUM BROMIDE AND ALBUTEROL SULFATE 3 ML: .5; 3 SOLUTION RESPIRATORY (INHALATION) at 19:24

## 2024-01-01 RX ADMIN — MICONAZOLE NITRATE: 20 POWDER TOPICAL at 16:31

## 2024-01-01 RX ADMIN — CLONIDINE HYDROCHLORIDE 0.2 MG: 0.1 TABLET ORAL at 21:08

## 2024-01-01 RX ADMIN — ALBUTEROL SULFATE 2.5 MG: 2.5 SOLUTION RESPIRATORY (INHALATION) at 23:59

## 2024-01-01 RX ADMIN — SODIUM BICARBONATE 50 MEQ: 84 INJECTION INTRAVENOUS at 04:53

## 2024-01-01 RX ADMIN — CLONIDINE HYDROCHLORIDE 0.2 MG: 0.1 TABLET ORAL at 09:52

## 2024-01-01 RX ADMIN — BUDESONIDE 0.5 MG: 0.5 INHALANT RESPIRATORY (INHALATION) at 08:10

## 2024-01-01 RX ADMIN — Medication 500 MG: at 15:12

## 2024-01-01 RX ADMIN — MICONAZOLE NITRATE: 20 POWDER TOPICAL at 21:23

## 2024-01-01 RX ADMIN — HYDROMORPHONE HYDROCHLORIDE 0.4 MG: 0.2 INJECTION, SOLUTION INTRAMUSCULAR; INTRAVENOUS; SUBCUTANEOUS at 12:30

## 2024-01-01 RX ADMIN — BUDESONIDE 0.5 MG: 0.5 INHALANT RESPIRATORY (INHALATION) at 07:42

## 2024-01-01 RX ADMIN — CARVEDILOL 37.5 MG: 25 TABLET, FILM COATED ORAL at 08:29

## 2024-01-01 RX ADMIN — IPRATROPIUM BROMIDE AND ALBUTEROL SULFATE 3 ML: .5; 3 SOLUTION RESPIRATORY (INHALATION) at 12:39

## 2024-01-01 RX ADMIN — CLONIDINE HYDROCHLORIDE 0.2 MG: 0.1 TABLET ORAL at 20:36

## 2024-01-01 RX ADMIN — HUMAN ALBUMIN MICROSPHERES AND PERFLUTREN 3 ML: 10; .22 INJECTION, SOLUTION INTRAVENOUS at 08:46

## 2024-01-01 RX ADMIN — AMLODIPINE BESYLATE 5 MG: 5 TABLET ORAL at 21:07

## 2024-01-01 RX ADMIN — SODIUM BICARBONATE 650 MG TABLET 650 MG: at 09:52

## 2024-01-01 RX ADMIN — HYDROMORPHONE HYDROCHLORIDE 0.4 MG: 0.2 INJECTION, SOLUTION INTRAMUSCULAR; INTRAVENOUS; SUBCUTANEOUS at 17:00

## 2024-01-01 RX ADMIN — HYDROMORPHONE HYDROCHLORIDE 0.4 MG: 0.2 INJECTION, SOLUTION INTRAMUSCULAR; INTRAVENOUS; SUBCUTANEOUS at 19:00

## 2024-01-01 RX ADMIN — NITROGLYCERIN 0.4 MG: 0.4 TABLET SUBLINGUAL at 23:42

## 2024-01-01 RX ADMIN — ACETAMINOPHEN 650 MG: 325 TABLET, FILM COATED ORAL at 21:06

## 2024-01-01 RX ADMIN — CALCIUM GLUCONATE 1 G: 98 INJECTION, SOLUTION INTRAVENOUS at 04:53

## 2024-01-01 RX ADMIN — FUROSEMIDE 40 MG: 10 INJECTION, SOLUTION INTRAVENOUS at 21:19

## 2024-01-01 RX ADMIN — AMLODIPINE BESYLATE 5 MG: 5 TABLET ORAL at 21:34

## 2024-01-01 RX ADMIN — CARVEDILOL 37.5 MG: 25 TABLET, FILM COATED ORAL at 17:39

## 2024-01-01 RX ADMIN — DOXAZOSIN 1 MG: 1 TABLET ORAL at 21:02

## 2024-01-01 RX ADMIN — SODIUM BICARBONATE 650 MG TABLET 650 MG: at 23:56

## 2024-01-01 RX ADMIN — PREDNISONE 40 MG: 20 TABLET ORAL at 08:02

## 2024-01-01 RX ADMIN — DEXTROSE MONOHYDRATE: 50 INJECTION, SOLUTION INTRAVENOUS at 05:24

## 2024-01-01 RX ADMIN — IPRATROPIUM BROMIDE AND ALBUTEROL SULFATE 3 ML: .5; 3 SOLUTION RESPIRATORY (INHALATION) at 12:29

## 2024-01-01 RX ADMIN — IPRATROPIUM BROMIDE AND ALBUTEROL SULFATE 3 ML: .5; 3 SOLUTION RESPIRATORY (INHALATION) at 20:46

## 2024-01-01 RX ADMIN — IPRATROPIUM BROMIDE AND ALBUTEROL SULFATE 3 ML: .5; 3 SOLUTION RESPIRATORY (INHALATION) at 15:49

## 2024-01-01 RX ADMIN — AMLODIPINE BESYLATE 5 MG: 5 TABLET ORAL at 20:36

## 2024-01-01 RX ADMIN — MICONAZOLE NITRATE: 20 POWDER TOPICAL at 18:07

## 2024-01-01 RX ADMIN — IPRATROPIUM BROMIDE AND ALBUTEROL SULFATE 3 ML: .5; 3 SOLUTION RESPIRATORY (INHALATION) at 08:40

## 2024-01-01 RX ADMIN — CARVEDILOL 37.5 MG: 25 TABLET, FILM COATED ORAL at 18:33

## 2024-01-01 RX ADMIN — BUDESONIDE 0.5 MG: 0.5 INHALANT RESPIRATORY (INHALATION) at 20:48

## 2024-01-01 RX ADMIN — BUDESONIDE 0.5 MG: 0.5 INHALANT RESPIRATORY (INHALATION) at 09:21

## 2024-01-01 RX ADMIN — ALBUTEROL SULFATE 10 MG: 2.5 SOLUTION RESPIRATORY (INHALATION) at 23:29

## 2024-01-01 RX ADMIN — ACETAMINOPHEN 650 MG: 325 TABLET, FILM COATED ORAL at 20:33

## 2024-01-01 RX ADMIN — CARVEDILOL 37.5 MG: 25 TABLET, FILM COATED ORAL at 17:51

## 2024-01-01 RX ADMIN — MONTELUKAST 10 MG: 10 TABLET, FILM COATED ORAL at 21:01

## 2024-01-01 RX ADMIN — MONTELUKAST 10 MG: 10 TABLET, FILM COATED ORAL at 21:07

## 2024-01-01 RX ADMIN — AMLODIPINE BESYLATE 5 MG: 5 TABLET ORAL at 08:37

## 2024-01-01 RX ADMIN — AMLODIPINE BESYLATE 5 MG: 5 TABLET ORAL at 08:15

## 2024-01-01 RX ADMIN — ACETAMINOPHEN: 500 TABLET, FILM COATED ORAL at 21:55

## 2024-01-01 RX ADMIN — TORSEMIDE 20 MG: 20 TABLET ORAL at 09:54

## 2024-01-01 RX ADMIN — CLOPIDOGREL BISULFATE 75 MG: 75 TABLET ORAL at 18:09

## 2024-01-01 RX ADMIN — DOXYCYCLINE HYCLATE 100 MG: 100 CAPSULE ORAL at 21:21

## 2024-01-01 RX ADMIN — PREDNISONE 40 MG: 20 TABLET ORAL at 09:52

## 2024-01-01 RX ADMIN — DOXYCYCLINE HYCLATE 100 MG: 100 CAPSULE ORAL at 21:34

## 2024-01-01 RX ADMIN — CLONIDINE HYDROCHLORIDE 0.2 MG: 0.1 TABLET ORAL at 20:32

## 2024-01-01 RX ADMIN — CLONIDINE HYDROCHLORIDE 0.2 MG: 0.1 TABLET ORAL at 09:53

## 2024-01-01 RX ADMIN — CLOPIDOGREL BISULFATE 75 MG: 75 TABLET ORAL at 08:40

## 2024-01-01 RX ADMIN — PREDNISONE 40 MG: 20 TABLET ORAL at 09:21

## 2024-01-01 RX ADMIN — DOXYCYCLINE HYCLATE 100 MG: 100 CAPSULE ORAL at 11:51

## 2024-01-01 RX ADMIN — BUDESONIDE 0.5 MG: 0.5 INHALANT RESPIRATORY (INHALATION) at 08:35

## 2024-01-01 RX ADMIN — ATORVASTATIN CALCIUM 40 MG: 40 TABLET, FILM COATED ORAL at 09:20

## 2024-01-01 RX ADMIN — IPRATROPIUM BROMIDE AND ALBUTEROL SULFATE 3 ML: .5; 3 SOLUTION RESPIRATORY (INHALATION) at 19:38

## 2024-01-01 RX ADMIN — IPRATROPIUM BROMIDE AND ALBUTEROL SULFATE 3 ML: .5; 3 SOLUTION RESPIRATORY (INHALATION) at 11:07

## 2024-01-01 RX ADMIN — CALCIUM GLUCONATE 2 G: 98 INJECTION, SOLUTION INTRAVENOUS at 23:40

## 2024-01-01 RX ADMIN — Medication 10 UNITS: at 04:54

## 2024-01-01 RX ADMIN — ATORVASTATIN CALCIUM 40 MG: 40 TABLET, FILM COATED ORAL at 09:52

## 2024-01-01 RX ADMIN — BUDESONIDE 0.5 MG: 0.5 INHALANT RESPIRATORY (INHALATION) at 08:28

## 2024-01-01 RX ADMIN — HYDROMORPHONE HYDROCHLORIDE 0.2 MG: 0.2 INJECTION, SOLUTION INTRAMUSCULAR; INTRAVENOUS; SUBCUTANEOUS at 22:47

## 2024-01-01 RX ADMIN — ATORVASTATIN CALCIUM 40 MG: 40 TABLET, FILM COATED ORAL at 08:25

## 2024-01-01 RX ADMIN — CEFTRIAXONE 2 G: 2 INJECTION, POWDER, FOR SOLUTION INTRAMUSCULAR; INTRAVENOUS at 14:31

## 2024-01-01 RX ADMIN — CARVEDILOL 37.5 MG: 25 TABLET, FILM COATED ORAL at 17:37

## 2024-01-01 RX ADMIN — ATORVASTATIN CALCIUM 40 MG: 40 TABLET, FILM COATED ORAL at 09:54

## 2024-01-01 RX ADMIN — DOXYCYCLINE HYCLATE 100 MG: 100 CAPSULE ORAL at 08:29

## 2024-01-01 RX ADMIN — CARVEDILOL 37.5 MG: 25 TABLET, FILM COATED ORAL at 09:52

## 2024-01-01 RX ADMIN — IPRATROPIUM BROMIDE AND ALBUTEROL SULFATE 3 ML: .5; 3 SOLUTION RESPIRATORY (INHALATION) at 19:49

## 2024-01-01 RX ADMIN — ACETAMINOPHEN 650 MG: 325 TABLET, FILM COATED ORAL at 21:34

## 2024-01-01 ASSESSMENT — ACTIVITIES OF DAILY LIVING (ADL)
ADLS_ACUITY_SCORE: 40
ADLS_ACUITY_SCORE: 31
ADLS_ACUITY_SCORE: 40
ADLS_ACUITY_SCORE: 35
ADLS_ACUITY_SCORE: 38
ADLS_ACUITY_SCORE: 31
ADLS_ACUITY_SCORE: 30
ADLS_ACUITY_SCORE: 30
DOING_ERRANDS_INDEPENDENTLY_DIFFICULTY: YES
ADLS_ACUITY_SCORE: 31
ADLS_ACUITY_SCORE: 55
ADLS_ACUITY_SCORE: 31
ADLS_ACUITY_SCORE: 30
ADLS_ACUITY_SCORE: 34
ADLS_ACUITY_SCORE: 51
ADLS_ACUITY_SCORE: 30
ADLS_ACUITY_SCORE: 35
ADLS_ACUITY_SCORE: 39
ADLS_ACUITY_SCORE: 39
ADLS_ACUITY_SCORE: 31
ADLS_ACUITY_SCORE: 31
ADLS_ACUITY_SCORE: 43
ADLS_ACUITY_SCORE: 40
ADLS_ACUITY_SCORE: 31
ADLS_ACUITY_SCORE: 30
ADLS_ACUITY_SCORE: 38
ADLS_ACUITY_SCORE: 30
ADLS_ACUITY_SCORE: 34
ADLS_ACUITY_SCORE: 43
ADLS_ACUITY_SCORE: 30
ADLS_ACUITY_SCORE: 31
ADLS_ACUITY_SCORE: 27
ADLS_ACUITY_SCORE: 31
ADLS_ACUITY_SCORE: 36
ADLS_ACUITY_SCORE: 30
ADLS_ACUITY_SCORE: 31
CONCENTRATING,_REMEMBERING_OR_MAKING_DECISIONS_DIFFICULTY: YES
ADLS_ACUITY_SCORE: 40
ADLS_ACUITY_SCORE: 31
ADLS_ACUITY_SCORE: 31
ADLS_ACUITY_SCORE: 30
ADLS_ACUITY_SCORE: 30
ADLS_ACUITY_SCORE: 31
ADLS_ACUITY_SCORE: 35
EQUIPMENT_CURRENTLY_USED_AT_HOME: WALKER, STANDARD;WHEELCHAIR, MANUAL
ADLS_ACUITY_SCORE: 43
ADLS_ACUITY_SCORE: 31
ADLS_ACUITY_SCORE: 31
ADLS_ACUITY_SCORE: 38
ADLS_ACUITY_SCORE: 39
ADLS_ACUITY_SCORE: 51
ADLS_ACUITY_SCORE: 55
USE_OF_HEARING_ASSISTIVE_DEVICES: BILATERAL HEARING AIDS
ADLS_ACUITY_SCORE: 31
ADLS_ACUITY_SCORE: 35
ADLS_ACUITY_SCORE: 27
ADLS_ACUITY_SCORE: 31
ADLS_ACUITY_SCORE: 30
ADLS_ACUITY_SCORE: 38
ADLS_ACUITY_SCORE: 30
ADLS_ACUITY_SCORE: 31
ADLS_ACUITY_SCORE: 30
ADLS_ACUITY_SCORE: 51
ADLS_ACUITY_SCORE: 31
ADLS_ACUITY_SCORE: 35
ADLS_ACUITY_SCORE: 31
ADLS_ACUITY_SCORE: 51
ADLS_ACUITY_SCORE: 39
ADLS_ACUITY_SCORE: 31
ADLS_ACUITY_SCORE: 30
ADLS_ACUITY_SCORE: 31
ADLS_ACUITY_SCORE: 30
ADLS_ACUITY_SCORE: 34
ADLS_ACUITY_SCORE: 31
ADLS_ACUITY_SCORE: 31
ADLS_ACUITY_SCORE: 40
ADLS_ACUITY_SCORE: 39
DIFFICULTY_COMMUNICATING: NO
ADLS_ACUITY_SCORE: 35
ADLS_ACUITY_SCORE: 55
ADLS_ACUITY_SCORE: 30
ADLS_ACUITY_SCORE: 38
ADLS_ACUITY_SCORE: 34
ADLS_ACUITY_SCORE: 40
ADLS_ACUITY_SCORE: 31
ADLS_ACUITY_SCORE: 55
ADLS_ACUITY_SCORE: 35
ADLS_ACUITY_SCORE: 30
ADLS_ACUITY_SCORE: 31
ADLS_ACUITY_SCORE: 35
ADLS_ACUITY_SCORE: 51
ADLS_ACUITY_SCORE: 51
ADLS_ACUITY_SCORE: 31
ADLS_ACUITY_SCORE: 51
DIFFICULTY_EATING/SWALLOWING: NO
ADLS_ACUITY_SCORE: 31
WERE_AUXILIARY_AIDS_OFFERED?: NO
ADLS_ACUITY_SCORE: 31
ADLS_ACUITY_SCORE: 31
WALKING_OR_CLIMBING_STAIRS: TRANSFERRING DIFFICULTY, REQUIRES EQUIPMENT;AMBULATION DIFFICULTY, REQUIRES EQUIPMENT
ADLS_ACUITY_SCORE: 40
ADLS_ACUITY_SCORE: 30
ADLS_ACUITY_SCORE: 35
ADLS_ACUITY_SCORE: 31
ADLS_ACUITY_SCORE: 35
ADLS_ACUITY_SCORE: 51
ADLS_ACUITY_SCORE: 39
ADLS_ACUITY_SCORE: 27
ADLS_ACUITY_SCORE: 30
HEARING_DIFFICULTY_OR_DEAF: YES
ADLS_ACUITY_SCORE: 31
ADLS_ACUITY_SCORE: 31
ADLS_ACUITY_SCORE: 27
ADLS_ACUITY_SCORE: 30
ADLS_ACUITY_SCORE: 43
WEAR_GLASSES_OR_BLIND: YES
ADLS_ACUITY_SCORE: 31
ADLS_ACUITY_SCORE: 51
ADLS_ACUITY_SCORE: 38
ADLS_ACUITY_SCORE: 38
ADLS_ACUITY_SCORE: 51
ADLS_ACUITY_SCORE: 38
WALKING_OR_CLIMBING_STAIRS_DIFFICULTY: YES
ADLS_ACUITY_SCORE: 40
ADLS_ACUITY_SCORE: 25
DEPENDENT_IADLS:: TRANSPORTATION;MEDICATION MANAGEMENT
ADLS_ACUITY_SCORE: 30
ADLS_ACUITY_SCORE: 38
ADLS_ACUITY_SCORE: 51
ADLS_ACUITY_SCORE: 35
ADLS_ACUITY_SCORE: 51
ADLS_ACUITY_SCORE: 31
ADLS_ACUITY_SCORE: 31
ADLS_ACUITY_SCORE: 30
ADLS_ACUITY_SCORE: 55
ADLS_ACUITY_SCORE: 38
ADLS_ACUITY_SCORE: 31
ADLS_ACUITY_SCORE: 31
ADLS_ACUITY_SCORE: 35
TOILETING_ISSUES: NO
ADLS_ACUITY_SCORE: 31
ADLS_ACUITY_SCORE: 34
ADLS_ACUITY_SCORE: 40
ADLS_ACUITY_SCORE: 40
DRESSING/BATHING: BATHING DIFFICULTY, REQUIRES EQUIPMENT;BATHING DIFFICULTY, ASSISTANCE 1 PERSON
ADLS_ACUITY_SCORE: 31
ADLS_ACUITY_SCORE: 30
ADLS_ACUITY_SCORE: 43
ADLS_ACUITY_SCORE: 39
ADLS_ACUITY_SCORE: 31
ADLS_ACUITY_SCORE: 31
ADLS_ACUITY_SCORE: 43
ADLS_ACUITY_SCORE: 31
ADLS_ACUITY_SCORE: 31
DESCRIBE_HEARING_LOSS: BILATERAL HEARING LOSS
ADLS_ACUITY_SCORE: 51
ADLS_ACUITY_SCORE: 39
ADLS_ACUITY_SCORE: 55
ADLS_ACUITY_SCORE: 38
ADLS_ACUITY_SCORE: 31
ADLS_ACUITY_SCORE: 35
ADLS_ACUITY_SCORE: 51
DRESSING/BATHING_DIFFICULTY: YES
ADLS_ACUITY_SCORE: 38
ADLS_ACUITY_SCORE: 43
ADLS_ACUITY_SCORE: 30
ADLS_ACUITY_SCORE: 31
ADLS_ACUITY_SCORE: 30
ADLS_ACUITY_SCORE: 35
PREVIOUS_RESPONSIBILITIES: MEDICATION MANAGEMENT
ADLS_ACUITY_SCORE: 40
FALL_HISTORY_WITHIN_LAST_SIX_MONTHS: NO
ADLS_ACUITY_SCORE: 51
CHANGE_IN_FUNCTIONAL_STATUS_SINCE_ONSET_OF_CURRENT_ILLNESS/INJURY: NO
ADLS_ACUITY_SCORE: 34
ADLS_ACUITY_SCORE: 27
ADLS_ACUITY_SCORE: 30
ADLS_ACUITY_SCORE: 31
ADLS_ACUITY_SCORE: 39
ADLS_ACUITY_SCORE: 55
ADLS_ACUITY_SCORE: 38
ADLS_ACUITY_SCORE: 35
ADLS_ACUITY_SCORE: 30

## 2024-01-01 ASSESSMENT — PATIENT HEALTH QUESTIONNAIRE - PHQ9
SUM OF ALL RESPONSES TO PHQ QUESTIONS 1-9: 1
10. IF YOU CHECKED OFF ANY PROBLEMS, HOW DIFFICULT HAVE THESE PROBLEMS MADE IT FOR YOU TO DO YOUR WORK, TAKE CARE OF THINGS AT HOME, OR GET ALONG WITH OTHER PEOPLE: NOT DIFFICULT AT ALL
SUM OF ALL RESPONSES TO PHQ QUESTIONS 1-9: 1

## 2024-01-01 ASSESSMENT — COLUMBIA-SUICIDE SEVERITY RATING SCALE - C-SSRS
2. HAVE YOU ACTUALLY HAD ANY THOUGHTS OF KILLING YOURSELF IN THE PAST MONTH?: NO
2. HAVE YOU ACTUALLY HAD ANY THOUGHTS OF KILLING YOURSELF IN THE PAST MONTH?: NO
1. IN THE PAST MONTH, HAVE YOU WISHED YOU WERE DEAD OR WISHED YOU COULD GO TO SLEEP AND NOT WAKE UP?: NO
6. HAVE YOU EVER DONE ANYTHING, STARTED TO DO ANYTHING, OR PREPARED TO DO ANYTHING TO END YOUR LIFE?: NO
6. HAVE YOU EVER DONE ANYTHING, STARTED TO DO ANYTHING, OR PREPARED TO DO ANYTHING TO END YOUR LIFE?: NO
1. IN THE PAST MONTH, HAVE YOU WISHED YOU WERE DEAD OR WISHED YOU COULD GO TO SLEEP AND NOT WAKE UP?: NO

## 2024-01-01 ASSESSMENT — ENCOUNTER SYMPTOMS: COUGH: 1

## 2024-01-01 ASSESSMENT — PAIN SCALES - GENERAL: PAINLEVEL: NO PAIN (0)

## 2024-01-02 NOTE — TELEPHONE ENCOUNTER
Home Care is calling regarding an established patient with  [facility].        11/8/2023     3:59 PM   Home Care Information   Home Care agency [place] [place] [phone]     Requesting orders from: Ludivina Chamberlain  Provider is following patient: Yes  Is this a 60-day recertification request?  Yes    Orders Requested    Skilled Nursing  Request for recertification   Frequency:  1x/wk for 5 wks  Then every other week for 4 weeks    HHA (Home Health Aide)  Request for recertification   Frequency:  1x/wk for 9 wks      Confirmed ok to leave a detailed message with call back.  Contact information confirmed and updated as needed.    Nickie Washburn RN

## 2024-01-02 NOTE — TELEPHONE ENCOUNTER
RN spoke to Casie [facility]     Advised below approved per Dr. Jose Richey, Registered Nurse  [facility]

## 2024-01-04 NOTE — TELEPHONE ENCOUNTER
Home Care is calling regarding an established patient with [facility].        11/8/2023     3:59 PM   Home Care Information   Home Care agency [facility] [phone]     Requesting orders from: Ludivina Chamberlain  Provider is following patient: Yes  Is this a 60-day recertification request?  Yes    Orders Requested    Occupational Therapy  Request for recertification   Frequency:  3 visits over 5 weeks      Information was gathered and will be sent to provider for review.  RN will contact Home Care with information after provider review.  Confirmed ok to leave a detailed message with call back.  Contact information confirmed and updated as needed.[phone] ZAID Aguirre with [facility] confidential line    Routing to PCP to review and advise.    Raine Bergman RN

## 2024-01-04 NOTE — TELEPHONE ENCOUNTER
Called and left detailed message on Carla's confidential line informing of orders approved.    Raine CORBIN RN

## 2024-01-23 NOTE — TELEPHONE ENCOUNTER
RODRÍGUEZ Antonio with [facility] calling.    Mraisela informs that she did medication reconciliation today and patient has been taking Tylenol PM nightly for past 3-4 months.    Marisela added Tylenol PM to pt's med list. She requests Ludivina Canales MD to review and advise if okay for pt to continue taking nightly.    Marisela requests a call back at phone number [phone]. She informs this is a confidential line and it is okay to leave a detailed message.      Routing to PCP to review and advise.  Raine CORBIN RN

## 2024-01-24 NOTE — TELEPHONE ENCOUNTER
Please find out how much Tylenol patient is taking during the day, and how much is in the Tylenol PM she is taking.    Is she using the Tylenol PM for pain and sleep? Just sleep?  I

## 2024-01-24 NOTE — TELEPHONE ENCOUNTER
Routing to RODRÍGUEZ Richey, Registered Nurse, PAL (Patient Advocate Liaison)   [facility]   [phone]

## 2024-01-24 NOTE — TELEPHONE ENCOUNTER
"Called RODRÍGUEZ Antonio with [facility] and relayed a detailed message on her confidential vm relaying the message from Dr. Jose Canales for okay for pt to continue taking acetaminophen as she has been.     \"Reports that she takes 2 tablets of the 500 mg Tyelnol PM at night  Takes 2 tablets of the 650 mg of Tylenol Arthritis in the morning for a total of 2,300 mg in a day.\"    -Instructed Marisela to call back to pt's PAL Nando ARREGUIN with any questions [phone].     Renetta VARGAS RN   PAL (Patient Advocate Liaison)  [facility]      "

## 2024-01-24 NOTE — TELEPHONE ENCOUNTER
- Call out to Daquan (CTC on file)   - Request information below from Dr. MCCANN  Patient reports that she has been taking the Tylenol PM and Arthritis for over 5 years now, and it has been working to help her arthritis pain in her back and she states that it has helped overnight with pain control and sleep.  Reports that she takes 2 tablets of the 500 mg Tyelnol PM at night  Takes 2 tablets of the 650 mg of Tylenol Arthritis in the morning for a total of 2,300 mg in a day. Reports that there is no other medication that she is taking that has tylenol/acetaminophen in it.       - Routing to PCP to advise, OK to continue? PAL RN will call the Home RN back to advise once PCP advises on recommendation.    Nando Webber RN  Patient Advocate Liaison (PAL)  [facility]  [phone]    01/24/2024 at 11:07 AM

## 2024-01-26 NOTE — TELEPHONE ENCOUNTER
BMP was drawn after Office visit today.   BMP reviewed by Kinza Shirley CNP.    1/26/24 PLAN:   DECREASE torsemide to 20mg daily (1 tablet in the morning), STOP afternoon tablet.   If your weight increases 3 pounds or more overnight or if you have worsening shortness of breath or swelling take 20mg torsemide in the afternoon.     Follow up with Kinza jeffery on 2/6  Follow up with provider in [place] in March **JOSE Rader 3/6/24  Will likely have home health care draw labs next week **2/2 or 2/5.  Please call with any questions/concerns [phone]            Call placed to daughterMichelle with update. No answer. Left a detailed message. Asked for a call back if she has questions.        NEW DOSE:   Torsemide 20 mg tablet:  Take 1 tablet (20 mg) by mouth daily With an additional 20mg as needed for weight gain > 2# overnight or worsening symptoms (leg swelling/breathing).         Future Appointments   Date Time Provider Department Center   2/6/2024 10:00 AM Kinza Shirley APRN CNP [facility]   2/28/2024  3:00 PM Cata Mclain MD [facility]   3/6/2024 12:00 PM RU LAB [facility]   3/6/2024  1:10 PM Kane Gil PA-C [facility] PSA CLIN   3/29/2024  9:30 AM Sadi Lamar MD [facility] CS   4/11/2024 12:00 AM GRADY [address] PSA CLIN   8/27/2024 10:30 AM Ludivina Chamberlain MD [facility] CR         ANKIT CrooksN, RN 4:34 PM 01/26/24

## 2024-01-26 NOTE — TELEPHONE ENCOUNTER
Dejah from [facility] called and left  stating they can draw BMP on  or  and fax order to them.      Order faxed:      Nursing:   [facility]  Fax [phone]     Arpita Rocha   1935     Draw BMP  on  or 24 for CORE appt on 24  Per Kinza Shirley CNP                  Future Appointments   Date Time Provider Department Center   2024 10:00 AM Kinza Shirley APRN CNP [facility] PSA CLIN   2024  3:00 PM Cata Mclain MD [facility]   3/6/2024 12:00 PM RU LAB [facility]   3/6/2024  1:10 PM Kane Gil, PA-C [facility] PSA CLIN   3/29/2024  9:30 AM Sadi Lamar MD [facility]    2024 12:00 AM GRADY [address] [place] [address] PSA CLIN   2024 10:30 AM Ludivina Chamberlain MD [facility]      ANKIT CrooksN, RN 4:12 PM 24

## 2024-01-26 NOTE — LETTER
1/26/2024    Ludivina Canales MD  [address] [facility] [address]    RE: Arpita Rocha       Dear Colleague,     I had the pleasure of seeing Arpita Rocha in the [facility].  Cardiology Clinic Progress Note  Arpita Rocha MRN# [id number]   YOB: 1935 Age: 88 year old   Primary Cardiologist: Dr. Gonzalez  Reason for visit: Cardiology follow up             Assessment and Plan:   Arpita Rocha is a very pleasant 88 year old female post hospital/cardiology follow up.     1. Chronic HFpEF -  LVEF 55%, RV normal size/function  2. Hypertension  3. Mild to moderate mitral regurgitation  4. SSS s/p PPM  5. Acute on chronic kidney disease - baseline creatinine ~ 2.1, increased to 2.7 at post hospital follow up with PCP on 12/19.   6. COPD  7. Obesity   8. Bilateral carotid artery stenosis - severe, has declined surgery consideration, on plavix.     I met patient and daughter-in-law today for post hospital follow up, since hospital discharge she has been feeling well, upon arrival today blood pressures were low 75/41, asymptomatic. BMP at time of post hospital follow up with PCP on 12/19 showed decline in renal function with creatinine of 2.7, does not appear anything was changed at that time. She has been monitoring her weight and blood pressures daily at home, weight has been ranging 156-163#, hospital discharge weight 163#. Symptomatically feeling significantly better since hospital discharge.     During hospitalization her furosmide 40mg daily was changed to torsemide 20mg BID. Worry today that she is over diuresed given hypotension/previously noted decline in renal function. Her exam is benign, body habitus does make volume assessment more challenging.     At this time recommend decreasing her torsemide, getting BMP after OV today and enrolling in HRS. Will monitor HRS readings to determine if further de-escalation in antihypertensive agents is needed.      Changes today: DECREASE torsemide to 20mg daily with additional 20mg PRN    Follow up plan:     BMP after OV    Enroll in HRS    Follow up with nephrology and pulmonary as scheduled     Cardiology virtual follow up with me on 2/6    Cardiology follow up in clinic with MAGGIE in ~ 1 month      ADDENEDUM : 1/26/24 @ 12:30  Labs reviewed after OV today showed further decline in renal function since post hospital follow up with PCP on 12/19. Changes as noted above with decrease in torsemide to 20mg daily. Plan for repeat BMP next week.         History of Presenting Illness:    Arpita Rocha is a very pleasant 88 year old female with a history of HFpEF, SSS s/p PPM, COPD, CKD, HTN, CKD and carotid artery stenosis.    Patient moved from [place] to be closer to son/daughter-in-law, she established care with Dr. Gonzalez after moving to the area.     Hospitalized 12/8-12/13/23 with PNA and acute HFpEF/COPD exacerbation, she was initially diuresed with IV furosemide. Discharged on prednisone and torsemide 20mg BID (previously lasix 40mg daily) and clonidine increased to 0.2mg BID. Discharge weight 163#. Cardiology not involved during admission.     Echo 12/8/23 showed LVEF 55%, RV normal size/function, mild to moderate mitral regurgitation    Device interrogation 12/18 showed fair variability in HR, no atrial arrhythmia, one 5 beat run os NSVT.     Patient is here today for cardiology follow up.     Patient reports feeling good. Monitoring weights daily a home, since hospital discharge have been ranging 156-163#. Continue LE edema, but improved since hospitalization. Denies shortness of breath at rest. Exertional dyspnea, significantly improved since hospitalization. Cough resolved. Denies orthopnea or PND, sleeping in a recliner which she has been doing for a long time (5+ years). Denies abdominal distention/bloating. Denies chest pain or chest tightness. Denies dizziness, lightheadedness or other presyncopal  symptoms. Denies tachycardia or palpitations. Taking medications daily.     No labs prior to OV, labs completed at PCP visit on  showed decline in renal function, creatinine increased from 2.14 at discharge to 2.70 on . Blood pressure 75/41 and HR 60 in clinic today. Manual repeat later in visit 102/52. Home blood pressures prior to medications at home have been 110-120s/50s    Appetite good. Living with her son and daughter in law (Michelle our ). Closely monitoring sodium in diet, Michelle does the cooking. Working with PT/OT. Denies alcohol use. Denies tobacco use (quit at age 57).         Social History      Social History     Socioeconomic History    Marital status:      Spouse name: Not on file    Number of children: Not on file    Years of education: Not on file    Highest education level: Not on file   Occupational History    Not on file   Tobacco Use    Smoking status: Former     Packs/day: 1.00     Years: 44.00     Additional pack years: 0.00     Total pack years: 44.00     Types: Cigarettes     Start date: 1952     Quit date: 1996     Years since quittin.0    Smokeless tobacco: Never   Vaping Use    Vaping Use: Never used   Substance and Sexual Activity    Alcohol use: Not Currently    Drug use: Never    Sexual activity: Not Currently     Partners: Male   Other Topics Concern    Not on file   Social History Narrative    Not on file     Social Determinants of Health     Financial Resource Strain: Low Risk  (2023)    Financial Resource Strain     Within the past 12 months, have you or your family members you live with been unable to get utilities (heat, electricity) when it was really needed?: No   Food Insecurity: Low Risk  (2023)    Food Insecurity     Within the past 12 months, did you worry that your food would run out before you got money to buy more?: No     Within the past 12 months, did the food you bought just not last and you didn t have money to get more?:  "No   Transportation Needs: Low Risk  (12/5/2023)    Transportation Needs     Within the past 12 months, has lack of transportation kept you from medical appointments, getting your medicines, non-medical meetings or appointments, work, or from getting things that you need?: No   Physical Activity: Inactive (8/14/2023)    Exercise Vital Sign     Days of Exercise per Week: 0 days     Minutes of Exercise per Session: 0 min   Stress: No Stress Concern Present (8/14/2023)    [employer] - Occupational Stress Questionnaire     Feeling of Stress : Not at all   Social Connections: Moderately Isolated (8/14/2023)    Social Connection and Isolation Panel [NHANES]     Frequency of Communication with Friends and Family: Twice a week     Frequency of Social Gatherings with Friends and Family: More than three times a week     Attends Congregational Services: More than 4 times per year     Active Member of Clubs or Organizations: No     Attends Club or Organization Meetings: Not on file     Marital Status:    Interpersonal Safety: Not on file   Housing Stability: Low Risk  (12/5/2023)    Housing Stability     Do you have housing? : Yes     Are you worried about losing your housing?: No          Review of Systems:   10 point ROS neg other than the symptoms noted above in the HPI.          Physical Exam:   Vitals: Ht 1.448 m (4' 9\")   BMI 35.45 kg/m     Wt Readings from Last 4 Encounters:   12/19/23 74.3 kg (163 lb 12.8 oz)   12/13/23 74 kg (163 lb 3.2 oz)   12/05/23 77 kg (169 lb 12.8 oz)   11/03/23 75.3 kg (166 lb)     GEN: in no acute distress.  HEENT:  Pupils equal, round. Sclerae nonicteric.   NECK: Supple, no masses appreciated. JVP appears normal.   C/V:  Regular rate and rhythm  RESP: Respirations are unlabored. Clear to auscultation bilaterally without wheezing, rales, or rhonchi.  GI: Abdomen soft, nontender.  EXTREM: +1 bilateral LE edema, R>L.  NEURO: Alert and oriented, cooperative.  SKIN: " Warm and dry.        Data:     LIPID RESULTS:  Lab Results   Component Value Date    CHOL 161 06/12/2023    HDL 52 06/12/2023    LDL 95 06/12/2023    TRIG 72 06/12/2023    TRIG 145 07/21/2022     LIVER ENZYME RESULTS:  Lab Results   Component Value Date    AST 25 12/08/2023    ALT 14 12/08/2023     CBC RESULTS:  Lab Results   Component Value Date    WBC 5.6 12/11/2023    RBC 3.07 (L) 12/11/2023    HGB 10.1 (L) 12/11/2023    HCT 31.8 (L) 12/11/2023     (H) 12/11/2023    MCH 32.9 12/11/2023    MCHC 31.8 12/11/2023    RDW 12.3 12/11/2023     12/12/2023     BMP RESULTS:  Lab Results   Component Value Date     12/19/2023    POTASSIUM 4.5 12/19/2023    CHLORIDE 98 12/19/2023    CO2 28 12/19/2023    ANIONGAP 12 12/19/2023     (H) 12/19/2023    BUN 97.1 (H) 12/19/2023    CR 2.70 (H) 12/19/2023    GFRESTIMATED 17 (L) 12/19/2023    CANELO 9.1 12/19/2023          Medications     Current Outpatient Medications   Medication Sig Dispense Refill    acetaminophen (TYLENOL) 650 MG CR tablet Take 1,300 mg by mouth every morning      amLODIPine (NORVASC) 10 MG tablet Take 1 tablet (10 mg) by mouth daily 90 tablet 3    atorvastatin (LIPITOR) 40 MG tablet Take 1 tablet (40 mg) by mouth daily 30 tablet 11    budesonide (PULMICORT) 0.5 MG/2ML neb solution Take 2 mLs (0.5 mg) by nebulization 2 times daily 360 mL 3    carvedilol (COREG) 25 MG tablet Take 1.5 tablets (37.5 mg) by mouth 2 times daily (with meals) 180 tablet 3    citalopram (CELEXA) 20 MG tablet TAKE 1.5 TAB BY MOUTH EVERY  tablet 1    cloNIDine (CATAPRES) 0.2 MG tablet Take 1 tablet (0.2 mg) by mouth 2 times daily 180 tablet 0    clopidogrel (PLAVIX) 75 MG tablet Take 1 tablet (75 mg) by mouth daily 30 tablet 11    Cranberry 450 MG TABS       doxycycline hyclate (VIBRAMYCIN) 100 MG capsule Take 1 capsule (100 mg) by mouth three times a week 39 capsule 3    ferrous sulfate (FEROSUL) 325 (65 Fe) MG tablet Take 2 tablets (650 mg) by mouth daily  (with breakfast) 180 tablet 3    ipratropium - albuterol 0.5 mg/2.5 mg/3 mL (DUONEB) 0.5-2.5 (3) MG/3ML neb solution Take 1 vial (3 mLs) by nebulization every 6 hours 180 mL 3    montelukast (SINGULAIR) 10 MG tablet Take 1 tablet (10 mg) by mouth At Bedtime 90 tablet 3    multivitamin w/minerals (THERA-VIT-M) tablet Take 1 tablet by mouth daily      nystatin (MYCOSTATIN) 493958 UNIT/GM external powder Apply topically 3 times daily as needed for other (rash) 60 g 1    Ostomy Supplies (ADAPT) PSTE Use with new ostomy pouch and drain  g 3    Ostomy Supplies (PREMIER DRAINABLE POUCH 22MM) Pouch MISC Use as directed every 3-4 days 5 each 12    sodium bicarbonate 650 MG tablet Take 1 tablet (650 mg) by mouth 2 times daily for 180 days 180 tablet 1    torsemide (DEMADEX) 20 MG tablet TAKE 1 TABLET BY MOUTH TWICE A DAY 60 tablet 0    umeclidinium-vilanterol (ANORO ELLIPTA) 62.5-25 MCG/ACT oral inhaler Inhale 1 puff into the lungs daily 1 each 5        Past Medical History     Past Medical History:   Diagnosis Date    Ulcerative pancolitis (H) 05/23/2023     Past Surgical History:   Procedure Laterality Date    CATARACT EXTRACTION Bilateral     COLONOSCOPY      FEMUR FRACTURE SURGERY Right     2010    ILEOSTOMY      IMPLANT PACEMAKER      left knee replacement      MULTIPLE TOOTH EXTRACTIONS      right knee replacement Right     SPINAL FUSION      TONSILLECTOMY      TUBAL LIGATION       Family History   Problem Relation Age of Onset    Cerebrovascular Disease Mother     Diabetes Mother     Hypertension Mother     Cancer Mother     Hypertension Father     Cerebrovascular Disease Sister     Hypertension Sister     Hypertension Sister     Breast Cancer Sister     Dementia Sister     Hypertension Brother     Cancer Brother     Hypertension Brother     Cancer Brother     Emphysema Brother           Allergies   Acetaminophen-codeine and Penicillin g    40 minutes spent on the date of the encounter doing chart review,  history and exam, documentation and further activities as noted above    KOKO Brandt CNP  [facility]  Pager: [phone]      Thank you for allowing me to participate in the care of your patient.      Sincerely,     KOKO Brandt CNP     [facility]  cc:   Keturah Hawley PA-C  [address] [place] [address]

## 2024-01-26 NOTE — PROGRESS NOTES
Cardiology Clinic Progress Note  Arpita Rocha MRN# [id number]   YOB: 1935 Age: 88 year old   Primary Cardiologist: Dr. Gonzalez  Reason for visit: Cardiology follow up             Assessment and Plan:   Arpita Rocha is a very pleasant 88 year old female post hospital/cardiology follow up.     1. Chronic HFpEF -  LVEF 55%, RV normal size/function  2. Hypertension  3. Mild to moderate mitral regurgitation  4. SSS s/p PPM  5. Acute on chronic kidney disease - baseline creatinine ~ 2.1, increased to 2.7 at post hospital follow up with PCP on 12/19.   6. COPD  7. Obesity   8. Bilateral carotid artery stenosis - severe, has declined surgery consideration, on plavix.     I met patient and daughter-in-law today for post hospital follow up, since hospital discharge she has been feeling well, upon arrival today blood pressures were low 75/41, asymptomatic. BMP at time of post hospital follow up with PCP on 12/19 showed decline in renal function with creatinine of 2.7, does not appear anything was changed at that time. She has been monitoring her weight and blood pressures daily at home, weight has been ranging 156-163#, hospital discharge weight 163#. Symptomatically feeling significantly better since hospital discharge.     During hospitalization her furosmide 40mg daily was changed to torsemide 20mg BID. Worry today that she is over diuresed given hypotension/previously noted decline in renal function. Her exam is benign, body habitus does make volume assessment more challenging.     At this time recommend decreasing her torsemide, getting BMP after OV today and enrolling in HRS. Will monitor HRS readings to determine if further de-escalation in antihypertensive agents is needed.     Changes today: DECREASE torsemide to 20mg daily with additional 20mg PRN    Follow up plan:     BMP after OV    Enroll in HRS    Follow up with nephrology and pulmonary as scheduled     Cardiology virtual follow up with  me on 2/6    Cardiology follow up in clinic with MAGGIE in ~ 1 month      ADDENEDUM : 1/26/24 @ 12:30  Labs reviewed after OV today showed further decline in renal function since post hospital follow up with PCP on 12/19. Changes as noted above with decrease in torsemide to 20mg daily. Plan for repeat BMP next week.         History of Presenting Illness:    Arpita Rocha is a very pleasant 88 year old female with a history of HFpEF, SSS s/p PPM, COPD, CKD, HTN, CKD and carotid artery stenosis.    Patient moved from [place] to be closer to son/daughter-in-law, she established care with Dr. Gonzalez after moving to the area.     Hospitalized 12/8-12/13/23 with PNA and acute HFpEF/COPD exacerbation, she was initially diuresed with IV furosemide. Discharged on prednisone and torsemide 20mg BID (previously lasix 40mg daily) and clonidine increased to 0.2mg BID. Discharge weight 163#. Cardiology not involved during admission.     Echo 12/8/23 showed LVEF 55%, RV normal size/function, mild to moderate mitral regurgitation    Device interrogation 12/18 showed fair variability in HR, no atrial arrhythmia, one 5 beat run os NSVT.     Patient is here today for cardiology follow up.     Patient reports feeling good. Monitoring weights daily a home, since hospital discharge have been ranging 156-163#. Continue LE edema, but improved since hospitalization. Denies shortness of breath at rest. Exertional dyspnea, significantly improved since hospitalization. Cough resolved. Denies orthopnea or PND, sleeping in a recliner which she has been doing for a long time (5+ years). Denies abdominal distention/bloating. Denies chest pain or chest tightness. Denies dizziness, lightheadedness or other presyncopal symptoms. Denies tachycardia or palpitations. Taking medications daily.     No labs prior to OV, labs completed at PCP visit on 12/19 showed decline in renal function, creatinine increased from 2.14 at discharge to 2.70 on 12/19.  Blood pressure 75/41 and HR 60 in clinic today. Manual repeat later in visit 102/52. Home blood pressures prior to medications at home have been 110-120s/50s    Appetite good. Living with her son and daughter in law (Michelle our ). Closely monitoring sodium in diet, Michelle does the cooking. Working with PT/OT. Denies alcohol use. Denies tobacco use (quit at age 57).         Social History      Social History     Socioeconomic History    Marital status:      Spouse name: Not on file    Number of children: Not on file    Years of education: Not on file    Highest education level: Not on file   Occupational History    Not on file   Tobacco Use    Smoking status: Former     Packs/day: 1.00     Years: 44.00     Additional pack years: 0.00     Total pack years: 44.00     Types: Cigarettes     Start date: 1952     Quit date: 1996     Years since quittin.0    Smokeless tobacco: Never   Vaping Use    Vaping Use: Never used   Substance and Sexual Activity    Alcohol use: Not Currently    Drug use: Never    Sexual activity: Not Currently     Partners: Male   Other Topics Concern    Not on file   Social History Narrative    Not on file     Social Determinants of Health     Financial Resource Strain: Low Risk  (2023)    Financial Resource Strain     Within the past 12 months, have you or your family members you live with been unable to get utilities (heat, electricity) when it was really needed?: No   Food Insecurity: Low Risk  (2023)    Food Insecurity     Within the past 12 months, did you worry that your food would run out before you got money to buy more?: No     Within the past 12 months, did the food you bought just not last and you didn t have money to get more?: No   Transportation Needs: Low Risk  (2023)    Transportation Needs     Within the past 12 months, has lack of transportation kept you from medical appointments, getting your medicines, non-medical meetings or appointments,  "work, or from getting things that you need?: No   Physical Activity: Inactive (8/14/2023)    Exercise Vital Sign     Days of Exercise per Week: 0 days     Minutes of Exercise per Session: 0 min   Stress: No Stress Concern Present (8/14/2023)    [employer] [employer] - Occupational Stress Questionnaire     Feeling of Stress : Not at all   Social Connections: Moderately Isolated (8/14/2023)    Social Connection and Isolation Panel [NHANES]     Frequency of Communication with Friends and Family: Twice a week     Frequency of Social Gatherings with Friends and Family: More than three times a week     Attends Oriental orthodox Services: More than 4 times per year     Active Member of Clubs or Organizations: No     Attends Club or Organization Meetings: Not on file     Marital Status:    Interpersonal Safety: Not on file   Housing Stability: Low Risk  (12/5/2023)    Housing Stability     Do you have housing? : Yes     Are you worried about losing your housing?: No          Review of Systems:   10 point ROS neg other than the symptoms noted above in the HPI.          Physical Exam:   Vitals: Ht 1.448 m (4' 9\")   BMI 35.45 kg/m     Wt Readings from Last 4 Encounters:   12/19/23 74.3 kg (163 lb 12.8 oz)   12/13/23 74 kg (163 lb 3.2 oz)   12/05/23 77 kg (169 lb 12.8 oz)   11/03/23 75.3 kg (166 lb)     GEN: in no acute distress.  HEENT:  Pupils equal, round. Sclerae nonicteric.   NECK: Supple, no masses appreciated. JVP appears normal.   C/V:  Regular rate and rhythm  RESP: Respirations are unlabored. Clear to auscultation bilaterally without wheezing, rales, or rhonchi.  GI: Abdomen soft, nontender.  EXTREM: +1 bilateral LE edema, R>L.  NEURO: Alert and oriented, cooperative.  SKIN: Warm and dry.        Data:     LIPID RESULTS:  Lab Results   Component Value Date    CHOL 161 06/12/2023    HDL 52 06/12/2023    LDL 95 06/12/2023    TRIG 72 06/12/2023    TRIG 145 07/21/2022     LIVER ENZYME RESULTS:  Lab " Results   Component Value Date    AST 25 12/08/2023    ALT 14 12/08/2023     CBC RESULTS:  Lab Results   Component Value Date    WBC 5.6 12/11/2023    RBC 3.07 (L) 12/11/2023    HGB 10.1 (L) 12/11/2023    HCT 31.8 (L) 12/11/2023     (H) 12/11/2023    MCH 32.9 12/11/2023    MCHC 31.8 12/11/2023    RDW 12.3 12/11/2023     12/12/2023     BMP RESULTS:  Lab Results   Component Value Date     12/19/2023    POTASSIUM 4.5 12/19/2023    CHLORIDE 98 12/19/2023    CO2 28 12/19/2023    ANIONGAP 12 12/19/2023     (H) 12/19/2023    BUN 97.1 (H) 12/19/2023    CR 2.70 (H) 12/19/2023    GFRESTIMATED 17 (L) 12/19/2023    CANELO 9.1 12/19/2023          Medications     Current Outpatient Medications   Medication Sig Dispense Refill    acetaminophen (TYLENOL) 650 MG CR tablet Take 1,300 mg by mouth every morning      amLODIPine (NORVASC) 10 MG tablet Take 1 tablet (10 mg) by mouth daily 90 tablet 3    atorvastatin (LIPITOR) 40 MG tablet Take 1 tablet (40 mg) by mouth daily 30 tablet 11    budesonide (PULMICORT) 0.5 MG/2ML neb solution Take 2 mLs (0.5 mg) by nebulization 2 times daily 360 mL 3    carvedilol (COREG) 25 MG tablet Take 1.5 tablets (37.5 mg) by mouth 2 times daily (with meals) 180 tablet 3    citalopram (CELEXA) 20 MG tablet TAKE 1.5 TAB BY MOUTH EVERY  tablet 1    cloNIDine (CATAPRES) 0.2 MG tablet Take 1 tablet (0.2 mg) by mouth 2 times daily 180 tablet 0    clopidogrel (PLAVIX) 75 MG tablet Take 1 tablet (75 mg) by mouth daily 30 tablet 11    Cranberry 450 MG TABS       doxycycline hyclate (VIBRAMYCIN) 100 MG capsule Take 1 capsule (100 mg) by mouth three times a week 39 capsule 3    ferrous sulfate (FEROSUL) 325 (65 Fe) MG tablet Take 2 tablets (650 mg) by mouth daily (with breakfast) 180 tablet 3    ipratropium - albuterol 0.5 mg/2.5 mg/3 mL (DUONEB) 0.5-2.5 (3) MG/3ML neb solution Take 1 vial (3 mLs) by nebulization every 6 hours 180 mL 3    montelukast (SINGULAIR) 10 MG tablet Take 1  tablet (10 mg) by mouth At Bedtime 90 tablet 3    multivitamin w/minerals (THERA-VIT-M) tablet Take 1 tablet by mouth daily      nystatin (MYCOSTATIN) 916267 UNIT/GM external powder Apply topically 3 times daily as needed for other (rash) 60 g 1    Ostomy Supplies (ADAPT) PSTE Use with new ostomy pouch and drain  g 3    Ostomy Supplies (PREMIER DRAINABLE POUCH 22MM) Pouch MISC Use as directed every 3-4 days 5 each 12    sodium bicarbonate 650 MG tablet Take 1 tablet (650 mg) by mouth 2 times daily for 180 days 180 tablet 1    torsemide (DEMADEX) 20 MG tablet TAKE 1 TABLET BY MOUTH TWICE A DAY 60 tablet 0    umeclidinium-vilanterol (ANORO ELLIPTA) 62.5-25 MCG/ACT oral inhaler Inhale 1 puff into the lungs daily 1 each 5        Past Medical History     Past Medical History:   Diagnosis Date    Ulcerative pancolitis (H) 05/23/2023     Past Surgical History:   Procedure Laterality Date    CATARACT EXTRACTION Bilateral     COLONOSCOPY      FEMUR FRACTURE SURGERY Right     2010    ILEOSTOMY      IMPLANT PACEMAKER      left knee replacement      MULTIPLE TOOTH EXTRACTIONS      right knee replacement Right     SPINAL FUSION      TONSILLECTOMY      TUBAL LIGATION       Family History   Problem Relation Age of Onset    Cerebrovascular Disease Mother     Diabetes Mother     Hypertension Mother     Cancer Mother     Hypertension Father     Cerebrovascular Disease Sister     Hypertension Sister     Hypertension Sister     Breast Cancer Sister     Dementia Sister     Hypertension Brother     Cancer Brother     Hypertension Brother     Cancer Brother     Emphysema Brother           Allergies   Acetaminophen-codeine and Penicillin g    40 minutes spent on the date of the encounter doing chart review, history and exam, documentation and further activities as noted above    KOKO Brandt [facility]  Pager: [phone]

## 2024-01-26 NOTE — PATIENT INSTRUCTIONS
Get labs drawn today  DECREASE torsemide to 20mg daily (1 tablet in the morning), STOP afternoon tablet.   If your weight increases 3 pounds or more overnight or if you have worsening shortness of breath or swelling take 20mg torsemide in the afternoon.   Enroll in HRS  Follow up with Kinza jeffery on 2/6  Follow up with provider in [place] in March   Will likely have home health care draw labs next week  Please call with any questions/concerns [phone]

## 2024-01-26 NOTE — TELEPHONE ENCOUNTER
Minerva to follow up with Kinza 2/6/2024. She will need labs prior. She odes have [facility]. Call placed to [facility] to see when nursing will be at her home and will request BMP to be drawn at that time.    Left voice mail for Dejah to see if BMP can be drawn on 2/2 or 2/5 for 2/6 appt.     ALEJANDRA Crooks, RN 10:20 AM 01/26/24

## 2024-01-26 NOTE — PROGRESS NOTES
Met with Minerva and daughter, Michelle, after Cardiology MAGGIE appt with Kinza Mancilla CNP.    Reviewed and shown clinic demo kit.   Questions answered.   Patient Agreement signed and sent to scan.    [facility] kit ordered.  Minerva entered into HRS website.           Future Appointments   Date Time Provider Department Center   2/6/2024 10:00 AM Kinza Shirley APRN CNP [facility] PSA CLIN   2/28/2024  3:00 PM Cata Mclain MD [facility]   3/6/2024 12:00 PM RU LAB [facility]   3/6/2024  1:10 PM Kane Gil, PA-C [facility] PSA CLIN   3/29/2024  9:30 AM Sadi Lamar MD [facility]   4/11/2024 12:00 AM GRADY [address] PSA CLIN   8/27/2024 10:30 AM Coming Ludivina Canales MD [facility] CR           ALEJANDRA Crooks, RN 10:18 AM 01/26/24

## 2024-01-26 NOTE — PROGRESS NOTES
Dejah from [facility] called and left  stating they can draw BMP on  or  and fax order to them.     Order faxed:     Nursing:   [facility]  Fax [phone]    Arpita Rocha   1935    Draw BMP  on  or 24 for CORE appt on 24  Per Kinza Shirley CNP        Future Appointments   Date Time Provider Department Center   2024 10:00 AM Kinza hSirley APRN CNP [facility] PSA CLIN   2024  3:00 PM Cata Mclain MD [facility]   3/6/2024 12:00 PM RU LAB [facility]   3/6/2024  1:10 PM Kane Gil, PA-C [facility] PSA CLIN   3/29/2024  9:30 AM Sadi Lamar MD [facility]    2024 12:00 AM GRADY [address] [place] [address] PSA CLIN   2024 10:30 AM Ludivina Chamberlain MD [facility] CR     ANKIT CrooksN, RN 4:12 PM 24

## 2024-01-29 NOTE — TELEPHONE ENCOUNTER
Patient Quality Outreach Health Maintenance - PAL RN    Summary:    PAL RN contacted pt regarding overdue health maintenance    Patient is due/failing the following:       Topic Date Due    Diptheria Tetanus Pertussis (DTAP/TDAP/TD) Vaccine (1 - Tdap) 05/07/2010     Health Maintenance Due   Topic Date Due    RSV VACCINE (Pregnancy & 60+) (1 - 1-dose 60+ series) Never done    DTAP/TDAP/TD IMMUNIZATION (1 - Tdap) 05/07/2010    PHQ-9  02/21/2024     Type of outreach:    Phone, spoke to patient/parent. Will call to schedule RSV vaccine at the pharmacy    - Advised patient if any questions or concerns comes up to call the PAL RN.   - Patient given opportunity to ask questions and at this time there is nothing further needed.     Questions for provider review:    None                                                                                     Nando Webber, RN  Patient Advocate Liaison (PAL)  [facility]  [phone]    01/29/2024 at 8:54 AM

## 2024-01-29 NOTE — PROGRESS NOTES
Email received from [facility]:                ANKIT CrooksN, RN 9:43 AM 01/29/24

## 2024-01-29 NOTE — TELEPHONE ENCOUNTER
- Daquan ([facility] on file) calls requesting information on the RSV vaccine for patient.    - Advised on below    Currently [facility] are providing and scheduling RSV vaccines at our pharmacies and clinics. The pharmacy is the recommended location for RSV vaccination for patients on Medicare insurance plans.  We are providing RSV vaccines for the following patients:     RSV vaccine  Patients 60+ years*  Pregnant patients at 32 to 36 weeks' gestation    *It is recommended that patients on Medicare insurance plans receive their RSV vaccine in the pharmacy.    Additional Information about RSV, RSV vaccines, and RSV monoclonal antibodies   Respiratory Syncytial Virus (RSV) is a common respiratory illness that typically causes mild, cold-like symptoms. However, RSV can be serious for infants and older adults. RSV causes approximately 60,000-160,000 hospitalizations and 6,000-10,000 deaths among adults 65 years and older. RSV is also the leading cause of hospitalization for infants. Nearly all children are infected with RSV by the age of 2 years.    Prevention of RSV through vaccination (adults) or monoclonal antibody (infants) is essential as there is no treatment (medications) for RSV after someone is infected.    RSV vaccine 60+ years  Patients 60 years old are eligible for RSV that desire vaccination  Medical conditions that are associated with increased risk of severe RSV disease are:  Chronic lung disease (e.g. COPD, asthma)  Heart disease (e.g. CHD, CAD)  Chronic or progressive neurological conditions  Chronic kidney disease  Chronic liver disease  Diabetes  Moderate or severe immunocompromise  Chronic blood disorders  Frailty  Living in a nursing home or long term care facility     - Advised that he will reach out the patient's pharmacy to see about scheduling. Will call PAL RN back if there are issues with supply at [facility], at which point will recommend getting it at the pharmacy attached to the  clinic.    Nando Webber RN  Patient Advocate Liaison (PAL)  [facility]  [phone]    01/29/2024 at 8:54 AM

## 2024-01-31 NOTE — PROGRESS NOTES
[facility]    Faxed order to DENYS Perez at [phone] for a BMP to be drawn on 2/5/24 for appt with Kinza Shirley NP on 2/6/24.    Future Appointments   Date Time Provider Department Center   2/6/2024 10:00 AM Kinza Shirley APRN CNP [facility] PSA CLIN   2/28/2024  3:00 PM Cata Mclain MD [facility]   3/6/2024 12:00 PM RU LAB [facility]   3/6/2024  1:10 PM Kane Gil, PA-C [facility] PSA CLIN   3/29/2024  9:30 AM Sadi Lamar MD [facility]   4/11/2024 12:00 AM GRADY [address] PSA CLIN   8/27/2024 10:30 AM Ludivina Chamberlain MD [facility] CR       Alda Benito RN BAN   11:56 AM 1/31/2024    CORE nurse line M-F 8a-4p: [phone]

## 2024-01-31 NOTE — PROGRESS NOTES
[facility]     Successful [employer] [facility] telemonitoring transmission. Will plan to discuss goal weight range at time of follow-up visit with Kinza Shirley CNP next week.        Future Appointments   Date Time Provider Department Center   2/6/2024 10:00 AM Kinza Shirley APRN CNP [facility] PSA CLIN   2/28/2024  3:00 PM Cata Mclain MD [facility]   3/6/2024 12:00 PM RU LAB [facility]   3/6/2024  1:10 PM Kane Gil, PA-C [facility] PSA CLIN   3/29/2024  9:30 AM Sadi Lamar MD [facility]    4/11/2024 12:00 AM GRADY [address] [place] [address] PSA CLIN   8/27/2024 10:30 AM Ludivina Chamberlain MD [facility] CR     Aida Chavez RN BSN   1:05 PM 01/31/24  Nurse line M-F 8a-4p: [phone]

## 2024-02-01 NOTE — PROGRESS NOTES
[facility]:   HRS ([employer]) Daily Alert     February 1, 2024    Alert(s): Symptom and Vitals    Current diuretic dose:     --Torsemide 20 mg once daily and an additional 20 mg for weight gain > 2# overnight or worsening symptoms (leg swelling/breathing)     Recent plan of care changes:   --1/26 DECREASE torsemide to 20mg daily (1 tablet in the morning), STOP afternoon tablet.   If your weight increases 3 pounds or more overnight or if you have worsening shortness of breath or swelling take 20mg torsemide in the afternoon.     Goal Weight Range: TBD    CHF Assessment:    Respiratory  Shortness of breath:: Yes  Shortness of breath symptom course:: Slightly Worse  Waking up at night short of breath?: No  Able to lie flat?: No  Elevating head of bed: No  Sleep in chair to breathe easier?: Yes  Is the patient on Oxygen?: No  Wheezing or noisy breathing?: No  Cough: Yes  Is your cough:: Loose  Increased sputum: Yes  Sputum Color: Clear    Fever  Fever: No    Cardiovascular  Does patient have an implanted device?: Yes  Implanted device type: Pacemaker  Device last checked date: 12/18/23  Is patient on remote monitoring?: Yes  Type of remote monitoring: HRS  Swelling:: Yes  Swelling location:: Ankles  Swelling severity:: Baseline  Swelling management:: Elevation  Bloating:: None  Fatigue:: Yes, worsening  Weakness (Heaviness in limbs):: No    Diet/Education/Medication  Does patient have access to a digital scale?: Yes  Checking weight daily? : Yes  Today's Weight?: 72.6 kg (160 lb)  Weight increase more than 2 lbs in 24 hours?: No  Weight Increase more than 5 lbs in 1 week? : No  Medication adherence problem (GOAL):: No                  Weight, BP and HR Readings:           Time spent in review this day: 15 min.         Plan:  Minerva has a PRN Torsemide for weight gain, symptoms of swelling or SOB. Instructed Minerva to take an additional 20 mg of Torsemide now and repeat her blood pressure due to  elevation. Will recheck HRS on 2/2.     Future Appointments   Date Time Provider Department Center   2/6/2024 10:00 AM Kinza Shirley APRN CNP [facility] PSA CLIN   2/28/2024  3:00 PM Cata Mclain MD [facility]   3/6/2024 12:00 PM RU LAB [facility]   3/6/2024  1:10 PM Kane Gil, WAGNER [facility] PSA CLIN   3/29/2024  9:30 AM Sadi Lamar MD [facility]   4/11/2024 12:00 AM GRADY [address] [place] [address] PSA CLIN   8/27/2024 10:30 AM Ludivina Chamberlain MD [facility] CR       Alda Benito RN BAN   4:35 PM 2/1/2024    CORE nurse line M-F 8a-4p: [phone]

## 2024-02-02 NOTE — PROGRESS NOTES
[facility]      Pt did not fill out questions today.     Alda Benito RN BAN   2:41 PM 2/2/2024    CORE nurse line M-F 8a-4p: [phone]

## 2024-02-05 NOTE — PROGRESS NOTES
[facility]:   HRS (Year Up) Daily Alert     February 5, 2024    Alert(s): Vitals- BP elevated    Recent plan of care changes:   --1/26/24 MAGGIE visit: Decreased torsemide to 20mg daily (If your weight increases 3 pounds or more overnight or if you have worsening shortness of breath or swelling take 20mg torsemide in the afternoon).  --2/1/24 Reported increased SOB, pt instructed to take an additional 20mg PRN Torsemide in afternoon x 1    Notes: BP has been elevated the last 3 am's. Noted pt is sending in Vitals around 7-715am most days. These are likely prior to or shortly after medications. Recheck BP 2/1 was improved from original am BP. Pt has virtual follow up tomorrow with ARRON Wong. Will send FYI for BP's to be addressed with tomorrow's visit.     Goal Weight Range: PENDING    Weight, BP and HR Readings:         Future Appointments   Date Time Provider Department Center   2/6/2024 10:00 AM Kinza Shirley APRN CNP [facility] PSA CLIN   2/28/2024  3:00 PM Cata Mclain MD [facility]   3/6/2024 12:00 PM RU LAB [facility] [place] [facility]   3/6/2024  1:10 PM Kane Gil, PA-C [facility] PSA CLIN   3/29/2024  9:30 AM Sadi Lamar MD [facility]   4/11/2024 12:00 AM GRADY [address] [place] [address] PSA CLIN   8/27/2024 10:30 AM Ludivina Chamberlain MD [facility] CR     Shanell Keen RN, BSN  02/05/24 at 11:19 AM

## 2024-02-06 NOTE — PROGRESS NOTES
Minerva is a 88 year old who is being evaluated via a billable video visit.      How would you like to obtain your AVS? MyChart  If the video visit is dropped, the invitation should be resent by: Text to cell phone: [phone]  Will anyone else be joining your video visit? No          Video-Visit Details    Type of service:  Video Visit   Video Start Time: 10:16 AM  Video End Time:10:49 AM    Originating Location (pt. Location): Home    Distant Location (provider location):  On-site  Platform used for Video Visit: Kay  Signed Electronically by: [facility]      Cardiology Clinic Progress Note  Arpita Rocha MRN# [id number]   YOB: 1935 Age: 88 year old   Primary Cardiologist: Dr. Gonzalez  Reason for visit: Cardiology follow up             Assessment and Plan:   Arpita Rocha is a very pleasant 88 year old female here for cardiology follow up.      1. Chronic HFpEF -  LVEF 55%, RV normal size/function  2. Hypertension  3. Mild to moderate mitral regurgitation  4. SSS s/p PPM  5. Acute on chronic kidney disease - baseline creatinine ~ 2.1, since hospital discharge 2.7-2.82-2.75 today.   6. COPD  7. Obesity   8. Bilateral carotid artery stenosis - severe, has declined surgery consideration, on plavix.     Since last week she has noted some increasing exertional dyspnea and cough, weight has been stable, oxygen saturations have been > 90%. BMP showed some slight improvement in renal function, creatinine 2.75, BUN 95, but remaining above previous baseline. HRS data reviewed, blood pressures elevated, these are being checked prior to medications. Advised to start sending in some afternoon blood pressure checks. Given virtual nature of her visit today exam is limited and hard to get a true assessment of her volume status. I added on an NTProBNP to her labs which is down from when she was in the hospital 2,383 (previously 5,097). Will additionally have patient get a CXR.. If CXR stable  "will have patient reach out to pulmonary given her known COPD.    Updated Dr. Gonzalez today, no further changes recommended.     Changes today: none    Follow up plan:     Add on NTproBNP    CXR    Send in afternoon blood pressure readings     Follow up with nephrology and pulmonary as scheduled      Cardiology follow up with Kane Gil PA-C 3/6/24        History of Presenting Illness:    Arpita Rocha is a very pleasant 88 year old female with a history of  HFpEF, SSS s/p PPM, COPD, CKD, HTN, CKD and carotid artery stenosis.     Patient moved from [place] to be closer to son/daughter-in-law, she established care with Dr. Gonzalez after moving to the area.      Hospitalized 12/8-12/13/23 with PNA and acute HFpEF/COPD exacerbation, she was initially diuresed with IV furosemide. Discharged on prednisone and torsemide 20mg BID (previously lasix 40mg daily) and clonidine increased to 0.2mg BID. Discharge weight 163#. Cardiology not involved during admission.      Echo 12/8/23 showed LVEF 55%, RV normal size/function, mild to moderate mitral regurgitation     Device interrogation 12/18 showed fair variability in HR, no atrial arrhythmia, one 5 beat run os NSVT.      I met patient for the first time last week at which time she was doing okay, she was hypotensive, 75/41, asymptomatic. BMP at time of post hospital follow up with PCP on 12/19 showed decline in renal function with creatinine of 2.7, does not appear anything was changed at that time. During hospitalization her furosmide 40mg daily was changed to torsemide 20mg BID. I was worried for over diuresis hypotension/previously noted decline in renal function. Her exam is benign, body habitus does make volume assessment more challenging. Recommend decreasing her torsemide to 20mg daily with additional 20mg PRN. Recommend BMP after OV and enroll in HRS.     Patient is here today for cardiology follow up.     Patient reports feeling okay, coughing more, \"thinks " "its her COPD\" Monitoring weights daily a home, sending results via HRS, overall stable low 160#. Denies increase in LE edema. Denies shortness of breath at rest. Exertional dyspnea, which she feels is worse than when she saw me, O2 sats at home low 90s. Sleeping in recliner, which she has done for a long time. Denies chest pain or chest tightness. Denies dizziness, lightheadedness or other presyncopal symptoms. Denies tachycardia or palpitations. Taking medications daily.     Labs from yesterday show slight improvement in renal function, creatinine 2.75, BUN 95. Blood pressure 178/66 and HR 67 at home yesterday prior to medications. Blood pressures she is sending via HRS, are prior to medications.     Appetite good. Living with her son and daughter in law (Michelle our ). Closely monitoring sodium in diet, Michelle does the cooking. Has graduated from PT/OT, continuing to do some of the PT/OT exercises. Denies alcohol use. Denies tobacco use (quit at age 57).          Social History       Social History     Socioeconomic History    Marital status:      Spouse name: Not on file    Number of children: Not on file    Years of education: Not on file    Highest education level: Not on file   Occupational History    Not on file   Tobacco Use    Smoking status: Former     Packs/day: 1.00     Years: 44.00     Additional pack years: 0.00     Total pack years: 44.00     Types: Cigarettes     Start date: 1952     Quit date: 1996     Years since quittin.1    Smokeless tobacco: Never   Vaping Use    Vaping Use: Never used   Substance and Sexual Activity    Alcohol use: Not Currently    Drug use: Never    Sexual activity: Not Currently     Partners: Male   Other Topics Concern    Not on file   Social History Narrative    Not on file     Social Determinants of Health     Financial Resource Strain: Low Risk  (2023)    Financial Resource Strain     Within the past 12 months, have you or your family members " you live with been unable to get utilities (heat, electricity) when it was really needed?: No   Food Insecurity: Low Risk  (12/5/2023)    Food Insecurity     Within the past 12 months, did you worry that your food would run out before you got money to buy more?: No     Within the past 12 months, did the food you bought just not last and you didn t have money to get more?: No   Transportation Needs: Low Risk  (12/5/2023)    Transportation Needs     Within the past 12 months, has lack of transportation kept you from medical appointments, getting your medicines, non-medical meetings or appointments, work, or from getting things that you need?: No   Physical Activity: Inactive (8/14/2023)    Exercise Vital Sign     Days of Exercise per Week: 0 days     Minutes of Exercise per Session: 0 min   Stress: No Stress Concern Present (8/14/2023)    [employer] [place] [employer] - Occupational Stress Questionnaire     Feeling of Stress : Not at all   Social Connections: Moderately Isolated (8/14/2023)    Social Connection and Isolation Panel [NHANES]     Frequency of Communication with Friends and Family: Twice a week     Frequency of Social Gatherings with Friends and Family: More than three times a week     Attends Christianity Services: More than 4 times per year     Active Member of Clubs or Organizations: No     Attends Club or Organization Meetings: Not on file     Marital Status:    Interpersonal Safety: Not on file   Housing Stability: Low Risk  (12/5/2023)    Housing Stability     Do you have housing? : Yes     Are you worried about losing your housing?: No          Review of Systems:   10 point ROS neg other than the symptoms noted above in the HPI.          Physical Exam:   Vitals: There were no vitals taken for this visit.   Wt Readings from Last 4 Encounters:   01/26/24 72.3 kg (159 lb 6.4 oz)   12/19/23 74.3 kg (163 lb 12.8 oz)   12/13/23 74 kg (163 lb 3.2 oz)   12/05/23 77 kg (169 lb 12.8 oz)      GEN: well nourished, in no acute distress.  HEENT:  Pupils equal, round. Sclerae nonicteric.   NECK: Supple, no masses appreciated.   RESP: Respirations are unlabored at rest  NEURO: Alert and oriented, cooperative.  SKIN: No visible rash       Data:     LIPID RESULTS:  Lab Results   Component Value Date    CHOL 161 06/12/2023    HDL 52 06/12/2023    LDL 95 06/12/2023    TRIG 72 06/12/2023    TRIG 145 07/21/2022     LIVER ENZYME RESULTS:  Lab Results   Component Value Date    AST 25 12/08/2023    ALT 14 12/08/2023     CBC RESULTS:  Lab Results   Component Value Date    WBC 5.6 12/11/2023    RBC 3.07 (L) 12/11/2023    HGB 10.1 (L) 12/11/2023    HCT 31.8 (L) 12/11/2023     (H) 12/11/2023    MCH 32.9 12/11/2023    MCHC 31.8 12/11/2023    RDW 12.3 12/11/2023     12/12/2023     BMP RESULTS:  Lab Results   Component Value Date     02/05/2024    POTASSIUM 5.2 02/05/2024    CHLORIDE 104 02/05/2024    CO2 24 02/05/2024    ANIONGAP 12 02/05/2024     (H) 02/05/2024    BUN 95.4 (H) 02/05/2024    CR 2.75 (H) 02/05/2024    GFRESTIMATED 16 (L) 02/05/2024    CANELO 8.9 02/05/2024           Medications     Current Outpatient Medications   Medication Sig Dispense Refill    acetaminophen (TYLENOL) 650 MG CR tablet Take 1,300 mg by mouth every morning      amLODIPine (NORVASC) 10 MG tablet Take 1 tablet (10 mg) by mouth daily 90 tablet 3    atorvastatin (LIPITOR) 40 MG tablet Take 1 tablet (40 mg) by mouth daily 30 tablet 11    budesonide (PULMICORT) 0.5 MG/2ML neb solution Take 2 mLs (0.5 mg) by nebulization 2 times daily (Patient taking differently: Take 0.5 mg by nebulization as needed) 360 mL 3    carvedilol (COREG) 25 MG tablet Take 1.5 tablets (37.5 mg) by mouth 2 times daily (with meals) 180 tablet 3    citalopram (CELEXA) 20 MG tablet TAKE 1.5 TAB BY MOUTH EVERY  tablet 1    cloNIDine (CATAPRES) 0.2 MG tablet Take 1 tablet (0.2 mg) by mouth 2 times daily 180 tablet 0    clopidogrel (PLAVIX) 75  MG tablet Take 1 tablet (75 mg) by mouth daily 30 tablet 11    Cranberry 450 MG TABS       diphenhydrAMINE-acetaminophen (TYLENOL PM)  MG tablet Take 2 tablets by mouth at bedtime      doxycycline hyclate (VIBRAMYCIN) 100 MG capsule Take 1 capsule (100 mg) by mouth three times a week 39 capsule 3    ferrous sulfate (FEROSUL) 325 (65 Fe) MG tablet Take 2 tablets (650 mg) by mouth daily (with breakfast) 180 tablet 3    ipratropium - albuterol 0.5 mg/2.5 mg/3 mL (DUONEB) 0.5-2.5 (3) MG/3ML neb solution Take 1 vial (3 mLs) by nebulization every 6 hours (Patient taking differently: Take 1 vial by nebulization every 6 hours as needed) 180 mL 3    montelukast (SINGULAIR) 10 MG tablet Take 1 tablet (10 mg) by mouth At Bedtime 90 tablet 3    multivitamin w/minerals (THERA-VIT-M) tablet Take 1 tablet by mouth daily      nystatin (MYCOSTATIN) 756797 UNIT/GM external powder Apply topically 3 times daily as needed for other (rash) 60 g 1    Ostomy Supplies (ADAPT) PSTE Use with new ostomy pouch and drain  g 3    Ostomy Supplies (PREMIER DRAINABLE POUCH 22MM) Pouch MISC Use as directed every 3-4 days 5 each 12    torsemide (DEMADEX) 20 MG tablet Take 1 tablet (20 mg) by mouth daily With an additional 20mg as needed for weight gain > 2# overnight or worsening symptoms (leg swelling/breathing). 60 tablet 0    umeclidinium-vilanterol (ANORO ELLIPTA) 62.5-25 MCG/ACT oral inhaler Inhale 1 puff into the lungs daily 1 each 5        Past Medical History     Past Medical History:   Diagnosis Date    Ulcerative pancolitis (H) 05/23/2023     Past Surgical History:   Procedure Laterality Date    CATARACT EXTRACTION Bilateral     COLONOSCOPY      FEMUR FRACTURE SURGERY Right     2010    ILEOSTOMY      IMPLANT PACEMAKER      left knee replacement      MULTIPLE TOOTH EXTRACTIONS      right knee replacement Right     SPINAL FUSION      TONSILLECTOMY      TUBAL LIGATION       Family History   Problem Relation Age of Onset     Cerebrovascular Disease Mother     Diabetes Mother     Hypertension Mother     Cancer Mother     Hypertension Father     Cerebrovascular Disease Sister     Hypertension Sister     Hypertension Sister     Breast Cancer Sister     Dementia Sister     Hypertension Brother     Cancer Brother     Hypertension Brother     Cancer Brother     Emphysema Brother             Allergies   Acetaminophen-codeine and Penicillin g    40 minutes spent on the date of the encounter doing chart review, history and exam, documentation and further activities as noted above      KOKO Brandt [facility]  Pager: [phone]

## 2024-02-06 NOTE — PROGRESS NOTES
[facility]     Updated HRS data for today's visit with Kinza Shirley CNP. Minerva did report increased SOB on survey today.          Aida Chavez RN BSN   8:10 AM 02/06/24  Nurse line M-F 8a-4p: [phone]

## 2024-02-06 NOTE — LETTER
2/6/2024    Ludivina Canales MD  [address]  [facility] [address]    RE: Arpita Rocha       Dear Colleague,     I had the pleasure of seeing Arpita Rocha in the [facility].  Minerva is a 88 year old who is being evaluated via a billable video visit.      How would you like to obtain your AVS? MyChart  If the video visit is dropped, the invitation should be resent by: Text to cell phone: [phone]  Will anyone else be joining your video visit? No          Video-Visit Details    Type of service:  Video Visit   Video Start Time: 10:16 AM  Video End Time:10:49 AM    Originating Location (pt. Location): Home    Distant Location (provider location):  On-site  Platform used for Video Visit: Kay  Signed Electronically by: KOKO Brandt [facility]      Cardiology Clinic Progress Note  Arpita Rocha MRN# [id number]   YOB: 1935 Age: 88 year old   Primary Cardiologist: Dr. Gonzalez  Reason for visit: Cardiology follow up             Assessment and Plan:   Arpita Rocha is a very pleasant 88 year old female here for cardiology follow up.      1. Chronic HFpEF -  LVEF 55%, RV normal size/function  2. Hypertension  3. Mild to moderate mitral regurgitation  4. SSS s/p PPM  5. Acute on chronic kidney disease - baseline creatinine ~ 2.1, since hospital discharge 2.7-2.82-2.75 today.   6. COPD  7. Obesity   8. Bilateral carotid artery stenosis - severe, has declined surgery consideration, on plavix.     Since last week she has noted some increasing exertional dyspnea and cough, weight has been stable, oxygen saturations have been > 90%. BMP showed some slight improvement in renal function, creatinine 2.75, BUN 95, but remaining above previous baseline. HRS data reviewed, blood pressures elevated, these are being checked prior to medications. Advised to start sending in some afternoon blood pressure checks. Given virtual nature of her visit today exam is limited and hard  to get a true assessment of her volume status. I added on an NTProBNP to her labs which is down from when she was in the hospital 2,383 (previously 5,097). Will additionally have patient get a CXR.. If CXR stable will have patient reach out to pulmonary given her known COPD.    Updated Dr. Gonzalez today, no further changes recommended.     Changes today: none    Follow up plan:     Add on NTproBNP    CXR    Send in afternoon blood pressure readings     Follow up with nephrology and pulmonary as scheduled      Cardiology follow up with Kane Gil PA-C 3/6/24        History of Presenting Illness:    Arpita Rocha is a very pleasant 88 year old female with a history of  HFpEF, SSS s/p PPM, COPD, CKD, HTN, CKD and carotid artery stenosis.     Patient moved from [place] to be closer to son/daughter-in-law, she established care with Dr. Gonzalez after moving to the area.      Hospitalized 12/8-12/13/23 with PNA and acute HFpEF/COPD exacerbation, she was initially diuresed with IV furosemide. Discharged on prednisone and torsemide 20mg BID (previously lasix 40mg daily) and clonidine increased to 0.2mg BID. Discharge weight 163#. Cardiology not involved during admission.      Echo 12/8/23 showed LVEF 55%, RV normal size/function, mild to moderate mitral regurgitation     Device interrogation 12/18 showed fair variability in HR, no atrial arrhythmia, one 5 beat run os NSVT.      I met patient for the first time last week at which time she was doing okay, she was hypotensive, 75/41, asymptomatic. BMP at time of post hospital follow up with PCP on 12/19 showed decline in renal function with creatinine of 2.7, does not appear anything was changed at that time. During hospitalization her furosmide 40mg daily was changed to torsemide 20mg BID. I was worried for over diuresis hypotension/previously noted decline in renal function. Her exam is benign, body habitus does make volume assessment more challenging. Recommend  "decreasing her torsemide to 20mg daily with additional 20mg PRN. Recommend BMP after OV and enroll in HRS.     Patient is here today for cardiology follow up.     Patient reports feeling okay, coughing more, \"thinks its her COPD\" Monitoring weights daily a home, sending results via HRS, overall stable low 160#. Denies increase in LE edema. Denies shortness of breath at rest. Exertional dyspnea, which she feels is worse than when she saw me, O2 sats at home low 90s. Sleeping in recliner, which she has done for a long time. Denies chest pain or chest tightness. Denies dizziness, lightheadedness or other presyncopal symptoms. Denies tachycardia or palpitations. Taking medications daily.     Labs from yesterday show slight improvement in renal function, creatinine 2.75, BUN 95. Blood pressure 178/66 and HR 67 at home yesterday prior to medications. Blood pressures she is sending via HRS, are prior to medications.     Appetite good. Living with her son and daughter in law (Michelle our ). Closely monitoring sodium in diet, Michelle does the cooking. Has graduated from PT/OT, continuing to do some of the PT/OT exercises. Denies alcohol use. Denies tobacco use (quit at age 57).          Social History       Social History     Socioeconomic History    Marital status:      Spouse name: Not on file    Number of children: Not on file    Years of education: Not on file    Highest education level: Not on file   Occupational History    Not on file   Tobacco Use    Smoking status: Former     Packs/day: 1.00     Years: 44.00     Additional pack years: 0.00     Total pack years: 44.00     Types: Cigarettes     Start date: 1952     Quit date: 1996     Years since quittin.1    Smokeless tobacco: Never   Vaping Use    Vaping Use: Never used   Substance and Sexual Activity    Alcohol use: Not Currently    Drug use: Never    Sexual activity: Not Currently     Partners: Male   Other Topics Concern    Not on file "   Social History Narrative    Not on file     Social Determinants of Health     Financial Resource Strain: Low Risk  (12/5/2023)    Financial Resource Strain     Within the past 12 months, have you or your family members you live with been unable to get utilities (heat, electricity) when it was really needed?: No   Food Insecurity: Low Risk  (12/5/2023)    Food Insecurity     Within the past 12 months, did you worry that your food would run out before you got money to buy more?: No     Within the past 12 months, did the food you bought just not last and you didn t have money to get more?: No   Transportation Needs: Low Risk  (12/5/2023)    Transportation Needs     Within the past 12 months, has lack of transportation kept you from medical appointments, getting your medicines, non-medical meetings or appointments, work, or from getting things that you need?: No   Physical Activity: Inactive (8/14/2023)    Exercise Vital Sign     Days of Exercise per Week: 0 days     Minutes of Exercise per Session: 0 min   Stress: No Stress Concern Present (8/14/2023)    [employer] [place] [employer] - Occupational Stress Questionnaire     Feeling of Stress : Not at all   Social Connections: Moderately Isolated (8/14/2023)    Social Connection and Isolation Panel [NHANES]     Frequency of Communication with Friends and Family: Twice a week     Frequency of Social Gatherings with Friends and Family: More than three times a week     Attends Scientologist Services: More than 4 times per year     Active Member of Clubs or Organizations: No     Attends Club or Organization Meetings: Not on file     Marital Status:    Interpersonal Safety: Not on file   Housing Stability: Low Risk  (12/5/2023)    Housing Stability     Do you have housing? : Yes     Are you worried about losing your housing?: No          Review of Systems:   10 point ROS neg other than the symptoms noted above in the HPI.          Physical Exam:   Vitals:  There were no vitals taken for this visit.   Wt Readings from Last 4 Encounters:   01/26/24 72.3 kg (159 lb 6.4 oz)   12/19/23 74.3 kg (163 lb 12.8 oz)   12/13/23 74 kg (163 lb 3.2 oz)   12/05/23 77 kg (169 lb 12.8 oz)     GEN: well nourished, in no acute distress.  HEENT:  Pupils equal, round. Sclerae nonicteric.   NECK: Supple, no masses appreciated.   RESP: Respirations are unlabored at rest  NEURO: Alert and oriented, cooperative.  SKIN: No visible rash       Data:     LIPID RESULTS:  Lab Results   Component Value Date    CHOL 161 06/12/2023    HDL 52 06/12/2023    LDL 95 06/12/2023    TRIG 72 06/12/2023    TRIG 145 07/21/2022     LIVER ENZYME RESULTS:  Lab Results   Component Value Date    AST 25 12/08/2023    ALT 14 12/08/2023     CBC RESULTS:  Lab Results   Component Value Date    WBC 5.6 12/11/2023    RBC 3.07 (L) 12/11/2023    HGB 10.1 (L) 12/11/2023    HCT 31.8 (L) 12/11/2023     (H) 12/11/2023    MCH 32.9 12/11/2023    MCHC 31.8 12/11/2023    RDW 12.3 12/11/2023     12/12/2023     BMP RESULTS:  Lab Results   Component Value Date     02/05/2024    POTASSIUM 5.2 02/05/2024    CHLORIDE 104 02/05/2024    CO2 24 02/05/2024    ANIONGAP 12 02/05/2024     (H) 02/05/2024    BUN 95.4 (H) 02/05/2024    CR 2.75 (H) 02/05/2024    GFRESTIMATED 16 (L) 02/05/2024    CANELO 8.9 02/05/2024           Medications     Current Outpatient Medications   Medication Sig Dispense Refill    acetaminophen (TYLENOL) 650 MG CR tablet Take 1,300 mg by mouth every morning      amLODIPine (NORVASC) 10 MG tablet Take 1 tablet (10 mg) by mouth daily 90 tablet 3    atorvastatin (LIPITOR) 40 MG tablet Take 1 tablet (40 mg) by mouth daily 30 tablet 11    budesonide (PULMICORT) 0.5 MG/2ML neb solution Take 2 mLs (0.5 mg) by nebulization 2 times daily (Patient taking differently: Take 0.5 mg by nebulization as needed) 360 mL 3    carvedilol (COREG) 25 MG tablet Take 1.5 tablets (37.5 mg) by mouth 2 times daily (with  meals) 180 tablet 3    citalopram (CELEXA) 20 MG tablet TAKE 1.5 TAB BY MOUTH EVERY  tablet 1    cloNIDine (CATAPRES) 0.2 MG tablet Take 1 tablet (0.2 mg) by mouth 2 times daily 180 tablet 0    clopidogrel (PLAVIX) 75 MG tablet Take 1 tablet (75 mg) by mouth daily 30 tablet 11    Cranberry 450 MG TABS       diphenhydrAMINE-acetaminophen (TYLENOL PM)  MG tablet Take 2 tablets by mouth at bedtime      doxycycline hyclate (VIBRAMYCIN) 100 MG capsule Take 1 capsule (100 mg) by mouth three times a week 39 capsule 3    ferrous sulfate (FEROSUL) 325 (65 Fe) MG tablet Take 2 tablets (650 mg) by mouth daily (with breakfast) 180 tablet 3    ipratropium - albuterol 0.5 mg/2.5 mg/3 mL (DUONEB) 0.5-2.5 (3) MG/3ML neb solution Take 1 vial (3 mLs) by nebulization every 6 hours (Patient taking differently: Take 1 vial by nebulization every 6 hours as needed) 180 mL 3    montelukast (SINGULAIR) 10 MG tablet Take 1 tablet (10 mg) by mouth At Bedtime 90 tablet 3    multivitamin w/minerals (THERA-VIT-M) tablet Take 1 tablet by mouth daily      nystatin (MYCOSTATIN) 281847 UNIT/GM external powder Apply topically 3 times daily as needed for other (rash) 60 g 1    Ostomy Supplies (ADAPT) PSTE Use with new ostomy pouch and drain  g 3    Ostomy Supplies (PREMIER DRAINABLE POUCH 22MM) Pouch MISC Use as directed every 3-4 days 5 each 12    torsemide (DEMADEX) 20 MG tablet Take 1 tablet (20 mg) by mouth daily With an additional 20mg as needed for weight gain > 2# overnight or worsening symptoms (leg swelling/breathing). 60 tablet 0    umeclidinium-vilanterol (ANORO ELLIPTA) 62.5-25 MCG/ACT oral inhaler Inhale 1 puff into the lungs daily 1 each 5        Past Medical History     Past Medical History:   Diagnosis Date    Ulcerative pancolitis (H) 05/23/2023     Past Surgical History:   Procedure Laterality Date    CATARACT EXTRACTION Bilateral     COLONOSCOPY      FEMUR FRACTURE SURGERY Right     2010    ILEOSTOMY      IMPLANT  PACEMAKER      left knee replacement      MULTIPLE TOOTH EXTRACTIONS      right knee replacement Right     SPINAL FUSION      TONSILLECTOMY      TUBAL LIGATION       Family History   Problem Relation Age of Onset    Cerebrovascular Disease Mother     Diabetes Mother     Hypertension Mother     Cancer Mother     Hypertension Father     Cerebrovascular Disease Sister     Hypertension Sister     Hypertension Sister     Breast Cancer Sister     Dementia Sister     Hypertension Brother     Cancer Brother     Hypertension Brother     Cancer Brother     Emphysema Brother             Allergies   Acetaminophen-codeine and Penicillin g    40 minutes spent on the date of the encounter doing chart review, history and exam, documentation and further activities as noted above      KOKO Brandt CNP  [facility]  Pager: [phone]      Thank you for allowing me to participate in the care of your patient.      Sincerely,     KOKO Brandt CNP     [facility]  cc:   KOKO Guillen CNP  [address]  [place] [address]

## 2024-02-08 NOTE — TELEPHONE ENCOUNTER
Sodium bicarb script got discontinued on medication list after hospitalization in Dec 2023 but discharge summary said to continue it. Verified with Dr. Mclain we should continue. New script sent to pharmacy of choice.

## 2024-02-08 NOTE — PROGRESS NOTES
Please set weight range at 158-164#    KOKO Brandt CNP  [facility]  Pager: [phone]

## 2024-02-08 NOTE — PROGRESS NOTES
[facility]    [facility] system updated 158-164#     RODRÍGUEZ Lee   11:12 AM 2/8/2024    CORE nurse line M-RD 8a-4p: [phone]

## 2024-02-08 NOTE — TELEPHONE ENCOUNTER
[facility]    Left  with daughter in law, Michelle.     Alda Benito, RN BAN   2:52 PM 2/8/2024    CORE nurse line M-F 8a-4p: [phone]

## 2024-02-08 NOTE — TELEPHONE ENCOUNTER
----- Message from KOKO Guillen CNP sent at 2/8/2024  7:55 AM CST -----  Can you please update patient and daughter-in-law (Michelle) on results, given overall stable findings on CXR recommend reaching out to PCP/pulmonary for further suggestions. Continue torsemide 20mg daily with additional 20mg PRN.     CORE - can you reach out to patient on Monday to follow up on symptoms?    KOKO Brandt CNP  [facility]  Pager: [phone]

## 2024-02-12 NOTE — TELEPHONE ENCOUNTER
[facility]   Sent CXR results and comments to pt and DIL via "[employer] Also sent copy of results to PCP And Pulmonologist in [facility]. Asked daughter for update on sx per Kinza's request.                Shanell Keen RN  2/12/2024 10:05 AM CST       Routed "[employer]" message to pt/daughter in law with CXR results and recommendations from ARRON Wong. Asked they reach out to PCP/pulmonary given results and ongoing dyspnea and cough. Did ask daughter in law and pt for an update on her sx. Weights are stable on HRS between 160-162 lbs, 160.4 lbs today. AM SBP's before or right after meds remain 150-190, but repeat afternoon SBP's around 120's.     Routed CXR to PCP and Pulmonology per ARRON Wong's request for review, as well as asked daughter in law to reach out.    Shanell Keen RN  2/12/2024  9:55 AM CST       Routed "[employer] message to pt/daughter in law.    KOKO Guillen CNP  2/8/2024  7:55 AM CST       Can you please update patient and daughter-in-law (Michelle) on results, given overall stable findings on CXR recommend reaching out to PCP/pulmonary for further suggestions. Continue torsemide 20mg daily with additional 20mg PRN.     CORE - can you reach out to patient on Monday to follow up on symptoms?     KOKO Brandt CNP  [facility]  Pager: [phone]     Shanell Keen RN, BSN  02/12/24 at 10:12 AM

## 2024-02-13 NOTE — TELEPHONE ENCOUNTER
[facility]     Weight in goal and BPs taken later in day are acceptable.    Note several O2 readings this week have been marginal: 87, 88, 89; other readings this week 90-94. Given Minerva's history of COPD, will ask Kinza Shirley CNP to advise on goal O2 sat range.          Aida Chavez RN BSN   11:06 AM 02/13/24  Nurse line M-F 8a-4p: [phone]

## 2024-02-13 NOTE — TELEPHONE ENCOUNTER
Casie calling from [facility] [phone]    Minerva has had a harsh cough and lung congestion x 2 weeks.  States Moa decided to call cardiology on the 6th.  States she had a CXR on the 7th.      Casie reports Minerva still a harsh cough today and a little more shortness of breath with activity.  Some expiratory wheezes at (R) base and mid lobe and some scattered rales throughout.      Weight stable 161.4# no new edema.      Minerva admitted today she is not compliant with respiratory medications.        Minerva reports she has not doing Anora elipta for about 8 weeks due to leaving a bad taste in her mouth and could not stand it any more.  Son said she also may not be taking due to the cost.    Minerva also reports she is not doing budesonide neb either.  She said she just does not want to do and takes too much time.  Has not been doing nebs for about 2 weeks.  Casie had a bowens talk and she was going to restart budesonide nebs today.        Minerva has been doing Singulair.    Minerva has been doing Duoneb.    Does not have a ventolin inhaler.  Not on current med list.  Family felt it was from before she moved to [place].      Casie feels Minerva needs visit for evaluation and to get medications straightened out.  Anora elipta will need to be changed as she will not take that again.  Do you want her to see extender?  Call patient for appointment and update Casie as needed with any med changes.      Sangeeta Simmons RN

## 2024-02-14 NOTE — TELEPHONE ENCOUNTER
[facility]     O2 sat on today's HRS survey was improved from 87 yesterday -->96 today.    Will await comment from Kinza Shirley CNP on what Minerva's range should be for O2 sat on HRS given COPD.    Aida Chavez RN BSN   12:45 PM 02/14/24  Nurse line M-F 8a-4p: [phone]       show none

## 2024-02-14 NOTE — TELEPHONE ENCOUNTER
Called Casie at [phone]. She states call patient to schedule appointment.Called pt, left a message to call clinic, needing to schedule an appointment. Ruth Behrens.

## 2024-02-15 NOTE — TELEPHONE ENCOUNTER
[facility]   O2 sat range set.     Shanell Keen RN, BSN  02/15/24 at 2:59 PM

## 2024-02-15 NOTE — TELEPHONE ENCOUNTER
Reviewed HRS data, set O2 range to 88-95%.     KOKO Brandt [facility]  Pager: [phone]

## 2024-02-15 NOTE — PROGRESS NOTES
Assessment & Plan     Chronic bronchitis with COPD (chronic obstructive pulmonary disease)  Uses nebulizer daily (DuoNebs 2 times daily and budesonide 2 times daily).  Noticed the cup was plugged on the nebulizer machine. Since cleaning out cough has improved. She denies any increased sputum production or increased shortness of breath (has dyspnea with exertion at baseline). Continue as she has been for now. Offered steroid burst but patient feels fairly well and feels symptoms have improved since discovering the problem with the nebulizer machine cup.  Had recent x-ray chest which did not show pneumonia. At this time I did not feel antibiotics warranted.  Patient and son in agreement with no changes to treatment for now.  - Nebulizer and Supplies Order for DME - ONLY FOR DME    Ileostomy status (H)  Uses ostomy bags. Stable.    Acute respiratory failure with hypoxia (H)  No hypoxia currently. Has history of recent hospitalization with acute respiratory failure and hypoxia.    Acute on chronic diastolic congestive heart failure (H)  Stable recently. Weights have been stable and most recent x-ray chest shows no significant fluid overload and no pneumonia.    Simple chronic bronchitis (H)  Uses nebulizer daily (DuoNebs 2 times daily and budesonide 2 times daily).    Sick sinus syndrome (H)  With pacemaker placement. Stable. Follows cardiology    CKD (chronic kidney disease) stage 4, GFR 15-29 ml/min (H)  Follows nephrology.    Review of external notes as documented elsewhere in note  Prescription drug management  30 minutes spent by me on the date of the encounter doing chart review, history and exam, documentation and further activities per the note      Subjective   Minerva is a 88 year old, presenting for the following health issues:  Cough (Hx COPD , coughs constantly and Home health nurse requested cough check)        2/15/2024    11:40 AM   Additional Questions   Roomed by Brionna   Accompanied by Son -Daquan  "    History of Present Illness       COPD:  She presents for follow up of COPD.   Overall, COPD symptoms are slightly worse since last visit. She has more than usual fatigue or shortness of breath with exertion and same as usual shortness of breath at rest.  She often coughs and does not have change in sputum. No recent fever. She can walk the length of 1-2 rooms without stopping to rest. She can not walk a flight of stairs without resting. The patient has had no ED, urgent care, or hospital admissions because of COPD since the last visit.     She eats 2-3 servings of fruits and vegetables daily.She consumes 1 sweetened beverage(s) daily.She exercises with enough effort to increase her heart rate 9 or less minutes per day.  She exercises with enough effort to increase her heart rate 3 or less days per week. She is missing 1 dose(s) of medications per week.  She is not taking prescribed medications regularly due to remembering to take.       Acute Illness  Acute illness concerns: cough  Onset/Duration: years , Hx COPD  Symptoms:  Fever: No  Chills/Sweats: No  Headache (location?): No  Sinus Pressure: No  Conjunctivitis:  No  Ear Pain: no  Rhinorrhea: No  Congestion: No  Sore Throat: No  Cough: YES-sometimes productive with yellow phlegm  Wheeze: YES  Decreased Appetite: No  Nausea: No  Vomiting: No  Diarrhea: No  Dysuria/Freq.: No  Dysuria or Hematuria: No  Fatigue/Achiness: No  Sick/Strep Exposure: N/A  Therapies tried and outcome: Nebulizer treatments 4 times daily (Budesonide twice daily and the DuoNeb twice daily)          Objective    BP (!) 148/64 (BP Location: Right arm, Patient Position: Sitting, Cuff Size: Adult Regular)   Pulse 60   Temp 97.8  F (36.6  C) (Oral)   Resp 17   Ht 1.448 m (4' 9\")   Wt 73.1 kg (161 lb 3.2 oz)   SpO2 94%   BMI 34.88 kg/m    Body mass index is 34.88 kg/m .  Physical Exam   GENERAL: No acute distress  HEENT: Normocephalic  CARDIAC: Regular rate and rhythm.   PULMONARY: " Occasional wheeze. No obvious crackles. Breathing comfortably.  NEURO: Alert and non-focal          Signed Electronically by: Janell Willoughby PA-C

## 2024-02-15 NOTE — TELEPHONE ENCOUNTER
Nephrology Note: Medication Refill Request    Medication Refill Request:     Medication/Dose/Frequency: Carvedilol 25mg tab, take 1.5 tab (37.5mg) po bid with meals  [facility]   Provider:                       Chemo    SITUATION/BACKROUND:                 Last office visit: 08/07/23        Future office visit: --     ASSESSMENT:     Neph Assessments:    Recent Labs:  CBC Results:  Recent Labs   Lab Test 12/12/23  0729 12/11/23  0656   WBC  --  5.6   RBC  --  3.07*   HGB  --  10.1*   HCT  --  31.8*   MCV  --  104*   MCH  --  32.9   MCHC  --  31.8   RDW  --  12.3    156     Last Renal Panel:  Sodium   Date Value Ref Range Status   02/05/2024 140 135 - 145 mmol/L Final     Comment:     Reference intervals for this test were updated on 09/26/2023 to more accurately reflect our healthy population. There may be differences in the flagging of prior results with similar values performed with this method. Interpretation of those prior results can be made in the context of the updated reference intervals.      Potassium   Date Value Ref Range Status   02/05/2024 5.2 3.4 - 5.3 mmol/L Final     Chloride   Date Value Ref Range Status   02/05/2024 104 98 - 107 mmol/L Final     Carbon Dioxide (CO2)   Date Value Ref Range Status   02/05/2024 24 22 - 29 mmol/L Final     Anion Gap   Date Value Ref Range Status   02/05/2024 12 7 - 15 mmol/L Final     Glucose   Date Value Ref Range Status   02/05/2024 104 (H) 70 - 99 mg/dL Final     Urea Nitrogen   Date Value Ref Range Status   02/05/2024 95.4 (H) 8.0 - 23.0 mg/dL Final     Creatinine   Date Value Ref Range Status   02/05/2024 2.75 (H) 0.51 - 0.95 mg/dL Final     GFR Estimate   Date Value Ref Range Status   02/05/2024 16 (L) >60 mL/min/1.73m2 Final     Calcium   Date Value Ref Range Status   02/05/2024 8.9 8.8 - 10.2 mg/dL Final     Phosphorus   Date Value Ref Range Status   12/12/2023 4.1 2.5 - 4.5 mg/dL Final     Albumin   Date Value Ref Range Status   12/08/2023  4.0 3.5 - 5.2 g/dL Final       Current Medication List:   Current Outpatient Medications (Antihypertensive, Cardiovascular, Diuretics, Beta blockers, Calcium blockers, Anticoagulants)   Medication Sig    amLODIPine (NORVASC) 10 MG tablet Take 1 tablet (10 mg) by mouth daily    carvedilol (COREG) 25 MG tablet Take 1.5 tablets (37.5 mg) by mouth 2 times daily (with meals)    cloNIDine (CATAPRES) 0.2 MG tablet Take 1 tablet (0.2 mg) by mouth 2 times daily    torsemide (DEMADEX) 20 MG tablet Take 1 tablet (20 mg) by mouth daily With an additional 20mg as needed for weight gain > 2# overnight or worsening symptoms (leg swelling/breathing).     Current Outpatient Medications (Other)   Medication Sig    acetaminophen (TYLENOL) 650 MG CR tablet Take 1,300 mg by mouth every morning    atorvastatin (LIPITOR) 40 MG tablet Take 1 tablet (40 mg) by mouth daily    budesonide (PULMICORT) 0.5 MG/2ML neb solution Take 2 mLs (0.5 mg) by nebulization 2 times daily (Patient taking differently: Take 0.5 mg by nebulization as needed)    citalopram (CELEXA) 20 MG tablet TAKE 1.5 TAB BY MOUTH EVERY DAY    clopidogrel (PLAVIX) 75 MG tablet Take 1 tablet (75 mg) by mouth daily    Cranberry 450 MG TABS     diphenhydrAMINE-acetaminophen (TYLENOL PM)  MG tablet Take 2 tablets by mouth at bedtime    doxycycline hyclate (VIBRAMYCIN) 100 MG capsule Take 1 capsule (100 mg) by mouth three times a week    ferrous sulfate (FEROSUL) 325 (65 Fe) MG tablet Take 2 tablets (650 mg) by mouth daily (with breakfast)    ipratropium - albuterol 0.5 mg/2.5 mg/3 mL (DUONEB) 0.5-2.5 (3) MG/3ML neb solution Take 1 vial (3 mLs) by nebulization every 6 hours (Patient taking differently: Take 1 vial by nebulization every 6 hours as needed)    montelukast (SINGULAIR) 10 MG tablet Take 1 tablet (10 mg) by mouth At Bedtime    multivitamin w/minerals (THERA-VIT-M) tablet Take 1 tablet by mouth daily    nystatin (MYCOSTATIN) 344683 UNIT/GM external powder Apply  topically 3 times daily as needed for other (rash)    Ostomy Supplies (ADAPT) PSTE Use with new ostomy pouch and drain PRN    Ostomy Supplies (PREMIER DRAINABLE POUCH 22MM) Pouch MISC Use as directed every 3-4 days    sodium bicarbonate 650 MG tablet Take 1 tablet (650 mg) by mouth 2 times daily    umeclidinium-vilanterol (ANORO ELLIPTA) 62.5-25 MCG/ACT oral inhaler Inhale 1 puff into the lungs daily       Has patient had kidney transplant in the prior 1 year: no.   Has patient been seen the last 12 months: Yes.  Associated labs reviewed for medication: N/A    PLAN:     Medication refilled per protocol: Yes    Arabella Santillan RN

## 2024-02-20 NOTE — PROGRESS NOTES
Reviewed HRS data and phone encounter. Agree with plan and okay with blood pressures being checked 4-5 x a week with assistance of family.     KOKO Brandt CNP  [facility]  Pager: [phone]

## 2024-02-20 NOTE — TELEPHONE ENCOUNTER
Can someone call and see if we can get her to [facility] to get fluids?    Provider Response to 2nd Level Triage Request    I have reviewed the RN documentation. My recommendation is:  Refer to Acute and Diagnostic Services (ADS) clinic.     Referral to Acute and Diagnostic Services      Patient qualifies for First Order Acute services at the ADS clinic.    Patient diagnoses/treatments needed:  Hypotension  ADS Referral placed: Yes    Nursing staff to contact patient and confirm willingness to receive next level of care at the ADS site in [place] ([phone], internal use only), [place] ([phone], internal use only), [place] ([phone], internal use only), [place] ([phone], internal use only) or [place] ([phone], internal use only) . Confirm the following are true:    Patient has transportation to travel to [facility] without delay  Patient understands that evaluation/treatment at [facility] typically takes significantly longer than in clinic/urgent care (>2 hours)    If patient is in agreement, contact the ADS to confirm patient acceptance and provide necessary information to ADS provider.       For patients going to the [place] ADS, instruct patients to enter the east entrance of the building ([address]) and go to Check In C    For patients going to the [place] ADS, have them enter building ([address] attached to [facility]) and go to the 2nd floor, [address]    For patients going to the [place] ADS, park on surface lot, use west center entrance door ([address], [facility]), clinic is on your left as you enter the building, [address].    For patients going to the [place] ADS, instruct patient to enter through the main entrance and follow the signage to [address], the ADS shares space with the Primary Care and Walk In Clinic    For patients going to the [place] ADS, instruct patient to enter through the main entrance and arrive for  appointment at [facility]

## 2024-02-20 NOTE — TELEPHONE ENCOUNTER
"Nurse Triage SBAR    Is this a 2nd Level Triage? YES, LICENSED PRACTITIONER REVIEW IS REQUIRED    Protocol Recommended Disposition:   Go To ED/UCC Now (Or To Office With PCP Approval), Go To Office Now    Situation/Background:RODRÍGUEZ Gonzalez with [facility] calling  Home health aid reporting to home health RN a critical BP of 88/44  Repeat BP of 73/46  BP medications were taken this morning  Aide reported blood in the colostomy bag  Home care RN unsure if patient is symptomatic, so writer calling to triage    Assessment: Called the patient and family to verify this secondhand report from earlier today  Patient denies weakness, dizziness, light-headedness; states she feels \"just fine\"  Home health aide reported blood in the colostomy bag but this was not confirmed by patient  Patient reports there was bleeding around the stoma, which is common due to skin breakdown. Patient could not confirm or deny any changes to the color of colostomy output that would indicate bleeding.  Family and patient report BP readings are \"all over the place\" and sometimes as high an SBP as 160s, other times low like today.    Recommendation/Disposition: Patient has a home health RN visit this afternoon at 1330. Also scheduled at [facility] for this afternoon at 1530. Routing to PCP clinic for further review and advisement.    Reason for Disposition   Systolic BP < 90 and NOT dizzy, lightheaded or weak   Fall in systolic BP > 20 mm Hg from normal and feeling dizzy, lightheaded, or weak    Additional Information   Negative: Systolic BP < 90 and feeling dizzy, lightheaded, or weak   Negative: Started suddenly after an allergic medicine, an allergic food, or bee sting   Negative: Shock suspected (e.g., cold/pale/clammy skin, too weak to stand, low BP, rapid pulse)   Negative: Difficult to awaken or acting confused (e.g., disoriented, slurred speech)   Negative: Fainted   Negative: Chest pain   Negative: Bleeding (e.g., vomiting blood, " rectal bleeding or tarry stools, severe vaginal bleeding)   Negative: Extra heartbeats, irregular heart beating, or heart is beating very fast (i.e., 'palpitations')   Negative: Sounds like a life-threatening emergency to the triager   Negative: Systolic BP < 80 and NOT dizzy, lightheaded or weak (feels normal)   Negative: Abdominal pain   Negative: Major surgery in the past month   Negative: Fever > 100.4 F (38.0 C)   Negative: Drinking very little and dehydration suspected (e.g., no urine > 12 hours, very dry mouth, very lightheaded)   Negative: Patient sounds very sick or weak to the triager    Protocols used: Blood Pressure - Low-A-OH

## 2024-02-20 NOTE — PROGRESS NOTES
[facility]   Call from son Dqauan. He noted that he thinks there was misunderstanding about BP and blood in colostomy bag. Initial BP reading today elevated but pre-medications and within her normal ranges. 9am BP's were significantly low, but per son. The Velcro on the BP cuff is extremely hard for pt to open so she has been just sliding the cuff on and off without adjusting the velcro, and thinks 9AM BP's were in accurate. Pt has no dizziness. Is her usual self. Alert. She denies any concerns. Recheck BP at 1:48pm with me on phone and son helping pt adjust BP cuff was 118/43. O2 sats also rechecked and at 93%. Son reports pt has hard time getting O2 sats, so also thinks this am was falsely low. We discussed how to use BP cuff and making sure its adjusted with each check for accuracy. Son and daughter in law do not think pt needs to be seen urgently today. Also noted there was concern about blood in the colostomy bag. Son and pt also noted they think there was a misunderstanding. Pt notes there was some blood around the stoma when changing the bag and there was NOT blood in the colostomy bag. Son also confirmed this. Pt and son note there is no more bleeding around the stoma then usual.     Given BP now WNL and pt denying sx or blood in colostomy bag, did advise did not feel they needed to go to ADS or ER today. Will continue to monitor BP on HRS. Discussed pt will be moving to [facility] 3/15 and will have trouble doing the BP on her own with how hard the velcro is. Discussed possibly doing O2 sat/Weight first thing in am and then doing BP a few times a week when family there to help her. Daughter in law said someone will be checking on her 4-5 days a week, and could help her do a BP at that time if doesn't need to be first thing in am. Will review with ARRON Wong, but I think this will be fine given AM BP's are elevated premedications anyway's, so might be more beneficial to do  BP's at a later time anyway's.     Reviewed BP readings today:   10:50AM - 148/60/63  9:10AM - 73/46/68  9:08AM - 80/44/66  8AM - 173/57/75    Update to ARRON Wong and Dr. Jose Canales.     Future Appointments   Date Time Provider Department Center   3/12/2024  4:00 PM HfrRosita rocha [facility] PSA CLIN   3/13/2024 10:30 AM [facility] RHCL[facility]   3/13/2024 11:00 AM Kane Gil, PA-C [facility] PSA CLIN   3/29/2024  9:30 AM Sadi Lamar MD CSPULM    4/11/2024 12:00 AM GRADY [address] [place] [address] PSA CLIN   8/27/2024 10:30 AM Ludivina Chamberlain MD CRFP CR      Time spent: 30 min     Shanell Keen RN, BSN  02/20/24 at 2:20 PM

## 2024-02-20 NOTE — PROGRESS NOTES
[facility]:   HRS ([employer]) Daily Alert     February 20, 2024    Alert(s): Vitals  - Left VM on home phone and with [facility] Myesha.       Current diuretic dose: Torsemide 20 mg daily   With an additional 20mg as needed for weight gain > 2# overnight or worsening symptoms (leg swelling/breathing).       Recent plan of care changes:   2/15 adjust oxygen parameters for COPD to 88-95%    Goal Weight Range: 158-164 lbs      Weight, BP and HR Readings:         Time spent in review this day: 15 min.     Looks like pt was direct to the ADS or Acute and Diagnostic services for low BP and blood in colostomy bag.  No current notes to note pt has been seen in ADS.  Will await return call and watch chart for ADS notes.     Future Appointments   Date Time Provider Department Center   3/12/2024  4:00 PM Rosita Banuelos [facility] PSA CLIN   3/13/2024 10:30 AM RU LAB RHCLB [place] RID   3/13/2024 11:00 AM Knae Gil, PAMarthaC [facility] PSA CLIN   3/29/2024  9:30 AM Sadi Lamar MD CSPULM    4/11/2024 12:00 AM GRADY [address] [place] [address] PSA CLIN   8/27/2024 10:30 AM Coming Ludivina Canales MD CRFP CR       Alda Benito RN BAN   1:25 PM 2/20/2024    CORE nurse line M-F 8a-4p: [phone]

## 2024-02-20 NOTE — TELEPHONE ENCOUNTER
RN called and spoke with ADS- they can see her at 2pm.    RN called and spoke with Daquan, son (ctc on file), he will bring patient to ADS at 2pm. Address given to patient's son.       Instructed if she has any worsening symptoms she should be seen sooner in ER. Patient's son was given an opportunity to ask questions, verbalized understanding of plan, and is agreeable. Requested appt at CR this afternoon at 3:30 cancelled.     Routing back to provider to close loop.  Kylie CLARK RN on 2/20/2024 at 10:36 AM

## 2024-02-22 NOTE — PROGRESS NOTES
[facility]:   HRS ([employer]) Daily Alert     February 22, 2024    Alert(s): Vitals- elevated BP    Notes: Spoke with RIKI Martinez- reviewed ARRON Wong's ok to check BP 4-5 times a week with assistance of family later in day given pt's difficulty doing on her own. Also reviewed elevated AM BP this am. Asked they recheck this afternoon. Recheck BP improved to 120/42. Will continue to monitor.     Goal Weight Range: 158-164 lbs    Weight, BP and HR Readings:       Time spent in review this day: 15 min.      Future Appointments   Date Time Provider Department Center   3/12/2024  4:00 PM Rosita Banuelos [facility] PSA CLIN   3/13/2024 10:30 AM RU LAB [facility] RID   3/13/2024 11:00 AM Kane Gil, PA-C [facility] PSA CLIN   3/29/2024  9:30 AM Sadi Lamar MD CSPULM CS   4/11/2024 12:00 AM GRADY TE[address] [place] [address] PSA CLIN   8/27/2024 10:30 AM Coming Ludivina Canales MD CRFP CR     Shanell Keen RN, BSN  02/22/24 at 12:59 PM

## 2024-03-04 NOTE — TELEPHONE ENCOUNTER
Received a call from Mari from [facility] [phone].      Home Care is calling regarding an established patient with [facility].        11/8/2023     3:59 PM   Home Care Information   Home Care agency [facility] [phone]     Requesting orders from: Ludivina Chamberlain  Provider is following patient: Yes  Is this a 60-day recertification request?  Yes    Orders Requested    Skilled Nursing  Request for recertification   Frequency: nursing once a week for 2 weeks and once every other week for 6 weeks, home health aide once a week for 9 weeks and OT eval.        Confirmed ok to leave a detailed message with call back.  Contact information confirmed and updated as needed.    Mary Hardwick RN

## 2024-03-05 NOTE — TELEPHONE ENCOUNTER
- Called and left detailed message with verbal approval of orders below. Will wait for faxed orders for PCP signature.    Nando Webber RN  Patient Advocate Liaison (PAL)  [facility]  [phone]    03/05/2024 at 8:28 AM

## 2024-03-05 NOTE — TELEPHONE ENCOUNTER
Forms/Letter Request    Type of form/letter: OTHER: [facility] forms        Do we have the form/letter: Yes:     Who is the form from? Patient    Where did/will the form come from? Patient or family brought in       When is form/letter needed by: asap    How would you like the form/letter returned:     Patient Notified form requests are processed in 5-7 business days:Yes    Could we send this information to you in [employer] or would you prefer to receive a phone call?:   Patient would prefer a phone call   Okay to leave a detailed message?: Yes at Cell number on file:    Telephone Information:   Mobile [phone]    Son: Daquan Josefina

## 2024-03-12 NOTE — PROGRESS NOTES
[facility]:   [facility] ([employer]) Routine Remote Evaluation    HRS Enrollment date: 1/26/24     Dates: 1/30/24 through 3/12/24    This is the first billing cycle. Patient received initial education this month regarding device equipment prior to starting services    Alerts in the last month:   --2/6/24 CORE MAGGIE visit, SOB/cough, CXR, add on NtproBNP, follow up with Nephrology and Pulmonology   --2/8/24 telephone note, CXR overall stable, recommended reaching out to PCP and Pulmonology for further suggestions, continue Torsemide 20mg daily PRN  -2/20/24 Alert for low BP, felt inaccurate due to not using cuff correctly, feeling ok, moving into [place], OK to monitor BP 4-5 times a week when family present to assist    These adjustments have been discussed with the patient/caregiver and they express verbal understanding.    Goal Weight: 158-164 lbs       Readings:            Total Minutes Spent: 60 minutes     Future Appointments   Date Time Provider Department Center   3/12/2024  4:00 PM Hfrn, [facility] PSA CLIN   3/13/2024 10:30 AM RU LAB RHCLB [facility]   3/13/2024 11:00 AM Kane Gil, [facility] PSA CLIN   3/29/2024  9:30 AM Sadi Lamar MD CSPULM    4/11/2024 12:00 AM GRADY [address]CH[address] [place] [address] PSA CLIN   4/16/2024  4:00 PM Hfrn, [facility] PSA CLIN   8/27/2024 10:30 AM Ludivina Chamberlain MD CRFP CR     Shanell Keen RN, BSN  03/12/24 at 11:15 AM     Weights reviewed on HRS. Continue to monitor.     Diuretics adjusted accordingly.  30 days of remote monitoring completed.         Cody Marks MD

## 2024-03-14 NOTE — PROGRESS NOTES
Daughter in law requested lab orders t be faxed to [facility].    Order faxed to [phone] for BMP, NT pro BNP and HGB draw for 04/12/2024 to ensure labs are resulted for 4/16 appt.     Updated MAGGIE follow-up order to Gen MAGGIE.  Arpita does not qualify for [facility] as her EF is >40%    Future Appointments   Date Time Provider Department Center   3/29/2024  9:30 AM Sadi Lamar MD CSPULM    4/11/2024 12:00 AM [facility]   4/16/2024  1:30 PM Kane Gil PA-C [facility] PSA CLIN   4/16/2024  4:00 PM Rosita Banuelos [facility] PSA CLIN   8/27/2024 10:30 AM Coming Ludivina Canales MD CRFP CR         ANKIT CrooksN, RN 8:54 AM 03/14/24

## 2024-03-15 NOTE — TELEPHONE ENCOUNTER
- Daquan calls back today and LM concerning the [facility] forms.    - Called and advised that patient will need to have a virtual visit to discuss further. Scheduled. Will go over forms at this time.    Mar 22, 2024  9:30 AM  Provider Visit with Ludivina Canales MD  [facility] [phone]     Nando Webber RN  Patient Advocate Liaison (PAL)  [facility]  [phone]    03/15/2024 at 4:35 PM

## 2024-03-21 NOTE — TELEPHONE ENCOUNTER
- Called Carla OT with [facility] concerning below  - Left detailed message of approval of orders. Will wait for faxed orders for PCP signature.    Nando Webber RN  Patient Advocate Liaison (PAL)  [facility]  [phone]    03/21/2024 at 5:54 PM

## 2024-03-21 NOTE — TELEPHONE ENCOUNTER
- Called to clarify orders below, is this a recert? Need to verify orders.    - LMTCB # 1    Nando Webber RN  Patient Advocate Liaison (PAL)  [facility]  [phone]    03/21/2024 at 3:46 PM

## 2024-03-21 NOTE — TELEPHONE ENCOUNTER
FYI - Status Update    Who is Calling: Carla rashid    Update: [facility] nurse requesting ongoing OT, 4 visits within 8 weeks.    Does caller want a call/response back: Yes     Could we send this information to you in [employer] or would you prefer to receive a phone call?:   Patient would prefer a phone call   Okay to leave a detailed message?: Yes at Other phone number:  [phone]

## 2024-03-21 NOTE — TELEPHONE ENCOUNTER
Home Care is calling regarding an established patient with [facility].        11/8/2023     3:59 PM   Home Care Information   Home Care agency [facility] [phone]     Requesting orders from: Ludivina Chamberlain  Provider is following patient: Yes  Is this a 60-day recertification request?  No    Orders Requested    Occupational Therapy  Request for initial certification (first set of orders)   Frequency:  4x/wk for 8 wks    Confirmed ok to leave a detailed message with call back.  Contact information confirmed and updated as needed.    OK to call Carla OT back @ [phone]    Nando Webber RN  Patient Advocate Liaison (PAL)  [facility]  [phone]    03/21/2024 at 4:42 PM

## 2024-03-22 NOTE — PROGRESS NOTES
"Minerva is a 88 year old who is being evaluated via a billable telephone visit.    What phone number would you like to be contacted at? [phone], (Pts number - [phone])  How would you like to obtain your AVS? Teagan  Originating Location (pt. Location): Home    Distant Location (provider location):  Off-site    Assessment & Plan     Administrative encounter  Forms for VA benefits completed today.    Recurrent major depressive disorder, in full remission (H24)  Stable on current medication, Continue citalaprom.  - Citalopram (Celexa) 20 mg    Parathyroid gland disorder (H24)  Stable per last labs, monitor.    Morbid obesity (H)  Continue to work on dietary changes, activity levels limited by pain and gait abnormality.    33 minutes spent by me on the date of the encounter doing chart review, history and exam, documentation and further activities per the note      BMI  Estimated body mass index is 34.88 kg/m  as calculated from the following:    Height as of 2/15/24: 1.448 m (4' 9\").    Weight as of 2/15/24: 73.1 kg (161 lb 3.2 oz).   Weight management plan: Discussed healthy diet and exercise guidelines      See Patient Instructions    Subjective   Minerva is a 88 year old, presenting for the following health issues:  Forms (VA form [id number])        3/22/2024     9:28 AM   Additional Questions   Roomed by AT     History of Present Illness       CKD: She uses over the counter pain medication, including Tylenol, two times daily.    COPD:  She presents for follow up of COPD.  Overall, COPD symptoms are stable since last visit.  She has more than usual fatigue or shortness of breath with exertion and same as usual shortness of breath at rest.  She often coughs and does not have change in sputum. No recent fever. She can walk the length of 1-2 rooms without stopping to rest. She can walk 1 flights of stairs without resting. The patient has had no ED, urgent care, or hospital admissions because of COPD since the last " "visit.     Heart Failure:  She presents for follow up of heart failure. She is experiencing shortness of breath with activity only, which is same as usual. She is experiencing lower extremity edema which is same as usual.   She denies orthopenea and coughs at night. Patient is checking weight daily. She has recently had a None.  She has no side effects from medications.  She has had no other medical visits for heart failure since the last visit.    Hypertension: She presents for follow up of hypertension.  She does check blood pressure  regularly outside of the clinic. Outpatient blood pressures have not been over 140/90. She follows a low salt diet.     Vascular Disease:  She presents for follow up of vascular disease.     She never takes nitroglycerin. She is not taking daily aspirin.    She eats 2-3 servings of fruits and vegetables daily.She consumes 1 sweetened beverage(s) daily.She exercises with enough effort to increase her heart rate 9 or less minutes per day.  She exercises with enough effort to increase her heart rate 3 or less days per week.   She is taking medications regularly.     Last Echo: Echo result w/o MOPS: Interpretation Summary 1. The left ventricle is normal in structure, function and size. The visualejection fraction is estimated at 55%.2. The right ventricle is normal in structure, function and size.3. There is mild to moderate (1-2+) mitral regurgitation. Echo 7/2023 showed EF 60%, 1-2+ MR.        Concern - forms             Objective    Vitals - Patient Reported  Systolic (Patient Reported): 95  Diastolic (Patient Reported): 47  Blood pressure taken with manual cuff (will exclude from quality measure): Yes  Weight (Patient Reported): 71.1 kg (156 lb 12.8 oz)  Height (Patient Reported): 147.3 cm (4' 10\")  BMI (Based on Pt Reported Ht/Wt): 32.77  Pulse (Patient Reported): 63  Pain Score: No Pain (0)      Vitals:  No vitals were obtained today due to virtual visit.    Physical Exam "   General: Alert and no distress //Respiratory: No audible wheeze, cough, or shortness of breath // Psychiatric:  Appropriate affect, tone, and pace of words          Phone call duration: 28 minutes  Signed Electronically by: Ludivina Canales MD

## 2024-03-28 NOTE — PROGRESS NOTES
Pulmonary Clinic Note    Date of Service: 3/29/2024     Chief Complaint   Patient presents with    Asthma Recheck       A/P:  88F being seen for follow up COPD. One exacerbation recently requiring hospitalization, there was also a heart failure component leading to this. She is currently doing very well.     - continue LABA-LAMA (Anoro) daily  - continue prn albuterol and duonebs  - repeat chest CT 2024 to follow RUL nodule   - OK to substitute medications based on formulary     History:  88F being seen for follow up COPD. Last seen 2023. She is prescribed LABA-LAMA (Anoro), prn duonebs, and prn albuterol. She was admitted to [facility] - for acute hypoxemic respiratory failure 2/2 possible CAP, COPD exacerbation, and HF exacerbation. Has been doing well. KWAN w/ moderate-strenuous activity. No SOB at rest. No chest pain or tightness. Wheezing w/ exertion. Minimal cough, dry. No nocturnal cough. No orthopnea, does cough if lying flat, this is not new. No PND. Stable LE edema. Using duonebs twice daily and albuterol twice daily, using this as a schedule, not due to increasing SOB, this is very helpful. Using Anoro.     Smokin pack year history, quit ~    Bird exposure: no             Animal exposure: no, prior cats        Inhalation exposure: no    Occupation: retired, former nurses aide                          10 point review of systems negative, aside from that mentioned in HPI.    /50   Pulse 60   Wt 71.7 kg (158 lb)   SpO2 93%   BMI 34.19 kg/m    Gen: well-appearing  HEENT: Mallampati I  Card: RRR  Pulm: clear bilaterally   Abd: soft  MSK: no edema, no acute joint abnormality   Skin: no obvious rash  Psych: normal affect  Neuro: alert and oriented     Labs:  Personally reviewed    Imaging/Studies: Personally reviewed  CT Chest (2023) - mild bibasilar linear opacities, 4mm RUL nodule  PFTs (2023) - mild obstruction, normal lung volumes, moderate reduction in DLCOunc  CXR  (2023) - no acute disease     TTE (2023) - EF 60-65%, grade I diastolic dysfunction    Past Medical History:   Diagnosis Date    Ulcerative pancolitis (H) 2023     Past Surgical History:   Procedure Laterality Date    CATARACT EXTRACTION Bilateral     COLONOSCOPY      FEMUR FRACTURE SURGERY Right     2010    ILEOSTOMY      IMPLANT PACEMAKER      left knee replacement      MULTIPLE TOOTH EXTRACTIONS      right knee replacement Right     SPINAL FUSION      TONSILLECTOMY      TUBAL LIGATION       Family History   Problem Relation Age of Onset    Cerebrovascular Disease Mother     Diabetes Mother     Hypertension Mother     Cancer Mother     Hypertension Father     Cerebrovascular Disease Sister     Hypertension Sister     Hypertension Sister     Breast Cancer Sister     Dementia Sister     Hypertension Brother     Cancer Brother     Hypertension Brother     Cancer Brother     Emphysema Brother      Social History     Socioeconomic History    Marital status:      Spouse name: Not on file    Number of children: Not on file    Years of education: Not on file    Highest education level: Not on file   Occupational History    Not on file   Tobacco Use    Smoking status: Former     Packs/day: 1.00     Years: 44.00     Additional pack years: 0.00     Total pack years: 44.00     Types: Cigarettes     Start date: 1952     Quit date: 1996     Years since quittin.2    Smokeless tobacco: Never   Vaping Use    Vaping Use: Never used   Substance and Sexual Activity    Alcohol use: Not Currently    Drug use: Never    Sexual activity: Not Currently     Partners: Male   Other Topics Concern    Not on file   Social History Narrative    Not on file     Social Determinants of Health     Financial Resource Strain: Low Risk  (2023)    Financial Resource Strain     Within the past 12 months, have you or your family members you live with been unable to get utilities (heat, electricity) when it was really  needed?: No   Food Insecurity: Low Risk  (12/5/2023)    Food Insecurity     Within the past 12 months, did you worry that your food would run out before you got money to buy more?: No     Within the past 12 months, did the food you bought just not last and you didn t have money to get more?: No   Transportation Needs: Low Risk  (12/5/2023)    Transportation Needs     Within the past 12 months, has lack of transportation kept you from medical appointments, getting your medicines, non-medical meetings or appointments, work, or from getting things that you need?: No   Physical Activity: Inactive (8/14/2023)    Exercise Vital Sign     Days of Exercise per Week: 0 days     Minutes of Exercise per Session: 0 min   Stress: No Stress Concern Present (8/14/2023)    [employer] - Occupational Stress Questionnaire     Feeling of Stress : Not at all   Social Connections: Moderately Isolated (8/14/2023)    Social Connection and Isolation Panel [NHANES]     Frequency of Communication with Friends and Family: Twice a week     Frequency of Social Gatherings with Friends and Family: More than three times a week     Attends Jew Services: More than 4 times per year     Active Member of Clubs or Organizations: No     Attends Club or Organization Meetings: Not on file     Marital Status:    Interpersonal Safety: Not on file   Housing Stability: Low Risk  (12/5/2023)    Housing Stability     Do you have housing? : Yes     Are you worried about losing your housing?: No       35 minutes spent reviewing chart, reviewing test results, talking with and examining patient, formulating plan, and documentation on the day of the encounter.    Sadi Lamar MD  Pulmonary and Critical Care Medicine  [facility]

## 2024-03-29 NOTE — PATIENT INSTRUCTIONS
Continue Anoro daily. Continue as needed albuterol or duonebs for worsening shortness of breath or 5-10 minutes prior to activity. I will see you back in early 2025.

## 2024-03-29 NOTE — LETTER
3/29/2024         RE: Arpita Rocha  [address]  [place] [address]        Dear Colleague,    Thank you for referring your patient, Arpita Rocha, to the [facility] [place]. Please see a copy of my visit note below.    Pulmonary Clinic Note    Date of Service: 3/29/2024     Chief Complaint   Patient presents with     Asthma Recheck       A/P:  88F being seen for follow up COPD. One exacerbation recently requiring hospitalization, there was also a heart failure component leading to this. She is currently doing very well.     - continue LABA-LAMA (Anoro) daily  - continue prn albuterol and duonebs  - repeat chest CT 2024 to follow RUL nodule   - OK to substitute medications based on formulary     History:  88F being seen for follow up COPD. Last seen 2023. She is prescribed LABA-LAMA (Anoro), prn duonebs, and prn albuterol. She was admitted to [facility] - for acute hypoxemic respiratory failure 2/2 possible CAP, COPD exacerbation, and HF exacerbation. Has been doing well. KWAN w/ moderate-strenuous activity. No SOB at rest. No chest pain or tightness. Wheezing w/ exertion. Minimal cough, dry. No nocturnal cough. No orthopnea, does cough if lying flat, this is not new. No PND. Stable LE edema. Using duonebs twice daily and albuterol twice daily, using this as a schedule, not due to increasing SOB, this is very helpful. Using Anoro.     Smokin pack year history, quit ~    Bird exposure: no             Animal exposure: no, prior cats        Inhalation exposure: no    Occupation: retired, former nurses aide                          10 point review of systems negative, aside from that mentioned in HPI.    /50   Pulse 60   Wt 71.7 kg (158 lb)   SpO2 93%   BMI 34.19 kg/m    Gen: well-appearing  HEENT: Mallampati I  Card: RRR  Pulm: clear bilaterally   Abd: soft  MSK: no edema, no acute joint abnormality   Skin: no obvious rash  Psych: normal  affect  Neuro: alert and oriented     Labs:  Personally reviewed    Imaging/Studies: Personally reviewed  CT Chest (2023) - mild bibasilar linear opacities, 4mm RUL nodule  PFTs (2023) - mild obstruction, normal lung volumes, moderate reduction in DLCOunc  CXR (2023) - no acute disease     TTE (2023) - EF 60-65%, grade I diastolic dysfunction    Past Medical History:   Diagnosis Date     Ulcerative pancolitis (H) 2023     Past Surgical History:   Procedure Laterality Date     CATARACT EXTRACTION Bilateral      COLONOSCOPY       FEMUR FRACTURE SURGERY Right     2010     ILEOSTOMY       IMPLANT PACEMAKER       left knee replacement       MULTIPLE TOOTH EXTRACTIONS       right knee replacement Right      SPINAL FUSION       TONSILLECTOMY       TUBAL LIGATION       Family History   Problem Relation Age of Onset     Cerebrovascular Disease Mother      Diabetes Mother      Hypertension Mother      Cancer Mother      Hypertension Father      Cerebrovascular Disease Sister      Hypertension Sister      Hypertension Sister      Breast Cancer Sister      Dementia Sister      Hypertension Brother      Cancer Brother      Hypertension Brother      Cancer Brother      Emphysema Brother      Social History     Socioeconomic History     Marital status:      Spouse name: Not on file     Number of children: Not on file     Years of education: Not on file     Highest education level: Not on file   Occupational History     Not on file   Tobacco Use     Smoking status: Former     Packs/day: 1.00     Years: 44.00     Additional pack years: 0.00     Total pack years: 44.00     Types: Cigarettes     Start date: 1952     Quit date: 1996     Years since quittin.2     Smokeless tobacco: Never   Vaping Use     Vaping Use: Never used   Substance and Sexual Activity     Alcohol use: Not Currently     Drug use: Never     Sexual activity: Not Currently     Partners: Male   Other Topics Concern     Not on file    Social History Narrative     Not on file     Social Determinants of Health     Financial Resource Strain: Low Risk  (12/5/2023)    Financial Resource Strain      Within the past 12 months, have you or your family members you live with been unable to get utilities (heat, electricity) when it was really needed?: No   Food Insecurity: Low Risk  (12/5/2023)    Food Insecurity      Within the past 12 months, did you worry that your food would run out before you got money to buy more?: No      Within the past 12 months, did the food you bought just not last and you didn t have money to get more?: No   Transportation Needs: Low Risk  (12/5/2023)    Transportation Needs      Within the past 12 months, has lack of transportation kept you from medical appointments, getting your medicines, non-medical meetings or appointments, work, or from getting things that you need?: No   Physical Activity: Inactive (8/14/2023)    Exercise Vital Sign      Days of Exercise per Week: 0 days      Minutes of Exercise per Session: 0 min   Stress: No Stress Concern Present (8/14/2023)    [employer] - Occupational Stress Questionnaire      Feeling of Stress : Not at all   Social Connections: Moderately Isolated (8/14/2023)    Social Connection and Isolation Panel [NHANES]      Frequency of Communication with Friends and Family: Twice a week      Frequency of Social Gatherings with Friends and Family: More than three times a week      Attends Confucianist Services: More than 4 times per year      Active Member of Clubs or Organizations: No      Attends Club or Organization Meetings: Not on file      Marital Status:    Interpersonal Safety: Not on file   Housing Stability: Low Risk  (12/5/2023)    Housing Stability      Do you have housing? : Yes      Are you worried about losing your housing?: No       35 minutes spent reviewing chart, reviewing test results, talking with and examining patient, formulating plan,  and documentation on the day of the encounter.    Sadi Lamar MD  Pulmonary and Critical Care Medicine  [facility]       Again, thank you for allowing me to participate in the care of your patient.        Sincerely,        Sadi Lamar MD

## 2024-04-01 NOTE — PROGRESS NOTES
"[facility]:   HRS ([employer]) Daily Alert     April 1, 2024    Alert(s): Weight ~4-5 lbs below goal x3 days    Sent [employer] message to Minerva/family in follow-up of weight decrease.  [employer] message received, will await additional updates:  \"She says she feels fine. She did just move into assisted living and is moving around more. I ll double check with her but I don t think she s taken any extra torsemide.\"    Recent plan of care changes:   --2/6/24 MAGGIE visit, SOB/cough, CXR, add on NtproBNP, follow up with Nephrology and Pulmonology   --2/8/24 telephone note, CXR overall stable, recommended reaching out to PCP and Pulmonology for further suggestions, continue Torsemide 20mg daily PRN  --2/20/24 Alert for low BP, felt inaccurate due to not using cuff correctly, feeling ok, moving into intermediate, OK to monitor BP 4-5 times a week when family present to assist  --3/22/24 PCP follow-up: HF sx stable, home weight 157  --3/29/24 pulm follow-up: no changes    Current diuretic plan:   Torsemide 20 mg daily with extra 20 mg daily as needed for weight gain/HF sx    Goal Weight Range: 158-164 lbs    Weight, BP and HR Readings:         Time spent in review this day: 20 minutes    Future Appointments   Date Time Provider Department Center   4[facility] 12[facility]   4/16/2024  1:30 PM Kane Gil, WAGNER [facility] CLIN   4/16/2024  4:00 PM [facility]   8/27/2024 10:30 AM Ludivina Chamberlain MD CRFP CR   1/8/2025  8:30 AM Sadi Lamar MD CSPULM CS     Aida Chavez RN BSN   1:49 PM 04/01/24; UPDATED 4/1/2024 2:25 PM  Nurse line M-RD 8a-4p: [phone]      "

## 2024-04-01 NOTE — TELEPHONE ENCOUNTER
MA/TC:    [facility] calls to inquire if orders have been received by fax. Recommended sending another fax request to [phone].     Oscar Hutchinson RN on 4/1/2024 at 11:30 AM

## 2024-04-02 NOTE — TELEPHONE ENCOUNTER
Received fax from [facility], sent to Dr. Jose Canales, faxing back today. [phone]. Ruth Behrens.

## 2024-04-02 NOTE — PROGRESS NOTES
[facility]     Minerva's weight is down to 152.8 lbs today, and has been gradually trending lower over the last month. Current goal weight range is 158-164 lbs.     Minerva moved into [facility] ~2 weeks ago and family thinks weight loss could be in part due to being more active with more assistance available to her. Family advised yesterday that Minerva's been feeling 'fine'.     She has BMP, BNP, Hgb ordered through home care 4/12, and cardiology follow-up with Kane Gil PAC 4/16. Will route to Dr. Gonzalez to see if any changes prior to this vs continued cautious monitoring.    Recent plan of care changes:   --2/6/24 MAGGIE visit, SOB/cough, CXR, add on NtproBNP, follow up with Nephrology and Pulmonology   --2/8/24 telephone note, CXR overall stable, recommended reaching out to PCP and Pulmonology for further suggestions, continue Torsemide 20mg daily PRN  --2/20/24 Alert for low BP, felt inaccurate due to not using cuff correctly, feeling ok, moving into [facility], OK to monitor BP 4-5 times a week when family present to assist  --3/22/24 PCP follow-up: HF sx stable, home weight 157  --3/29/24 pulm follow-up: no changes          Future Appointments   Date Time Provider Department Center   4/11/2024 12:00 AM YSABEL BARNARD [facility]   4/16/2024  1:30 PM Kane Gil PA-C [facility] PSA CLIN   4/16/2024  4:00 PM HfrnYsabel [facility] PSA CLIN   8/27/2024 10:30 AM Ludivina Chamberlain MD CRFP CR   1/8/2025  8:30 AM Sadi Lamra MD CSPULM CS     Time spent: 10 minute    Aida Chavez RN BSN   12:21 PM 04/02/24  Nurse line M-RD 8a-4p: [phone]

## 2024-04-05 NOTE — PROGRESS NOTES
[facility]   Mary Gonzalez DO Wagner, Aida Doherty, RN1 hour ago (8:57 AM)     Just wait til follow-up visit and labs to make any changes.     Will continue to monitor unless drastic changes or new symptoms prior to 4/16 follow up.     Future Appointments   Date Time Provider Department Center   4/11/2024 12:00 AM GRADY TECH[address] [place] [address] PSA CLIN   4/16/2024  1:30 PM Kane Gil PA-C [facility] PSA CLIN   4/16/2024  4:00 PM Rosita Banuelos [facility] PSA CLIN   8/27/2024 10:30 AM Coming Ludivina Canales MD CRFP CR   1/8/2025  8:30 AM Sadi Lamar MD CSPULM CS      Time spent: 5 min     Shanell Keen RN, BSN  04/05/24 at 10:01 AM

## 2024-04-12 NOTE — TELEPHONE ENCOUNTER
----- Message from Carol Washington RN sent at 4/12/2024 10:19 AM CDT -----  Hi ladies - this looks to be a CORE pt - let me know if not! Thanks!  ----- Message -----  From: Kane Gil PA-C  Sent: 4/12/2024  10:15 AM CDT  To: [facility] Heart Nursing Team    Results reviewed.     NT pro BNP elevated - will assess volume status at upcoming visit.     K is a  little high. I do not see that she is on ACEi/ARB/ARNI or aure.  If so, please let me know and we may reduce.   If she is taking KCL, please let me know and we will reduce or stop.   Repeat AM of our appt please.       Hgb is looking improved.       Thanks,   Kane Gil PA-C 4/12/2024 10:15 AM

## 2024-04-12 NOTE — TELEPHONE ENCOUNTER
Call placed to Minerva.  No answer, left message for a call back    ALEJANDRA Crooks, RN 1:15 PM 04/12/24

## 2024-04-15 NOTE — TELEPHONE ENCOUNTER
Home Care is calling regarding an established patient with  [facility].        11/8/2023     3:59 PM   Home Care Information   Home Care agency [facility] [phone]     Requesting orders from: Ludivina Chamberlain  Provider is following patient: Yes  Is this a 60-day recertification request?  No    Orders Requested    Social Work  Request for initial evaluation and treatment (one time)       Confirmed ok to leave a detailed message with call back.  Contact information confirmed and updated as needed.    Yoko Chen RN

## 2024-04-15 NOTE — TELEPHONE ENCOUNTER
[facility]:   HRS ([employer]) Daily Review    April 15, 2024    Notes:   Pt has been in current goal weight range at 153.6 - 156 lbs since 04/04/2024.  Pt needs to be reminded to complete blood pressure and pulse ox.     Current GDMT:   - Amlodipine 10 mg tablet: Take 1 tablet (10 mg) by mouth daily   - Carvedilol 25 mg tablet: Take 1.5 tablets (37.5 mg) by mouth 2 times daily (with meals)   - Torsemide 20 mg tablet: Take 1 tablet (20 mg) by mouth daily With an additional 20mg as needed for weight gain > 2# overnight or worsening symptoms (leg swelling/breathing).     - Clonidine 0.2 mg tablet: TAKE 1 TABLET BY MOUTH TWICE A DAY       Recent plan of care changes:   --2/6/24 MAGGIE visit, SOB/cough, CXR, add on NtproBNP, follow up with Nephrology and Pulmonology   --2/8/24 telephone note, CXR overall stable, recommended reaching out to PCP and Pulmonology for further suggestions, continue Torsemide 20mg daily PRN  --2/20/24 Alert for low BP, felt inaccurate due to not using cuff correctly, feeling ok, moving into group home, OK to monitor BP 4-5 times a week when family present to assist  --3/22/24 PCP follow-up: HF sx stable, home weight 157  --3/29/24 pulm follow-up: no changes    Goal Weight Range: 154-164 lbs per Dr Gonzalez (weigh goal to be adjusted at 4/16/24 OV with JOSE Rader.       Weight, BP and HR Readings:               Time spent in review this day: 5 min.      Future Appointments   Date Time Provider Department Center   4/16/2024  1:30 PM Kane Gil PA-C RUUMHT [facility] PSA CLIN   4/16/2024  4:00 PM HfYsabel miller [facility] PSA CLIN   8/[facility]2024 [facility]   8/27/2024 10:30 AM Ludivina Chamberlain MD CRFP CR   1/8/2025  8:30 AM [facility]         ANKIT CrooksN, RN 12:24 PM 04/15/24

## 2024-04-16 NOTE — PATIENT INSTRUCTIONS
Due to the occasional low blood pressures, split the amlodipine to 5 mg twice daily.     On exam you look like you are at a pretty good spot fluid-wise, possibly just retaining a little bit.   Continue your current torsemide 20 mg dosing. You can take an extra dose if your weight goes up >3 lbs in one day.

## 2024-04-16 NOTE — PROGRESS NOTES
[facility]:   [facility] ([place]) Routine Remote Evaluation     HRS Enrollment date: 1/26/24      Dates: 3/13/24/24 through 4/16/2024      This is not the first billing cycle.      Alerts in the last month:   --weights slowly trending downward; plan per Dr. Gonzalez is to address this at follow-up visit today     These adjustments have been discussed with the patient/caregiver and they express verbal understanding.     Goal Weight: was 158-164 lbs  but with slow weight loss this month was decreased to 154-164 lbs     Readings:         Future Appointments   Date Time Provider Department Center   4/16/2024 12:30 PM RU LAB [facility] [place] RID   4/16/2024  1:30 PM [facility]   4/16/2024  4:00 PM Hfrn, Grady SUUMHT P PSA CLIN   5/21/2024  4:00 PM Hfrn, Grady SUUMHT UMP PSA CLIN   8/1/2024 12:00 AM GRADY TECH1 SUUMPC UMP PSA CLIN   8/27/2024 10:30 AM Ludivina Chamberlain MD CRFP CR   1/8/2025  8:30 AM Sadi Lamar MD CSPULM CS     Aida Chavez RN BSN   12:14 PM 04/16/24  Nurse line M-RD 8a-4p: [phone]    Weights reviewed on HRS. Continue to monitor.     Diuretics adjusted accordingly.  30 days of remote monitoring completed.         Cody Marks MD

## 2024-04-16 NOTE — PROGRESS NOTES
CARDIOLOGY CLINIC PROGRESS NOTE    DOS: 2024      Arpita Rocha  : 1935, 88 year old  MRN: [id number]      Primary cardiologist: Dr. Gonzalez       History: Arpita Rocha is a very pleasant 88 year old female with a history of  HFpEF, SSS s/p PPM, COPD, CKD, HTN, CKD and carotid artery stenosis.     Patient moved from [place] to be closer to son/daughter-in-law (Michelle, who is one of our schedulers in clinic), she established care with Dr. Gonzalez after moving to the area.      Hospitalized -23 with PNA and acute HFpEF/COPD exacerbation, she was initially diuresed with IV furosemide. Discharged on prednisone and torsemide 20 mg BID (previously Lasix 40 mg daily) and clonidine increased to 0.2 mg BID. Discharge weight 163 lbs. Cardiology not involved during admission.      Echo 23 showed LVEF 55%, RV normal size/function, mild to moderate mitral regurgitation     Device interrogation 22 showed fair variability in HR, no atrial arrhythmia, one 5 beat run of NSVT.      She established with [facility] 24. She was hypotensive, 75/41, asymptomatic. BMP at time of post hospital follow up with PCP on 22 showed decline in renal function with creatinine of 2.7.   Kinza lowered torsemide to 20 mg daily.     She was enrolled into HRS.  Initially weight range was 158-164 lbs., then decreased to 154-164 lbs.             Patient is here today for cardiology follow up.   Weights have been stable around 152-156 lbs.   Breathing improved.   Coughing resolved.   Mild edema R>L is stable. Present for many years.   /64 today but no meds yet today.    She has occasionally quite low BPs and feels lousey.  this is after taking meds.  She feels better within 20-30 minutes.   Her exam suggest near euvolemia, perhaps just mild fluid retention.   Appetite good. Now lives in assisted living. They do cleaning and laundry. She has an alert button. They do not do vitals or weights.   Denies  alcohol use. Denies tobacco use (quit at age 57).             ROS:  Skin:  not assessed     Eyes:  not assessed    ENT:  not assessed    Respiratory:  Positive for dyspnea on exertion  Cardiovascular:    edema;Positive for  Gastroenterology: not assessed    Genitourinary:  not assessed    Musculoskeletal:  not assessed    Neurologic:  not assessed    Psychiatric:  not assessed    Heme/Lymph/Imm:  not assessed    Endocrine:  not assessed      PAST MEDICAL HISTORY:  Past Medical History:   Diagnosis Date    Ulcerative pancolitis (H) 2023       PAST SURGICAL HISTORY:  Past Surgical History:   Procedure Laterality Date    CATARACT EXTRACTION Bilateral     COLONOSCOPY      FEMUR FRACTURE SURGERY Right     2010    ILEOSTOMY      IMPLANT PACEMAKER      left knee replacement      MULTIPLE TOOTH EXTRACTIONS      right knee replacement Right     SPINAL FUSION      TONSILLECTOMY      TUBAL LIGATION         SOCIAL HISTORY:  Social History     Socioeconomic History    Marital status:    Tobacco Use    Smoking status: Former     Current packs/day: 0.00     Average packs/day: 1 pack/day for 44.0 years (44.0 ttl pk-yrs)     Types: Cigarettes     Start date: 1952     Quit date: 1996     Years since quittin.3    Smokeless tobacco: Never   Vaping Use    Vaping status: Never Used   Substance and Sexual Activity    Alcohol use: Not Currently    Drug use: Never    Sexual activity: Not Currently     Partners: Male     Social Determinants of Health     Financial Resource Strain: Low Risk  (2023)    Financial Resource Strain     Within the past 12 months, have you or your family members you live with been unable to get utilities (heat, electricity) when it was really needed?: No   Food Insecurity: Low Risk  (2023)    Food Insecurity     Within the past 12 months, did you worry that your food would run out before you got money to buy more?: No     Within the past 12 months, did the food you bought just not  last and you didn t have money to get more?: No   Transportation Needs: Low Risk  (12/5/2023)    Transportation Needs     Within the past 12 months, has lack of transportation kept you from medical appointments, getting your medicines, non-medical meetings or appointments, work, or from getting things that you need?: No   Physical Activity: Inactive (8/14/2023)    Exercise Vital Sign     Days of Exercise per Week: 0 days     Minutes of Exercise per Session: 0 min   Stress: No Stress Concern Present (8/14/2023)    [employer] - Occupational Stress Questionnaire     Feeling of Stress : Not at all   Social Connections: Moderately Isolated (8/14/2023)    Social Connection and Isolation Panel [NHANES]     Frequency of Communication with Friends and Family: Twice a week     Frequency of Social Gatherings with Friends and Family: More than three times a week     Attends Restorationist Services: More than 4 times per year     Active Member of Clubs or Organizations: No     Marital Status:    Housing Stability: Low Risk  (12/5/2023)    Housing Stability     Do you have housing? : Yes     Are you worried about losing your housing?: No       FAMILY HISTORY:  Family History   Problem Relation Age of Onset    Cerebrovascular Disease Mother     Diabetes Mother     Hypertension Mother     Cancer Mother     Hypertension Father     Cerebrovascular Disease Sister     Hypertension Sister     Hypertension Sister     Breast Cancer Sister     Dementia Sister     Hypertension Brother     Cancer Brother     Hypertension Brother     Cancer Brother     Emphysema Brother        MEDS:   Current Outpatient Medications   Medication Sig Dispense Refill    acetaminophen (TYLENOL) 650 MG CR tablet Take 1,300 mg by mouth every morning      amLODIPine (NORVASC) 5 MG tablet Take 1 tablet (5 mg) by mouth 2 times daily 180 tablet 3    atorvastatin (LIPITOR) 40 MG tablet Take 1 tablet (40 mg) by mouth daily 30 tablet 11     budesonide (PULMICORT) 0.5 MG/2ML neb solution Take 2 mLs (0.5 mg) by nebulization 2 times daily 360 mL 3    carvedilol (COREG) 25 MG tablet Take 1.5 tablets (37.5 mg) by mouth 2 times daily (with meals) 180 tablet 4    citalopram (CELEXA) 20 MG tablet TAKE 1.5 TAB BY MOUTH EVERY  tablet 1    cloNIDine (CATAPRES) 0.2 MG tablet TAKE 1 TABLET BY MOUTH TWICE A  tablet 3    clopidogrel (PLAVIX) 75 MG tablet Take 1 tablet (75 mg) by mouth daily 30 tablet 11    Cranberry 450 MG TABS       diphenhydrAMINE-acetaminophen (TYLENOL PM)  MG tablet Take 2 tablets by mouth at bedtime      doxycycline hyclate (VIBRAMYCIN) 100 MG capsule Take 1 capsule (100 mg) by mouth three times a week 39 capsule 3    ferrous sulfate (FEROSUL) 325 (65 Fe) MG tablet Take 2 tablets (650 mg) by mouth daily (with breakfast) 180 tablet 3    ipratropium - albuterol 0.5 mg/2.5 mg/3 mL (DUONEB) 0.5-2.5 (3) MG/3ML neb solution Take 1 vial (3 mLs) by nebulization every 6 hours 180 mL 3    montelukast (SINGULAIR) 10 MG tablet Take 1 tablet (10 mg) by mouth At Bedtime 90 tablet 3    multivitamin w/minerals (THERA-VIT-M) tablet Take 1 tablet by mouth daily      Ostomy Supplies (ADAPT) PSTE Use with new ostomy pouch and drain  g 3    Ostomy Supplies (PREMIER DRAINABLE POUCH 22MM) Pouch MISC Use as directed every 3-4 days 5 each 12    sodium bicarbonate 650 MG tablet Take 1 tablet (650 mg) by mouth 2 times daily 180 tablet 0    torsemide (DEMADEX) 20 MG tablet Take 1 tablet (20 mg) by mouth daily With an additional 20 mg as needed for weight gain > 3# overnight or worsening symptoms (leg swelling/breathing). 100 tablet 3    umeclidinium-vilanterol (ANORO ELLIPTA) 62.5-25 MCG/ACT oral inhaler Inhale 1 puff into the lungs daily 1 each 11    nystatin (MYCOSTATIN) 120138 UNIT/GM external powder Apply topically 3 times daily as needed for other (rash) (Patient not taking: Reported on 4/16/2024) 60 g 1     No current facility-administered  "medications for this visit.       ALLERGIES:   Allergies   Allergen Reactions    Acetaminophen-Codeine Hallucination    Penicillin G Rash       PHYSICAL EXAM:  Vitals: BP (!) 158/64 (BP Location: Right arm, Patient Position: Sitting, Cuff Size: Adult Regular)   Pulse 60   Ht 1.422 m (4' 8\")   Wt 72.1 kg (159 lb)   SpO2 92%   BMI 35.65 kg/m    Constitutional:  cooperative;in no acute distress chronically ill;obese      Skin:  warm and dry to the touch        Head:  normocephalic        Eyes:  pupils equal and round;conjunctivae and lids unremarkable;sclera white        ENT:  no pallor or cyanosis        Neck:    right carotid bruit      Respiratory:      mild kyphosis, few bibasilar rales    Cardiac: regular rhythm;no murmurs, gallops or rubs detected              + mild JVD, no HJR    GI:  abdomen soft obese ostomy    Vascular:   pulses below the femoral arteries are diminished                                    Extremities and Musculoskeletal:        bilateral LEs with varicose veins, trace edema R>L    Neurological:  no gross motor deficits;affect appropriate              LABS/DATA:  I reviewed the following:  Component      Latest Ref Rng 2/5/2024  9:10 AM 4/9/2024  1:00 PM 4/16/2024  12:23 PM   Sodium      135 - 145 mmol/L 140  139     Potassium      3.4 - 5.3 mmol/L 5.2  5.4 (H)  5.1    Chloride      98 - 107 mmol/L 104  103     Carbon Dioxide (CO2)      22 - 29 mmol/L 24  20 (L)     Anion Gap      7 - 15 mmol/L 12  16 (H)     Urea Nitrogen      8.0 - 23.0 mg/dL 95.4 (H)  74.6 (H)     Creatinine      0.51 - 0.95 mg/dL 2.75 (H)  2.74 (H)     GFR Estimate      >60 mL/min/1.73m2 16 (L)  16 (L)     Calcium      8.8 - 10.2 mg/dL 8.9  9.1     Glucose      70 - 99 mg/dL 104 (H)  99     N-Terminal Pro Bnp      0 - 1,800 pg/mL 2,383 (H)  5,381 (H)     Hemoglobin      11.7 - 15.7 g/dL  11.4 (L)                    ASSESSMENT/PLAN:  1. Chronic HFpEF -  LVEF 55%, RV normal size/function.   She appears euvolemic or just " slightly volume up. Weights have been running 152-156 lbs. Will lower range to 150-158 lbs on HRS. We will continue HRS as although she lives in assisted living, they are not proving medical cares yet.   BMP is overall stable post discharge.   Continue torsemide 20 mg daily.   Daily weights  Low sodium      2. Hypertension  Controlled on amlodipine 10 mg, Coreg 37.5 mg BID, clonidine 0.2 mg BID, torsemide 20 mg  After her AM pills, she can have some sxs hypotension lasting a short time  Split amlodipine to 5 mg BID      3. Mild to moderate mitral regurgitation    4. SSS s/p PPM  Device checks per device clinic    5. Acute on chronic kidney disease - baseline creatinine ~ 2.1, since hospital discharge 2.7-2.8. Had seen a kidney doctor in [place] and at the .  She does not want dialysis.     6. COPD    7. Obesity     8. Bilateral carotid artery stenosis - severe, has declined surgery consideration, on plavix.          Follow up:  Continue HRS  3 months with CORE and BMP, NT pro BNP prior, sooner if any concerns arise      Kane Gil PA-C

## 2024-04-16 NOTE — LETTER
2024    Ludivina Canales MD  [address] [address] [facility] [address]    RE: Arpita Rocha       Dear Colleague,     I had the pleasure of seeing Arpita Rocha in the [facility].      CARDIOLOGY CLINIC PROGRESS NOTE    DOS: 2024      Arpita Rocha  : 1935, 88 year old  MRN: [id number]      Primary cardiologist: Dr. Gonzalez       History: Arpita Rocha is a very pleasant 88 year old female with a history of  HFpEF, SSS s/p PPM, COPD, CKD, HTN, CKD and carotid artery stenosis.     Patient moved from [place] to be closer to son/daughter-in-law (Michelle, who is one of our schedulers in clinic), she established care with Dr. Gonzalez after moving to the area.      Hospitalized -23 with PNA and acute HFpEF/COPD exacerbation, she was initially diuresed with IV furosemide. Discharged on prednisone and torsemide 20 mg BID (previously Lasix 40 mg daily) and clonidine increased to 0.2 mg BID. Discharge weight 163 lbs. Cardiology not involved during admission.      Echo 23 showed LVEF 55%, RV normal size/function, mild to moderate mitral regurgitation     Device interrogation 22 showed fair variability in HR, no atrial arrhythmia, one 5 beat run of NSVT.      She established with Kinza 24. She was hypotensive, 75/41, asymptomatic. BMP at time of post hospital follow up with PCP on 22 showed decline in renal function with creatinine of 2.7.   Kinza lowered torsemide to 20 mg daily.     She was enrolled into HRS.  Initially weight range was 158-164 lbs., then decreased to 154-164 lbs.             Patient is here today for cardiology follow up.   Weights have been stable around 152-156 lbs.   Breathing improved.   Coughing resolved.   Mild edema R>L is stable. Present for many years.   /64 today but no meds yet today.    She has occasionally quite low BPs and feels lousey.  this is after taking meds.  She feels better within 20-30 minutes.    Her exam suggest near euvolemia, perhaps just mild fluid retention.   Appetite good. Now lives in assisted living. They do cleaning and laundry. She has an alert button. They do not do vitals or weights.   Denies alcohol use. Denies tobacco use (quit at age 57).             ROS:  Skin:  not assessed     Eyes:  not assessed    ENT:  not assessed    Respiratory:  Positive for dyspnea on exertion  Cardiovascular:    edema;Positive for  Gastroenterology: not assessed    Genitourinary:  not assessed    Musculoskeletal:  not assessed    Neurologic:  not assessed    Psychiatric:  not assessed    Heme/Lymph/Imm:  not assessed    Endocrine:  not assessed      PAST MEDICAL HISTORY:  Past Medical History:   Diagnosis Date    Ulcerative pancolitis (H) 2023       PAST SURGICAL HISTORY:  Past Surgical History:   Procedure Laterality Date    CATARACT EXTRACTION Bilateral     COLONOSCOPY      FEMUR FRACTURE SURGERY Right     2010    ILEOSTOMY      IMPLANT PACEMAKER      left knee replacement      MULTIPLE TOOTH EXTRACTIONS      right knee replacement Right     SPINAL FUSION      TONSILLECTOMY      TUBAL LIGATION         SOCIAL HISTORY:  Social History     Socioeconomic History    Marital status:    Tobacco Use    Smoking status: Former     Current packs/day: 0.00     Average packs/day: 1 pack/day for 44.0 years (44.0 ttl pk-yrs)     Types: Cigarettes     Start date: 1952     Quit date: 1996     Years since quittin.3    Smokeless tobacco: Never   Vaping Use    Vaping status: Never Used   Substance and Sexual Activity    Alcohol use: Not Currently    Drug use: Never    Sexual activity: Not Currently     Partners: Male     Social Determinants of Health     Financial Resource Strain: Low Risk  (2023)    Financial Resource Strain     Within the past 12 months, have you or your family members you live with been unable to get utilities (heat, electricity) when it was really needed?: No   Food Insecurity:  Low Risk  (12/5/2023)    Food Insecurity     Within the past 12 months, did you worry that your food would run out before you got money to buy more?: No     Within the past 12 months, did the food you bought just not last and you didn t have money to get more?: No   Transportation Needs: Low Risk  (12/5/2023)    Transportation Needs     Within the past 12 months, has lack of transportation kept you from medical appointments, getting your medicines, non-medical meetings or appointments, work, or from getting things that you need?: No   Physical Activity: Inactive (8/14/2023)    Exercise Vital Sign     Days of Exercise per Week: 0 days     Minutes of Exercise per Session: 0 min   Stress: No Stress Concern Present (8/14/2023)    [employer] - Occupational Stress Questionnaire     Feeling of Stress : Not at all   Social Connections: Moderately Isolated (8/14/2023)    Social Connection and Isolation Panel [NHANES]     Frequency of Communication with Friends and Family: Twice a week     Frequency of Social Gatherings with Friends and Family: More than three times a week     Attends Sabianism Services: More than 4 times per year     Active Member of Clubs or Organizations: No     Marital Status:    Housing Stability: Low Risk  (12/5/2023)    Housing Stability     Do you have housing? : Yes     Are you worried about losing your housing?: No       FAMILY HISTORY:  Family History   Problem Relation Age of Onset    Cerebrovascular Disease Mother     Diabetes Mother     Hypertension Mother     Cancer Mother     Hypertension Father     Cerebrovascular Disease Sister     Hypertension Sister     Hypertension Sister     Breast Cancer Sister     Dementia Sister     Hypertension Brother     Cancer Brother     Hypertension Brother     Cancer Brother     Emphysema Brother        MEDS:   Current Outpatient Medications   Medication Sig Dispense Refill    acetaminophen (TYLENOL) 650 MG CR tablet Take  1,300 mg by mouth every morning      amLODIPine (NORVASC) 5 MG tablet Take 1 tablet (5 mg) by mouth 2 times daily 180 tablet 3    atorvastatin (LIPITOR) 40 MG tablet Take 1 tablet (40 mg) by mouth daily 30 tablet 11    budesonide (PULMICORT) 0.5 MG/2ML neb solution Take 2 mLs (0.5 mg) by nebulization 2 times daily 360 mL 3    carvedilol (COREG) 25 MG tablet Take 1.5 tablets (37.5 mg) by mouth 2 times daily (with meals) 180 tablet 4    citalopram (CELEXA) 20 MG tablet TAKE 1.5 TAB BY MOUTH EVERY  tablet 1    cloNIDine (CATAPRES) 0.2 MG tablet TAKE 1 TABLET BY MOUTH TWICE A  tablet 3    clopidogrel (PLAVIX) 75 MG tablet Take 1 tablet (75 mg) by mouth daily 30 tablet 11    Cranberry 450 MG TABS       diphenhydrAMINE-acetaminophen (TYLENOL PM)  MG tablet Take 2 tablets by mouth at bedtime      doxycycline hyclate (VIBRAMYCIN) 100 MG capsule Take 1 capsule (100 mg) by mouth three times a week 39 capsule 3    ferrous sulfate (FEROSUL) 325 (65 Fe) MG tablet Take 2 tablets (650 mg) by mouth daily (with breakfast) 180 tablet 3    ipratropium - albuterol 0.5 mg/2.5 mg/3 mL (DUONEB) 0.5-2.5 (3) MG/3ML neb solution Take 1 vial (3 mLs) by nebulization every 6 hours 180 mL 3    montelukast (SINGULAIR) 10 MG tablet Take 1 tablet (10 mg) by mouth At Bedtime 90 tablet 3    multivitamin w/minerals (THERA-VIT-M) tablet Take 1 tablet by mouth daily      Ostomy Supplies (ADAPT) PSTE Use with new ostomy pouch and drain  g 3    Ostomy Supplies (PREMIER DRAINABLE POUCH 22MM) Pouch MISC Use as directed every 3-4 days 5 each 12    sodium bicarbonate 650 MG tablet Take 1 tablet (650 mg) by mouth 2 times daily 180 tablet 0    torsemide (DEMADEX) 20 MG tablet Take 1 tablet (20 mg) by mouth daily With an additional 20 mg as needed for weight gain > 3# overnight or worsening symptoms (leg swelling/breathing). 100 tablet 3    umeclidinium-vilanterol (ANORO ELLIPTA) 62.5-25 MCG/ACT oral inhaler Inhale 1 puff into the  "lungs daily 1 each 11    nystatin (MYCOSTATIN) 232848 UNIT/GM external powder Apply topically 3 times daily as needed for other (rash) (Patient not taking: Reported on 4/16/2024) 60 g 1     No current facility-administered medications for this visit.       ALLERGIES:   Allergies   Allergen Reactions    Acetaminophen-Codeine Hallucination    Penicillin G Rash       PHYSICAL EXAM:  Vitals: BP (!) 158/64 (BP Location: Right arm, Patient Position: Sitting, Cuff Size: Adult Regular)   Pulse 60   Ht 1.422 m (4' 8\")   Wt 72.1 kg (159 lb)   SpO2 92%   BMI 35.65 kg/m    Constitutional:  cooperative;in no acute distress chronically ill;obese      Skin:  warm and dry to the touch        Head:  normocephalic        Eyes:  pupils equal and round;conjunctivae and lids unremarkable;sclera white        ENT:  no pallor or cyanosis        Neck:    right carotid bruit      Respiratory:      mild kyphosis, few bibasilar rales    Cardiac: regular rhythm;no murmurs, gallops or rubs detected              + mild JVD, no HJR    GI:  abdomen soft obese ostomy    Vascular:   pulses below the femoral arteries are diminished                                    Extremities and Musculoskeletal:        bilateral LEs with varicose veins, trace edema R>L    Neurological:  no gross motor deficits;affect appropriate              LABS/DATA:  I reviewed the following:  Component      Latest Ref Rng 2/5/2024  9:10 AM 4/9/2024  1:00 PM 4/16/2024  12:23 PM   Sodium      135 - 145 mmol/L 140  139     Potassium      3.4 - 5.3 mmol/L 5.2  5.4 (H)  5.1    Chloride      98 - 107 mmol/L 104  103     Carbon Dioxide (CO2)      22 - 29 mmol/L 24  20 (L)     Anion Gap      7 - 15 mmol/L 12  16 (H)     Urea Nitrogen      8.0 - 23.0 mg/dL 95.4 (H)  74.6 (H)     Creatinine      0.51 - 0.95 mg/dL 2.75 (H)  2.74 (H)     GFR Estimate      >60 mL/min/1.73m2 16 (L)  16 (L)     Calcium      8.8 - 10.2 mg/dL 8.9  9.1     Glucose      70 - 99 mg/dL 104 (H)  99   "   N-Terminal Pro Bnp      0 - 1,800 pg/mL 2,383 (H)  5,381 (H)     Hemoglobin      11.7 - 15.7 g/dL  11.4 (L)                    ASSESSMENT/PLAN:  1. Chronic HFpEF -  LVEF 55%, RV normal size/function.   She appears euvolemic or just slightly volume up. Weights have been running 152-156 lbs. Will lower range to 150-158 lbs on HRS. We will continue HRS as although she lives in assisted living, they are not proving medical cares yet.   BMP is overall stable post discharge.   Continue torsemide 20 mg daily.   Daily weights  Low sodium      2. Hypertension  Controlled on amlodipine 10 mg, Coreg 37.5 mg BID, clonidine 0.2 mg BID, torsemide 20 mg  After her AM pills, she can have some sxs hypotension lasting a short time  Split amlodipine to 5 mg BID      3. Mild to moderate mitral regurgitation    4. SSS s/p PPM  Device checks per device clinic    5. Acute on chronic kidney disease - baseline creatinine ~ 2.1, since hospital discharge 2.7-2.8. Had seen a kidney doctor in [facility] and at the .  She does not want dialysis.     6. COPD    7. Obesity     8. Bilateral carotid artery stenosis - severe, has declined surgery consideration, on plavix.          Follow up:  Continue HRS  3 months with [facility] and BMP, NT pro BNP prior, sooner if any concerns arise      Kane Gil PA-C      Thank you for allowing me to participate in the care of your patient.      Sincerely,     Kane Gil PA-C     [facility]  cc:   KOKO Guillen CNP

## 2024-04-16 NOTE — TELEPHONE ENCOUNTER
[facility]  [facility] HF     Telemanagement for review at visit today with Kane GARCIA. Minerva is a general cardiology patient with HFpEF. She was enrolled into HF telemanagement 1/26/24 by Kinza Shirley CNP (who is no longer with our practice).  Since that time, she's moved into an assisted living facility where they have also been monitoring her vitals (unclear if the monitor her weight)    Current goal weight: 158-164 lbs was last set by Kinza LYNNE, however, we've since moved the range to 154-164 following some lower weights after her move to [facility] which family thought may be due to Minerva being more active there. Dr. Gonzalez was made aware of the lower weight trends and asked that we monitor until follow-up today.    Would [facility] to consider discontinuing HRS and instead give [facility] instructions with which to call for weights, VS, symptoms, etc. Of note, Minerva has stopped using HRS to check VS, as she's unable to indepdently use the BP cuff; BPs have been checked by FAIZA.    Recent data:               Aida Chavez RN BSN   10:20 AM 04/16/24  Nurse line M-RD 8a-4p: [phone]

## 2024-04-23 NOTE — PROGRESS NOTES
[facility]:   HRS ([employer]) Daily Alert     April 23, 2024    Alert(s): Weight and Vitals    Notes:  Called Minerva to get update on.  Her BP was checked in AM before meds, so she held her amlodipine per Kane's instructions.  Denies dizziness/lightheadedness.  Wt remains within goal.     Current diuretic dose:    Torsemide 20 mg daily w/ additional 20 mg as needed for wt gain >3# overnight or worsening symtoms (leg swelling/breathing)    Recent plan of care changes:   -- 4/16/24:  LOV w/ Kane Gil, PAC in [facility].  Appears euvolemic or slightly volume up. Plan: d/t occasional low BP's split the amlodipine to 5 mg BID.     Goal Weight Range:  150-158 lbs -- per Kane Gil 4/16/24     Weight, BP and HR Readings:       Time spent in review this day: 8 min.         Patient to follow up as scheduled.  RN CC reviewed and reinforced fluid/dietary sodium restrictions; patient stated understanding.  Instructed patient to call RN line with new or worsening heart failure symptoms and/or rapid weight gain.  Confirmed patient has clinic and scheduling phone numbers.    Future Appointments   Date Time Provider Department Center   5/21/2024  4:00 PM [facility]   7/17/2024  9:45 AM RU LAB [facility] [place] RID   7/17/2024 10:30 AM Kane Gil PA-C [facility] PSA CLIN   8/1/2024 12:00 [facility]   8/27/2024 10:30 AM Ludivina Chamberlain MD CRFP CR   1/8/2025  8:30 AM Sadi Lamar MD CSPULM CS       Janet Vazquez, RN BSN   [facility]- [place], [place]  [facility] Clinic Care Coordinator  04/23/24, 11:29 AM

## 2024-04-24 NOTE — PROGRESS NOTES
[facility]    Today's HRS data all WNL.        Time Spent: 2 min    Janet Vazquez RN BSN   [facility] [place], [place]  [facility] Care Coordinator  04/24/24, 10:40 AM

## 2024-04-25 NOTE — TELEPHONE ENCOUNTER
Home Care visit completed earlier-weight today is 154lbs which is outside of our ordered parameters.  Patient states  this has been stable at this weight for awhile now.  Lungs clear, no SOB noted, no lower extremity edema noted.  Please be aware of the above any changes or further questions please let us know.  Thank you,  Amalia Burt LPN/PCN  [facility]  [phone]

## 2024-05-01 NOTE — TELEPHONE ENCOUNTER
Recertification   SN 1 week3 [id number] for ostomy teaching and symptoms management  HHA 1week9 for ADLs  Please approve the above  Thank you,  Amalia Jones LPN  [facility]  [phone]

## 2024-05-10 PROBLEM — E87.5 HYPERKALEMIA: Status: ACTIVE | Noted: 2024-01-01

## 2024-05-10 PROBLEM — J96.01 ACUTE RESPIRATORY FAILURE WITH HYPOXIA (H): Status: ACTIVE | Noted: 2023-01-01

## 2024-05-10 PROBLEM — I50.33 ACUTE ON CHRONIC DIASTOLIC CONGESTIVE HEART FAILURE (H): Status: ACTIVE | Noted: 2023-01-01

## 2024-05-10 PROBLEM — J44.1 COPD EXACERBATION (H): Status: ACTIVE | Noted: 2024-01-01

## 2024-05-11 NOTE — CONSULTS
Care Management Initial Consult    General Information  Assessment completed with: Patient, Children,    Type of CM/SW Visit: Initial Assessment    Primary Care Provider verified and updated as needed:     Readmission within the last 30 days: no previous admission in last 30 days      Reason for Consult: discharge planning    Communication Assessment  Patient's communication style: spoken language (English or Bilingual)    Hearing Difficulty or Deaf: yes   Wear Glasses or Blind: yes    Cognitive  Cognitive/Neuro/Behavioral: WDL                      Living Environment:   People in home: alone     Current living Arrangements: apartment, independent living facility      Able to return to prior arrangements: yes     Family/Social Support:  Care provided by: self  Provides care for: no one  Marital Status:   Children          Description of Support System: Supportive, Involved    Support Assessment: Adequate family and caregiver support, Adequate social supports    Current Resources:   Patient receiving home care services: Yes  Skilled Home Care Services: Skilled Nursing, Home Health Aid  Community Resources: None  Equipment currently used at home: walker, rolling  es the patient's insurance plan have a 3 day qualifying hospital stay waiver?  No    Lifestyle & Psychosocial Needs:  Social Determinants of Health     Food Insecurity: Low Risk  (12/5/2023)    Food Insecurity     Within the past 12 months, did you worry that your food would run out before you got money to buy more?: No     Within the past 12 months, did the food you bought just not last and you didn t have money to get more?: No   Depression: Not at risk (3/29/2024)    PHQ-2     PHQ-2 Score: 0   Housing Stability: Low Risk  (12/5/2023)    Housing Stability     Do you have housing? : Yes     Are you worried about losing your housing?: No   Tobacco Use: Medium Risk (4/16/2024)    Patient History     Smoking Tobacco Use: Former     Smokeless Tobacco Use:  Never     Passive Exposure: Not on file   Financial Resource Strain: Low Risk  (12/5/2023)    Financial Resource Strain     Within the past 12 months, have you or your family members you live with been unable to get utilities (heat, electricity) when it was really needed?: No   Alcohol Use: Not At Risk (8/14/2023)    AUDIT-C     Frequency of Alcohol Consumption: Never     Average Number of Drinks: Patient does not drink     Frequency of Binge Drinking: Never   Transportation Needs: Low Risk  (12/5/2023)    Transportation Needs     Within the past 12 months, has lack of transportation kept you from medical appointments, getting your medicines, non-medical meetings or appointments, work, or from getting things that you need?: No   Physical Activity: Inactive (8/14/2023)    Exercise Vital Sign     Days of Exercise per Week: 0 days     Minutes of Exercise per Session: 0 min   Interpersonal Safety: Not on file   Stress: No Stress Concern Present (8/14/2023)    [employer] - Occupational Stress Questionnaire     Feeling of Stress : Not at all   Social Connections: Moderately Isolated (8/14/2023)    Social Connection and Isolation Panel [NHANES]     Frequency of Communication with Friends and Family: Twice a week     Frequency of Social Gatherings with Friends and Family: More than three times a week     Attends Orthodoxy Services: More than 4 times per year     Active Member of Clubs or Organizations: No     Attends Club or Organization Meetings: Not on file     Marital Status:    Health Literacy: Not on file       Functional Status:  Prior to admission patient needed assistance:   Dependent ADLs:: Bathing  Dependent IADLs:: Transportation, Medication Management     Values/Beliefs:  Spiritual, Cultural Beliefs, Orthodoxy Practices, Values that affect care:    Description of Beliefs that Will Affect Care: Cheryl          Additional Information:  Care management consult for discharge  planning/disposition. Patient admitted with COPD exacerbation, CHF, CKD stage IV. Met with patient and son, daughter in law at bedside. Patient lives in an independent [facility] at [facility]. She is currently open to home care through [facility] for  and A. Her family is very involved and supportive. Her daughter in law sets up her medications weekly. She receives bathing assist from home care.   Will verify home care services. Will send resumption orders at discharge.  Patient's family can transport home. No other needs identified at this time.     Mariam Rodriguez RN  Care Coordinator  [facility]

## 2024-05-11 NOTE — PROGRESS NOTES
"Cross Cover    Called for troponin elevation to 110 and 1/10 chest pain     I went to see Minerva, she was clearly in distress.  Chest pain was central chest.  She was SOB and hypoxic in the mid 80's despite 2L/nc.  Lung sounds were wet.  O2 requirements continued to rise and stabilized ~ 8 L/NC, switched to FM and now in the mid 90's on 5 L    Hypertensive  Stat EKG with new LBBB and ST depression V5-I, all new compared with admission  Has had EKGs with [facility] but no LBBB that I can find in her past records    Ordered stat NTG with substantial improvement in her sx    **d/w cards on call given new LBBB with the ST changes.  She too was concerned about the new LBBB but less so for the ST depression seeing as how this would not be concordant findings.  She agrees with the management below    Stat heparin gtt  Cont serial trops  NTG q5 \" prn til CP is gone, will consider drip vs paste based on response  Also have prn hydromorphone available  Furosemide 60 mg IV x 1 and rosas placement   Echo in am   Cards consult in am     Repeat EKG with resolution of LBBB, patient remains comfortable.  Trops 110->182    Follow-up ~3am  Repeat BMP with stable renal function but K has gone from 5.6->6.4  This could be due to heparin gtt  She's chronically on bicarb tabs so suspect some T4 RTA as well   Tele reviewed and is a paced without peaked t's or other concerning findings.     Jennifer K 6.3, stopped heparin gtt, initiated shifting agents: calcium, insulin/dextrose, bicarb, and 10 mg lokelma to be givem     Jennifer K q 2 x 2  BG q 1 hour x 4    Changed to [facility] status              "

## 2024-05-11 NOTE — PROVIDER NOTIFICATION
MD (Sandra) informed of upward trending trop, current value of 231, and pt denial of CP via page. MD acknowledged.

## 2024-05-11 NOTE — CONSULTS
[facility]    Cardiology Consultation     Date of Admission:  5/10/2024    ASSESSMENT:   COPD exacerbation:  primary issue resulting in some mild volume overload   CHFpEF with mild volume overload:  Not significantly above dry weight which runs 154-164 pounds.    CKD stage IV  Elevated troponin:  suspect demand ischemia in the setting of COPD/CHF, uncontrolled hypertension and anemia.  Echocardiogram with normal LV function without regional wall motion abnormalities  SSS s/p pacemaker:  Predominantly atrial paced, intermittently ventricular paced  Hypertension:  uncontrolled on admission; now improved    PLAN/RECOMMENDATIONS  -Agree with IV diuresis although appears to be nearing euvolemia. Transition back to outpatient torsemide tomorrow  -Reviewed with Minerva and family echocardiogram results and significance of troponin elevation.  No clear evidence for ACS. Discussed definitive evaluation would be coronary angiogram which would put her at very high risk for requiring dialysis; we all agreed conservative treatment is best to avoid this outcome  -No changes to outpatient cardiac regimen.    -Cardiology will sign off. She has follow up scheduled in cardiology clinic in July.        High complexity     Candi Richards MD, [facility]    Primary Care Physician   Ludivina Canales    Reason for Consult   Reason for consult: I was asked by Dr. Jenkins to evaluate this patient for CHF exacerbation, elevated troponin.    History of Present Illness   Arpita Rocha is a very pleasant 88 year old female with a history of  HFpEF, SSS s/p PPM, COPD, CKD, HTN, CKD and carotid artery stenosis admitted with one week hx of cough and shortness of breath.     In brief review, she was hospitalized 12/8-12/13/23 with PNA and acute HFpEF/COPD exacerbation, she was initially diuresed with IV furosemide. Discharged on prednisone and torsemide 20 mg BID (previously Lasix 40 mg daily) and  "clonidine increased to 0.2 mg BID. Discharge weight 163 lbs. Torsemide was later decreased to 20 mg daily given hypotension and worsening renal function.  She was enrolled into [facility].  Initially weight range was 158-164 lbs., then decreased to 154-164 lbs.      She presented to the ED yesterday form her assisted living facility for shortness of breat.  Her sats were 85% on EMS arrival. S he was given IV solumedrol and duonebs.  She was noted to be significantly hypertensive in the ED with initial /109.  CXR demonstrated some mild pulmonary edema and she was also given IV lasix.  She currently reports her breathing is \"much better\".  She is on room air.  Her troponin increased to 231 without documented peak.  A repeat echocardiogram demonstrates normal LV function without regional wall motion abnormalities.  She denies any chest pain, chest discomfort.           Past Medical History   Past Medical History:   Diagnosis Date    Ulcerative pancolitis (H) 05/23/2023         Past Surgical History   Past Surgical History:   Procedure Laterality Date    CATARACT EXTRACTION Bilateral     COLONOSCOPY      FEMUR FRACTURE SURGERY Right     2010    ILEOSTOMY      IMPLANT PACEMAKER      left knee replacement      MULTIPLE TOOTH EXTRACTIONS      right knee replacement Right     SPINAL FUSION      TONSILLECTOMY      TUBAL LIGATION           Prior to Admission Medications   Prior to Admission Medications   Prescriptions Last Dose Informant Patient Reported? Taking?   Cranberry 450 MG TABS 5/10/2024 at AM  Yes Yes   Sig: Take 1 tablet by mouth daily   Ostomy Supplies (ADAPT) PSTE DME at [facility] [place]  No Yes   Sig: Use with new ostomy pouch and drain PRN   Ostomy Supplies (PREMIER DRAINABLE POUCH 22MM) Pouch MISC DME at [facility] [place]  No Yes   Sig: Use as directed every 3-4 days   acetaminophen (TYLENOL) 650 MG CR tablet 5/10/2024 at am  Yes Yes   Sig: Take 1,300 mg by mouth every morning   amLODIPine (NORVASC) 10 MG tablet 5/10/2024 at am  Yes Yes "   Sig: Take 5 mg by mouth 2 times daily   atorvastatin (LIPITOR) 40 MG tablet 5/10/2024 at AM  No Yes   Sig: Take 1 tablet (40 mg) by mouth daily   budesonide (PULMICORT) 0.5 MG/2ML neb solution 5/10/2024 at AM  No Yes   Sig: Take 2 mLs (0.5 mg) by nebulization 2 times daily   carvedilol (COREG) 25 MG tablet 5/10/2024 at AM  No Yes   Sig: Take 1.5 tablets (37.5 mg) by mouth 2 times daily (with meals)   citalopram (CELEXA) 20 MG tablet 5/10/2024 at AM  No Yes   Sig: TAKE 1.5 TAB BY MOUTH EVERY DAY   cloNIDine (CATAPRES) 0.2 MG tablet 5/10/2024 at AM  No Yes   Sig: TAKE 1 TABLET BY MOUTH TWICE A DAY   clopidogrel (PLAVIX) 75 MG tablet 5/10/2024 at AM  No Yes   Sig: Take 1 tablet (75 mg) by mouth daily   diphenhydrAMINE-acetaminophen (TYLENOL PM)  MG tablet 5/9/2024 at pm  Yes Yes   Sig: Take 2 tablets by mouth at bedtime   doxazosin (CARDURA) 1 MG tablet 5/10/2024 at PM  Yes Yes   Sig: Take 1 mg by mouth at bedtime   doxycycline hyclate (VIBRAMYCIN) 100 MG capsule 5/8/2024 at PM  Yes Yes   Sig: Take 100 mg by mouth Every Mon, Wed, Fri Morning At bedtime   ferrous sulfate (FEROSUL) 325 (65 Fe) MG tablet 5/10/2024 at am  No Yes   Sig: Take 2 tablets (650 mg) by mouth daily (with breakfast)   ipratropium - albuterol 0.5 mg/2.5 mg/3 mL (DUONEB) 0.5-2.5 (3) MG/3ML neb solution 5/10/2024 at pm  No Yes   Sig: Take 1 vial (3 mLs) by nebulization every 6 hours   montelukast (SINGULAIR) 10 MG tablet 5/9/2024 at pm  No Yes   Sig: Take 1 tablet (10 mg) by mouth At Bedtime   multivitamin w/minerals (THERA-VIT-M) tablet 5/10/2024 at am  Yes Yes   Sig: Take 1 tablet by mouth daily   nystatin (MYCOSTATIN) 303034 UNIT/GM external powder prn at prn  No Yes   Sig: Apply topically 3 times daily as needed for other (rash)   sodium bicarbonate 650 MG tablet 5/10/2024 at am  No Yes   Sig: Take 1 tablet (650 mg) by mouth 2 times daily   torsemide (DEMADEX) 20 MG tablet 5/10/2024 at am  No Yes   Sig: Take 1 tablet (20 mg) by mouth daily  With an additional 20 mg as needed for weight gain > 3# overnight or worsening symptoms (leg swelling/breathing).   umeclidinium-vilanterol (ANORO ELLIPTA) 62.5-25 MCG/ACT oral inhaler 5/10/2024 at am  No Yes   Sig: Inhale 1 puff into the lungs daily      Facility-Administered Medications: None     Current Facility-Administered Medications   Medication Dose Route Frequency Provider Last Rate Last Admin    acetaminophen (TYLENOL) CR tablet 1,300 mg  1,300 mg Oral Shawn Isabel MD        acetaminophen (TYLENOL) tablet 650 mg  650 mg Oral Q4H PRN Alice Dallas, DO   650 mg at 05/10/24 2304    Or    acetaminophen (TYLENOL) Suppository 650 mg  650 mg Rectal Q4H PRN Alice Dallas, DO        albuterol (PROVENTIL) neb solution 2.5 mg  2.5 mg Nebulization Q2H PRN Alice Dallas, DO   2.5 mg at 05/10/24 2359    amLODIPine (NORVASC) tablet 5 mg  5 mg Oral BID Alice Dallas, DO   5 mg at 05/11/24 0913    atorvastatin (LIPITOR) tablet 40 mg  40 mg Oral Daily Alice Dallas, DO   40 mg at 05/11/24 1151    budesonide (PULMICORT) neb solution 0.5 mg  0.5 mg Nebulization BID Alice Dallas, DO   0.5 mg at 05/11/24 0921    calcium carbonate (TUMS) chewable tablet 1,000 mg  1,000 mg Oral 4x Daily PRN Alice Dallas, DO        carvedilol (COREG) tablet 37.5 mg  37.5 mg Oral BID w/meals Alice, Dallas, DO   37.5 mg at 05/11/24 0913    citalopram (celeXA) tablet 30 mg  30 mg Oral Daily Heathera Dallas, DO   30 mg at 05/11/24 1232    cloNIDine (CATAPRES) tablet 0.2 mg  0.2 mg Oral BID Alice Dallas, DO   0.2 mg at 05/11/24 0913    clopidogrel (PLAVIX) tablet 75 mg  75 mg Oral Daily Heathera, Dallas, DO   75 mg at 05/11/24 0913    glucose gel 15-30 g  15-30 g Oral Q15 Min PRN Kizzy Roberson MD        Or    dextrose 50 % injection 25-50 mL  25-50 mL Intravenous Q15 Min PRN Kizzy Roberson MD        Or    glucagon injection 1 mg  1 mg Subcutaneous Q15 Min PRN Kizzy Roberson MD        glucose gel 15-30 g  15-30 g Oral  Q15 Min PRN Kizzy Roberson MD        Or    dextrose 50 % injection 25-50 mL  25-50 mL Intravenous Q15 Min PRN Kizzy Roberson MD        Or    glucagon injection 1 mg  1 mg Subcutaneous Q15 Min PRN Kizzy Roberson MD        diphenhydrAMINE (BENADRYL) 25 mg, acetaminophen (TYLENOL) 500 mg alternative for Tylenol PM   Oral At Bedtime PRN Shawn Flores MD        doxazosin (CARDURA) tablet 1 mg  1 mg Oral At Bedtime Dallas Jenkins DO   1 mg at 05/11/24 0046    doxycycline hyclate (VIBRAMYCIN) capsule 100 mg  100 mg Oral Q12H [facility] (08/20) Shawn Flores MD   100 mg at 05/11/24 1151    furosemide (LASIX) injection 20 mg  20 mg Intravenous Q12H Shawn Flores MD   20 mg at 05/11/24 1151    HYDROmorphone (DILAUDID) injection 0.2 mg  0.2 mg Intravenous Q3H PRN Kizzy Roberson MD        HYDROmorphone (DILAUDID) injection 0.4 mg  0.4 mg Intravenous Q3H PRN Kizzy Roberson MD        ipratropium - albuterol 0.5 mg/2.5 mg/3 mL (DUONEB) neb solution 3 mL  3 mL Nebulization Q4H WA Dallas Jenkins DO   3 mL at 05/11/24 0921    lidocaine (LMX4) cream   Topical Q1H PRN Dallas Jenkins DO        lidocaine 1 % 0.1-1 mL  0.1-1 mL Other Q1H PRN Dallas Jenkins DO        melatonin tablet 1 mg  1 mg Oral At Bedtime PRN Dallas Jenkins DO        montelukast (SINGULAIR) tablet 10 mg  10 mg Oral At Bedtime Dallas Jenkins DO   10 mg at 05/10/24 2356    naloxone (NARCAN) injection 0.2 mg  0.2 mg Intravenous Q2 Min PRN Dallas Jenkins DO        Or    naloxone (NARCAN) injection 0.4 mg  0.4 mg Intravenous Q2 Min PRN Dallas Jenkins DO        Or    naloxone (NARCAN) injection 0.2 mg  0.2 mg Intramuscular Q2 Min PRN Dallas Jenkins DO        Or    naloxone (NARCAN) injection 0.4 mg  0.4 mg Intramuscular Q2 Min PRN Dallas Jenkins DO        nitroGLYcerin (NITROSTAT) sublingual tablet 0.4 mg  0.4 mg Sublingual Q5 Min PRN Kizzy Roberson MD   0.4 mg at 05/10/24 3791    nystatin (MYCOSTATIN) topical powder   Topical TID PRN  Shawn Flores MD        ondansetron (ZOFRAN ODT) ODT tab 4 mg  4 mg Oral Q6H PRN Dallas Jenkins DO        Or    ondansetron (ZOFRAN) injection 4 mg  4 mg Intravenous Q6H PRN Dallas Jenkins DO        predniSONE (DELTASONE) tablet 40 mg  40 mg Oral Daily Dallas Jenkins DO   40 mg at 05/11/24 0802    prochlorperazine (COMPAZINE) injection 5 mg  5 mg Intravenous Q6H PRN Dallas Jenkins DO        Or    prochlorperazine (COMPAZINE) tablet 5 mg  5 mg Oral Q6H PRN Dallas Jenkins DO        Or    prochlorperazine (COMPAZINE) suppository 12.5 mg  12.5 mg Rectal Q12H PRN Dallas Jenkins DO        senna-docusate (SENOKOT-S/PERICOLACE) 8.6-50 MG per tablet 1 tablet  1 tablet Oral BID PRN Dallas Jenkins DO        Or    senna-docusate (SENOKOT-S/PERICOLACE) 8.6-50 MG per tablet 2 tablet  2 tablet Oral BID PRN Dallas Jenkins DO        sodium bicarbonate tablet 650 mg  650 mg Oral BID Dallas Jenkins DO   650 mg at 05/11/24 0913    sodium chloride (PF) 0.9% PF flush 3 mL  3 mL Intracatheter Q8H Dallas Jenkins DO   3 mL at 05/11/24 0002    sodium chloride (PF) 0.9% PF flush 3 mL  3 mL Intracatheter q1 min prn Dallas Jenkins DO   3 mL at 05/11/24 1152    umeclidinium-vilanterol (ANORO ELLIPTA) 62.5-25 MCG/ACT oral inhaler 1 puff  1 puff Inhalation Daily Shawn Flores MD         Current Facility-Administered Medications   Medication Dose Route Frequency Provider Last Rate Last Admin    acetaminophen (TYLENOL) CR tablet 1,300 mg  1,300 mg Oral QAM Shawn Flores MD        acetaminophen (TYLENOL) tablet 650 mg  650 mg Oral Q4H PRN Dallas Jenkins DO   650 mg at 05/10/24 2304    Or    acetaminophen (TYLENOL) Suppository 650 mg  650 mg Rectal Q4H PRN Dallas Jenkins DO        albuterol (PROVENTIL) neb solution 2.5 mg  2.5 mg Nebulization Q2H PRN Dallas Jenkins DO   2.5 mg at 05/10/24 6571    amLODIPine (NORVASC) tablet 5 mg  5 mg Oral BID Dallas Jenkins DO   5 mg at 05/11/24 3865     atorvastatin (LIPITOR) tablet 40 mg  40 mg Oral Daily Dallas Jenkins DO   40 mg at 05/11/24 1151    budesonide (PULMICORT) neb solution 0.5 mg  0.5 mg Nebulization BID Dallas Jenkins DO   0.5 mg at 05/11/24 0921    calcium carbonate (TUMS) chewable tablet 1,000 mg  1,000 mg Oral 4x Daily PRN Dalals Jenkins DO        carvedilol (COREG) tablet 37.5 mg  37.5 mg Oral BID w/meals Dallas Jenkins DO   37.5 mg at 05/11/24 0913    citalopram (celeXA) tablet 30 mg  30 mg Oral Daily Dallas Jenkins, DO   30 mg at 05/11/24 1232    cloNIDine (CATAPRES) tablet 0.2 mg  0.2 mg Oral BID Dallas Jenkins DO   0.2 mg at 05/11/24 0913    clopidogrel (PLAVIX) tablet 75 mg  75 mg Oral Daily Dallas Jenkins DO   75 mg at 05/11/24 0913    glucose gel 15-30 g  15-30 g Oral Q15 Min PRN Kizzy Roberson MD        Or    dextrose 50 % injection 25-50 mL  25-50 mL Intravenous Q15 Min PRN Kizzy Roberson MD        Or    glucagon injection 1 mg  1 mg Subcutaneous Q15 Min PRN Kizzy Roberson MD        glucose gel 15-30 g  15-30 g Oral Q15 Min PRN Kizzy Roberson MD        Or    dextrose 50 % injection 25-50 mL  25-50 mL Intravenous Q15 Min PRKizzy Welch MD        Or    glucagon injection 1 mg  1 mg Subcutaneous Q15 Min PRN Kizzy Roberson MD        diphenhydrAMINE (BENADRYL) 25 mg, acetaminophen (TYLENOL) 500 mg alternative for Tylenol PM   Oral At Bedtime PRN Shawn Flores MD        doxazosin (CARDURA) tablet 1 mg  1 mg Oral At Bedtime Dallas Jenkins DO   1 mg at 05/11/24 0046    doxycycline hyclate (VIBRAMYCIN) capsule 100 mg  100 mg Oral Q12H [facility] (08/20) Shawn Flores MD   100 mg at 05/11/24 1151    furosemide (LASIX) injection 20 mg  20 mg Intravenous Q12H Shawn Flores MD   20 mg at 05/11/24 1151    HYDROmorphone (DILAUDID) injection 0.2 mg  0.2 mg Intravenous Q3H PRN Kizzy Roberson MD        HYDROmorphone (DILAUDID) injection 0.4 mg  0.4 mg Intravenous Q3H PRN Kizzy Roberson MD        ipratropium - albuterol  0.5 mg/2.5 mg/3 mL (DUONEB) neb solution 3 mL  3 mL Nebulization Q4H WA Dallas Jenkins DO   3 mL at 05/11/24 0921    lidocaine (LMX4) cream   Topical Q1H PRN Dallas Jenkins DO        lidocaine 1 % 0.1-1 mL  0.1-1 mL Other Q1H PRN Dallas Jenkins DO        melatonin tablet 1 mg  1 mg Oral At Bedtime PRN Dallas Jenkins DO        montelukast (SINGULAIR) tablet 10 mg  10 mg Oral At Bedtime Dallas Jenkins DO   10 mg at 05/10/24 2356    naloxone (NARCAN) injection 0.2 mg  0.2 mg Intravenous Q2 Min PRN Dallsa Jenkins DO        Or    naloxone (NARCAN) injection 0.4 mg  0.4 mg Intravenous Q2 Min PRN Dallas Jenkins DO        Or    naloxone (NARCAN) injection 0.2 mg  0.2 mg Intramuscular Q2 Min PRN Dallas Jenkins DO        Or    naloxone (NARCAN) injection 0.4 mg  0.4 mg Intramuscular Q2 Min PRN Dallas Jenkins DO        nitroGLYcerin (NITROSTAT) sublingual tablet 0.4 mg  0.4 mg Sublingual Q5 Min PRN Kizzy Roberson MD   0.4 mg at 05/10/24 2342    nystatin (MYCOSTATIN) topical powder   Topical TID PRN Shawn Flores MD        ondansetron (ZOFRAN ODT) ODT tab 4 mg  4 mg Oral Q6H PRN Dallas Jenkins DO        Or    ondansetron (ZOFRAN) injection 4 mg  4 mg Intravenous Q6H PRN Dallas Jenkins DO        predniSONE (DELTASONE) tablet 40 mg  40 mg Oral Daily Dallas Jenkins DO   40 mg at 05/11/24 0802    prochlorperazine (COMPAZINE) injection 5 mg  5 mg Intravenous Q6H PRN Dallas Jenkins DO        Or    prochlorperazine (COMPAZINE) tablet 5 mg  5 mg Oral Q6H PRN Dallas Jenkins DO        Or    prochlorperazine (COMPAZINE) suppository 12.5 mg  12.5 mg Rectal Q12H PRN Dallas Jenkins DO        senna-docusate (SENOKOT-S/PERICOLACE) 8.6-50 MG per tablet 1 tablet  1 tablet Oral BID PRN Dallas Jenkins DO        Or    senna-docusate (SENOKOT-S/PERICOLACE) 8.6-50 MG per tablet 2 tablet  2 tablet Oral BID PRN Dallas Jenkins DO        sodium bicarbonate tablet 650 mg  650 mg Oral BID Dallas Jenkins DO   650  mg at 05/11/24 0913    sodium chloride (PF) 0.9% PF flush 3 mL  3 mL Intracatheter Q8H Dallas Jenkins, DO   3 mL at 05/11/24 0002    sodium chloride (PF) 0.9% PF flush 3 mL  3 mL Intracatheter q1 min prn Dallas Jenkins, DO   3 mL at 05/11/24 1152    umeclidinium-vilanterol (ANORO ELLIPTA) 62.5-25 MCG/ACT oral inhaler 1 puff  1 puff Inhalation Daily Shawn Flores MD         Allergies   Allergies   Allergen Reactions    Acetaminophen-Codeine Hallucination    Penicillin G Rash       Social History    reports that she quit smoking about 28 years ago. Her smoking use included cigarettes. She started smoking about 72 years ago. She has a 44 pack-year smoking history. She has never used smokeless tobacco. She reports that she does not currently use alcohol. She reports that she does not use drugs.      Family History   I have reviewed this patient's family history and updated it with pertinent information if needed.  Family History   Problem Relation Age of Onset    Cerebrovascular Disease Mother     Diabetes Mother     Hypertension Mother     Cancer Mother     Hypertension Father     Cerebrovascular Disease Sister     Hypertension Sister     Hypertension Sister     Breast Cancer Sister     Dementia Sister     Hypertension Brother     Cancer Brother     Hypertension Brother     Cancer Brother     Emphysema Brother           Review of Systems   A comprehensive review of system was performed and is negative other than that noted in the HPI or here.     Physical Exam   Vital Signs with Ranges  Temp:  [97.5  F (36.4  C)-99.3  F (37.4  C)] 98.3  F (36.8  C)  Pulse:  [] 60  Resp:  [19-38] 22  BP: (122-211)/() 136/57  SpO2:  [85 %-96 %] 93 %  Wt Readings from Last 4 Encounters:   05/10/24 70.8 kg (156 lb)   04/16/24 72.1 kg (159 lb)   03/29/24 71.7 kg (158 lb)   02/15/24 73.1 kg (161 lb 3.2 oz)     I/O last 3 completed shifts:  In: 360 [P.O.:200; I.V.:160]  Out: 350 [Urine:350]      Vitals: /57 (BP  "Location: Right arm)   Pulse 60   Temp 98.3  F (36.8  C) (Axillary)   Resp 22   Ht 1.422 m (4' 8\")   Wt 70.8 kg (156 lb)   SpO2 93%   BMI 34.97 kg/m      Physical Exam:   General - Alert and oriented to time place and person in no acute distress  Eyes - No scleral icterus  HEENT - Neck supple, moist mucous membranes  Cardiovascular - RRR nom/r/g, no JVD  Extremities - There is no edema  Respiratory - CTAB  Skin - No pallor or cyanosis  Gastrointestinal - Non tender and non distended without rebound or guarding  Psych - Appropriate affect   Neurological - No gross motor neurological focal deficits    No lab results found in last 7 days.    Invalid input(s): \"TROPONINIES\"    Recent Labs   Lab 05/11/24  1116 05/11/24  1000 05/11/24  0856 05/11/24  0759 05/11/24  0730 05/11/24  0621 05/11/24  0618 05/11/24  0505 05/11/24  0329 05/11/24  0203 05/10/24  2042   WBC  --   --   --   --   --   --  5.2  --   --  7.8 13.1*   HGB  --   --   --   --   --   --  9.9*  --   --  9.3* 10.8*   MCV  --   --   --   --   --   --  102*  --   --  104* 104*   PLT  --   --   --   --   --   --  141*  --   --  144* 170   NA  --   --   --   --   --   --   --   --   --  139 141   POTASSIUM  --   --   --   --  5.4*  --  5.6*  --  6.3* 6.4* 5.6*   CHLORIDE  --   --   --   --   --   --   --   --   --  109* 110*   CO2  --   --   --   --   --   --   --   --   --  17* 18*   BUN  --   --   --   --   --   --   --   --   --  67.8* 64.6*   CR  --   --   --   --   --   --   --   --   --  2.52* 2.54*   GFRESTIMATED  --   --   --   --   --   --   --   --   --  18* 18*   ANIONGAP  --   --   --   --   --   --   --   --   --  13 13   CANELO  --   --   --   --   --   --   --   --   --  8.9 9.4   * 173* 183*   < >  --    < >  --    < >  --  165* 113*    < > = values in this interval not displayed.     Recent Labs   Lab Test 06/12/23  0826 07/21/22  0816   CHOL 161  --    HDL 52  --    LDL 95  --    TRIG 72 145     Recent Labs   Lab 05/11/24  0618 " "24  0203 05/10/24  2042   WBC 5.2 7.8 13.1*   HGB 9.9* 9.3* 10.8*   HCT 30.8* 29.3* 33.7*   * 104* 104*   * 144* 170     Recent Labs   Lab 05/10/24  2342 05/10/24  2037   PHV 7.21* 7.30*   PO2V 56* 16*   PCO2V 41 38*   HCO3V 16* 18*     Recent Labs   Lab 05/10/24  2042   NTBNPI 17,972*     No results for input(s): \"DD\" in the last 168 hours.  No results for input(s): \"SED\", \"CRP\" in the last 168 hours.  Recent Labs   Lab 24  0618 05/11/24  0203 05/10/24  2042   * 144* 170     No results for input(s): \"TSH\" in the last 168 hours.  No results for input(s): \"COLOR\", \"APPEARANCE\", \"URINEGLC\", \"URINEBILI\", \"URINEKETONE\", \"SG\", \"UBLD\", \"URINEPH\", \"PROTEIN\", \"UROBILINOGEN\", \"NITRITE\", \"LEUKEST\", \"RBCU\", \"WBCU\" in the last 168 hours.    Imaging:  Recent Results (from the past 48 hour(s))   XR Chest Port 1 View    Narrative    EXAM: XR CHEST PORT 1 VIEW  LOCATION: [facility]  DATE: 5/10/2024    INDICATION: SOB  COMPARISON: 2024      Impression    IMPRESSION: Mild pulmonary edema with small bilateral pleural effusions. Left greater than right lower lobe opacities, likely atelectasis. Stable heart size and mediastinal contours. Unchanged left chest cardiac generator and leads and extensive spinal   fusion hardware.   Echocardiogram Complete   Result Value    LVEF  50-55%    Narrative    [id number]  [id number]  [id number]  [id number]^ROSAURA^VINOD^T     [facility]  Echocardiography Laboratory  [address] [place] [address]     Name: CONNER LINDER  MRN: [id number]  : 1935  Study Date: 2024 08:13 AM  Age: 88 yrs  Gender: Female  Patient Location: [facility]  Reason For Study: Chest Pain  Ordering Physician: VINOD KAPLAN  Referring Physician: Ludivina Chamberlain MD  Performed By: Michelle Dewey JEFF     BSA: 1.6 m2  Height: 56 in  Weight: 156 lb  HR: 60  BP: 174/80 " mmHg  ______________________________________________________________________________  Procedure  Complete Portable Echo Adult. Optison (NDC #2613-9750) given intravenously.  ______________________________________________________________________________  Interpretation Summary     1. Left ventricular systolic function is low normal. The visual ejection  fraction is 50-55%.  2. Septal wall motion abnormality may reflect pacemaker activation.  3. The right ventricle is normal in structure, function and size.  4. There is mild (1+) mitral regurgitation. There is mild mitral stenosis.  ______________________________________________________________________________  Left Ventricle  The left ventricle is normal in size. There is moderate concentric left  ventricular hypertrophy. The visual ejection fraction is 50-55%. Left  ventricular systolic function is low normal. Diastolic function not assessed  due to atrial fibrillation. Septal wall motion abnormality may reflect  pacemaker activation.     Right Ventricle  The right ventricle is normal in structure, function and size.     Atria  The left atrium is severely dilated. The right atrium is moderately dilated.  There is no color Doppler evidence of an atrial shunt.     Mitral Valve  There is moderate mitral annular calcification. There is mild (1+) mitral  regurgitation. There is mild mitral stenosis.     Tricuspid Valve  The tricuspid valve is normal in structure and function. There is mild (1+)  tricuspid regurgitation.     Aortic Valve  The aortic valve is trileaflet with aortic valve sclerosis.     Pulmonic Valve  The pulmonic valve is not well seen, but is grossly normal.     Vessels  Ascending aorta dilatation is present. IVC diameter and respiratory changes  fall into an intermediate range suggesting an RA pressure of 8 mmHg.     Rhythm  The rhythm was atrial fibrillation.     ______________________________________________________________________________  MMode/2D  Measurements & Calculations  IVSd: 1.4 cm  LVIDd: 3.6 cm  LVIDs: 2.3 cm  LVPWd: 1.5 cm  IVC diam: 2.2 cm  FS: 37.3 %  LV mass(C)d: 191.4 grams  LV mass(C)dI: 119.8 grams/m2     Ao root diam: 3.5 cm  LA dimension: 4.0 cm  asc Aorta Diam: 4.0 cm  LA/Ao: 1.2  LVOT diam: 2.2 cm  LVOT area: 3.9 cm2  Ao root diam index Ht(cm/m): 2.4  Ao root diam index BSA (cm/m2): 2.2  Asc Ao diam index BSA (cm/m2): 2.5  Asc Ao diam index Ht(cm/m): 2.8  EF Biplane: 45.0 %  LA Volume (BP): 78.6 ml  LA Volume Index (BP): 49.1 ml/m2     RWT: 0.83     Doppler Measurements & Calculations  MV E max john: 126.0 cm/sec  MV A max john: 142.0 cm/sec  MV E/A: 0.89  MV max PG: 10.9 mmHg  MV mean P.7 mmHg  MV V2 VTI: 35.8 cm  MV P1/2t max john: 113.4 cm/sec  MV P1/2t: 74.3 msec  MVA(P1/2t): 3.0 cm2  MV dec slope: 446.9 cm/sec2  MV dec time: 0.26 sec  Ao V2 max: 180.9 cm/sec  Ao max P.0 mmHg  TR max john: 310.6 cm/sec  TR max P.6 mmHg  E/E' av.0  Lateral E/e': 14.7  Medial E/e': 11.2     ______________________________________________________________________________  Report approved by: Anders Cervantes 2024 10:23 AM           LABS dated 5/10/24 reviewed personally including CBC,CMP, NTProBNP, troponins    ECGs dated 5/10/24 reviewed personally;  atrial paced rhythm,ventricular paced rhythm      Echo:  Echocardiogram dated 24   1. Left ventricular systolic function is low normal. The visual ejection  fraction is 50-55%.  2. Septal wall motion abnormality may reflect pacemaker activation.  3. The right ventricle is normal in structure, function and size.  4. There is mild (1+) mitral regurgitation. There is mild mitral stenosis.  __________________________________________________________  Clinically Significant Risk Factors Present on Admission        # Hyperkalemia: Highest K = 6.4 mmol/L in last 2 days, will monitor as appropriate         # Drug Induced Platelet Defect: home medication list includes an antiplatelet  "medication   # Hypertension: Noted on problem list  # Acute heart failure with preserved ejection fraction: heart failure noted on problem list, last echo with EF >50%, and receiving IV diuretics     # Obesity: Estimated body mass index is 34.97 kg/m  as calculated from the following:    Height as of this encounter: 1.422 m (4' 8\").    Weight as of this encounter: 70.8 kg (156 lb).       # COPD: noted on problem list  # Pacemaker present        "

## 2024-05-11 NOTE — PLAN OF CARE
"Assumed care after status change to [facility]. Increasing potassium, increasing troponin. Potassium 6.4 down to 5.6 after shifting with dextrose, insulin, bicarb and calcium gluconate. Lokelma also given. Henderson placed with cloudy urine. Pt on 1 liter oxygen, sats well but continues to have a very congested cough, coarse lungs. Echo and cardiology consult today.       Problem: Adult Inpatient Plan of Care  Goal: Plan of Care Review  Description: The Plan of Care Review/Shift note should be completed every shift.  The Outcome Evaluation is a brief statement about your assessment that the patient is improving, declining, or no change.  This information will be displayed automatically on your shift  note.  Outcome: Not Progressing  Flowsheets (Taken 5/11/2024 0700)  Outcome Evaluation: status change overnight, transitioned to [facility] status  Plan of Care Reviewed With: patient  Overall Patient Progress: declining  Goal: Patient-Specific Goal (Individualized)  Description: You can add care plan individualizations to a care plan. Examples of Individualization might be:  \"Parent requests to be called daily at 9am for status\", \"I have a hard time hearing out of my right ear\", or \"Do not touch me to wake me up as it startles  me\".  Outcome: Not Progressing  Goal: Absence of Hospital-Acquired Illness or Injury  Outcome: Not Progressing  Intervention: Identify and Manage Fall Risk  Recent Flowsheet Documentation  Taken 5/11/2024 0453 by Priyanka Araiza, RN  Safety Promotion/Fall Prevention:   activity supervised   clutter free environment maintained   lighting adjusted   nonskid shoes/slippers when out of bed   safety round/check completed  Intervention: Prevent Skin Injury  Recent Flowsheet Documentation  Taken 5/11/2024 4676 by Priyanka Araiza, RN  Body Position: position changed independently  Taken 5/11/2024 8403 by Priyanka Araiza, RN  Device Skin Pressure Protection: adhesive use limited  Goal: Optimal Comfort and " Wellbeing  Outcome: Not Progressing  Goal: Readiness for Transition of Care  Outcome: Not Progressing     Problem: Comorbidity Management  Goal: Maintenance of Asthma Control  Outcome: Not Progressing  Intervention: Maintain Asthma Symptom Control  Recent Flowsheet Documentation  Taken 5/11/2024 0453 by Priyanka Araiza RN  Medication Review/Management: medications reviewed  Goal: Maintenance of COPD Symptom Control  Outcome: Not Progressing  Intervention: Maintain COPD Symptom Control  Recent Flowsheet Documentation  Taken 5/11/2024 0453 by Priyanka Araiza RN  Supportive Measures: active listening utilized  Medication Review/Management: medications reviewed  Goal: Maintenance of Heart Failure Symptom Control  Outcome: Not Progressing  Intervention: Maintain Heart Failure Management  Recent Flowsheet Documentation  Taken 5/11/2024 0453 by Priyanka Araiza RN  Medication Review/Management: medications reviewed  Goal: Blood Pressure in Desired Range  Outcome: Not Progressing  Intervention: Maintain Blood Pressure Management  Recent Flowsheet Documentation  Taken 5/11/2024 0453 by Priyanka Araiza RN  Medication Review/Management: medications reviewed  Goal: Maintenance of Osteoarthritis Symptom Control  Outcome: Not Progressing  Intervention: Maintain Osteoarthritis Symptom Control  Recent Flowsheet Documentation  Taken 5/11/2024 0530 by Priyanka Araiza RN  Assistive Device Utilized:   gait belt   walker  Activity Management: activity adjusted per tolerance  Taken 5/11/2024 0453 by Priyanka Araiza RN  Medication Review/Management: medications reviewed     Problem: Chest Pain  Goal: Resolution of Chest Pain Symptoms  Outcome: Not Progressing   Goal Outcome Evaluation:      Plan of Care Reviewed With: patient    Overall Patient Progress: decliningOverall Patient Progress: declining    Outcome Evaluation: status change overnight, transitioned to [facility] status

## 2024-05-11 NOTE — PLAN OF CARE
"BP (!) 171/87   Pulse 109   Temp 97.9  F (36.6  C) (Oral)   Resp 26   Ht 1.422 m (4' 8\")   Wt 70.8 kg (156 lb)   SpO2 91%   BMI 34.97 kg/m      Neuro: A/OX4  Cardiac: Tele  Lungs: 3L RA  GI: Ileostomy  : Purewick  Pain: 1/10 Chest Pain  IV: LPIV  Labs/tests: k-mg  Diet: Low NA < 2400, 1800 Fluid Restriction  Activity: Ax1 G/W    Goal Outcome Evaluation:      Plan of Care Reviewed With: patient    Overall Patient Progress: decliningOverall Patient Progress: declining    Outcome Evaluation: NC turned up from 2L to 3L. SOB. A/O X4      Problem: Adult Inpatient Plan of Care  Goal: Plan of Care Review  Description: The Plan of Care Review/Shift note should be completed every shift.  The Outcome Evaluation is a brief statement about your assessment that the patient is improving, declining, or no change.  This information will be displayed automatically on your shift  note.  Outcome: Not Progressing  Flowsheets (Taken 5/10/2024 2316)  Outcome Evaluation: NC turned up from 2L to 3L. SOB. A/O X4  Plan of Care Reviewed With: patient  Overall Patient Progress: declining  Goal: Patient-Specific Goal (Individualized)  Description: You can add care plan individualizations to a care plan. Examples of Individualization might be:  \"Parent requests to be called daily at 9am for status\", \"I have a hard time hearing out of my right ear\", or \"Do not touch me to wake me up as it startles  me\".  Outcome: Not Progressing  Goal: Absence of Hospital-Acquired Illness or Injury  Outcome: Not Progressing  Intervention: Identify and Manage Fall Risk  Recent Flowsheet Documentation  Taken 5/10/2024 2242 by Neymar Carolina RN  Safety Promotion/Fall Prevention: activity supervised  Intervention: Prevent Skin Injury  Recent Flowsheet Documentation  Taken 5/10/2024 2242 by Neymar Carolina RN  Body Position: position changed independently  Intervention: Prevent Infection  Recent Flowsheet Documentation  Taken 5/10/2024 2242 by Neymar Carolina, " RN  Infection Prevention: rest/sleep promoted  Goal: Optimal Comfort and Wellbeing  Outcome: Not Progressing  Intervention: Monitor Pain and Promote Comfort  Recent Flowsheet Documentation  Taken 5/10/2024 2235 by Neymar Carolina, RN  Pain Management Interventions: medication (see MAR)  Goal: Readiness for Transition of Care  Outcome: Not Progressing  Intervention: Mutually Develop Transition Plan  Recent Flowsheet Documentation  Taken 5/10/2024 2300 by Neymar Carolina, RN  Equipment Currently Used at Home: none

## 2024-05-11 NOTE — PROGRESS NOTES
[facility]  Hospitalist Progress Note  Shawn Flores MD 05/11/2024    Reason for Stay (Diagnosis): Acute hypoxic respiratory failure         Assessment and Plan:      Summary of Stay: Arpita Rocha is a 88 year old female with PMH significant for HFpEF, CKD4, HTN, COPD, obesity, depression admitted on 5/10/2024 with shortness of breath.     On presentation, the patient was initially treated for presumed COPD exacerbation with nebulizers and prednisone.    The evening of admission, the patient acutely developed respiratory distress and worsening hypoxia requiring up to 8 L supplemental oxygen.  She was also complaining of chest discomfort.  EKG was obtained which appeared to show new left bundle branch block, and troponin enzymes noted to be elevated in the 100s.  Patient was administered IV Lasix.  Cardiology was contacted given EKG change.  IV heparin infusion was initiated for concerns about acute coronary syndrome, but this was complicated by development of hyperkalemia believed related to heparin in the setting of chronic kidney disease.  Heparin was subsequently stopped.   hyperkalemia was treated medically with improvement    On the morning of 5/11, patient's respiratory status is much improved.  She has been weaned off supplemental oxygen.    Of note, family does report that the patient had poor compliance with her medications this past week, possibly contributing to her presentation    Plans today:  -Continue Lasix 20 mg IV every 12 hours today  -Repeat troponin and BMP at noon  -Echocardiogram today  -Continue prednisone and nebulizer therapy, though I suspect the bulk of her respiratory failure is more cardiac than COPD in origin  -Cardiology consult     Acute hypoxemic respiratory failure   Suspect primarily acute on chronic diastolic heart failure exacerbation, with possible lesser contribution from COPD exacerbation:  - Presented with shortness of breath.  Reports that she is  been feeling sick for a couple of weeks now and suddenly worse over the past 24 to 48 hours.  Family reported the patient has missed taking several doses of her regular medications over the past weed  K, including her chronic diuretic.  she was brought in from her residential with oxygen saturations as low as 80%.  She endorses a productive cough and denies fevers or chills.  Exam on admit was notable for severe expiratory wheezing in all lung fields.  Work up notable for BNP of 17,000 and chest x-ray showing pulmonary edema and pleural effusion.    -Lack of compliance with daily diuretic this week may be contributing factor  -Continue Lasix IV  -Wean supplemental oxygen as able.  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  Will plan on daily BMP     HTN: Known history of hypertension.  Patient has been adherent to her home blood pressure medications.  She is hypertensive on presentation likely in the setting of respiratory distress ambulatory use.  PTA amlodipine, carvedilol, clonidine with hold parameters.      MDD: PTA celexa           Diet: Combination Diet Low Saturated Fat Na <2400mg Diet, No Caffeine Diet  Fluid restriction 1800 ML FLUID  DVT Prophylaxis: Pneumatic Compression Devices  Henderson Catheter: Not present  Lines: None     Cardiac Monitoring: ACTIVE order. Indication: Acute decompensated heart failure (48 hours)  Code Status: No CPR- Do  "NOT Intubate  DISPO: After respiratory status improved and any recommended cardiac workup completed        Interval History (Subjective):      Breathing much better this morning.  Not quite at normal baseline respiratory status.  Family present at bedside.  Patient is not currently on supplemental oxygen, but has been in the past                  Physical Exam:      Last Vital Signs:  /53 (BP Location: Right arm, Patient Position: Semi-Smith's, Cuff Size: Adult Regular)   Pulse 62   Temp 99.3  F (37.4  C) (Oral)   Resp 19   Ht 1.422 m (4' 8\")   Wt 70.8 kg (156 lb)   SpO2 91%   BMI 34.97 kg/m        Intake/Output Summary (Last 24 hours) at 5/11/2024 1218  Last data filed at 5/11/2024 1200  Gross per 24 hour   Intake 1143 ml   Output 700 ml   Net 443 ml       Constitutional: Awake, alert, cooperative, no apparent distress     Respiratory: Few crackles at the bases.  No wheezing   Cardiovascular: Regular rate and rhythm, normal S1 and S2, and no murmur noted   Abdomen: Normal bowel sounds, soft, non-distended, non-tender   Skin: No rashes, no cyanosis, dry to touch   Neuro: Alert and oriented x3, no weakness, numbness, memory loss   Extremities: No edema, normal range of motion   Other(s):        All other systems: Negative          Medications:      All current medications were reviewed with changes reflected in problem list.         Data:      All new lab and imaging data was reviewed.   Labs:       Lab Results   Component Value Date     05/11/2024     05/10/2024     04/09/2024    Lab Results   Component Value Date    CHLORIDE 109 05/11/2024    CHLORIDE 110 05/10/2024    CHLORIDE 103 04/09/2024    Lab Results   Component Value Date    BUN 67.8 05/11/2024    BUN 64.6 05/10/2024    BUN 74.6 04/09/2024      Lab Results   Component Value Date    POTASSIUM 5.4 05/11/2024    POTASSIUM 5.6 05/11/2024    POTASSIUM 6.3 05/11/2024    Lab Results   Component Value Date    CO2 17 05/11/2024    CO2 " 18 05/10/2024    CO2 20 2024    Lab Results   Component Value Date    CR 2.52 2024    CR 2.54 05/10/2024    CR 2.74 2024        Recent Labs   Lab 24  0618   WBC 5.2   HGB 9.9*   HCT 30.8*   *   *      Imaging:   Recent Results (from the past 24 hour(s))   XR Chest Port 1 View    Narrative    EXAM: XR CHEST PORT 1 VIEW  LOCATION: [facility]  DATE: 5/10/2024    INDICATION: SOB  COMPARISON: 2024      Impression    IMPRESSION: Mild pulmonary edema with small bilateral pleural effusions. Left greater than right lower lobe opacities, likely atelectasis. Stable heart size and mediastinal contours. Unchanged left chest cardiac generator and leads and extensive spinal   fusion hardware.   Echocardiogram Complete   Result Value    LVEF  50-55%    Narrative    [id number]  [id number]  [id number]  [id number]^ROSAURA^VINOD^CONSUELO     [facility]  Echocardiography Laboratory  [address] [place] [address]     Name: CONNER LINDER  MRN: [id number]  : 1935  Study Date: 2024 08:13 AM  Age: 88 yrs  Gender: Female  Patient Location: [facility]  Reason For Study: Chest Pain  Ordering Physician: VINOD KAPLAN  Referring Physician: Ludivina Chamberlain MD  Performed By: Michelle Dewey JEFF     BSA: 1.6 m2  Height: 56 in  Weight: 156 lb  HR: 60  BP: 174/80 mmHg  ______________________________________________________________________________  Procedure  Complete Portable Echo Adult. Optison (NDC #6460-1471) given intravenously.  ______________________________________________________________________________  Interpretation Summary     1. Left ventricular systolic function is low normal. The visual ejection  fraction is 50-55%.  2. Septal wall motion abnormality may reflect pacemaker activation.  3. The right ventricle is normal in structure, function and size.  4. There is mild (1+) mitral regurgitation. There is mild mitral  stenosis.  ______________________________________________________________________________  Left Ventricle  The left ventricle is normal in size. There is moderate concentric left  ventricular hypertrophy. The visual ejection fraction is 50-55%. Left  ventricular systolic function is low normal. Diastolic function not assessed  due to atrial fibrillation. Septal wall motion abnormality may reflect  pacemaker activation.     Right Ventricle  The right ventricle is normal in structure, function and size.     Atria  The left atrium is severely dilated. The right atrium is moderately dilated.  There is no color Doppler evidence of an atrial shunt.     Mitral Valve  There is moderate mitral annular calcification. There is mild (1+) mitral  regurgitation. There is mild mitral stenosis.     Tricuspid Valve  The tricuspid valve is normal in structure and function. There is mild (1+)  tricuspid regurgitation.     Aortic Valve  The aortic valve is trileaflet with aortic valve sclerosis.     Pulmonic Valve  The pulmonic valve is not well seen, but is grossly normal.     Vessels  Ascending aorta dilatation is present. IVC diameter and respiratory changes  fall into an intermediate range suggesting an RA pressure of 8 mmHg.     Rhythm  The rhythm was atrial fibrillation.     ______________________________________________________________________________  MMode/2D Measurements & Calculations  IVSd: 1.4 cm  LVIDd: 3.6 cm  LVIDs: 2.3 cm  LVPWd: 1.5 cm  IVC diam: 2.2 cm  FS: 37.3 %  LV mass(C)d: 191.4 grams  LV mass(C)dI: 119.8 grams/m2     Ao root diam: 3.5 cm  LA dimension: 4.0 cm  asc Aorta Diam: 4.0 cm  LA/Ao: 1.2  LVOT diam: 2.2 cm  LVOT area: 3.9 cm2  Ao root diam index Ht(cm/m): 2.4  Ao root diam index BSA (cm/m2): 2.2  Asc Ao diam index BSA (cm/m2): 2.5  Asc Ao diam index Ht(cm/m): 2.8  EF Biplane: 45.0 %  LA Volume (BP): 78.6 ml  LA Volume Index (BP): 49.1 ml/m2     RWT: 0.83     Doppler Measurements & Calculations  MV E max  john: 126.0 cm/sec  MV A max john: 142.0 cm/sec  MV E/A: 0.89  MV max PG: 10.9 mmHg  MV mean P.7 mmHg  MV V2 VTI: 35.8 cm  MV P1/2t max john: 113.4 cm/sec  MV P1/2t: 74.3 msec  MVA(P1/2t): 3.0 cm2  MV dec slope: 446.9 cm/sec2  MV dec time: 0.26 sec  Ao V2 max: 180.9 cm/sec  Ao max P.0 mmHg  TR max john: 310.6 cm/sec  TR max P.6 mmHg  E/E' av.0  Lateral E/e': 14.7  Medial E/e': 11.2     ______________________________________________________________________________  Report approved by: Anders Cervantes 2024 10:23 AM

## 2024-05-11 NOTE — ED NOTES
[facility]  ED Nurse Handoff Report    ED Chief complaint: Shortness of Breath  . ED Diagnosis:   Final diagnoses:   COPD exacerbation (H)   Acute on chronic diastolic congestive heart failure (H)       Allergies:   Allergies   Allergen Reactions    Acetaminophen-Codeine Hallucination    Penicillin G Rash       Code Status: DNR / DNI    Activity level - Baseline/Home:  walker.  Activity Level - Current:   assist of 1.   Lift room needed: No.   Bariatric: No   Needed: No   Isolation: No.   Infection: Not Applicable.     Respiratory status: Nasal cannula    Vital Signs (within 30 minutes):   Vitals:    05/10/24 2045 05/10/24 2100 05/10/24 2115 05/10/24 2130   BP: (!) 200/78 (!) 192/137  (!) 191/77   Pulse: 60 60 59 59   Resp: 22 (!) 38 (!) 38 28   Temp:       TempSrc:       SpO2: 95% 95% 95% 94%   Weight:       Height:           Cardiac Rhythm:  ,   Cardiac  Cardiac Rhythm: Other (Comment) (pcer)  Pain level:    Patient confused: No.   Patient Falls Risk: nonskid shoes/slippers when out of bed, arm band in place, patient and family education, and assistive device/personal items within reach.   Elimination Status:  pure wick in place, ileostomy       Patient Report - Initial Complaint: Arpita Rocha is a 88 year old female with PMH significant for HFpEF, CKD4, HTN, COPD, obesity, depression admitted on 5/10/2024 with shortness of breath. .   Focused Assessment: respiratory      Abnormal Results:   Labs Ordered and Resulted from Time of ED Arrival to Time of ED Departure   BASIC METABOLIC PANEL - Abnormal       Result Value    Sodium 141      Potassium 5.6 (*)     Chloride 110 (*)     Carbon Dioxide (CO2) 18 (*)     Anion Gap 13      Urea Nitrogen 64.6 (*)     Creatinine 2.54 (*)     GFR Estimate 18 (*)     Calcium 9.4      Glucose 113 (*)    NT PROBNP INPATIENT - Abnormal    N terminal Pro BNP Inpatient 17,972 (*)    ISTAT GASES LACTATE VENOUS POCT - Abnormal    Lactic Acid POCT  0.8      Bicarbonate Venous POCT 18 (*)     O2 Sat, Venous POCT 19 (*)     pCO2 Venous POCT 38 (*)     pH Venous POCT 7.30 (*)     pO2 Venous POCT 16 (*)    CBC WITH PLATELETS AND DIFFERENTIAL - Abnormal    WBC Count 13.1 (*)     RBC Count 3.25 (*)     Hemoglobin 10.8 (*)     Hematocrit 33.7 (*)      (*)     MCH 33.2 (*)     MCHC 32.0      RDW 13.0      Platelet Count 170      % Neutrophils 78      % Lymphocytes 14      % Monocytes 7      % Eosinophils 1      % Basophils 0      % Immature Granulocytes 0      NRBCs per 100 WBC 0      Absolute Neutrophils 10.2 (*)     Absolute Lymphocytes 1.9      Absolute Monocytes 0.9      Absolute Eosinophils 0.1      Absolute Basophils 0.1      Absolute Immature Granulocytes 0.0      Absolute NRBCs 0.0     INFLUENZA A/B, RSV, & SARS-COV2 PCR - Normal    Influenza A PCR Negative      Influenza B PCR Negative      RSV PCR Negative      SARS CoV2 PCR Negative     PROCALCITONIN - Normal    Procalcitonin 0.16     TROPONIN T, HIGH SENSITIVITY        XR Chest Port 1 View   Final Result   IMPRESSION: Mild pulmonary edema with small bilateral pleural effusions. Left greater than right lower lobe opacities, likely atelectasis. Stable heart size and mediastinal contours. Unchanged left chest cardiac generator and leads and extensive spinal    fusion hardware.          Treatments provided:   XR Chest Port 1 View   Final Result   IMPRESSION: Mild pulmonary edema with small bilateral pleural effusions. Left greater than right lower lobe opacities, likely atelectasis. Stable heart size and mediastinal contours. Unchanged left chest cardiac generator and leads and extensive spinal    fusion hardware.        Labs Ordered and Resulted from Time of ED Arrival to Time of ED Departure   BASIC METABOLIC PANEL - Abnormal       Result Value    Sodium 141      Potassium 5.6 (*)     Chloride 110 (*)     Carbon Dioxide (CO2) 18 (*)     Anion Gap 13      Urea Nitrogen 64.6 (*)     Creatinine 2.54 (*)      GFR Estimate 18 (*)     Calcium 9.4      Glucose 113 (*)    NT PROBNP INPATIENT - Abnormal    N terminal Pro BNP Inpatient 17,972 (*)    ISTAT GASES LACTATE VENOUS POCT - Abnormal    Lactic Acid POCT 0.8      Bicarbonate Venous POCT 18 (*)     O2 Sat, Venous POCT 19 (*)     pCO2 Venous POCT 38 (*)     pH Venous POCT 7.30 (*)     pO2 Venous POCT 16 (*)    CBC WITH PLATELETS AND DIFFERENTIAL - Abnormal    WBC Count 13.1 (*)     RBC Count 3.25 (*)     Hemoglobin 10.8 (*)     Hematocrit 33.7 (*)      (*)     MCH 33.2 (*)     MCHC 32.0      RDW 13.0      Platelet Count 170      % Neutrophils 78      % Lymphocytes 14      % Monocytes 7      % Eosinophils 1      % Basophils 0      % Immature Granulocytes 0      NRBCs per 100 WBC 0      Absolute Neutrophils 10.2 (*)     Absolute Lymphocytes 1.9      Absolute Monocytes 0.9      Absolute Eosinophils 0.1      Absolute Basophils 0.1      Absolute Immature Granulocytes 0.0      Absolute NRBCs 0.0     INFLUENZA A/B, RSV, & SARS-COV2 PCR - Normal    Influenza A PCR Negative      Influenza B PCR Negative      RSV PCR Negative      SARS CoV2 PCR Negative     PROCALCITONIN - Normal    Procalcitonin 0.16     TROPONIN T, HIGH SENSITIVITY       Family Comments: lives in assisted living, children at bedside  OBS brochure/video discussed/provided to patient:  N/A  ED Medications:   Medications   ipratropium - albuterol 0.5 mg/2.5 mg/3 mL (DUONEB) neb solution 6 mL (6 mLs Nebulization $Given 5/10/24 2040)   furosemide (LASIX) injection 60 mg (60 mg Intravenous $Given 5/10/24 2131)       Drips infusing:  No  For the majority of the shift this patient was Green.   Interventions performed were n/a.    Sepsis treatment initiated: No    Cares/treatment/interventions/medications to be completed following ED care: MD orders    ED Nurse Name: Kalpana Dawson RN  10:03 PM     Reviewed by RN: Neymar Carolina RN 10:14 PM

## 2024-05-11 NOTE — PHARMACY-ADMISSION MEDICATION HISTORY
Pharmacy Intern Admission Medication History    Admission medication history is complete. The information provided in this note is only as accurate as the sources available at the time of the update.    Information Source(s): Family member, Prescription bottles, and CareEverywhere/SureScripts via in-person    Pertinent Information:   -Pt lives in assistant living faculty, however, her son and daughter in law organizer her meds into a pill organizer weekly.     Family brought home meds and nebulizer and she till uses DUONEB. However, it was last filed on 3- per SureScripts    Changes made to PTA medication list:  Added: None  Deleted: None  Changed: doxycycline, amlodipine, Cranberry    Allergies reviewed with patient and updates made in EHR: yes    Medication History Completed By: Hakeem Vital 5/11/2024 9:12 AM    PTA Med List   Medication Sig Last Dose    acetaminophen (TYLENOL) 650 MG CR tablet Take 1,300 mg by mouth every morning 5/10/2024 at am    amLODIPine (NORVASC) 10 MG tablet Take 5 mg by mouth 2 times daily 5/10/2024 at am    atorvastatin (LIPITOR) 40 MG tablet Take 1 tablet (40 mg) by mouth daily 5/10/2024 at AM    budesonide (PULMICORT) 0.5 MG/2ML neb solution Take 2 mLs (0.5 mg) by nebulization 2 times daily 5/10/2024 at AM    carvedilol (COREG) 25 MG tablet Take 1.5 tablets (37.5 mg) by mouth 2 times daily (with meals) 5/10/2024 at AM    citalopram (CELEXA) 20 MG tablet TAKE 1.5 TAB BY MOUTH EVERY DAY 5/10/2024 at AM    cloNIDine (CATAPRES) 0.2 MG tablet TAKE 1 TABLET BY MOUTH TWICE A DAY 5/10/2024 at AM    clopidogrel (PLAVIX) 75 MG tablet Take 1 tablet (75 mg) by mouth daily 5/10/2024 at AM    Cranberry 450 MG TABS Take 1 tablet by mouth daily 5/10/2024 at AM    diphenhydrAMINE-acetaminophen (TYLENOL PM)  MG tablet Take 2 tablets by mouth at bedtime 5/9/2024 at pm    doxazosin (CARDURA) 1 MG tablet Take 1 mg by mouth at bedtime 5/10/2024 at PM    doxycycline hyclate (VIBRAMYCIN) 100 MG  capsule Take 100 mg by mouth Every Mon, Wed, Fri Morning At bedtime 5/8/2024 at PM    ferrous sulfate (FEROSUL) 325 (65 Fe) MG tablet Take 2 tablets (650 mg) by mouth daily (with breakfast) 5/10/2024 at am    ipratropium - albuterol 0.5 mg/2.5 mg/3 mL (DUONEB) 0.5-2.5 (3) MG/3ML neb solution Take 1 vial (3 mLs) by nebulization every 6 hours 5/10/2024 at pm    montelukast (SINGULAIR) 10 MG tablet Take 1 tablet (10 mg) by mouth At Bedtime 5/9/2024 at pm    multivitamin w/minerals (THERA-VIT-M) tablet Take 1 tablet by mouth daily 5/10/2024 at am    nystatin (MYCOSTATIN) 348985 UNIT/GM external powder Apply topically 3 times daily as needed for other (rash) prn at prn    Ostomy Supplies (ADAPT) PSTE Use with new ostomy pouch and drain PRN DME at [facility]    Ostomy Supplies (PREMIER DRAINABLE POUCH 22MM) Pouch MISC Use as directed every 3-4 days DME at [facility]    sodium bicarbonate 650 MG tablet Take 1 tablet (650 mg) by mouth 2 times daily 5/10/2024 at am    torsemide (DEMADEX) 20 MG tablet Take 1 tablet (20 mg) by mouth daily With an additional 20 mg as needed for weight gain > 3# overnight or worsening symptoms (leg swelling/breathing). 5/10/2024 at am    umeclidinium-vilanterol (ANORO ELLIPTA) 62.5-25 MCG/ACT oral inhaler Inhale 1 puff into the lungs daily 5/10/2024 at am

## 2024-05-11 NOTE — H&P
[facility]    History and Physical - Hospitalist Service       Date of Admission:  5/10/2024    Assessment & Plan      Arpita Rocha is a 88 year old female with PMH significant for HFpEF, CKD4, HTN, COPD, obesity, depression admitted on 5/10/2024 with shortness of breath.     Acute hypoxemic respiratory failure   Suspect combination COPD exacerbation and HFpEF exacerbation:  Presents with shortness of breath.  Reports that she is been feeling sick for a couple of weeks now.  Suddenly worse over the past 24 to 48 hours.  She was brought in from her [facility] with oxygen saturations as low as 80% recovered by EMS.  She endorses a productive cough that is consistent with her COPD.  She denies fevers, chills.  Exam is most notable for severe expiratory wheezing in all lung fields that make me highly suspicious for COPD exacerbation.  However, workup is more consistent with CHF exacerbation with proBNP of 17,000, chest x-ray showing pulmonary edema and pleural effusion.  Will treat for both COPD exacerbation and heart failure constipation.  -She already received 125 mg of Solu-Medrol en route, will continue prednisone 40 mg daily to start tomorrow  -DuoNebs every 4 hours while awake, albuterol nebs every 2 hours as needed  -She was dosed with IV Lasix 60 mg once in the ED,  will hold off on further dosing tonight. I will order Lasix IV 60 x1 more dose  for tomorrow AM and then defer further dosing to daily rounder  -Continuous oximetry, supplemental oxygen as needed  -Daily weights, ins and outs  -Cardiac diet and 1800 mg fluid restriction  -PTA budesonide nebs, montelukast  -PTA cardiac medications including atorvastatin, Plavix, carvedilol    CKD4: Last hospitalization was complicated by cardiorenal syndrome, will need to keep a close eye on BMP in setting diuresis as above.  Will plan on daily BMP    HTN: Known history of hypertension.  Patient has been adherent to her home blood pressure  "medications.  She is hypertensive on presentation likely in the setting of respiratory distress ambulatory use.  PTA amlodipine, carvedilol, clonidine with hold parameters.     MDD: PTA celexa          Diet: Combination Diet Low Saturated Fat Na <2400mg Diet, No Caffeine Diet  Fluid restriction 1800 ML FLUID  DVT Prophylaxis: Pneumatic Compression Devices  Henderson Catheter: Not present  Lines: None     Cardiac Monitoring: ACTIVE order. Indication: Acute decompensated heart failure (48 hours)  Code Status: No CPR- Do NOT Intubate    Clinically Significant Risk Factors Present on Admission        # Hyperkalemia: Highest K = 5.6 mmol/L in last 2 days, will monitor as appropriate         # Drug Induced Platelet Defect: home medication list includes an antiplatelet medication   # Hypertension: Noted on problem list    # Acute heart failure with preserved ejection fraction: heart failure noted on problem list, last echo with EF >50%, and receiving IV diuretics     # Obesity: Estimated body mass index is 37.26 kg/m  as calculated from the following:    Height as of this encounter: 1.422 m (4' 8\").    Weight as of this encounter: 75.4 kg (166 lb 3.2 oz).       # COPD: noted on problem list    # Pacemaker present       Disposition Plan     Medically Ready for Discharge: Anticipated in 2-4 Days           Dallas Jenkins DO  Hospitalist Service  [facility]  Securely message with [employer] (more info)  Text page via [employer] Paging/Directory     ______________________________________________________________________    Chief Complaint   Shortness of breath    History is obtained from the patient    History of Present Illness   Arpita Rocha is a 88 year old female with PMH significant for HFpEF, CKD4, HTN, COPD, obesity, depression admitted on 5/10/2024 with shortness of breath.     Presents with shortness of breath.  Reports that she is been feeling sick for a couple of weeks now.  Suddenly worse over the " past 24 to 48 hours.  She endorses a productive cough that is consistent with her prior COPD exacerbation.  She denies fevers, chills.  She endorses adherence to her home medications.  She endorses chronic lower extremity edema that is unchanged.  She denies any recent weight gain.  She endorses low-sodium diet.  She reports that this feels identical to her prior hospitalization I believe in December.      Past Medical History    Past Medical History:   Diagnosis Date    Ulcerative pancolitis (H) 05/23/2023       Past Surgical History   Past Surgical History:   Procedure Laterality Date    CATARACT EXTRACTION Bilateral     COLONOSCOPY      FEMUR FRACTURE SURGERY Right     2010    ILEOSTOMY      IMPLANT PACEMAKER      left knee replacement      MULTIPLE TOOTH EXTRACTIONS      right knee replacement Right     SPINAL FUSION      TONSILLECTOMY      TUBAL LIGATION         Prior to Admission Medications   Prior to Admission Medications   Prescriptions Last Dose Informant Patient Reported? Taking?   Cranberry 450 MG TABS   Yes No   Ostomy Supplies (ADAPT) PSTE   No No   Sig: Use with new ostomy pouch and drain PRN   Ostomy Supplies (PREMIER DRAINABLE POUCH 22MM) Pouch MISC   No No   Sig: Use as directed every 3-4 days   acetaminophen (TYLENOL) 650 MG CR tablet   Yes No   Sig: Take 1,300 mg by mouth every morning   amLODIPine (NORVASC) 5 MG tablet   No No   Sig: Take 1 tablet (5 mg) by mouth 2 times daily   atorvastatin (LIPITOR) 40 MG tablet   No No   Sig: Take 1 tablet (40 mg) by mouth daily   budesonide (PULMICORT) 0.5 MG/2ML neb solution   No No   Sig: Take 2 mLs (0.5 mg) by nebulization 2 times daily   carvedilol (COREG) 25 MG tablet   No No   Sig: Take 1.5 tablets (37.5 mg) by mouth 2 times daily (with meals)   citalopram (CELEXA) 20 MG tablet   No No   Sig: TAKE 1.5 TAB BY MOUTH EVERY DAY   cloNIDine (CATAPRES) 0.2 MG tablet   No No   Sig: TAKE 1 TABLET BY MOUTH TWICE A DAY   clopidogrel (PLAVIX) 75 MG tablet   No No    Sig: Take 1 tablet (75 mg) by mouth daily   diphenhydrAMINE-acetaminophen (TYLENOL PM)  MG tablet   Yes No   Sig: Take 2 tablets by mouth at bedtime   doxazosin (CARDURA) 1 MG tablet   Yes Yes   Sig: Take 1 mg by mouth at bedtime   doxycycline hyclate (VIBRAMYCIN) 100 MG capsule   No No   Sig: Take 1 capsule (100 mg) by mouth three times a week   ferrous sulfate (FE TABS) 325 (65 Fe) MG EC tablet   Yes Yes   Sig: Take 325 mg by mouth daily   ferrous sulfate (FEROSUL) 325 (65 Fe) MG tablet   No No   Sig: Take 2 tablets (650 mg) by mouth daily (with breakfast)   ipratropium - albuterol 0.5 mg/2.5 mg/3 mL (DUONEB) 0.5-2.5 (3) MG/3ML neb solution   No No   Sig: Take 1 vial (3 mLs) by nebulization every 6 hours   montelukast (SINGULAIR) 10 MG tablet   No No   Sig: Take 1 tablet (10 mg) by mouth At Bedtime   multivitamin w/minerals (THERA-VIT-M) tablet   Yes No   Sig: Take 1 tablet by mouth daily   nystatin (MYCOSTATIN) 992462 UNIT/GM external powder   No No   Sig: Apply topically 3 times daily as needed for other (rash)   Patient not taking: Reported on 4/16/2024   sodium bicarbonate 650 MG tablet   No No   Sig: Take 1 tablet (650 mg) by mouth 2 times daily   torsemide (DEMADEX) 20 MG tablet   No No   Sig: Take 1 tablet (20 mg) by mouth daily With an additional 20 mg as needed for weight gain > 3# overnight or worsening symptoms (leg swelling/breathing).   umeclidinium-vilanterol (ANORO ELLIPTA) 62.5-25 MCG/ACT oral inhaler   No No   Sig: Inhale 1 puff into the lungs daily      Facility-Administered Medications: None        Review of Systems    The 10 point Review of Systems is negative other than noted in the HPI or here.     Social History   I have reviewed this patient's social history and updated it with pertinent information if needed.  Social History     Tobacco Use    Smoking status: Former     Current packs/day: 0.00     Average packs/day: 1 pack/day for 44.0 years (44.0 ttl pk-yrs)     Types: Cigarettes      Start date: 1952     Quit date: 1996     Years since quittin.3    Smokeless tobacco: Never   Vaping Use    Vaping status: Never Used   Substance Use Topics    Alcohol use: Not Currently    Drug use: Never         Family History   I have reviewed this patient's family history and updated it with pertinent information if needed.  Family History   Problem Relation Age of Onset    Cerebrovascular Disease Mother     Diabetes Mother     Hypertension Mother     Cancer Mother     Hypertension Father     Cerebrovascular Disease Sister     Hypertension Sister     Hypertension Sister     Breast Cancer Sister     Dementia Sister     Hypertension Brother     Cancer Brother     Hypertension Brother     Cancer Brother     Emphysema Brother          Allergies   Allergies   Allergen Reactions    Acetaminophen-Codeine Hallucination    Penicillin G Rash        Physical Exam   Vital Signs: Temp: 99  F (37.2  C) Temp src: Oral BP: (!) 171/74 Pulse: 60   Resp: 28 SpO2: 93 % O2 Device: Nasal cannula Oxygen Delivery: 2 LPM  Weight: 166 lbs 3.2 oz    General Appearance: Patient awake & alert.  No apparent distress.   Respiratory: Lungs with profuse expiratory wheezing that is heard even without auscultation.  Work of breathing mildly increased on 2L NC.  Mild bibasilar rales.   Cardiovascular: Regular rate and rhythm with pvc.  No murmurs rubs or gallops.  2+ pitting edema to BLE.   GI: Benign.  Soft.  Non-tender.  Bowel sounds active.   Skin: No rashes or lesions exposed skin.  Neuro:  The patient is moving all extremities.  No obvious focal asymmetries.   Other: Patient is appropriate.      Medical Decision Making       75 MINUTES SPENT BY ME on the date of service doing chart review, history, exam, documentation & further activities per the note.      Data   ------------------------- PAST 24 HR DATA REVIEWED -----------------------------------------------    I have personally reviewed the following data over the past 24  hrs:    13.1 (H)  \   10.8 (L)   / 170     141 110 (H) 64.6 (H) /  113 (H)   5.6 (H) 18 (L) 2.54 (H) \     Trop: N/A BNP: 17,972 (H)     Procal: 0.16 CRP: N/A Lactic Acid: 0.8         Imaging results reviewed over the past 24 hrs:   Recent Results (from the past 24 hour(s))   XR Chest Port 1 View    Narrative    EXAM: XR CHEST PORT 1 VIEW  LOCATION: [facility]  DATE: 5/10/2024    INDICATION: SOB  COMPARISON: 2/7/2024      Impression    IMPRESSION: Mild pulmonary edema with small bilateral pleural effusions. Left greater than right lower lobe opacities, likely atelectasis. Stable heart size and mediastinal contours. Unchanged left chest cardiac generator and leads and extensive spinal   fusion hardware.

## 2024-05-11 NOTE — PLAN OF CARE
"Care from [id number]  Inpatient Progress Note    BP (!) 174/80 (BP Location: Right arm)   Pulse 109   Temp 98.2  F (36.8  C) (Oral)   Resp 26   Ht 1.422 m (4' 8\")   Wt 70.8 kg (156 lb)   SpO2 95%   BMI 34.97 kg/m      ORIENTATION: A&O x4  VS: WDL  O2: 95% on 1L via oxymask  TELE: A- Paced  LS: Coarse w/ exp wheezes throughout  GI: Chronic illeostomy to RUQ, producing adequate output, stoma red & protruding   : Henderson in place, I&O's   SKIN: Redness blanchable to coccyx/sacrum. Rash and moisture to L breast fold and abd fold-- interdry and baby powder in place  ACTIVITY: A1 gbw-- has not been oob at this time  PAIN: Given nitroglycerin x2 for 5/10 CP--- intervention effective. Pt denying pain at this time.    LINES/DRAINS: L AC PIV.   PLAN: Tele, serial trops, prednisone, nebs q4h, cards diet w/ 1.8 L fluid restriction, PRN nitroglycerin, echo and cardiac consult in the AM.      Goal Outcome Evaluation:    Overall Patient Progress: improvingOverall Patient Progress: improving    Outcome Evaluation: Tolerating 1L via oxymask. Denies CP. No SOB at rest/tachypnea has resolved at this time.      Problem: Adult Inpatient Plan of Care  Goal: Plan of Care Review  Description: The Plan of Care Review/Shift note should be completed every shift.  The Outcome Evaluation is a brief statement about your assessment that the patient is improving, declining, or no change.  This information will be displayed automatically on your shift  note.  5/11/2024 0233 by Yakelin Rivas, RN  Outcome: Progressing  Flowsheets (Taken 5/11/2024 0233)  Outcome Evaluation: Tolerating 2L via oxymask. Denies CP. No SOB at rest/tachypnea has resolved at this time.  Overall Patient Progress: improving  5/11/2024 0231 by Yakelin Rivas, RN  Outcome: Progressing  Goal: Patient-Specific Goal (Individualized)  Description: You can add care plan individualizations to a care plan. Examples of Individualization might be:  \"Parent requests to " "be called daily at 9am for status\", \"I have a hard time hearing out of my right ear\", or \"Do not touch me to wake me up as it startles  me\".  5/11/2024 0233 by Yakelin Rivas RN  Outcome: Progressing  5/11/2024 0231 by Yakelin Rivas RN  Outcome: Progressing  Goal: Absence of Hospital-Acquired Illness or Injury  5/11/2024 0233 by Yakelin Rivas RN  Outcome: Progressing  5/11/2024 0231 by Yakelin Rivas RN  Outcome: Progressing  Intervention: Identify and Manage Fall Risk  Recent Flowsheet Documentation  Taken 5/11/2024 0004 by Yakelin Rivas RN  Safety Promotion/Fall Prevention:   activity supervised   increase visualization of patient   clutter free environment maintained   nonskid shoes/slippers when out of bed   patient and family education   room near nurse's station   safety round/check completed   treat reversible contributory factors   treat underlying cause  Intervention: Prevent Infection  Recent Flowsheet Documentation  Taken 5/11/2024 0004 by Yakelin Rivas RN  Infection Prevention: rest/sleep promoted  Goal: Optimal Comfort and Wellbeing  5/11/2024 0233 by Yakelin Rivas RN  Outcome: Progressing  5/11/2024 0231 by Yakelin Rivas RN  Outcome: Progressing  Intervention: Monitor Pain and Promote Comfort  Recent Flowsheet Documentation  Taken 5/11/2024 0100 by Yakelin Rivas RN  Pain Management Interventions:   declines   quiet environment facilitated   rest  Taken 5/10/2024 2343 by Yakelin Rivas RN  Pain Management Interventions: medication (see MAR)  Goal: Readiness for Transition of Care  5/11/2024 0233 by Yakelin Rivas RN  Outcome: Progressing  5/11/2024 0231 by Yakelin Rivas RN  Outcome: Progressing     "

## 2024-05-11 NOTE — ED PROVIDER NOTES
Emergency Department Note      History of Present Illness   Chief Complaint  Shortness of Breath    HPI  Arpita Rocha is a 88 year old female with history of COPD and diastolic CHF who presents with shortness of breath. Patient was BIBA for shortness of breath from her assisted living facility in [place]. She was given 125 g solumedrol and a Duoneb PTA. Patient's 2 saturation were as low as 86% as recorded by EMS. Patient says she bean feeling sick yesterday with more shortness of breath on exertion which has progressively worsened since then. She cannot tell if her shortness of breath is positional. She endorses intermittent productive cough. She has been following her weight closely and denies major fluctuations in her weight. She also denies increased leg swelling, fever, or chest pain.  She normally does 4 nebulizer treatments a day.     Independent Historian  Some history provided by EMS. See HPI.     Review of External Notes  I reviewed cardiology note from 2/6/2024 which patient has chronic heart failure with preserved ejection fraction of 55%.  She has mild-moderate right mitral regurgitation.  She has a history of sick sinus syndrome status post permanent pacemaker insertion.    Past Medical History   Medical History and Problem List  Ulcerative Colitis  CHF  Copd  CKD  Hypertension  Carotid artery stenosis     Medications  amlodipine   atorvastatin   budesonide   carvedilol   citalopram   clonidine   clopidogrel  diphenhydramine-acetaminophen   doxycycline hyclate  ferrous sulfate  Duo neb  montelukast  nystatin  torsemide   umeclidinium-vilanterol    Surgical History   Colonoscopy   Femur fracture surgery  Ileostomy   Implant pacemaker   Left knee replacement   Right knee replacement   Tonsillectomy   Tubal ligation  Spinal fusion     Physical Exam   Patient Vitals for the past 24 hrs:   BP Temp Temp src Pulse Resp SpO2 Height Weight   05/10/24 2200 (!) 171/74 -- -- 60 28 93 % -- --   05/10/24  "2130 (!) 191/77 -- -- 59 28 94 % -- --   05/10/24 2115 -- -- -- 59 (!) 38 95 % -- --   05/10/24 2100 (!) 192/137 -- -- 60 (!) 38 95 % -- --   05/10/24 2045 (!) 200/78 -- -- 60 22 95 % -- --   05/10/24 2040 -- -- -- -- -- 95 % -- --   05/10/24 2034 (!) 211/109 99  F (37.2  C) Oral 59 29 94 % 1.422 m (4' 8\") 75.4 kg (166 lb 3.2 oz)     Physical Exam      HEENT:    Oropharynx is moist  Eyes:    Conjunctiva normal  Neck:     Supple, no meningismus.     CV:     Regular rate and rhythm.      No murmurs, rubs or gallops.       No unilateral leg swelling.       2+ radial pulses bilateral.       Mild bilateral lower extremity edema.  PULM:    Moderate diffuse expiratory wheezing.       Tachypneic     Diminished air exchange.     No rales or rhonci.     No stridor.  ABD:    Soft, non-tender, non-distended.       No rebound, guarding or rigidity.  MSK:     No gross deformity to all four extremities.   LYMPH:   No cervical lymphadenopathy.  NEURO:   Alert     Speech is clear     Good muscle tone  Skin:    Warm, dry and intact.    Psych:    Mildly anxious        Diagnostics   Lab Results   Labs Ordered and Resulted from Time of ED Arrival to Time of ED Departure   BASIC METABOLIC PANEL - Abnormal       Result Value    Sodium 141      Potassium 5.6 (*)     Chloride 110 (*)     Carbon Dioxide (CO2) 18 (*)     Anion Gap 13      Urea Nitrogen 64.6 (*)     Creatinine 2.54 (*)     GFR Estimate 18 (*)     Calcium 9.4      Glucose 113 (*)    NT PROBNP INPATIENT - Abnormal    N terminal Pro BNP Inpatient 17,972 (*)    ISTAT GASES LACTATE VENOUS POCT - Abnormal    Lactic Acid POCT 0.8      Bicarbonate Venous POCT 18 (*)     O2 Sat, Venous POCT 19 (*)     pCO2 Venous POCT 38 (*)     pH Venous POCT 7.30 (*)     pO2 Venous POCT 16 (*)    CBC WITH PLATELETS AND DIFFERENTIAL - Abnormal    WBC Count 13.1 (*)     RBC Count 3.25 (*)     Hemoglobin 10.8 (*)     Hematocrit 33.7 (*)      (*)     MCH 33.2 (*)     MCHC 32.0      RDW 13.0      " Platelet Count 170      % Neutrophils 78      % Lymphocytes 14      % Monocytes 7      % Eosinophils 1      % Basophils 0      % Immature Granulocytes 0      NRBCs per 100 WBC 0      Absolute Neutrophils 10.2 (*)     Absolute Lymphocytes 1.9      Absolute Monocytes 0.9      Absolute Eosinophils 0.1      Absolute Basophils 0.1      Absolute Immature Granulocytes 0.0      Absolute NRBCs 0.0     INFLUENZA A/B, RSV, & SARS-COV2 PCR - Normal    Influenza A PCR Negative      Influenza B PCR Negative      RSV PCR Negative      SARS CoV2 PCR Negative     PROCALCITONIN - Normal    Procalcitonin 0.16     TROPONIN T, HIGH SENSITIVITY     Imaging  XR Chest Port 1 View   Final Result   IMPRESSION: Mild pulmonary edema with small bilateral pleural effusions. Left greater than right lower lobe opacities, likely atelectasis. Stable heart size and mediastinal contours. Unchanged left chest cardiac generator and leads and extensive spinal    fusion hardware.        EKG   ECG taken at 2048, ECG read at 2058  Atrial-paced rhythm   Left axis deviation   Left ventricular hypertrophy with repolarization abnormality (R in aVL, Casey product, Romhilt-Mtz)  Abnormal ECG  No significant change as compared to prior, dated 12/8/23.  Rate 60 bpm. AK interval 112 ms. QRS duration 112 ms. QT/QTc 440/440 ms. P-R-T axes -14 -39 67.    Independent Interpretation  I independent review chest x-ray which there is pulmonary edema without focal infiltrate or pneumothorax.    ED Course    Medications Administered  Medications   ipratropium - albuterol 0.5 mg/2.5 mg/3 mL (DUONEB) neb solution 6 mL (6 mLs Nebulization $Given 5/10/24 2040)   furosemide (LASIX) injection 60 mg (60 mg Intravenous $Given 5/10/24 2131)     Social Determinants of Health adding to complexity of care  None    Discussion of Management/ED Course  ED Course as of 05/10/24 2220   Fri May 10, 2024   2125 I obtained history and examined the patient as noted above.    2156 Consult with  Dr. Jenkins, hospitalist, who accepted the patient.      Medical Decision Making / Diagnosis   FÁTIMA Rocha is a 88 year old female presents with shortness of breath and hypoxia of 86%.  She has a history of COPD with evidence of acute bronchospasm.  She was given DuoNebs x 3 and Solu-Medrol .  She has no convincing signs of community-acquired pneumonia.  Viral testing negative.      She also likely has a component of acute decompensated diastolic heart failure as evidenced by pulmonary edema on chest x-ray and elevated BNP.  She was given IV Lasix.  Tachypnea and workload of breathing is improving.  She does not appear to be in respiratory distress that would require initiation of noninvasive ventilation.  Patient will transfer to a monitored bed for ongoing management of COPD and CHF exacerbation.    Disposition  The patient was admitted to the hospital under the care of Dr. Jenkins.     ICD-10 Codes:    ICD-10-CM    1. COPD exacerbation (H)  J44.1       2. Acute on chronic diastolic congestive heart failure (H)  I50.33       3. Hyperkalemia  E87.5       4. Acute respiratory failure with hypoxia (H)  J96.01          Scribe Disclosure:  Ruth VALERO, am serving as a scribe at 9:43 PM on 5/10/2024 to document services personally performed by Wesly Ford MD based on my observations and the provider's statements to me.     [employer]      Wesly Ford MD  05/10/24 5048

## 2024-05-11 NOTE — PLAN OF CARE
Shift summary ([id number])    Pt Ox4. VSS on 1L O2 per NC. Denies pain. Tele A-paced w/ BBB, denies CP. PIV x 2 intact, SL. Henderson and ileostomy patent and intact. Up to chair Ax1-2 pivot. Sacral mepi placed for skin integrity. Antifungal powder applied under bilateral breasts and to abdominal and groin folds. IMC discontinued this shift. Pt using Yanker at bedside to suction oral secretions.     Goal Outcome Evaluation:      Plan of Care Reviewed With: patient    Overall Patient Progress: no changeOverall Patient Progress: no change    Outcome Evaluation: VSS on 1L O2 per NC. K+ WDL at 5.1. Fluid restriction discontinued.    Heart Failure Care Map  GOALS TO BE MET BEFORE DISCHARGE:    1. Decrease congestion and/or edema with diuretic therapy to achieve near optimal volume status.     Dyspnea improved: No, further care required to meet this goal. Please explain KWAN   Edema improved: Yes, satisfactory for discharge.        Last 24 hour I/O:   Intake/Output Summary (Last 24 hours) at 5/11/2024 1828  Last data filed at 5/11/2024 1657  Gross per 24 hour   Intake 1233 ml   Output 1100 ml   Net 133 ml           Net I/O and Weights since admission:   04/11 2300 - 05/11 2259  In: 1233 [P.O.:1070; I.V.:163]  Out: 1100 [Urine:900]  Net: 133     Vitals:    05/10/24 2034 05/10/24 2235   Weight: 75.4 kg (166 lb 3.2 oz) 70.8 kg (156 lb)       2.  O2 sats > 90% on room air, or at prior home O2 therapy level.      Able to wean O2 this shift to keep sats above 90%?: No, further care required to meet this goal. Please explain 1L   Does patient use Home O2? No          Current oxygenation status:   SpO2: 97 %     O2 Device: Nasal cannula, Oxygen Delivery: 1 LPM    3.  Tolerates ambulation and mobility near baseline.     Ambulation: No, further care required to meet this goal. Please explain Ax1-2   Times patient ambulated with staff this shift: up to chair     Please review the Heart Failure Care Map for additional HF goal  "outcomes.    Graciela Hall RN  5/11/2024       Problem: Adult Inpatient Plan of Care  Goal: Plan of Care Review  Description: The Plan of Care Review/Shift note should be completed every shift.  The Outcome Evaluation is a brief statement about your assessment that the patient is improving, declining, or no change.  This information will be displayed automatically on your shift  note.  Outcome: Not Progressing  Flowsheets (Taken 5/11/2024 1824)  Outcome Evaluation: VSS on 1L O2 per NC. K+ WDL at 5.1. Fluid restriction discontinued.  Plan of Care Reviewed With: patient  Overall Patient Progress: no change  Goal: Patient-Specific Goal (Individualized)  Description: You can add care plan individualizations to a care plan. Examples of Individualization might be:  \"Parent requests to be called daily at 9am for status\", \"I have a hard time hearing out of my right ear\", or \"Do not touch me to wake me up as it startles  me\".  Outcome: Not Progressing  Goal: Absence of Hospital-Acquired Illness or Injury  Outcome: Not Progressing  Intervention: Identify and Manage Fall Risk  Recent Flowsheet Documentation  Taken 5/11/2024 1135 by Graciela Hall RN  Safety Promotion/Fall Prevention: safety round/check completed  Taken 5/11/2024 0943 by Graciela Hall RN  Safety Promotion/Fall Prevention: safety round/check completed  Taken 5/11/2024 0800 by Graciela Hall RN  Safety Promotion/Fall Prevention:   activity supervised   assistive device/personal items within reach   clutter free environment maintained   increased rounding and observation   increase visualization of patient   lighting adjusted   mobility aid in reach   nonskid shoes/slippers when out of bed   patient and family education   room door open   room organization consistent   safety round/check completed   supervised activity   treat reversible contributory factors   treat underlying cause  Taken 5/11/2024 0734 by Graciela Hall, " RN  Safety Promotion/Fall Prevention: safety round/check completed  Intervention: Prevent Skin Injury  Recent Flowsheet Documentation  Taken 5/11/2024 1702 by Graciela Hall RN  Body Position:   position changed independently   supine, head elevated   heels elevated  Taken 5/11/2024 0800 by Graciela Hall RN  Body Position:   position changed independently   supine, head elevated  Skin Protection:   adhesive use limited   incontinence pads utilized  Device Skin Pressure Protection:   absorbent pad utilized/changed   adhesive use limited   tubing/devices free from skin contact   pressure points protected  Intervention: Prevent and Manage VTE (Venous Thromboembolism) Risk  Recent Flowsheet Documentation  Taken 5/11/2024 0800 by Graciela Hall RN  VTE Prevention/Management: (plavix) other (see comments)  Intervention: Prevent Infection  Recent Flowsheet Documentation  Taken 5/11/2024 0800 by Graciela Hall RN  Infection Prevention:   equipment surfaces disinfected   hand hygiene promoted   personal protective equipment utilized   rest/sleep promoted   single patient room provided  Goal: Optimal Comfort and Wellbeing  Outcome: Not Progressing  Goal: Readiness for Transition of Care  Outcome: Not Progressing

## 2024-05-11 NOTE — ED TRIAGE NOTES
Pt BIBA, found to have diff breathing. Duoneb given by EMS and 125mg solumedrol. 18g in left AC by EMS. Pt a/o breathing is coarse.      Triage Assessment (Adult)       Row Name 05/10/24 2039          Triage Assessment    Additional Documentation Breath Sounds (Group)        Respiratory WDL    Respiratory WDL rhythm/pattern     Rhythm/Pattern, Respiratory grunting        Breath Sounds    Breath Sounds LLL;RLL     LLL Breath Sounds rhonchi;coarse     RLL Breath Sounds rhonchi;coarse        Skin Circulation/Temperature WDL    Skin Circulation/Temperature WDL WDL        Cardiac WDL    Cardiac WDL X     Cardiac Rhythm Other (Comment)  pcer        Peripheral/Neurovascular WDL    Peripheral Neurovascular WDL WDL        Cognitive/Neuro/Behavioral WDL    Cognitive/Neuro/Behavioral WDL WDL

## 2024-05-12 NOTE — PLAN OF CARE
Vss desat to 85% on RA with mild exertion. Exp wheezing.  Sats improved to 95% on 2LNC. Sob with exertion. Nonproductive cough. Generalized weakness. Up with A1 belt to chair. Henderson removed at 1300.   Due to void.     Problem: Adult Inpatient Plan of Care  Goal: Readiness for Transition of Care  Outcome: Not Progressing     Problem: Comorbidity Management  Goal: Maintenance of Asthma Control  Outcome: Not Progressing  Intervention: Maintain Asthma Symptom Control  Recent Flowsheet Documentation  Taken 5/12/2024 0953 by Pablo Gonzales RN  Medication Review/Management: medications reviewed  Goal: Maintenance of COPD Symptom Control  Outcome: Not Progressing  Intervention: Maintain COPD Symptom Control  Recent Flowsheet Documentation  Taken 5/12/2024 0953 by Pablo Gonzales RN  Medication Review/Management: medications reviewed   Goal Outcome Evaluation:      Plan of Care Reviewed With: patient    Overall Patient Progress: no changeOverall Patient Progress: no change

## 2024-05-12 NOTE — PLAN OF CARE
"Pt is alert and oriented, forgetful at times. Oxygen at 3 liters/nasal cannula with sats 93% and above. Pt with good urine output via rosas. Ileostomy with moderate stool. Tele reading A paced. VSS. Afebrile. Switching from IV lasix to oral Torsemide.       Problem: Adult Inpatient Plan of Care  Goal: Plan of Care Review  Description: The Plan of Care Review/Shift note should be completed every shift.  The Outcome Evaluation is a brief statement about your assessment that the patient is improving, declining, or no change.  This information will be displayed automatically on your shift  note.  Outcome: Progressing  Flowsheets (Taken 5/12/2024 0537)  Outcome Evaluation: slept well between cares  Plan of Care Reviewed With: patient  Overall Patient Progress: improving  Goal: Patient-Specific Goal (Individualized)  Description: You can add care plan individualizations to a care plan. Examples of Individualization might be:  \"Parent requests to be called daily at 9am for status\", \"I have a hard time hearing out of my right ear\", or \"Do not touch me to wake me up as it startles  me\".  Outcome: Progressing  Goal: Absence of Hospital-Acquired Illness or Injury  Outcome: Progressing  Intervention: Identify and Manage Fall Risk  Recent Flowsheet Documentation  Taken 5/12/2024 0004 by Priyanka Araiza RN  Safety Promotion/Fall Prevention:   activity supervised   clutter free environment maintained   lighting adjusted   nonskid shoes/slippers when out of bed   safety round/check completed  Intervention: Prevent Skin Injury  Recent Flowsheet Documentation  Taken 5/12/2024 0004 by Priyanka Araiza RN  Body Position: position changed independently  Skin Protection: adhesive use limited  Device Skin Pressure Protection:   adhesive use limited   absorbent pad utilized/changed  Goal: Optimal Comfort and Wellbeing  Outcome: Progressing  Goal: Readiness for Transition of Care  Outcome: Progressing     Problem: Comorbidity " Management  Goal: Maintenance of Asthma Control  Outcome: Progressing  Intervention: Maintain Asthma Symptom Control  Recent Flowsheet Documentation  Taken 5/12/2024 0004 by Priyanka Araiza RN  Medication Review/Management: medications reviewed  Goal: Maintenance of COPD Symptom Control  Outcome: Progressing  Intervention: Maintain COPD Symptom Control  Recent Flowsheet Documentation  Taken 5/12/2024 0004 by Priyanka Araiza RN  Medication Review/Management: medications reviewed  Goal: Maintenance of Heart Failure Symptom Control  Outcome: Progressing  Intervention: Maintain Heart Failure Management  Recent Flowsheet Documentation  Taken 5/12/2024 0004 by Priyanka Araiza RN  Medication Review/Management: medications reviewed  Goal: Blood Pressure in Desired Range  Outcome: Progressing  Intervention: Maintain Blood Pressure Management  Recent Flowsheet Documentation  Taken 5/12/2024 0004 by Priyanka Araiza RN  Medication Review/Management: medications reviewed  Goal: Maintenance of Osteoarthritis Symptom Control  Outcome: Progressing  Intervention: Maintain Osteoarthritis Symptom Control  Recent Flowsheet Documentation  Taken 5/12/2024 0004 by Priyanka Araiza RN  Assistive Device Utilized:   gait belt   walker  Activity Management: activity adjusted per tolerance  Medication Review/Management: medications reviewed     Problem: Chest Pain  Goal: Resolution of Chest Pain Symptoms  Outcome: Progressing   Goal Outcome Evaluation:      Plan of Care Reviewed With: patient    Overall Patient Progress: improvingOverall Patient Progress: improving    Outcome Evaluation: slept well between cares

## 2024-05-12 NOTE — PROGRESS NOTES
[facility]  Hospitalist Progress Note  Shawn Flores MD 05/12/2024    Reason for Stay (Diagnosis): acute hypoxic resp failure         Assessment and Plan:      Summary of Stay: Arpita Rocha is a 88 year old female with PMH significant for HFpEF, CKD4, HTN, COPD, obesity, depression admitted on 5/10/2024 with shortness of breath.      On presentation, the patient was initially treated for presumed COPD exacerbation with nebulizers and prednisone.     The evening of admission, the patient acutely developed respiratory distress and worsening hypoxia requiring up to 8 L supplemental oxygen.  She was also complaining of chest discomfort.  EKG was obtained which appeared to show new left bundle branch block, and troponin enzymes noted to be elevated in the 100s.  Patient was administered IV Lasix.  Cardiology was contacted given EKG change.  IV heparin infusion was initiated for concerns about acute coronary syndrome, but this was complicated by development of hyperkalemia believed related to heparin in the setting of chronic kidney disease.  Heparin was subsequently stopped.   hyperkalemia was treated medically with improvement     On the morning of 5/11, patient's respiratory status is much improved following IV diuresis.  She has been weaned off supplemental oxygen.     Of note, family does report that the patient had poor compliance with her medications this past week, possibly contributing to her presentation     Plans today:  -Resume outpatient dose of torsemide 20 mg daily today  -Continue prednisone, doxycycline, nebulizer therapy for COPD exacerbation  -Cardiology consult note reviewed.  Conservative management recommended and cardiology signed off  -PT/OT consult today  -Remove Henderson catheter today     Acute hypoxemic respiratory failure   Suspect primarily acute on chronic diastolic heart failure exacerbation, with possible lesser contribution from COPD exacerbation:  - Presented  with shortness of breath.  Reports that she is been feeling sick for a couple of weeks now and suddenly worse over the past 24 to 48 hours.  Family reported the patient has missed taking several doses of her regular medications over the past weed  K, including her chronic diuretic.  [facility] saturations as low as 80%.  She endorses a productive cough and denies fevers or chills.  Exam on admit was notable for severe expiratory wheezing in all lung fields.  Work up notable for BNP of 17,000 and chest x-ray showing pulmonary edema and pleural effusion.    -Lack of compliance with daily diuretic this past week may be contributing factor  -Much improved with IV Lasix.  Transitioned back to oral torsemide dose on 5/12  -Wean supplemental oxygen as able.  Patient is not currently on chronic oxygen therapy (though has been previously)  -Echocardiogram reviewed.  Ejection fraction 50 to 55% with mild mitral stenosis and moderate concentric LVH.  -Cardiology consult appreciated; cardiology has since signed off  -Continue nebulizers, prednisone (taper on discharge), doxycycline for suspected COPD component     Hyperkalemia  -Acutely worsened after admission with max potassium level near 6.5  -Felt possibly related to heparin infusion  -Medically treated with improvement  -Continue to follow potassium levels and treat as needed  -Patient is on sodium bicarbonate; may have type IV RTA  -At baseline appears to have high normal potassium levels in the high 4 to low 5 range     CKD4: Last hospitalization was complicated by cardiorenal syndrome, will need to keep a close eye on BMP in setting diuresis as above.  Will plan on daily BMP  -Creatinine stable near 2.5 since admission     HTN: Known history of hypertension.  Patient has been adherent to her home blood pressure medications.  She is hypertensive on presentation likely in the setting of respiratory distress ambulatory use.  PTA amlodipine,  "carvedilol, clonidine with hold parameters.      MDD: PTA celexa           Diet: Combination Diet Low Saturated Fat Na <2400mg Diet, No Caffeine Diet  Fluid restriction 1800 ML FLUID  DVT Prophylaxis: Pneumatic Compression Devices  Henderson Catheter: Not present  Lines: None     Cardiac Monitoring: ACTIVE order. Indication: Acute decompensated heart failure (48 hours)  Code Status: No CPR- Do NOT Intubate  DISPO: After respiratory status further improved, weaning of supplemental oxygen, and ability to tolerate mobilization without significant dyspnea        Interval History (Subjective):      Patient overall feels much better than on admission.  She does note she still gets some exertional dyspnea.  Has not mobilized much since being in the hospital.                  Physical Exam:      Last Vital Signs:  /40 (BP Location: Right arm)   Pulse 64   Temp 97.9  F (36.6  C) (Oral)   Resp 20   Ht 1.422 m (4' 8\")   Wt 71.5 kg (157 lb 9.6 oz)   SpO2 98%   BMI 35.33 kg/m        Intake/Output Summary (Last 24 hours) at 5/12/2024 0955  Last data filed at 5/12/2024 0400  Gross per 24 hour   Intake 395 ml   Output 1380 ml   Net -985 ml       Constitutional: Awake, alert, cooperative, no apparent distress     Respiratory: Clear to auscultation bilaterally, no crackles or wheezing   Cardiovascular: Regular rate and rhythm, normal S1 and S2, and no murmur noted   Abdomen: Normal bowel sounds, soft, non-distended, non-tender   Skin: No rashes, no cyanosis, dry to touch   Neuro: Alert and oriented x3, no weakness, numbness, memory loss   Extremities: No edema, normal range of motion   Other(s):        All other systems: Negative          Medications:      All current medications were reviewed with changes reflected in problem list.         Data:      All new lab and imaging data was reviewed.   Labs:       Lab Results   Component Value Date     05/12/2024     05/11/2024     05/11/2024    Lab Results "   Component Value Date    CHLORIDE 106 05/12/2024    CHLORIDE 105 05/11/2024    CHLORIDE 109 05/11/2024    Lab Results   Component Value Date    BUN 80.7 05/12/2024    BUN 69.4 05/11/2024    BUN 67.8 05/11/2024      Lab Results   Component Value Date    POTASSIUM 4.8 05/12/2024    POTASSIUM 5.1 05/11/2024    POTASSIUM 5.4 05/11/2024    Lab Results   Component Value Date    CO2 19 05/12/2024    CO2 18 05/11/2024    CO2 17 05/11/2024    Lab Results   Component Value Date    CR 2.55 05/12/2024    CR 2.55 05/11/2024    CR 2.52 05/11/2024        Recent Labs   Lab 05/12/24  0642   WBC 7.9   HGB 9.0*   HCT 27.5*      *      Imaging:   No results found for this or any previous visit (from the past 24 hour(s)).

## 2024-05-13 NOTE — PROGRESS NOTES
[facility]    Patient is currently receiving home care services from [facility]. The patient is currently receiving PT OT RN HHA services.  and home health team have been notified of patient admission. [facility] Liaison will continue to follow patient during stay. If appropriate provide orders to resume home care at time of discharge and make sure ROBERTO date is appropriate, ensuring a safe discharge plan.     Thank you,     GILBERT Winter, LPN  Home Care Liaison   Cell: [phone]

## 2024-05-13 NOTE — PLAN OF CARE
Shift summary ([id number])    Pt disoriented to time, intermittently forgetful. VSS on RA at rest, requiring up to 4L O2 per NC w/ ambulation. Denies pain. Tele A-paced w/ BBB / invert T's, denies CP. PIV intact, SL. Antifungal powder applied under bilateral breasts and abdominal folds. Sacral mepi CDI. Up to bathroom, in halls, and chair Ax1 GB W. Ileostomy patent and intact. Potential discharge 5/14 w/ home O2.    Goal Outcome Evaluation:      Plan of Care Reviewed With: patient    Overall Patient Progress: no changeOverall Patient Progress: no change    Outcome Evaluation: Pt stable on RA at rest but requiring up to 4L O2 per NC w/ ambulation. Pt endorses KWAN.    Heart Failure Care Map  GOALS TO BE MET BEFORE DISCHARGE:    1. Decrease congestion and/or edema with diuretic therapy to achieve near optimal volume status.     Dyspnea improved: No, further care required to meet this goal. Please explain KWAN   Edema improved: Yes, satisfactory for discharge.        Last 24 hour I/O:   Intake/Output Summary (Last 24 hours) at 5/13/2024 1813  Last data filed at 5/13/2024 1740  Gross per 24 hour   Intake 246 ml   Output 1135 ml   Net -889 ml           Net I/O and Weights since admission:   04/13 2300 - 05/13 2259  In: 1724 [P.O.:1550; I.V.:174]  Out: 3890 [Urine:2505]  Net: -2166     Vitals:    05/10/24 2034 05/10/24 2235 05/12/24 0407 05/13/24 0513   Weight: 75.4 kg (166 lb 3.2 oz) 70.8 kg (156 lb) 71.5 kg (157 lb 9.6 oz) 72.1 kg (158 lb 14.4 oz)       2.  O2 sats > 90% on room air, or at prior home O2 therapy level.      Able to wean O2 this shift to keep sats above 90%?: No, further care required to meet this goal. Please explain 4L w/ ambulation   Does patient use Home O2? No          Current oxygenation status:   SpO2: 94 %     O2 Device: Nasal cannula, Oxygen Delivery: 1 LPM    3.  Tolerates ambulation and mobility near baseline.     Ambulation: No, further care required to meet this goal. Please explain KWAN,  "requiring O2   Times patient ambulated with staff this shift: 4    Please review the Heart Failure Care Map for additional HF goal outcomes.    Graciela Hall RN  5/13/2024       Problem: Adult Inpatient Plan of Care  Goal: Plan of Care Review  Description: The Plan of Care Review/Shift note should be completed every shift.  The Outcome Evaluation is a brief statement about your assessment that the patient is improving, declining, or no change.  This information will be displayed automatically on your shift  note.  Outcome: Progressing  Flowsheets (Taken 5/13/2024 1811)  Outcome Evaluation: Pt stable on RA at rest but requiring up to 4L O2 per NC w/ ambulation. Pt endorses KWAN.  Plan of Care Reviewed With: patient  Overall Patient Progress: no change  Goal: Patient-Specific Goal (Individualized)  Description: You can add care plan individualizations to a care plan. Examples of Individualization might be:  \"Parent requests to be called daily at 9am for status\", \"I have a hard time hearing out of my right ear\", or \"Do not touch me to wake me up as it startles  me\".  Outcome: Progressing  Goal: Absence of Hospital-Acquired Illness or Injury  Outcome: Progressing  Intervention: Identify and Manage Fall Risk  Recent Flowsheet Documentation  Taken 5/13/2024 1433 by Graciela Hall RN  Safety Promotion/Fall Prevention: safety round/check completed  Taken 5/13/2024 1421 by Graciela Hall RN  Safety Promotion/Fall Prevention: safety round/check completed  Taken 5/13/2024 1234 by Graciela Hall RN  Safety Promotion/Fall Prevention: safety round/check completed  Taken 5/13/2024 1210 by Graciela Hall RN  Safety Promotion/Fall Prevention: safety round/check completed  Taken 5/13/2024 1111 by Graciela Hall RN  Safety Promotion/Fall Prevention: safety round/check completed  Taken 5/13/2024 1000 by Graciela Hall RN  Safety Promotion/Fall Prevention:   assistive " device/personal items within reach   activity supervised   clutter free environment maintained   increased rounding and observation   increase visualization of patient   lighting adjusted   mobility aid in reach   nonskid shoes/slippers when out of bed   patient and family education   room door open   room organization consistent   safety round/check completed   supervised activity   treat underlying cause   treat reversible contributory factors  Taken 5/13/2024 0718 by Graciela Hall RN  Safety Promotion/Fall Prevention: safety round/check completed  Intervention: Prevent Skin Injury  Recent Flowsheet Documentation  Taken 5/13/2024 1000 by Graciela Hall RN  Skin Protection:   adhesive use limited   incontinence pads utilized  Device Skin Pressure Protection:   absorbent pad utilized/changed   adhesive use limited   tubing/devices free from skin contact   pressure points protected  Intervention: Prevent and Manage VTE (Venous Thromboembolism) Risk  Recent Flowsheet Documentation  Taken 5/13/2024 1000 by Graciela Hall RN  VTE Prevention/Management: (plavix) other (see comments)  Intervention: Prevent Infection  Recent Flowsheet Documentation  Taken 5/13/2024 0958 by Graciela Hall RN  Infection Prevention:   equipment surfaces disinfected   hand hygiene promoted   personal protective equipment utilized   rest/sleep promoted   single patient room provided  Goal: Optimal Comfort and Wellbeing  Outcome: Progressing  Goal: Readiness for Transition of Care  Outcome: Progressing

## 2024-05-13 NOTE — PROGRESS NOTES
[facility]    Medicine Progress Note - Hospitalist Service    Date of Admission:  5/10/2024    Assessment & Plan   Summary of Stay: Arpita Rocha is a 88 year old female with PMH significant for HFpEF, CKD4, HTN, COPD, obesity, depression admitted on 5/10/2024 with shortness of breath.      On presentation, the patient was initially treated for presumed COPD exacerbation with nebulizers and prednisone.     The evening of admission, the patient acutely developed respiratory distress and worsening hypoxia requiring up to 8 L supplemental oxygen.  She was also complaining of chest discomfort.  EKG was obtained which appeared to show new left bundle branch block, and troponin enzymes noted to be elevated in the 100s.  Patient was administered IV Lasix.  Cardiology was contacted given EKG change.  IV heparin infusion was initiated for concerns about acute coronary syndrome, but this was complicated by development of hyperkalemia believed related to heparin in the setting of chronic kidney disease.  Heparin was subsequently stopped.   hyperkalemia was treated medically with improvement.  On the morning of 5/11, patient's respiratory status is much improved following IV diuresis.  She has been weaned off supplemental oxygen.     Of note, family does report that the patient had poor compliance with her medications this past week, possibly contributing to her presentation     Plans today:  --Continue prednisone, doxycycline, nebulizer therapy for COPD exacerbation.  Slowly improving.  Unable to wean off O2 yet.  If cannot wean off by tomorrow AM will need home O2.    -Therapy     Acute hypoxemic respiratory failure (mixed etiology)  Suspect primarily acute on chronic diastolic heart failure exacerbation, with probable contribution from COPD exacerbation:  - Presented with shortness of breath.  Reports that she is been feeling sick for a couple of weeks now and suddenly worse over the past 24 to 48  hours.  Family reported the patient has missed taking several doses of her regular medications over the past week .  She was brought in from her [facility] with oxygen saturations as low as 80%.  She endorses a productive cough and denies fevers or chills.  Exam on admit was notable for severe expiratory wheezing in all lung fields.  Work up notable for BNP of 17,000 and chest x-ray showing pulmonary edema and pleural effusion.    -Lack of compliance with daily diuretic this past week may be contributing factor  -Much improved with IV Lasix + COPD tx.  Transitioned back to oral torsemide dose on 5/12  -Wean supplemental oxygen as able.  Patient is not currently on chronic oxygen therapy (though has been previously)  -Echocardiogram reviewed.  Ejection fraction 50 to 55% with mild mitral stenosis and moderate concentric LVH.  -Cardiology consult appreciated; cardiology has since signed off  -Continue nebulizers, prednisone (taper on discharge), doxycycline for suspected COPD component.  Likely begin taper of prednisone dose tomorrow 5/14 with ongoing improvement.     Hyperkalemia - resolved  -Acutely worsened after admission with max potassium level near 6.5  -Felt possibly related to heparin infusion  -Medically treated with improvement  -Continue to follow potassium levels and treat as needed  -Patient is on sodium bicarbonate; may have type IV RTA  -At baseline appears to have high normal potassium levels in the high 4 to low 5 range     CKD4: Last hospitalization was complicated by cardiorenal syndrome, will need to keep a close eye on BMP in setting diuresis as above.  Will plan on daily BMP  -Creatinine stable near 2.5 since admission     HTN: Known history of hypertension.  Patient has been adherent to her home blood pressure medications.  She is hypertensive on presentation likely in the setting of respiratory distress ambulatory use.  PTA amlodipine, carvedilol, clonidine with hold parameters.      MDD: PTA  "celexa       PPE Used:  Mask          Diet: Regular Diet Adult    DVT Prophylaxis: Pneumatic Compression Devices  Henderson Catheter: Not present  Lines: None     Cardiac Monitoring: ACTIVE order. Indication: Acute decompensated heart failure (48 hours)  Code Status: No CPR- Do NOT Intubate      Clinically Significant Risk Factors                  # Hypertension: Noted on problem list  # Acute heart failure with preserved ejection fraction: heart failure noted on problem list, last echo with EF >50%, and receiving IV diuretics       # Obesity: Estimated body mass index is 35.62 kg/m  as calculated from the following:    Height as of this encounter: 1.422 m (4' 8\").    Weight as of this encounter: 72.1 kg (158 lb 14.4 oz)., PRESENT ON ADMISSION     # COPD: noted on problem list  # Pacemaker present       Disposition Plan     Medically Ready for Discharge: Anticipated Tomorrow     Shadi Jasmine, DO  Hospitalist Service  [facility]  Securely message with [employer] (more info)  Text page via [employer] Paging/Directory   ______________________________________________________________________    Interval History   Assumed care for the day, history reviewed.  Ms. Rocha is feeling better though still SOB when up.  Walking across [place] barely out of room door yesterday severely winded her.  Occasional dry cough now.  No other new complaints.  She feels she needs a little more time in the hospital with her SOB.    Physical Exam   Vital Signs: Temp: 97.6  F (36.4  C) Temp src: Oral BP: (!) 162/51 Pulse: 61   Resp: 20 SpO2: 95 % O2 Device: Nasal cannula Oxygen Delivery: 1 LPM  Weight: 158 lbs 14.4 oz    GEN:  Alert, oriented, appears ill but comfortable, no overt distress.  HEENT:  Normocephalic/atraumatic, no scleral icterus, no nasal discharge, mouth moist.  CV:  Regular rate and rhythm, distant.  LUNGS:  Moving air better at this point, slightly coarse with slight wheezing noted.  Symmetric chest rise on " inhalation noted.  ABD:  Active bowel sounds, soft, non-tender/non-distended.  No guarding/rigidity.  EXT:  Trace edema.  No cyanosis.  No new joint synovitis noted.  SKIN:  Dry to touch, no new exanthems noted in the visualized areas.    Medical Decision Making       40 MINUTES SPENT BY ME on the date of service doing chart review, history, exam, documentation & further activities per the note.      Data   Medications   Current Facility-Administered Medications   Medication Dose Route Frequency Provider Last Rate Last Admin     Current Facility-Administered Medications   Medication Dose Route Frequency Provider Last Rate Last Admin    acetaminophen (TYLENOL) tablet 650 mg  650 mg Oral BID Shawn Flores MD   650 mg at 05/13/24 0953    amLODIPine (NORVASC) tablet 5 mg  5 mg Oral BID Dallas Jenkins DO   5 mg at 05/13/24 0952    atorvastatin (LIPITOR) tablet 40 mg  40 mg Oral Daily Dallas Jenkins DO   40 mg at 05/13/24 0952    budesonide (PULMICORT) neb solution 0.5 mg  0.5 mg Nebulization BID Dallas Jenkins DO   0.5 mg at 05/13/24 0828    carvedilol (COREG) tablet 37.5 mg  37.5 mg Oral BID w/meals Dallas Jenkins DO   37.5 mg at 05/13/24 0952    citalopram (celeXA) tablet 30 mg  30 mg Oral Daily Dallas Jenkins DO   30 mg at 05/13/24 0953    cloNIDine (CATAPRES) tablet 0.2 mg  0.2 mg Oral BID Dallas Jenkins DO   0.2 mg at 05/13/24 0952    clopidogrel (PLAVIX) tablet 75 mg  75 mg Oral Daily Dallas Jenkins DO   75 mg at 05/13/24 0952    doxazosin (CARDURA) tablet 1 mg  1 mg Oral At Bedtime Dallas Jenkins DO   1 mg at 05/12/24 2155    doxycycline hyclate (VIBRAMYCIN) capsule 100 mg  100 mg Oral Q12H [facility] (08/20) Shawn Flores MD   100 mg at 05/13/24 0952    ipratropium - albuterol 0.5 mg/2.5 mg/3 mL (DUONEB) neb solution 3 mL  3 mL Nebulization Q4H [place] Dallas Jenkins DO   3 mL at 05/13/24 1607    montelukast (SINGULAIR) tablet 10 mg  10 mg Oral At Bedtime Dallas Jenkins DO   10 mg at  05/12/24 2156    predniSONE (DELTASONE) tablet 40 mg  40 mg Oral Daily Alice Dallas DO   40 mg at 05/13/24 0952    sodium bicarbonate tablet 650 mg  650 mg Oral BID Dallas Jenkins DO   650 mg at 05/13/24 0952    sodium chloride (PF) 0.9% PF flush 3 mL  3 mL Intracatheter Q8H Dallas Jenkins DO   3 mL at 05/13/24 0959    torsemide (DEMADEX) tablet 20 mg  20 mg Oral Daily Shawn Flores MD   20 mg at 05/13/24 0953     Labs and Imaging results below reviewed today.  Recent Labs   Lab 05/12/24  0642 05/11/24  0618 05/11/24  0203   WBC 7.9 5.2 7.8   HGB 9.0* 9.9* 9.3*   HCT 27.5* 30.8* 29.3*    102* 104*   * 141* 144*     Recent Labs   Lab 05/13/24  0638 05/12/24  0642 05/11/24  1258    139 136   POTASSIUM 5.0 4.8 5.1   CHLORIDE 108* 106 105   CO2 18* 19* 18*   ANIONGAP 14 14 13   * 110* 155*   BUN 93.0* 80.7* 69.4*   CR 2.63* 2.55* 2.55*   GFRESTIMATED 17* 18* 18*   CANELO 8.5* 8.8 8.8     No results found for this or any previous visit (from the past 24 hour(s)).

## 2024-05-13 NOTE — PROGRESS NOTES
05/13/24 1100   Appointment Info   Signing Clinician's Name / Credentials (OT) Fe Rothman OTRL   Living Environment   People in Home facility resident   Current Living Arrangements assisted living   Home Accessibility no concerns   Living Environment Comments Pt lives in an [place]. Walk in shower with grab bars and shower chair. Raised toilet with grab bars.   Self-Care   Usual Activity Tolerance moderate   Current Activity Tolerance poor   Equipment Currently Used at Home walker, rolling;other (see comments)  (power scooter for hallways/long distances)   Fall history within last six months no   Activity/Exercise/Self-Care Comment Pt reports independence with dressing and toileting, mobilizes with walker in apartment and scooter in [place]. Has assist with IADLs and showering 1x a week from facility staff   Instrumental Activities of Daily Living (IADL)   Previous Responsibilities medication management   IADL Comments Facility and family complete IADLs, pt manages her own meds   General Information   Onset of Illness/Injury or Date of Surgery 05/10/24   Referring Physician Shawn Flores MD   Patient/Family Therapy Goal Statement (OT) Patient would like to progress to prior level of function   Additional Occupational Profile Info/Pertinent History of Current Problem 88 year old female with PMH significant for HFpEF, CKD4, HTN, COPD, obesity, depression admitted on 5/10/2024 with shortness of breath.      On presentation, the patient was initially treated for presumed COPD exacerbation with nebulizers and prednisone.     The evening of admission, the patient acutely developed respiratory distress and worsening hypoxia requiring up to 8 L supplemental oxygen.  She was also complaining of chest discomfort.  EKG was obtained which appeared to show new left bundle branch block, and troponin enzymes noted to be elevated in the 100s.  Patient was administered IV Lasix.  Cardiology was contacted given EKG change.   IV heparin infusion was initiated for concerns about acute coronary syndrome, but this was complicated by development of hyperkalemia believed related to heparin in the setting of chronic kidney disease.  Heparin was subsequently stopped.   hyperkalemia was treated medically with improvement     On the morning of 5/11, patient's respiratory status is much improved following IV diuresis.  She has been weaned off supplemental oxygen.     Of note, family does report that the patient had poor compliance with her medications this past week, possibly contributing to her presentation   Existing Precautions/Restrictions fall   Limitations/Impairments safety/cognitive   Cognitive Status Examination   Orientation Status orientation to person, place and time   Follows Commands WFL   Memory Deficit minimal deficit   Cognitive Status Comments Pt demonstrates mild memory decline, see Short Bless for details. Pt family reports poor medication compliance leading to current hospitalization   Cognitive Screens/Assessments   Cognitive Assessments Completed Other Cognitive Screen/Assessment   Cognitive Assessment Test Short Blessed   Cognitive Assessment Test Score -6   Cognitive Assessment Test Interpretation >-4 indicates mild cognitive concerns. Areas of deficit including short term recall of name and address   Visual Perception   Visual Impairment/Limitations corrective lenses full-time   Pain Assessment   Patient Currently in Pain Yes, see Vital Sign flowsheet   Posture   Posture forward head position;protracted shoulders;kyphosis   Range of Motion Comprehensive   Comment, General Range of Motion Limited shoulder flexion bilaterally due to shoulder injury, trunk ROM decline due to back pain   Strength Comprehensive (MMT)   Comment, General Manual Muscle Testing (MMT) Assessment Decline in activity tolerance and global strength deficits   Bed Mobility   Comment (Bed Mobility) Pt in chair upon OT arrival, pt sleeps in a chair at  baseline, does not use a bed   Transfers   Transfer Comments CGA   Balance   Balance Comments Impaired in standing   Activities of Daily Living   BADL Assessment/Intervention bathing;lower body dressing;grooming;toileting   Bathing Assessment/Intervention   Comment, (Bathing) Mod assist per clinical reasoning, poor tolerance for activity   Lower Body Dressing Assessment/Training   Comment, (Lower Body Dressing) Min assist   Grooming Assessment/Training   Comment, (Grooming) Poor tolerance for activity in standing   Toileting   Comment, (Toileting) CGA   Clinical Impression   Criteria for Skilled Therapeutic Interventions Met (OT) Yes, treatment indicated   OT Diagnosis Decline in ADL tolerance and safety   Influenced by the following impairments COPD   OT Problem List-Impairments impacting ADL problems related to;activity tolerance impaired;balance;cognition;mobility;strength;pain   Assessment of Occupational Performance 3-5 Performance Deficits   Identified Performance Deficits Impaired tolerance for dressing bathing toileting and grooming with cognitive decline   Planned Therapy Interventions (OT) ADL retraining;cognition;progressive activity/exercise   Clinical Decision Making Complexity (OT) problem focused assessment/low complexity   Risk & Benefits of therapy have been explained evaluation/treatment results reviewed;risks/benefits reviewed;care plan/treatment goals reviewed;current/potential barriers reviewed;participants voiced agreement with care plan;participants included;patient;daughter   OT Total Evaluation Time   OT Eval, Low Complexity Minutes (64592) 10   OT Goals   Therapy Frequency (OT) Daily   OT Predicted Duration/Target Date for Goal Attainment 05/22/24   OT Goals Hygiene/Grooming;Lower Body Dressing;Lower Body Bathing;Toilet Transfer/Toileting;Cognition   OT: Hygiene/Grooming modified independent;while standing;using adaptive equipment   OT: Lower Body Dressing Modified independent;using  adaptive equipment;including set-up/clothing retrieval;Goal Met   OT: Lower Body Bathing Modified independent;using adaptive equipment   OT: Toilet Transfer/Toileting Modified independent;toilet transfer;cleaning and garment management;using adaptive equipment   OT: Cognitive Patient/caregiver will verbalize understanding of cognitive assessment results/recommendations as needed for safe discharge planning   Self-Care/Home Management   Self-Care/Home Mgmt/ADL, Compensatory, Meal Prep Minutes (91574) 23   Symptoms Noted During/After Treatment (Meal Preparation/Planning Training) fatigue;shortness of breath;significant change in vital signs   Treatment Detail/Skilled Intervention OT; Patient agreeable to therapy. Pt cued for figure four, after cuing pt adjusted socks with min assist to compelte figure four position. Pt compelted sit to stand with CGA and cues for hand placement on walker for safety. Pt tolerated 7 mintues of active standign with cues for PLB and pacing of activity/EC to improve tolerance for ADLS. Pt returned to room. Seated in chair pt educated on results of the short blessed, pt reports no cognitive concerns but family reports concerns regarding pt medication management. Pt seated in chair with alarm on and call light upon TO departure   OT Discharge Planning   OT Plan SLUMS, activity tolerance with g/h at the sink, toilet transfers   OT Discharge Recommendation (DC Rec) home with assist;home with home care occupational therapy   OT Rationale for DC Rec Anticipate with continued inpatient OT and medical management that pt will be able to discharge home with intermittent assist with medication management and showering with walker and scooter. Pt would benefit from HHOT to progress activity tolerance and monitor cognition.   OT Brief overview of current status -6 on Short Blessed indicating mild cognitive impairment   Total Session Time   Timed Code Treatment Minutes 23   Total Session Time (sum of  timed and untimed services) 33

## 2024-05-13 NOTE — PLAN OF CARE
"Assumed care [id number]. A&Ox4, however, can be forgetful at times. Ax1 with a gait belt and walker when OOB. Satting in the 90s on 2L of oxygen. Has an ostomy to RUQ and empties herself. Can be incontinent of bladder at times. Voiding post rosas removal. Both L PIV are SL. Denies pain. Plan of care ongoing.     Goal Outcome Evaluation:      Plan of Care Reviewed With: patient    Overall Patient Progress: no changeOverall Patient Progress: no change    Outcome Evaluation: Family at bedside until evening, denies pain, on 2L of O2 via NC    Problem: Adult Inpatient Plan of Care  Goal: Plan of Care Review  Description: The Plan of Care Review/Shift note should be completed every shift.  The Outcome Evaluation is a brief statement about your assessment that the patient is improving, declining, or no change.  This information will be displayed automatically on your shift  note.  Outcome: Progressing  Flowsheets (Taken 5/13/2024 0257)  Outcome Evaluation: Family at bedside until evening, denies pain, on 2L of O2 via NC  Plan of Care Reviewed With: patient  Overall Patient Progress: no change  Goal: Patient-Specific Goal (Individualized)  Description: You can add care plan individualizations to a care plan. Examples of Individualization might be:  \"Parent requests to be called daily at 9am for status\", \"I have a hard time hearing out of my right ear\", or \"Do not touch me to wake me up as it startles  me\".  Outcome: Progressing  Goal: Absence of Hospital-Acquired Illness or Injury  Outcome: Progressing  Intervention: Identify and Manage Fall Risk  Recent Flowsheet Documentation  Taken 5/12/2024 2033 by Carol Ponce, RN  Safety Promotion/Fall Prevention:   activity supervised   lighting adjusted   nonskid shoes/slippers when out of bed   room near nurse's station   safety round/check completed  Intervention: Prevent Skin Injury  Recent Flowsheet Documentation  Taken 5/12/2024 2033 by Carol Ponce, RN  Body Position: " position changed independently  Intervention: Prevent and Manage VTE (Venous Thromboembolism) Risk  Recent Flowsheet Documentation  Taken 5/12/2024 2033 by Carol Ponce RN  VTE Prevention/Management: (Plavix) SCDs (sequential compression devices) off  Goal: Optimal Comfort and Wellbeing  Outcome: Progressing  Goal: Readiness for Transition of Care  Outcome: Progressing     Problem: Comorbidity Management  Goal: Maintenance of Asthma Control  Outcome: Progressing  Intervention: Maintain Asthma Symptom Control  Recent Flowsheet Documentation  Taken 5/12/2024 2033 by Carol Ponce RN  Medication Review/Management: medications reviewed  Goal: Maintenance of COPD Symptom Control  Outcome: Progressing  Intervention: Maintain COPD Symptom Control  Recent Flowsheet Documentation  Taken 5/12/2024 2033 by Carol Ponce RN  Medication Review/Management: medications reviewed  Goal: Maintenance of Heart Failure Symptom Control  Outcome: Progressing  Intervention: Maintain Heart Failure Management  Recent Flowsheet Documentation  Taken 5/12/2024 2033 by Carol Ponce RN  Medication Review/Management: medications reviewed  Goal: Blood Pressure in Desired Range  Outcome: Progressing  Intervention: Maintain Blood Pressure Management  Recent Flowsheet Documentation  Taken 5/12/2024 2033 by Carol Ponce RN  Medication Review/Management: medications reviewed  Goal: Maintenance of Osteoarthritis Symptom Control  Outcome: Progressing  Intervention: Maintain Osteoarthritis Symptom Control  Recent Flowsheet Documentation  Taken 5/12/2024 2129 by Carol Ponce RN  Assistive Device Utilized:   gait belt   walker  Activity Management: ambulated to bathroom  Taken 5/12/2024 2033 by Carol Ponce RN  Assistive Device Utilized:   gait belt   walker  Activity Management: activity adjusted per tolerance  Medication Review/Management: medications reviewed     Problem: Chest Pain  Goal: Resolution of Chest Pain  Symptoms  Outcome: Progressing

## 2024-05-13 NOTE — PROGRESS NOTES
05/13/24 1514   Appointment Info   Signing Clinician's Name / Credentials (PT) Bryson Jordan DPT   Living Environment   Living Environment Comments Pt lives in [facility] by self, use of FWW for mobility inside room otherwise uses electric scooter for long distance mobility. Sleeps in recliner chair.   Self-Care   Usual Activity Tolerance moderate   Current Activity Tolerance fair   Equipment Currently Used at Home walker, rolling;other (see comments);shower chair;grab bar, tub/shower   Fall history within last six months no   General Information   Onset of Illness/Injury or Date of Surgery 05/10/24   Referring Physician Shawn Flores MD   Patient/Family Therapy Goals Statement (PT) To improve mobility, activity tolerance   Pertinent History of Current Problem (include personal factors and/or comorbidities that impact the POC) Per H&P, pt is an 88 year old female with PMH significant for HFpEF, CKD4, HTN, COPD, obesity, depression admitted on 5/10/2024 with shortness of breath.   Existing Precautions/Restrictions oxygen therapy device and L/min  (1L)   Cognition   Affect/Mental Status (Cognition) WFL   Orientation Status (Cognition) oriented x 4   Follows Commands (Cognition) WFL   Pain Assessment   Patient Currently in Pain No   Posture    Posture Forward head position;Protracted shoulders   Range of Motion (ROM)   ROM Comment B LE WFL   Strength (Manual Muscle Testing)   Strength (Manual Muscle Testing) Deficits observed during functional mobility   Strength Comments decreased activity tolerance, functionally demonstrated at least 3/5 B LE strength   Bed Mobility   Comment, (Bed Mobility) SBA sit to supine   Transfers   Comment, (Transfers) SBA sit <> stand with FWW   Gait/Stairs (Locomotion)   Comment, (Gait/Stairs) SBA with FWW   Balance   Balance Comments good sitting; good- standing with FWW   Clinical Impression   Criteria for Skilled Therapeutic Intervention Yes, treatment indicated   PT Diagnosis  (PT) impaired functional mobility   Influenced by the following impairments decreased activity tolerance, decreased balance   Functional limitations due to impairments impaired transfers, ambulation   Clinical Presentation (PT Evaluation Complexity) stable   Clinical Presentation Rationale Functional status   Clinical Decision Making (Complexity) low complexity   Planned Therapy Interventions (PT) balance training;gait training;home exercise program;neuromuscular re-education;patient/family education;strengthening;postural re-education;progressive activity/exercise;transfer training   Risk & Benefits of therapy have been explained evaluation/treatment results reviewed;care plan/treatment goals reviewed;risks/benefits reviewed;current/potential barriers reviewed;participants voiced agreement with care plan;participants included;patient   PT Total Evaluation Time   PT Eval, Low Complexity Minutes (89525) 8   Physical Therapy Goals   PT Frequency Daily   PT Predicted Duration/Target Date for Goal Attainment 05/14/24   PT Goals Transfers;Gait;Aerobic Activity   PT: Transfers Modified independent;Sit to/from stand;Assistive device   PT: Gait Modified independent;Assistive device;100 feet   PT: Perform aerobic activity with stable cardiovascular response 10 minutes;continuous activity   PT Discharge Planning   PT Plan monitor 02; progress ambulation/activity tolerance   PT Discharge Recommendation (DC Rec) home with home care physical therapy   PT Rationale for DC Rec Pt mobilizing SBA ~90 ft with FWW on 1L 02, dropping to 88%. Pt utilizes scooter for longer distances, anticipate will be safe to return home with HHPT to progress mobility.   PT Brief overview of current status SBA with FWW   Total Session Time   Timed Code Treatment Minutes 26   Total Session Time (sum of timed and untimed services) 34

## 2024-05-14 NOTE — PLAN OF CARE
"  Problem: Adult Inpatient Plan of Care  Goal: Plan of Care Review  Description: The Plan of Care Review/Shift note should be completed every shift.  The Outcome Evaluation is a brief statement about your assessment that the patient is improving, declining, or no change.  This information will be displayed automatically on your shift  note.  Outcome: Progressing  Flowsheets (Taken 5/14/2024 0521)  Outcome Evaluation: 1L O2 over night.  Plan of Care Reviewed With: patient  Overall Patient Progress: improving  Goal: Patient-Specific Goal (Individualized)  Description: You can add care plan individualizations to a care plan. Examples of Individualization might be:  \"Parent requests to be called daily at 9am for status\", \"I have a hard time hearing out of my right ear\", or \"Do not touch me to wake me up as it startles  me\".  Outcome: Progressing  Goal: Absence of Hospital-Acquired Illness or Injury  Outcome: Progressing  Intervention: Identify and Manage Fall Risk  Recent Flowsheet Documentation  Taken 5/14/2024 0129 by Karla Larkin RN  Safety Promotion/Fall Prevention:   activity supervised   assistive device/personal items within reach   clutter free environment maintained   room organization consistent   safety round/check completed  Intervention: Prevent Skin Injury  Recent Flowsheet Documentation  Taken 5/14/2024 0129 by Karla Larkin RN  Body Position: position maintained  Intervention: Prevent Infection  Recent Flowsheet Documentation  Taken 5/14/2024 0129 by Karla Larkin RN  Infection Prevention: rest/sleep promoted  Goal: Optimal Comfort and Wellbeing  Outcome: Progressing  Goal: Readiness for Transition of Care  Outcome: Progressing     Problem: Comorbidity Management  Goal: Maintenance of Asthma Control  Outcome: Progressing  Intervention: Maintain Asthma Symptom Control  Recent Flowsheet Documentation  Taken 5/14/2024 0129 by Karla Larkin RN  Medication Review/Management: medications reviewed  Goal: " Maintenance of COPD Symptom Control  Outcome: Progressing  Intervention: Maintain COPD Symptom Control  Recent Flowsheet Documentation  Taken 5/14/2024 0129 by Karla Larkin RN  Medication Review/Management: medications reviewed  Goal: Maintenance of Heart Failure Symptom Control  Outcome: Progressing  Intervention: Maintain Heart Failure Management  Recent Flowsheet Documentation  Taken 5/14/2024 0129 by Karla Larkin RN  Medication Review/Management: medications reviewed  Goal: Blood Pressure in Desired Range  Outcome: Progressing  Intervention: Maintain Blood Pressure Management  Recent Flowsheet Documentation  Taken 5/14/2024 0129 by Karla Larkin RN  Medication Review/Management: medications reviewed  Goal: Maintenance of Osteoarthritis Symptom Control  Outcome: Progressing  Intervention: Maintain Osteoarthritis Symptom Control  Recent Flowsheet Documentation  Taken 5/14/2024 0129 by Karla Larkin RN  Activity Management:   activity adjusted per tolerance   activity minimized  Medication Review/Management: medications reviewed     Problem: Chest Pain  Goal: Resolution of Chest Pain Symptoms  Outcome: Progressing   Goal Outcome Evaluation:      Plan of Care Reviewed With: patient    Overall Patient Progress: improvingOverall Patient Progress: improving    Outcome Evaluation: 1L O2 over night.

## 2024-05-14 NOTE — PROGRESS NOTES
Hospitalist Medicine Progress Note   [facility]       Arpita Rocha is a  year old 87-year-old lady with HFpEF, CKD4, HTN, COPD, obesity, depression admitted on 5/10/2024 to [facility] with shortness of breath.  This was initially suspected to be secondary to COPD exacerbation and heart failure exacerbation.  In the evening of the day of admission patient acutely developed respiratory distress with worsening hypoxia requiring 8 L of supplemental oxygen.  She had chest pain and EKG showed new left bundle branch block troponins were not 100s she was admitted started on IV Lasix IV heparin was also started with cardiology consultation with concerns for acute coronary syndrome that was complicated by hyperkalemia which was thought to be secondary to heparin infusion in a lady with chronic kidney disease.  Heparin was subsequently stopped hyperkalemia treated medically and on 5/11/2024 her breathing improved with IV diuresis which slowed weaning of oxygen       Date of Admission:  5/10/2024  Assessment & Plan     Acute hypoxemic respiratory failure  Acute on chronic diastolic heart failure exacerbation  Acute exacerbation of chronic COPD  Patient is now on 1 L of nasal cannula O2 down from 8 L  Unfortunately patient's weight seems to be increasing  I told her that we will check oxygen assessment tomorrow and she might be discharged in 1 to 2 days    Hyperkalemia - resolved  -Acutely worsened after admission with max potassium level near 6.5  -Felt possibly related to heparin infusion  -Medically treated with improvement  -Continue to follow potassium levels and treat as needed  -Patient is on sodium bicarbonate; may have type IV RTA  -At baseline appears to have high normal potassium levels in the high 4 to low 5 range     CKD4: Last hospitalization was complicated by cardiorenal syndrome, will need to keep a close eye on BMP in setting diuresis as above.  Will plan on daily  BMP  -Creatinine stable near 2.5 since admission     HTN: Known history of hypertension.  Patient has been adherent to her home blood pressure medications.  She is hypertensive on presentation likely in the setting of respiratory distress ambulatory use.  PTA amlodipine, carvedilol, clonidine with hold parameters.      MDD: PTA celexa         Plan:   Home oxygen assessment  BMP in a.m.  Increase torsemide to 30 mg daily with increasing weight    Diet: Regular Diet Adult    DVT Prophylaxis: Low Risk/Ambulatory with no VTE prophylaxis indicated  Henderson Catheter: Not present  Code Status: No CPR- Do NOT Intubate               The patient's care was discussed with the Patient .    Zeus Escoto MD  Hospitalist Service  [facility]    ______________________________________________________________________    Interval History     Symptoms   Feels shortness of breath is significantly improved.    Review of Systems:   Patient is still short of breath on less than normal exertion    Data reviewed today: I reviewed all medications, new labs and imaging results over the last 24 hours.     Physical Exam   Vital Signs: Temp: 97.5  F (36.4  C) Temp src: Oral BP: (!) 155/59 Pulse: 62   Resp: 24 SpO2: 96 % O2 Device: Nasal cannula Oxygen Delivery: 1 LPM  Weight: 159 lbs 3.2 oz      GENERAL: Patient is not in acute distress  HEENT: EOM+,Conjunctiva is clear   NECK:  1 cm Jugular Venous distention  HEART: S1 S2 regular Rate and Rhythm, there is  no murmur,   LUNGS: Respirations are not laboured, Lungs are clear to auscultation without Crepitations or Wheezing   ABDOMEN: Soft, there is no tenderness , Bowel Sounds are  Positive   LOWER LIMBS: no Pedal Edema  Bilaterally   CNS:  Alert,  Oriented x 3, Moving all the Four Limbs     Data   Recent Labs   Lab 05/13/24  0638 05/12/24  0642 05/11/24  1258 05/11/24  0621 05/11/24  0618 05/11/24  0329 05/11/24  0203   WBC  --  7.9  --   --  5.2  --  7.8   HGB  --  9.0*  --    --  9.9*  --  9.3*   MCV  --  100  --   --  102*  --  104*   PLT  --  146*  --   --  141*  --  144*    139 136  --   --   --  139   POTASSIUM 5.0 4.8 5.1   < > 5.6*   < > 6.4*   CHLORIDE 108* 106 105  --   --   --  109*   CO2 18* 19* 18*  --   --   --  17*   BUN 93.0* 80.7* 69.4*  --   --   --  67.8*   CR 2.63* 2.55* 2.55*  --   --   --  2.52*   ANIONGAP 14 14 13  --   --   --  13   CANELO 8.5* 8.8 8.8  --   --   --  8.9   * 110* 155*   < >  --    < > 165*    < > = values in this interval not displayed.         No results found for this or any previous visit (from the past 24 hour(s)).

## 2024-05-14 NOTE — PLAN OF CARE
Goal Outcome Evaluation:      Plan of Care Reviewed With: patient    Overall Patient Progress: no changeOverall Patient Progress: no change    Outcome Evaluation: on 1L NC.    Pt up ax1 walker gait belt. VSS, bp slightly hypertensive on 1L NC. Denies pain. IV SL. Oral abx. Plan to discharge tomorrow home with O2, will continue with plan of care.

## 2024-05-14 NOTE — PLAN OF CARE
Shift summary ([id number])    Pt disoriented to time, forgetful. VSS except elevated BP on 1L O2 per NC. Denies pain, CP. Increased cough with secretions this evening, PRN tessalon given along w/ IS. Ileostomy patent and intact. Up to chair and bathroom Ax1 GB W. Potential discharge home w/ O2 5/15.    Goal Outcome Evaluation:      Plan of Care Reviewed With: patient    Overall Patient Progress: no changeOverall Patient Progress: no change    Outcome Evaluation: Pt continues to require 1L O2 per NC at rest for reported SOB and w/ ambulation. IS provided, pt encouraged to utilize multiple times/hr and demonstrated poor tolerance <250.    Heart Failure Care Map  GOALS TO BE MET BEFORE DISCHARGE:    1. Decrease congestion and/or edema with diuretic therapy to achieve near optimal volume status.     Dyspnea improved: No, further care required to meet this goal. Please explain KWAN   Edema improved: No, +1 BLEs        Last 24 hour I/O:   Intake/Output Summary (Last 24 hours) at 5/14/2024 1829  Last data filed at 5/14/2024 1742  Gross per 24 hour   Intake 486 ml   Output 1100 ml   Net -614 ml           Net I/O and Weights since admission:   04/14 2300 - 05/14 2259  In: 2210 [P.O.:2030; I.V.:180]  Out: 4990 [Urine:3405]  Net: -2780     Vitals:    05/10/24 2034 05/10/24 2235 05/12/24 0407 05/13/24 0513   Weight: 75.4 kg (166 lb 3.2 oz) 70.8 kg (156 lb) 71.5 kg (157 lb 9.6 oz) 72.1 kg (158 lb 14.4 oz)    05/14/24 0447   Weight: 72.2 kg (159 lb 3.2 oz)       2.  O2 sats > 90% on room air, or at prior home O2 therapy level.      Able to wean O2 this shift to keep sats above 90%?: No, further care required to meet this goal. Please explain 1L   Does patient use Home O2? No          Current oxygenation status:   SpO2: 96 %     O2 Device: Nasal cannula, Oxygen Delivery: 1 LPM    3.  Tolerates ambulation and mobility near baseline.     Ambulation: No, further care required to meet this goal. Please explain KWAN   Times patient  "ambulated with staff this shift: 2    Please review the Heart Failure Care Map for additional HF goal outcomes.    Graciela Hall RN  5/14/2024       Problem: Adult Inpatient Plan of Care  Goal: Plan of Care Review  Description: The Plan of Care Review/Shift note should be completed every shift.  The Outcome Evaluation is a brief statement about your assessment that the patient is improving, declining, or no change.  This information will be displayed automatically on your shift  note.  Outcome: Not Progressing  Flowsheets (Taken 5/14/2024 1827)  Outcome Evaluation: Pt continues to require 1L O2 per NC at rest for reported SOB and w/ ambulation. IS provided, pt encouraged to utilize multiple times/hr and demonstrated poor tolerance <250.  Plan of Care Reviewed With: patient  Overall Patient Progress: no change  Goal: Patient-Specific Goal (Individualized)  Description: You can add care plan individualizations to a care plan. Examples of Individualization might be:  \"Parent requests to be called daily at 9am for status\", \"I have a hard time hearing out of my right ear\", or \"Do not touch me to wake me up as it startles  me\".  Outcome: Not Progressing  Goal: Absence of Hospital-Acquired Illness or Injury  Outcome: Not Progressing  Intervention: Identify and Manage Fall Risk  Recent Flowsheet Documentation  Taken 5/14/2024 1147 by Graciela Hall RN  Safety Promotion/Fall Prevention: safety round/check completed  Taken 5/14/2024 1054 by Graciela Hall RN  Safety Promotion/Fall Prevention: safety round/check completed  Taken 5/14/2024 0919 by Graciela Hall RN  Safety Promotion/Fall Prevention:   activity supervised   assistive device/personal items within reach   clutter free environment maintained   increased rounding and observation   increase visualization of patient   lighting adjusted   mobility aid in reach   nonskid shoes/slippers when out of bed   patient and family education   room " door open   room organization consistent   safety round/check completed   supervised activity   treat reversible contributory factors   treat underlying cause  Taken 5/14/2024 0709 by Graciela Hall RN  Safety Promotion/Fall Prevention: safety round/check completed  Intervention: Prevent Skin Injury  Recent Flowsheet Documentation  Taken 5/14/2024 1336 by Graciela Hall RN  Body Position:   position changed independently   supine, head elevated   heels elevated  Taken 5/14/2024 0919 by Graciela Hall RN  Skin Protection:   adhesive use limited   incontinence pads utilized  Device Skin Pressure Protection:   absorbent pad utilized/changed   pressure points protected   tubing/devices free from skin contact   adhesive use limited  Intervention: Prevent and Manage VTE (Venous Thromboembolism) Risk  Recent Flowsheet Documentation  Taken 5/14/2024 0919 by Graciela Hall RN  VTE Prevention/Management: (plavix) other (see comments)  Intervention: Prevent Infection  Recent Flowsheet Documentation  Taken 5/14/2024 0919 by Graciela Hall RN  Infection Prevention:   equipment surfaces disinfected   hand hygiene promoted   personal protective equipment utilized   rest/sleep promoted   single patient room provided  Goal: Optimal Comfort and Wellbeing  Outcome: Not Progressing  Goal: Readiness for Transition of Care  Outcome: Not Progressing

## 2024-05-15 NOTE — CONSULTS
[facility] Nurse Inpatient Assessment     Consulted for: Ileostomy    Patient History (according to provider note(s):      Arpita Rocha is a  year old 87-year-old lady with HFpEF, CKD4, HTN, COPD, obesity, depression admitted on 5/10/2024 to [facility] with shortness of breath.  This was initially suspected to be secondary to COPD exacerbation and heart failure exacerbation.  In the evening of the day of admission patient acutely developed respiratory distress with worsening hypoxia requiring 8 L of supplemental oxygen.  She had chest pain and EKG showed new left bundle branch block troponins were not 100s she was admitted started on IV Lasix IV heparin was also started with cardiology consultation with concerns for acute coronary syndrome that was complicated by hyperkalemia which was thought to be secondary to heparin infusion in a lady with chronic kidney disease.  Heparin was subsequently stopped hyperkalemia treated medically and on 5/11/2024 her breathing improved with IV diuresis which slowed weaning of oxygen     Assessment:      Areas visualized during today's visit: Focused:    Assessment of established end Ileostomy:  Diagnosis Pertinent to Stoma: Ulcerative Colitis      Surgery Date: About 15 years ago per patient  Pouching system in place on assessment today: Dex one piece, cut to fit, and flat   Pouch barrier status: Leaking at 3 o clock  Pouch last changed/wear time: overnight  Reason for pouch change today: leakage  Effectiveness of current pouching/ supply plan: Ineffective, needs ring  Change made with ostomy management today: Yes  Pouching system placed today: Dex one piece, cut to fit, flat, and barrier ring   Supplies: ordered pouches, rings, powder, scissors  Last photo: none  Stoma location: RLQ  Stoma size: 3/4 inches  Stoma appearance: pink-red-one bleeding area on stoma, required holding pressure for 5 minutes to stop  Mucocutaneous  "junction:  intact  Peristomal complication(s): Moisture-Associated Skin Damage (MASD) due to leakage extending out 1cm from 9-11 o'clock  Output: liquid  Output volume emptied during visit: 0ml    Face to face time: 30 minutes           Treatment Plan:     RLQ Ileostomy pouching plan:   Pouching system: ostomy supplies pouches: Dex Flat FECAL (251404)   Accessories used: [facility] ostomy accessories: 2\" Cera Barrier Ring ([id number]), Powder ([id number]), and Cavilon no sting barrier film ([id number])   Frequency of pouch changes: Twice weekly  WOC follow up plan: Weekly   Bedside RN interventions: Change pouch PRN if leaking using the supplies above, Empty pouch when 1/3 to 1/2 full, ensure to clean pouch outlet after emptying to prevent odor, and Notify WOC for ongoing pouch leakage      Orders: Written    DATA:     Current support surface: Standard  Standard Isoflex gel  Containment of urine/stool: Ileostomy pouch  BMI: Body mass index is 35.71 kg/m .   Active diet order: Orders Placed This Encounter      Regular Diet Adult     Output: I/O last 3 completed shifts:  In: 486 [P.O.:480; I.V.:6]  Out: 1150 [Urine:800; Stool:350]     Labs:   Recent Labs   Lab 05/12/24  0642   HGB 9.0*   WBC 7.9     Pressure injury risk assessment:   Sensory Perception: 3-->slightly limited  Moisture: 3-->occasionally moist  Activity: 3-->walks occasionally  Mobility: 3-->slightly limited  Nutrition: 3-->adequate  Friction and Shear: 2-->potential problem  Sunny Score: 17    Alonso Carreno, RN CWOCN  Contact Via [facility] Nurse (Riki)  Dept. Office Number: [phone]      "

## 2024-05-15 NOTE — PROGRESS NOTES
Hospitalist Medicine Progress Note   [employer] [facility]       Arpita Rocha is a  year old 87-year-old lady with HFpEF, CKD4, HTN, COPD, obesity, depression admitted on 5/10/2024 to [facility] with shortness of breath.  This was initially suspected to be secondary to COPD exacerbation and heart failure exacerbation.  In the evening of the day of admission patient acutely developed respiratory distress with worsening hypoxia requiring 8 L of supplemental oxygen.  She had chest pain and EKG showed new left bundle branch block troponins were not 100s she was admitted started on IV Lasix IV heparin was also started with cardiology consultation with concerns for acute coronary syndrome that was complicated by hyperkalemia which was thought to be secondary to heparin infusion in a lady with chronic kidney disease.  Heparin was subsequently stopped hyperkalemia treated medically and on 5/11/2024 her breathing improved with IV diuresis which slowed weaning of oxygen       Date of Admission:  5/10/2024  Assessment & Plan     Acute hypoxemic respiratory failure  Acute on chronic diastolic heart failure exacerbation  Acute exacerbation of chronic COPD  Patient is now on 1 L of nasal cannula O2 down from 8 L her saturations continued to drop when she walks because of which prednisone has been increased from 40 to 50 mg daily  Unfortunately patient's weight seems to be increasing  I told her that we will check oxygen assessment tomorrow and she might be discharged in 1 to 2 days    Hyperkalemia - resolved  -Acutely worsened after admission with max potassium level near 6.5  -Felt possibly related to heparin infusion  -Medically treated with improvement  -Continue to follow potassium levels and treat as needed  -Patient is on sodium bicarbonate; may have type IV RTA  -At baseline appears to have high normal potassium levels in the high 4 to low 5 range     CKD4: Last hospitalization was  complicated by cardiorenal syndrome, will need to keep a close eye on BMP in setting diuresis as above.  Will plan on daily BMP  -Creatinine stable near 2.5 since admission     HTN: Known history of hypertension.  Patient has been adherent to her home blood pressure medications.  She is hypertensive on presentation likely in the setting of respiratory distress ambulatory use.  PTA amlodipine, carvedilol, clonidine with hold parameters.      MDD: PTA celexa         Plan:   Home oxygen assessment -will require 1 L at rest and 2 L on exertion  BMP in a.m.  Discharge in 1 to 2 days    Diet: Regular Diet Adult    DVT Prophylaxis: Low Risk/Ambulatory with no VTE prophylaxis indicated  Henderson Catheter: Not present  Code Status: No CPR- Do NOT Intubate               The patient's care was discussed with the Patient .    Zeus Escoto MD  Hospitalist Service  [facility]    ______________________________________________________________________    Interval History     Symptoms   Feels shortness of breath is significantly improved.  As compared to when she came in but is still hypoxic when she walks    Review of Systems:   Patient is still short of breath on less than normal exertion    Data reviewed today: I reviewed all medications, new labs and imaging results over the last 24 hours.     Physical Exam   Vital Signs: Temp: 97.9  F (36.6  C) Temp src: Oral BP: (!) 165/48 Pulse: 60   Resp: 18 SpO2: 93 % O2 Device: Nasal cannula Oxygen Delivery: 1 LPM  Weight: 159 lbs 4.8 oz      GENERAL: Patient is not in acute distress  HEENT: EOM+,Conjunctiva is clear   NECK:  1 cm Jugular Venous distention  HEART: S1 S2 regular Rate and Rhythm, there is  no murmur,   LUNGS: Respirations are not laboured, Lungs are clear to auscultation without Crepitations or Wheezing   ABDOMEN: Soft, there is no tenderness , Bowel Sounds are  Positive   LOWER LIMBS: no Pedal Edema  Bilaterally   CNS:  Alert,  Oriented x 3, Moving all  the Four Limbs     Data   Recent Labs   Lab 05/15/24  0718 05/13/24  0638 05/12/24  0642 05/11/24  0621 05/11/24  0618 05/11/24  0329 05/11/24  0203   WBC  --   --  7.9  --  5.2  --  7.8   HGB  --   --  9.0*  --  9.9*  --  9.3*   MCV  --   --  100  --  102*  --  104*   PLT  --   --  146*  --  141*  --  144*    140 139   < >  --   --  139   POTASSIUM 4.9 5.0 4.8   < > 5.6*   < > 6.4*   CHLORIDE 108* 108* 106   < >  --   --  109*   CO2 21* 18* 19*   < >  --   --  17*   .3* 93.0* 80.7*   < >  --   --  67.8*   CR 2.48* 2.63* 2.55*   < >  --   --  2.52*   ANIONGAP 12 14 14   < >  --   --  13   CANELO 8.2* 8.5* 8.8   < >  --   --  8.9   GLC 91 107* 110*   < >  --    < > 165*    < > = values in this interval not displayed.         No results found for this or any previous visit (from the past 24 hour(s)).

## 2024-05-15 NOTE — PLAN OF CARE
"Goal Outcome Evaluation:      Plan of Care Reviewed With: patient    Overall Patient Progress: no changeOverall Patient Progress: no change    Outcome Evaluation: on 1L NC overnight.    Pt up ax1 walker. VSS on 1L NC. Desats with activity. Denies pain. Ileostomy pouch fell off, replaced as best as possibly, needs woc consult. Needs another home O2 study, then plan to discharge possibly. Will continue with plan of care.       Problem: Adult Inpatient Plan of Care  Goal: Plan of Care Review  Description: The Plan of Care Review/Shift note should be completed every shift.  The Outcome Evaluation is a brief statement about your assessment that the patient is improving, declining, or no change.  This information will be displayed automatically on your shift  note.  Outcome: Progressing  Flowsheets (Taken 5/15/2024 6109)  Outcome Evaluation: on 1L NC overnight.  Plan of Care Reviewed With: patient  Overall Patient Progress: no change  Goal: Patient-Specific Goal (Individualized)  Description: You can add care plan individualizations to a care plan. Examples of Individualization might be:  \"Parent requests to be called daily at 9am for status\", \"I have a hard time hearing out of my right ear\", or \"Do not touch me to wake me up as it startles  me\".  Outcome: Progressing  Goal: Absence of Hospital-Acquired Illness or Injury  Outcome: Progressing  Goal: Optimal Comfort and Wellbeing  Outcome: Progressing  Goal: Readiness for Transition of Care  Outcome: Progressing     Problem: Comorbidity Management  Goal: Maintenance of Asthma Control  Outcome: Progressing  Goal: Maintenance of COPD Symptom Control  Outcome: Progressing  Goal: Maintenance of Heart Failure Symptom Control  Outcome: Progressing  Goal: Blood Pressure in Desired Range  Outcome: Progressing  Goal: Maintenance of Osteoarthritis Symptom Control  Outcome: Progressing     Problem: Chest Pain  Goal: Resolution of Chest Pain Symptoms  Outcome: Progressing     "

## 2024-05-15 NOTE — PLAN OF CARE
"Goal Outcome Evaluation:      Plan of Care Reviewed With: patient    The patient remains alert and oriented x4 and call light appropriate. On alarms for safety. Ambulates assist x1 gb and walker. No pain reported. Scheduled nebs completed with RT and one PRN in addition given. Patient on 1 L NC at rest around 92-95%. 3 L NC with ambulation. Vitally stable.       Problem: Adult Inpatient Plan of Care  Goal: Plan of Care Review  Description: The Plan of Care Review/Shift note should be completed every shift.  The Outcome Evaluation is a brief statement about your assessment that the patient is improving, declining, or no change.  This information will be displayed automatically on your shift  note.  5/15/2024 1808 by Dayana Cortez RN  Outcome: Progressing  Flowsheets (Taken 5/15/2024 1808)  Plan of Care Reviewed With: patient  5/15/2024 1259 by Dayana Cortez RN  Outcome: Progressing  Flowsheets (Taken 5/15/2024 1259)  Plan of Care Reviewed With: patient  Goal: Patient-Specific Goal (Individualized)  Description: You can add care plan individualizations to a care plan. Examples of Individualization might be:  \"Parent requests to be called daily at 9am for status\", \"I have a hard time hearing out of my right ear\", or \"Do not touch me to wake me up as it startles  me\".  5/15/2024 1808 by Dayana Cortez RN  Outcome: Progressing  5/15/2024 1259 by Dayana Cortez RN  Outcome: Progressing  Goal: Absence of Hospital-Acquired Illness or Injury  5/15/2024 1808 by Dayana Cortez RN  Outcome: Progressing  5/15/2024 1259 by Dayana Cortez RN  Outcome: Progressing  Intervention: Identify and Manage Fall Risk  Recent Flowsheet Documentation  Taken 5/15/2024 1513 by Dayana Cortez RN  Safety Promotion/Fall Prevention: safety round/check completed  Taken 5/15/2024 0840 by Dayana Cortez RN  Safety Promotion/Fall Prevention: safety round/check completed  Intervention: Prevent Skin Injury  Recent Flowsheet " Documentation  Taken 5/15/2024 1513 by Dayana Cortez RN  Body Position: position changed independently  Taken 5/15/2024 0840 by Dayana Cortez RN  Body Position: position changed independently  Intervention: Prevent Infection  Recent Flowsheet Documentation  Taken 5/15/2024 1513 by Dayana Cortez RN  Infection Prevention:   rest/sleep promoted   hand hygiene promoted  Taken 5/15/2024 0840 by Dayana Cortez RN  Infection Prevention:   rest/sleep promoted   hand hygiene promoted  Goal: Optimal Comfort and Wellbeing  5/15/2024 1808 by Dayana Cortez RN  Outcome: Progressing  5/15/2024 1259 by Dayana Cortez RN  Outcome: Progressing  Goal: Readiness for Transition of Care  5/15/2024 1808 by Dayana Cortez RN  Outcome: Progressing  5/15/2024 1259 by Dayana Cortez RN  Outcome: Progressing     Problem: Comorbidity Management  Goal: Maintenance of Asthma Control  5/15/2024 1808 by Dayana Cortez RN  Outcome: Progressing  5/15/2024 1259 by Dayana Cortez RN  Outcome: Progressing  Intervention: Maintain Asthma Symptom Control  Recent Flowsheet Documentation  Taken 5/15/2024 1513 by Dayana Cortez RN  Medication Review/Management: medications reviewed  Taken 5/15/2024 0840 by Dayana Cortez RN  Medication Review/Management: medications reviewed  Goal: Maintenance of COPD Symptom Control  5/15/2024 1808 by Dayana Cortez RN  Outcome: Progressing  5/15/2024 1259 by Dayana Cortez RN  Outcome: Progressing  Intervention: Maintain COPD Symptom Control  Recent Flowsheet Documentation  Taken 5/15/2024 1513 by Dayana Cortez RN  Medication Review/Management: medications reviewed  Taken 5/15/2024 0840 by Dayana Cortez RN  Medication Review/Management: medications reviewed  Goal: Maintenance of Heart Failure Symptom Control  5/15/2024 1808 by Dayana Cortez RN  Outcome: Progressing  5/15/2024 1259 by Dayana Cortez RN  Outcome: Progressing  Intervention: Maintain Heart Failure Management  Recent  Flowsheet Documentation  Taken 5/15/2024 1513 by Dayana Cortez RN  Medication Review/Management: medications reviewed  Taken 5/15/2024 0840 by Dayana Cortez RN  Medication Review/Management: medications reviewed  Goal: Blood Pressure in Desired Range  5/15/2024 1808 by Dayana Cortez RN  Outcome: Progressing  5/15/2024 1259 by Dayana Cortez RN  Outcome: Progressing  Intervention: Maintain Blood Pressure Management  Recent Flowsheet Documentation  Taken 5/15/2024 1513 by Dayana Cortez RN  Medication Review/Management: medications reviewed  Taken 5/15/2024 0840 by Dayana Cortez RN  Medication Review/Management: medications reviewed  Goal: Maintenance of Osteoarthritis Symptom Control  5/15/2024 1808 by Dayana Cortez RN  Outcome: Progressing  5/15/2024 1259 by Dayana Cortez RN  Outcome: Progressing  Intervention: Maintain Osteoarthritis Symptom Control  Recent Flowsheet Documentation  Taken 5/15/2024 1611 by Dayana Cortez RN  Activity Management: activity adjusted per tolerance  Taken 5/15/2024 1513 by Dayana Cortez RN  Assistive Device Utilized: walker  Activity Management: activity adjusted per tolerance  Medication Review/Management: medications reviewed  Taken 5/15/2024 0840 by Dayana Cortez RN  Assistive Device Utilized: walker  Activity Management: activity adjusted per tolerance  Medication Review/Management: medications reviewed     Problem: Chest Pain  Goal: Resolution of Chest Pain Symptoms  5/15/2024 1808 by Dayana Cortez RN  Outcome: Progressing  5/15/2024 1259 by Dayana Cortez RN  Outcome: Progressing

## 2024-05-16 NOTE — PROGRESS NOTES
Care Management Discharge Note    Discharge Date: 05/16/2024       Discharge Disposition: Home, Home Care  Discharge Services: None  Discharge DME: Home oxygen  Discharge Transportation: family or friend will provide      Education Provided on the Discharge Plan:  Yes  Patient/Family in Agreement with the Plan: yes      Additional Information:  CM following for care coordination/discharge planning. Patient is discharging home with resumption of HH RN/PT/OT/HHA services through [facility], contact info added to AVS. Family is very involved and supportive as well. Discharge orders were sent to homecare agency and they were informed patient is discharging today.     Patient discharging with home oxygen through  DME, set up per bedside nursing.     Family transporting for discharge.     Gem Lennon, RN, BSN  Inpatient Care Coordination  [facility]  [phone]

## 2024-05-16 NOTE — PLAN OF CARE
"  A&O x4  Denies pain, SoB, N/V  O2 @1L  Pt will be sent home with home O2  Ileostomy managed.     RN Contacted pt's son(Daquan) to assist with transportation home. Daquan stated he will be arriving sometime between [id number].     Pt will discharge home, all discharge medication given to pt. Pt stated he understood discharge instructions. All pt belongings sent home with pt.    Pt left with pt's son(Daquan)      BP (!) 174/69 (BP Location: Left arm, Patient Position: Semi-Smith's, Cuff Size: Adult Regular)   Pulse 67   Temp 98  F (36.7  C) (Oral)   Resp 20   Ht 1.422 m (4' 8\")   Wt 73.9 kg (162 lb 14.7 oz)   SpO2 96%   BMI 36.53 kg/m      "

## 2024-05-16 NOTE — PROGRESS NOTES
Patient has been assessed for Home Oxygen needs. Oxygen readings:    *Pulse oximetry (SpO2) = 90% on room air at rest while awake.    *SpO2 improved to 93% on 1liters/minute at rest.    *SpO2 = 85% on room air during activity/with exercise.    *SpO2 improved to 90% on 3liters/minute during activity/with exercise.

## 2024-05-16 NOTE — PROGRESS NOTES
Oxygen Documentation  I certify that this patient, Arpita Rocha has been under my care (or a nurse practitioner or physican's assistant working with me). This is the face-to-face encounter for oxygen medical necessity.      At the time of this encounter, I have reviewed the qualifying testing and have determined that supplemental oxygen is reasonable and necessary and is expected to improve the patient's condition in a home setting.         Patient has continued oxygen desaturation due to COPD J44.9.    If portability is ordered, is the patient mobile within the home? yes    Was this visit performed as a telehealth visit: No      *Pulse oximetry (SpO2) = 90% on room air at rest while awake.     *SpO2 improved to 93% on 1liters/minute at rest.     *SpO2 = 85% on room air during activity/with exercise.     *SpO2 improved to 90% on 3liters/minute during activity/with exercise.

## 2024-05-16 NOTE — DISCHARGE SUMMARY
"[facility]  Hospitalist Discharge Summary      Date of Admission:  5/10/2024  Date of Discharge:  5/16/2024  Discharging Provider: Zeus Escoto MD  Discharge Service: Hospitalist Service    Discharge Diagnoses   Acute hypoxemic respiratory failure  Acute on chronic diastolic heart failure exacerbation  Acute exacerbation of chronic COPD  Hyperkalemia - resolved   Stage IV CKD  Hypertension  Depression        Clinically Significant Risk Factors     # Obesity: Estimated body mass index is 36.53 kg/m  as calculated from the following:    Height as of this encounter: 1.422 m (4' 8\").    Weight as of this encounter: 73.9 kg (162 lb 14.7 oz).       Follow-ups Needed After Discharge   Follow-up Appointments     Follow-up and recommended labs and tests       Follow up with primary care provider, Ludivina Canales, within 7 days   for hospital follow- up.  The following labs/tests are recommended: BMP    May need long term Oxygen treatment .            Discharge Disposition   Discharged to home  Condition at discharge: Good    Hospital Course   Arpita Rocha is a  year old 87-year-old lady with HFpEF, CKD4, HTN, COPD, obesity, depression admitted on 5/10/2024 to [facility] with shortness of breath.  This was initially suspected to be secondary to COPD exacerbation and heart failure exacerbation.  In the evening of the day of admission patient acutely developed respiratory distress with worsening hypoxia requiring 8 L of supplemental oxygen.  She had chest pain and EKG showed new left bundle branch block troponins were not 100s she was admitted started on IV Lasix IV heparin was also started with cardiology consultation with concerns for acute coronary syndrome that was complicated by hyperkalemia which was thought to be secondary to heparin infusion in a lady with chronic kidney disease.  Heparin was subsequently stopped hyperkalemia treated medically and on 5/11/2024 her breathing " improved with IV diuresis but unfortunately her oxygen could not be weaned off completely.  She said that she had earlier had been wearing oxygen which was weaned off.     Consultations This Hospital Stay   PHARMACY IP CONSULT  CARDIOLOGY IP CONSULT  CARE MANAGEMENT / SOCIAL WORK IP CONSULT  PHYSICAL THERAPY ADULT IP CONSULT  OCCUPATIONAL THERAPY ADULT IP CONSULT  WOUND OSTOMY CONTINENCE NURSE  IP CONSULT    Code Status   No CPR- Do NOT Intubate    Time Spent on this Encounter   I, Zeus Escoto MD, personally saw the patient today and spent greater than 30 minutes discharging this patient.       Zeus Escoto MD  [place] [address] MEDICAL SURGICAL  [address] [place] [address]  Phone: [phone]  Fax: [phone]  ______________________________________________________________________    Physical Exam   Vital Signs: Temp: 98  F (36.7  C) Temp src: Oral BP: (!) 174/69 Pulse: 60   Resp: 20 SpO2: 95 % O2 Device: Nasal cannula Oxygen Delivery: 1 LPM  Weight: 162 lbs 14.72 oz  GENERAL: Patient is not in acute distress  HEENT: EOM+,Conjunctiva is clear   NECK:  1 cm Jugular Venous distention  HEART: S1 S2 regular Rate and Rhythm, there is  no murmur,   LUNGS: Respirations are not laboured, Lungs are clear to auscultation without Crepitations or Wheezing   ABDOMEN: Soft, there is no tenderness , Bowel Sounds are  Positive   LOWER LIMBS: no Pedal Edema  Bilaterally   CNS:  Alert,  Oriented x 3, Moving all the Four Limbs        Primary Care Physician   Ludivina Canales    Discharge Orders      Medication Therapy Management Referral      Home Care Referral      Reason for your hospital stay    Admitted on 5/10/2024 to [facility] with shortness of breath     Follow-up and recommended labs and tests     Follow up with primary care provider, Ludivina Canales, within 7 days for hospital follow- up.  The following labs/tests are recommended: BMP    May need long term Oxygen  treatment .     Activity    Your activity upon discharge: activity as tolerated     Oxygen Adult/Peds     Diet    Follow this diet upon discharge: Orders Placed This Encounter      Regular Diet Adult       Significant Results and Procedures   Most Recent 3 CBC's:  Recent Labs   Lab Test 24  0642 24  0618 24  0203   WBC 7.9 5.2 7.8   HGB 9.0* 9.9* 9.3*    102* 104*   * 141* 144*     Most Recent 3 BMP's:  Recent Labs   Lab Test 24  0807 05/15/24  0718 24  0638    141 140   POTASSIUM 4.3 4.9 5.0   CHLORIDE 104 108* 108*   CO2 21* 21* 18*   .7* 101.3* 93.0*   CR 2.39* 2.48* 2.63*   ANIONGAP 14 12 14   CANELO 8.1* 8.2* 8.5*   GLC 88 91 107*     Most Recent 2 LFT's:  Recent Labs   Lab Test 23  0812 23  0858   AST 25 24   ALT 14 16   ALKPHOS 77 81   BILITOTAL 0.3 0.3     Most Recent ABG:No lab results found.,   Results for orders placed or performed during the hospital encounter of 05/10/24   XR Chest Port 1 View    Narrative    EXAM: XR CHEST PORT 1 VIEW  LOCATION: [facility]  DATE: 5/10/2024    INDICATION: SOB  COMPARISON: 2024      Impression    IMPRESSION: Mild pulmonary edema with small bilateral pleural effusions. Left greater than right lower lobe opacities, likely atelectasis. Stable heart size and mediastinal contours. Unchanged left chest cardiac generator and leads and extensive spinal   fusion hardware.   Echocardiogram Complete     Value    LVEF  50-55%    Narrative    [id number]  [id number]  [id number]  [id number]^ROSAURA^VINOD^CONSUELO     [facility]  Echocardiography Laboratory  [address] [place] [address]     Name: CONNER LINDER  MRN: [id number]  : 1935  Study Date: 2024 08:13 AM  Age: 88 yrs  Gender: Female  Patient Location: [facility]  Reason For Study: Chest Pain  Ordering Physician: VINOD KAPLAN  Referring Physician: Ludivina Chamberlain MD  Performed By: Michelle Dewey RDCS      BSA: 1.6 m2  Height: 56 in  Weight: 156 lb  HR: 60  BP: 174/80 mmHg  ______________________________________________________________________________  Procedure  Complete Portable Echo Adult. Optison (NDC #5121-5658) given intravenously.  ______________________________________________________________________________  Interpretation Summary     1. Left ventricular systolic function is low normal. The visual ejection  fraction is 50-55%.  2. Septal wall motion abnormality may reflect pacemaker activation.  3. The right ventricle is normal in structure, function and size.  4. There is mild (1+) mitral regurgitation. There is mild mitral stenosis.  ______________________________________________________________________________  Left Ventricle  The left ventricle is normal in size. There is moderate concentric left  ventricular hypertrophy. The visual ejection fraction is 50-55%. Left  ventricular systolic function is low normal. Diastolic function not assessed  due to atrial fibrillation. Septal wall motion abnormality may reflect  pacemaker activation.     Right Ventricle  The right ventricle is normal in structure, function and size.     Atria  The left atrium is severely dilated. The right atrium is moderately dilated.  There is no color Doppler evidence of an atrial shunt.     Mitral Valve  There is moderate mitral annular calcification. There is mild (1+) mitral  regurgitation. There is mild mitral stenosis.     Tricuspid Valve  The tricuspid valve is normal in structure and function. There is mild (1+)  tricuspid regurgitation.     Aortic Valve  The aortic valve is trileaflet with aortic valve sclerosis.     Pulmonic Valve  The pulmonic valve is not well seen, but is grossly normal.     Vessels  Ascending aorta dilatation is present. IVC diameter and respiratory changes  fall into an intermediate range suggesting an RA pressure of 8 mmHg.     Rhythm  The rhythm was atrial fibrillation.      ______________________________________________________________________________  MMode/2D Measurements & Calculations  IVSd: 1.4 cm  LVIDd: 3.6 cm  LVIDs: 2.3 cm  LVPWd: 1.5 cm  IVC diam: 2.2 cm  FS: 37.3 %  LV mass(C)d: 191.4 grams  LV mass(C)dI: 119.8 grams/m2     Ao root diam: 3.5 cm  LA dimension: 4.0 cm  asc Aorta Diam: 4.0 cm  LA/Ao: 1.2  LVOT diam: 2.2 cm  LVOT area: 3.9 cm2  Ao root diam index Ht(cm/m): 2.4  Ao root diam index BSA (cm/m2): 2.2  Asc Ao diam index BSA (cm/m2): 2.5  Asc Ao diam index Ht(cm/m): 2.8  EF Biplane: 45.0 %  LA Volume (BP): 78.6 ml  LA Volume Index (BP): 49.1 ml/m2     RWT: 0.83     Doppler Measurements & Calculations  MV E max john: 126.0 cm/sec  MV A max john: 142.0 cm/sec  MV E/A: 0.89  MV max PG: 10.9 mmHg  MV mean P.7 mmHg  MV V2 VTI: 35.8 cm  MV P1/2t max john: 113.4 cm/sec  MV P1/2t: 74.3 msec  MVA(P1/2t): 3.0 cm2  MV dec slope: 446.9 cm/sec2  MV dec time: 0.26 sec  Ao V2 max: 180.9 cm/sec  Ao max P.0 mmHg  TR max john: 310.6 cm/sec  TR max P.6 mmHg  E/E' av.0  Lateral E/e': 14.7  Medial E/e': 11.2     ______________________________________________________________________________  Report approved by: Anders Cervantes 2024 10:23 AM             Discharge Medications   Current Discharge Medication List        START taking these medications    Details   benzonatate (TESSALON) 100 MG capsule Take 1 capsule (100 mg) by mouth 3 times daily as needed for cough  Qty: 30 capsule, Refills: 0    Associated Diagnoses: COPD exacerbation (H); Acute respiratory failure with hypoxia (H)      guaiFENesin (ROBITUSSIN) 20 mg/mL liquid Take 10 mLs (200 mg) by mouth every 4 hours as needed for cough  Qty: 180 mL, Refills: 0    Associated Diagnoses: COPD exacerbation (H)      predniSONE (DELTASONE) 10 MG tablet 50 mg oral x 2 days then 30 mg x 3 days then 10 mg x 3 days then discontinue  Qty: 19 tablet, Refills: 0    Associated Diagnoses: COPD exacerbation (H)            CONTINUE these medications which have NOT CHANGED    Details   acetaminophen (TYLENOL) 650 MG CR tablet Take 1,300 mg by mouth every morning      atorvastatin (LIPITOR) 40 MG tablet Take 1 tablet (40 mg) by mouth daily  Qty: 30 tablet, Refills: 11    Associated Diagnoses: Hyperlipidemia LDL goal <100; Bilateral carotid artery stenosis      budesonide (PULMICORT) 0.5 MG/2ML neb solution Take 2 mLs (0.5 mg) by nebulization 2 times daily  Qty: 360 mL, Refills: 3    Associated Diagnoses: Chronic bronchitis, unspecified chronic bronchitis type (H)      carvedilol (COREG) 25 MG tablet Take 1.5 tablets (37.5 mg) by mouth 2 times daily (with meals)  Qty: 180 tablet, Refills: 4    Associated Diagnoses: Benign essential hypertension; Chronic heart failure with preserved ejection fraction (H)      citalopram (CELEXA) 20 MG tablet TAKE 1.5 TAB BY MOUTH EVERY DAY  Qty: 135 tablet, Refills: 1    Associated Diagnoses: Recurrent major depressive disorder, in full remission (H24); Generalized anxiety disorder      cloNIDine (CATAPRES) 0.2 MG tablet TAKE 1 TABLET BY MOUTH TWICE A DAY  Qty: 180 tablet, Refills: 3    Associated Diagnoses: Acute respiratory failure with hypoxia (H)      clopidogrel (PLAVIX) 75 MG tablet Take 1 tablet (75 mg) by mouth daily  Qty: 30 tablet, Refills: 11    Associated Diagnoses: Bilateral carotid artery stenosis      Cranberry 450 MG TABS Take 1 tablet by mouth daily      diphenhydrAMINE-acetaminophen (TYLENOL PM)  MG tablet Take 2 tablets by mouth at bedtime      doxazosin (CARDURA) 1 MG tablet Take 1 mg by mouth at bedtime      doxycycline hyclate (VIBRAMYCIN) 100 MG capsule Take 100 mg by mouth Every Mon, Wed, Fri Morning At bedtime      ferrous sulfate (FEROSUL) 325 (65 Fe) MG tablet Take 2 tablets (650 mg) by mouth daily (with breakfast)  Qty: 180 tablet, Refills: 3    Associated Diagnoses: Iron deficiency anemia, unspecified iron deficiency anemia type      ipratropium - albuterol 0.5  mg/2.5 mg/3 mL (DUONEB) 0.5-2.5 (3) MG/3ML neb solution Take 1 vial (3 mLs) by nebulization every 6 hours  Qty: 180 mL, Refills: 3    Associated Diagnoses: Other emphysema (H); Chronic bronchitis, unspecified chronic bronchitis type (H)      montelukast (SINGULAIR) 10 MG tablet Take 1 tablet (10 mg) by mouth At Bedtime  Qty: 90 tablet, Refills: 3    Associated Diagnoses: Chronic bronchitis, unspecified chronic bronchitis type (H)      multivitamin w/minerals (THERA-VIT-M) tablet Take 1 tablet by mouth daily      nystatin (MYCOSTATIN) 403672 UNIT/GM external powder Apply topically 3 times daily as needed for other (rash)  Qty: 60 g, Refills: 1    Associated Diagnoses: Candidal intertrigo      Ostomy Supplies (ADAPT) PSTE Use with new ostomy pouch and drain PRN  Qty: 120 g, Refills: 3    Associated Diagnoses: Ileostomy status (H); Ileostomy care (H)      Ostomy Supplies (PREMIER DRAINABLE POUCH 22MM) Pouch MISC Use as directed every 3-4 days  Qty: 5 each, Refills: 12    Associated Diagnoses: Ileostomy care (H)      sodium bicarbonate 650 MG tablet Take 1 tablet (650 mg) by mouth 2 times daily  Qty: 180 tablet, Refills: 0    Associated Diagnoses: Metabolic acidosis      torsemide (DEMADEX) 20 MG tablet Take 1 tablet (20 mg) by mouth daily With an additional 20 mg as needed for weight gain > 3# overnight or worsening symptoms (leg swelling/breathing).  Qty: 100 tablet, Refills: 3    Associated Diagnoses: Heart failure with preserved ejection fraction, NYHA class I (H)      umeclidinium-vilanterol (ANORO ELLIPTA) 62.5-25 MCG/ACT oral inhaler Inhale 1 puff into the lungs daily  Qty: 1 each, Refills: 11    Associated Diagnoses: Chronic obstructive pulmonary disease, unspecified COPD type (H)      amLODIPine (NORVASC) 10 MG tablet TAKE 1 TABLET BY MOUTH EVERY DAY  Qty: 90 tablet, Refills: 0    Associated Diagnoses: Benign essential hypertension           Allergies   Allergies   Allergen Reactions    Acetaminophen-Codeine  Hallucination    Penicillin G Rash

## 2024-05-16 NOTE — PLAN OF CARE
"Pt A&O x4. Denies pain. Ileostomy intact, clean and dry. On torsemide.     Problem: Adult Inpatient Plan of Care  Goal: Plan of Care Review  Description: The Plan of Care Review/Shift note should be completed every shift.  The Outcome Evaluation is a brief statement about your assessment that the patient is improving, declining, or no change.  This information will be displayed automatically on your shift  note.  Outcome: Progressing  Flowsheets (Taken 5/16/2024 0322)  Outcome Evaluation: On 2L NC  Plan of Care Reviewed With: patient  Overall Patient Progress: no change  Goal: Patient-Specific Goal (Individualized)  Description: You can add care plan individualizations to a care plan. Examples of Individualization might be:  \"Parent requests to be called daily at 9am for status\", \"I have a hard time hearing out of my right ear\", or \"Do not touch me to wake me up as it startles  me\".  Outcome: Progressing  Goal: Absence of Hospital-Acquired Illness or Injury  Outcome: Progressing  Intervention: Identify and Manage Fall Risk  Recent Flowsheet Documentation  Taken 5/15/2024 2035 by Hortencia Fernandez RN  Safety Promotion/Fall Prevention: safety round/check completed  Intervention: Prevent Skin Injury  Recent Flowsheet Documentation  Taken 5/15/2024 2035 by Hortencia Fernandez RN  Body Position: position changed independently  Intervention: Prevent and Manage VTE (Venous Thromboembolism) Risk  Recent Flowsheet Documentation  Taken 5/15/2024 2035 by Hortencia Fernandez RN  VTE Prevention/Management: compression stockings off  Goal: Optimal Comfort and Wellbeing  Outcome: Progressing  Goal: Readiness for Transition of Care  Outcome: Progressing     Problem: Comorbidity Management  Goal: Maintenance of Asthma Control  Outcome: Progressing  Intervention: Maintain Asthma Symptom Control  Recent Flowsheet Documentation  Taken 5/15/2024 2035 by Hortencia Fernandez RN  Medication Review/Management: medications reviewed  Goal: Maintenance of COPD Symptom " Control  Outcome: Progressing  Intervention: Maintain COPD Symptom Control  Recent Flowsheet Documentation  Taken 5/15/2024 2035 by Hortencia Fernandez RN  Medication Review/Management: medications reviewed  Goal: Maintenance of Heart Failure Symptom Control  Outcome: Progressing  Intervention: Maintain Heart Failure Management  Recent Flowsheet Documentation  Taken 5/15/2024 2035 by Hortencia Fernandez RN  Medication Review/Management: medications reviewed  Goal: Blood Pressure in Desired Range  Outcome: Progressing  Intervention: Maintain Blood Pressure Management  Recent Flowsheet Documentation  Taken 5/15/2024 2035 by Hortencia Fernandez RN  Medication Review/Management: medications reviewed  Goal: Maintenance of Osteoarthritis Symptom Control  Outcome: Progressing  Intervention: Maintain Osteoarthritis Symptom Control  Recent Flowsheet Documentation  Taken 5/15/2024 2035 by Hortencia Fernandez RN  Medication Review/Management: medications reviewed     Problem: Chest Pain  Goal: Resolution of Chest Pain Symptoms  Outcome: Progressing   Goal Outcome Evaluation:      Plan of Care Reviewed With: patient    Overall Patient Progress: no changeOverall Patient Progress: no change    Outcome Evaluation: On 2L NC, denies SOB.

## 2024-05-16 NOTE — PLAN OF CARE
Physical Therapy Discharge Summary    Reason for therapy discharge:    Discharged to home with home therapy.    Progress towards therapy goal(s). See goals on Care Plan in [facility] electronic health record for goal details.  Goals partially met.  Barriers to achieving goals:   discharge from facility.    Therapy recommendation(s):    Continued therapy is recommended.  Rationale/Recommendations:  Rec HHPT to further progress strength and endurance.

## 2024-05-16 NOTE — PLAN OF CARE
Occupational Therapy Discharge Summary    Reason for therapy discharge:    Discharged to home with home therapy.    Progress towards therapy goal(s). See goals on Care Plan in [facility] electronic health record for goal details.  Goals not met.  Barriers to achieving goals:   pt was making progress towards goals, SBA for most bathroom cares, including toileting and sinkside cares.    Therapy recommendation(s):    Continued therapy is recommended.  Rationale/Recommendations:  pt has met needed goals to return home with  intermittent assist with medication management and showering with walker and scooter. Pt would benefit from HHOT to progress activity tolerance and monitor cognition.

## 2024-05-17 NOTE — PROGRESS NOTES
Clinic Care Coordination Contact  [facility]/Voicemail    Clinical Data: Care Coordinator Outreach    Outreach Documentation Number of Outreach Attempt   5/17/2024   9:39 AM 1       Left message on patient's voicemail with call back information and requested return call.    Plan: Care Coordinator will try to reach patient again in 1-2 business days.    Carol Paige, RN Care Coordinator  [facility] [place]  Email: [email or web address]  Phone: [phone]

## 2024-05-17 NOTE — LETTER
[facility] CARE COORDINATION  [id number]  [facility] [place] [facility] [address]     May 20, 2024    Arpita Rocha  [address] [facility]   [facility] [address]      Dear Arpita,    I am a clinic care coordinator who works with Ludivina Canales MD with the [facility]. I recently tried to call and was unable to reach you. Below is a description of clinic care coordination and how I can further assist you.       The clinic care coordination team is made up of a registered nurse, , financial resource worker and community health worker who understand the health care system. The goal of clinic care coordination is to help you manage your health and improve access to the health care system. Our team works alongside your provider to assist you in determining your health and social needs. We can help you obtain health care and community resources, providing you with necessary information and education. We can work with you through any barriers and develop a care plan that helps coordinate and strengthen the communication between you and your care team.  Our services are voluntary and are offered without charge to you personally.    Please feel free to contact me with any questions or concerns regarding care coordination and what we can offer.      We are focused on providing you with the highest-quality healthcare experience possible.    Sincerely,     Carol Paige RN Care Coordinator  [facility] [place]  Email: [email or web address]  Phone: [phone]

## 2024-05-20 NOTE — TELEPHONE ENCOUNTER
Please be updated,  skilled nurse visit is scheduled for today.  BMP will be drawn today per discharge orders.  Also  needing to assist family with obtaining Oxygen equipment that is still in need of being obtained, currently only has a concentrator.    Patient is to be seen in clinic 5/28 for a follow up and for labs to be completed.  Per patient's daughter's request, will provider allow a virtual appointment and any labs to be drawn can doctor allow [facility] to draw labs in home?    SN2WK1 THEN 1WK1 THEN EVERY OTHER WEEK WITH 3PRN. [id number], PT, OT EVAL.  Please approve the above and update when able  Thank you,  Amalia Jones LPN  [facility]  [phone]

## 2024-05-20 NOTE — TELEPHONE ENCOUNTER
Hospital/TCU/ED for chronic condition Discharge Protocol    Patient status since discharge:  Things have been going better since being home, on O2 at home, and doing well on this.     Follow up  Follow up instructions/recommendations: hospital follow-up with PCP    Medications  Changes to chronic meds? 0-1    Medication reconciliation: unable to reconcile discharge medications due to Spoke with son, not with patient at this time .    Appointment   Primary Care Provider appointment scheduled: Yes. (confirm)     May 28, 2024  1:00 PM  (Arrive by 12:45 PM)  ED/Hospital Follow Up with Jose Mccord MD  [facility] [phone]     Questions about medications: None     Medication therapy management (MTM) referral placed:  No     Other information  New diagnoses of heart failure, COPD, diabetes, or MI? No     Call Summary  Questions or concerns: none     Nando Webber RN  [facility]  [phone]    05/20/2024 at 9:28 AM

## 2024-05-20 NOTE — TELEPHONE ENCOUNTER
SN 2x WK x1 THEN 1x WK x1 THEN EVERY OTHER WEEK WITH 3PRN. HHA 1x WK x6, PT, OT to eval.  APPROVAL NEEDED TO OBTAIN BMP LAB RECOMMENDED TO BE DONE WITHIN 7 DAYS FROM DISCHARGE ON 5/16. NURSING HAS THIS SCHEDULED THURSDAY 5/23.  Please approve the above.  Thank you,  Amalia Jones LPN  [facility]  [phone]

## 2024-05-20 NOTE — PROGRESS NOTES
Clinic Care Coordination Contact  [facility]/Voicemail    Clinical Data: Care Coordinator Outreach    Outreach Documentation Number of Outreach Attempt   5/17/2024   9:39 AM 1   5/20/2024   2:38 PM 2       Left message on patient's voicemail with call back information and requested return call.    Plan: Care Coordinator will send care coordination introduction letter with care coordinator contact information and explanation of care coordination services via "[employer]hart. Care Coordinator will do no further outreaches at this time.    Carol Paige, RN Care Coordinator  [facility] [place]  Email: [email or web address]  Phone: [phone]

## 2024-05-20 NOTE — TELEPHONE ENCOUNTER
Please be aware of the below during visit today, please advise of any changes needing to be made.    Patient is currently using 1lpm of continuous oxygen. Her sats were 97% on 1liter. Patient was put on room air for 2 minutes and her sats dropped to 95% but remained stable on room air. Patient ambulated about 40feet on room air and patient sats dropped to 85%. Patient sats recovered back to 95% within one minute of rest.   Thank you,  Amalia Jones LPN  [facility]  [phone]

## 2024-05-20 NOTE — TELEPHONE ENCOUNTER
Thank you, noted.  Branch and clinician updated.  Amalia Jones LPN  [facility]

## 2024-05-21 NOTE — PROGRESS NOTES
[facility]:   [facility] ([employer]) Routine Remote Evaluation    HRS Enrollment date: 01/26/2024     Dates: 04/17/2024 through 05/21/2024    This is not the first billing cycle.    Alerts in the last month:   -- 04/16/2024: Gen MAGGIE OV: Amlodipine decreased. Continue current dose of torsemide and may take an extra 20 mg if weight goes up >3 lbs in a day.  -- 05/10 - 05/16/2024: FVR admit for HF. Received IVP Lasix.       These adjustments have been discussed with the patient/caregiver and they express verbal understanding.    Goal Weight: 150 - 158 lbs lbs       Readings:                Total Minutes Spent: 20 minutes     Future Appointments   Date Time Provider Department Center   5/21/2024  4:00 PM Rosita Banuelos [facility] PSA CLIN   5/28/2024  1:00 PM Jose Mccord MD CRFP CR   7/17/2024  9:45 AM RU LAB [facility]CLB [facility]   7/17/2024 10:30 AM Kane Gil PA-C [facility] PSA CLIN   8/1/2024 12:00 AM GRADY TECH1 [facility] PSA CLIN   8/27/2024 10:30 AM Coming Ludivina Canales MD CRFP CR   1/8/2025  8:30 AM [facility]        ALEJANDRA Crooks, RN 9:23 AM 05/21/24

## 2024-05-21 NOTE — TELEPHONE ENCOUNTER
MTM referral from: Transitions of Care (recent hospital discharge or ED visit)    MTM referral outreach attempt #2 on May 21, 2024 at 1:13 PM      Outcome: Patient not reachable after several attempts, sent [employer] message    Use vbc for the carrier/Plan on the flowsheet      MyChart Message Sent    See Chetan JONES   [phone]

## 2024-05-28 NOTE — PROGRESS NOTES
Minerva is a 88 year old who is being evaluated via a billable video visit.    How would you like to obtain your AVS? MyChart  If the video visit is dropped, the invitation should be resent by: Text to cell phone: [phone]   Will anyone else be joining your video visit? Son will be present       Assessment & Plan   Problem List Items Addressed This Visit       Acute respiratory failure with hypoxia (H) - Primary     She has home O2..  Her oximeter shows O2 sats is 96% at rest on room air.  Discussed the use of O2 maintain O2 sats greater than 90%.  Permission to take it off at rest is given.         Chronic heart failure with preserved ejection fraction (H)     She is followed closely by cardiology         CKD (chronic kidney disease) stage 4, GFR 15-29 ml/min (H)     She is due to see nephrology.  We shall order.     GFR Estimate   Date Value Ref Range Status   05/20/2024 17 (L) >60 mL/min/1.73m2 Final     No ACE/ARB         Relevant Orders    Adult Nephrology  Referral    COPD exacerbation (H)     She is back to baseline.  She follows episodically with pulmonology, next appointment January 2025                MED REC REQUIRED  Post Medication Reconciliation Status: discharge medications reconciled and changed, per note/orders        Subjective   Minerva is a 88 year old, presenting for the following health issues:  Hospital F/U      Video Start Time: 1:04 PM    [facility]        Hospital Follow-up Visit:    Hospital/Nursing Home/IP Rehab Facility: [facility]  Date of Admission: 05/10/2024   Date of Discharge: 05/16/2024  Reason(s) for Admission: Acute hypoxemic respiratory failure  Acute on chronic diastolic heart failure exacerbation  Acute exacerbation of chronic COPD  Was the patient in the ICU or did the patient experience delirium during hospitalization?  No  Do you have any other stressors you would like to discuss with your provider? No    Problems taking medications regularly:   "None  Medication changes since discharge: None  Problems adhering to non-medication therapy:  None    Summary of hospitalization:  [facility] discharge summary reviewed  Diagnostic Tests/Treatments reviewed.  Follow up needed: Her potassium was rechecked already.  She has appointments with cardiology  Other Healthcare Providers Involved in Patient s Care:         Specialist appointment -    Update since discharge: improved.         Plan of care communicated with patient and family               No chest pain no orthopnea      Objective    Vitals - Patient Reported  Systolic (Patient Reported):  (unable to take today)  Diastolic (Patient Reported):  (unable to take today)  Weight (Patient Reported): 68 kg (150 lb)  Height (Patient Reported): 144.8 cm (4' 9\")  BMI (Based on Pt Reported Ht/Wt): 32.46  SpO2 (Patient Reported): 96  Temperature (Patient Reported):  (unable to take today)  Pulse (Patient Reported):  (unable to take today)      Vitals:  No vitals were obtained today due to virtual visit.    Physical Exam   GENERAL: alert and no distress  EYES: Eyes grossly normal to inspection.  No discharge or erythema, or obvious scleral/conjunctival abnormalities.  RESP: No audible wheeze, cough, or visible cyanosis.    SKIN: Visible skin clear. No significant rash, abnormal pigmentation or lesions.  NEURO: Cranial nerves grossly intact.  Mentation and speech appropriate for age.  PSYCH: mentation appears normal and affect flat          Video-Visit Details    Type of service:  Video Visit   Video End Time: 1:19 PM  Originating Location (pt. Location): Home    Distant Location (provider location):  On-site  Platform used for Video Visit: Kay  Signed Electronically by: Jose Mccord MD    "

## 2024-05-29 NOTE — ASSESSMENT & PLAN NOTE
She has home O2..  Her oximeter shows O2 sats is 96% at rest on room air.  Discussed the use of O2 maintain O2 sats greater than 90%.  Permission to take it off at rest is given.

## 2024-05-29 NOTE — ASSESSMENT & PLAN NOTE
She is due to see nephrology.  We shall order.     GFR Estimate   Date Value Ref Range Status   05/20/2024 17 (L) >60 mL/min/1.73m2 Final     No ACE/ARB

## 2024-05-29 NOTE — ASSESSMENT & PLAN NOTE
She is back to baseline.  She follows episodically with pulmonology, next appointment January 2025

## 2024-06-03 NOTE — TELEPHONE ENCOUNTER
Physical Therapy 3 visits over next 4 was for strength, balance, transfers, ambulation.  Please approve the above.  Thank you,  Amalia Jones LPN  [facility]  [phone]

## 2024-06-05 NOTE — TELEPHONE ENCOUNTER
Needing to have the below approved to continue PT services, below message sent to provider Coming-Hay with no response as of yet.      Physical Therapy 3 visits over next 4 was for strength, balance, transfers, ambulation.  Please approve the above.  Thank you,  Amalia Jones LPN  [facility]  [phone]

## 2024-06-07 NOTE — TELEPHONE ENCOUNTER
Home care calling. Plan of care was faxed over twice now, on 5/13 and 5/23. Have not received forms back. Will fax over again, please send back when complete.     Janet Yeh RN on 6/7/2024 at 4:03 PM

## 2024-06-12 NOTE — TELEPHONE ENCOUNTER
"- Daquan (CTC on file) calls and LM on RN Line concerning 2 prescriptions. States that the pharmacist is advising that the prescriptions have been cancelled by the provider.    - Call placed back to Daquan, states that he was informed that the Doxycycline and Doxazosin was discontinued by the provider. Upon review, the Doxazosin was only reported as a historical medication from the most recent hospital visit     - Call placed to Pharmacy, informed that the patient's family who was picking up the prescription on 6/1 advised the pharmacist that \"the patient no longer is taking the prescription\". As a result, this was discontinued by the pharmacist.This could have been mis communicated as patient was on the Doxazosin and this was discontinued, so possible that the medications got mixed up.    - Routing to PCP, pharmacy will need a new prescription for the Doxycycline sent to the pharmacy. Please advise if anything needs to be done also with the Doxazosin, or just to be on the Doxycycline. Pended.    Nando Webber RN  [facility]  [phone]    06/12/2024 at 12:39 PM   "

## 2024-06-12 NOTE — PROGRESS NOTES
"Will switch Minerva to \"maintenance\" phase of CKD Journey as she is not interested in seeing nephrology at this time but will keep her on my list in case she changes her mind.  "

## 2024-06-13 NOTE — TELEPHONE ENCOUNTER
Reviewed last discharge summary and she should be taking Doxazosin, confirmed per mychart message to cardiology on 5-20-24

## 2024-06-13 NOTE — TELEPHONE ENCOUNTER
RN spoke to son Daquan C2C on file     Advised that patient should be taking doxazosin, Daquan reports that his brother cancelled this with the pharmacy     RN advised per Dr. Jose Canales this should be taken and refills sent to [facility]     Maria C Richey, Registered Nurse  [facility]

## 2024-06-19 NOTE — TELEPHONE ENCOUNTER
Skilled nurse visit completed this day.  Wound on patients inner buttock.   Measures 3.6 x 1.8 X 0.1, appears to be a skin tear , superficial, moderate serous drainage, periwound skin normal, has been getting reinjured due to sticking to brief and bleeding at times. No bleeding today, looked moist, no odor. Decline in wound score from 17 to 24.   Can current wound orders be D/C'd and new orders approved of the below. Per Clinicians request  1. Cleanse with mild soap and water or wound cleanser  2. Cover with 4x4 border foam dressing   3. Change every 3 days and as needed  4. May DC when healed  Thank you,  Amalia Jones LPN  [facility]

## 2024-06-19 NOTE — PROGRESS NOTES
[facility]    Yesterday's HRS wt was 217 lbs    Current goal wt: 150-158 lbs    MyChart sent requesting pt re-weigh as question yesterday's accuracy.         (Addendum @ 0538):    Today's Wt on HRS within wt goal range:      Time spent: 6 min     Janet Vazquez RN BSN   [facility] [place], [place]  [facility] Care Coordinator  06/19/24, 8:56 AM

## 2024-06-25 NOTE — PROGRESS NOTES
[facility]:   [facility] ([employer]) Routine Remote Evaluation    HRS Enrollment date: 01/30/2024     Dates: 5/22/2024 through 6/25/2024    This is not the first billing cycle.    Alerts in the last month: None    No adjustments made this month.  Discussed with patient/caregiver and they will continue current plan of care.     Goal Weight: 150-158 lbs       Readings:               Total Minutes Spent: 6 minutes     Future Appointments   Date Time Provider Department Center   6/25/2024  4:00 PM Hfrn, Grady [facility] PSA CLIN   7/17/2024  9:45 AM RU LAB RHCLB [place] [facility]   7/17/2024 10:30 AM Kane Gil PA-C [facility] PSA CLIN   7/30/2024  4:00 PM Hfrn, Grady [facility] PSA CLIN   8/1/2024 12:00 AM GRADY TECH1 [facility] PSA CLIN   8/27/2024 10:30 AM Ludivina Chamberlain MD CRFP CR   1/8/2025  8:30 AM Sadi Lamar MD CSPULM         ANKIT CrooksN, RN 4:18 PM 06/26/24    Weights reviewed on HRS. Continue to monitor.     Diuretics adjusted accordingly.  30 days of remote monitoring completed.         Cody Marks MD

## 2024-07-02 NOTE — TELEPHONE ENCOUNTER
Recertification   SN 1x week x8 for wound care,  ostomy teaching and symptoms management  HHA 1x week x1 adding HHA visit for this cert   HHA 1x week x8 for ADLs for next cert period  Please approve the above.  Thank you,  Amalia Jones LPN  [facility]

## 2024-07-03 NOTE — PROGRESS NOTES
Orders for BMP and NT pro BNP for upcoming MAGGIE appt on 7/17/24 faxed to [facility].  Requested labs to be drawn 7/15.    Update to Gen Nursing.     Message to CORE board to enter HRS data into chart for appt.     Lab appt cancelled per DIL      Future Appointments   Date Time Provider Department Center   7/17/2024  9:45 AM RU LAB RHCLB [facility]   7/17/2024 10:30 AM Kane Gil, [facility] PSA CLIN   7/30/2024  4:00 PM [facility]   8/1/2024 12:00 AM [facility] PSA CLIN   8/27/2024 10:30 AM Ludivina Chamberlain MD CRFP CR   1/8/2025  8:30 AM Sadi Lamar MD CSPULM CS         ANKIT CrooksN, RN 11:04 AM 07/03/24

## 2024-07-05 NOTE — TELEPHONE ENCOUNTER
[facility]:   HRS (Over 40 Females) Daily Alert     July 5, 2024    Alert(s): Vitals - Blood pressure    Notes:   BP on HRS noted to be 87/46 and 100/43. No symptoms reported on survey.   Call to RIKI with no answer. VM left for a call back. Pt's phone is DIL's number.     Call to [facility] to see if they set up medications and if so to confirm Amlodipine dosing (10 mg once daily or 5 mg BID - See 4/16/24 CORE OV note).  Message left for Myesha. Call to main office. Lisa will be new  as HC we recently re certified. Message left for Dejah as Lisa is off today.     Message to DIL to see about amlodipine.       ADDENDUM 1320: Myesha from HC called back discussed BPs. Sarahy have been low per HC: 104/46. Myesha is not sure how pt is feeling as she has not seen her since last week. RIKI sets up the medications. Myesha has amlodipine listed as 10 mg once daily. We did discuss CORE OV appt from 4/16 and recommendation to spilt amlodipine dose. Myesha will let Lisa know as she is going to pt's home on 7/8. Myesha is seeing pt on 7/15 and will draw labs.     Awaiting call back from RIKI.       Current cardiac meds:  Amlodipine 10 mg once daily  Carvedilol 37.5 mg twice daily  Clonidine 0.2 mg twice daily  Doxazosin 1 mg once daily  Torsemide 20 mg PRN      Recent plan of care changes:   -- 4/16/24: Gen MAGGIE OV: weights stable 152-156 lbs. Near euvolemia.  /64. Occ has low BPs and feels tousey. This is after mediations. Feels better in 20-30 minutes after.  BMP reviewed. HRS weight window changed to 150-158 lbs. PLAN: After her AM pills, she can have some sxs hypotension lasting a short time  Split amlodipine to 5 mg BID   -- 5/10 (166 lbs) - 5/16/24 (162 lbs): FVR ADMIT: Respiratory failure. HFpEF, hyperkalemia. Cardiology for concerns of ACS complained by hyperkalemia possible secondary to Heparin. Heparin stopped, hyperkalemia treated and receive IV diuretics with improvement in breathing but still  requiring supplemental O2. Echo completed (5/11). DISCHARGED to home with HC. STARTED Prednisone.   PLAN: Follow-up with PCC in 1 week with BMP. Home O2.   -- 5/20/24: BMP completed per PCC. Creatinine 2.66, .1 (baseline ~2.1 )  -- 5/28/24: PCP VV for post hospital Follow-up: Home O2 oximeter shows at 96% rest on room air. Discussed using supplemental O2 if <90%. Can stop wearing it continuously. Due to see nephrology. No medication changes.     GFR rechecked 5/20/24: 17 ml/min/1.73m2              Goal Weight Range: 150-158 lbs  Goal Blood pressure:   SBP: >90 and <160                                          DBP: >45 and <100    Weight, BP and HR Readings:             Time spent in review this day: 30 min.          Future Appointments   Date Time Provider Department Center   7/17/2024 10:30 AM Kane Gil PA-C [facility] PSA CLIN   7/30/2024  4:00 PM Rosita Banuelos [facility] PSA CLIN   8/1/2024 12:00 AM GRADY AKIL [facility] PSA CLIN   8/27/2024 10:30 AM Coming Ludivina Canales MD CRFP CR   1/8/2025  8:30 AM Sadi Lamar MD CSPULM        Ngoc Ortiz, ANKITN, RN 1:03 PM 07/05/24  ALEJANDRA Crooks, RN 1:59 PM 07/05/24

## 2024-07-05 NOTE — TELEPHONE ENCOUNTER
"Spoke to DIL. Pt's other DIL sets up medications. Discussed blood pressures. Pt does feel \"a little lousy after she takes her meds then gets better.\"  Discussed splitting amlodipine in 2 doses: 5 mg in the morning and 5 mg  in the evening. I did let her know I spoke to Myesha who is going to update Lisa (seeing pt on 7/8).      Update amlodipine on med list and update to Dr Gonzalez  and Kane.      Future Appointments   Date Time Provider Department Center   7/17/2024 10:30 AM [facility]   [facility]24  [facility]   8[facility]/2024 12[facility]   8/27/2024 10:30 AM Ludivina Chamberlain MD CRFP CR   1/8/2025  8:30 AM Sadi Lamar MD CSPULM          ANKIT CrooksN, RN 3:00 PM 07/05/24    "

## 2024-07-08 NOTE — PROGRESS NOTES
Addressed in separate encounter. Closing this encounter.    Aida Chavez RN BSN   4:06 PM 07/08/24  Nurse line M-F 8a-4p: [phone]

## 2024-07-08 NOTE — PROGRESS NOTES
[facility]:   HRS ([employer]) Daily Alert     July 8, 2024    Alert(s): Vitals: DBP continues below threshold     Current cardiac GDMT dose:    Amlodipine 5 mg BID   Carvedilol 37.5 mg BID   Clopidogrel 75 mg daily   Cardura 1 mg at HS   Torsemide 20 mg daily with an additional 20 mg as needed for weight gain > 3# overnight or worsening symptoms (leg swelling/breathing).     Recent plan of care changes:   -- 4/16/24: Gen MAGGIE OV: weights stable 152-156 lbs. Near euvolemia.  /64. Occ has low BPs and feels tousey. This is after mediations. Feels better in 20-30 minutes after.  BMP reviewed. HRS weight window changed to 150-158 lbs. PLAN: After her AM pills, she can have some sxs hypotension lasting a short time  Split amlodipine to 5 mg BID   -- 5/10 (166 lbs) - 5/16/24 (162 lbs): FVR ADMIT: Respiratory failure. HFpEF, hyperkalemia. Cardiology for concerns of ACS complained by hyperkalemia possible secondary to Heparin. Heparin stopped, hyperkalemia treated and receive IV diuretics with improvement in breathing but still requiring supplemental O2. Echo completed (5/11). DISCHARGED to home with HC. STARTED Prednisone.   PLAN: Follow-up with PCC in 1 week with BMP. Home O2.   -- 5/20/24: BMP completed per PCC. Creatinine 2.66, .1 (baseline ~2.1 )  -- 5/28/24: PCP VV for post hospital Follow-up: Home O2 oximeter shows at 96% rest on room air. Discussed using supplemental O2 if <90%. Can stop wearing it continuously. Due to see nephrology. No medication changes.   -- 7/5/24:  RN called HC RN and dtr in law Michelle instructing to split amlodipine to 5 mg BID as prescribed (instead of 10 mg daily).      Goal Weight Range:  150-158 lbs -- per Kane Gil 4/16/24   Goal Blood pressure:   SBP: >90 and <160                                          DBP: >45 and <100    Weight, BP and HR Readings:       Time spent in review this day: 5 min.           Routing to [facility]'s general support team.      Future Appointments   Date Time Provider Department Center   7/17/2024 10:30 AM Kane Gil PA-C [facility] PSA CLIN   7/30/2024  4[facility]   8/1/2024 12:00 AM YSABEL BARNARD [facility] PSA CLIN   8/27/2024 10:30 AM Ludivina Chamberlain MD CRFP CR   1/8/2025  8:30 AM Sadi Lamar MD CSPULM CS       Janet Vazquez RN BSN   [place], [place]  [facility] Care Coordinator  07/08/24, 11:26 AM

## 2024-07-08 NOTE — TELEPHONE ENCOUNTER
Order/Referral Request    Who is requesting: valeri Avitia    Orders being requested: Wants a referral to Dr Tabatha Niño @ [facility] in [place].     Reason service is needed/diagnosis: Genetic swab test to help diagnose son's eye issue    When are orders needed by: asap    Has this been discussed with Provider: No    Does patient have a preference on a Group/Provider/Facility? [facility] in [facility] [phone]    Does patient have an appointment scheduled?: No    Where to send orders:  unknown, phone number above    Could we send this information to you in [employer] or would you prefer to receive a phone call?:   Patient would prefer a phone call   Okay to leave a detailed message?: Yes at Other phone number:  [phone] valeri Avitia

## 2024-07-10 NOTE — PROGRESS NOTES
This says her current meds include amlodipine 10 mg.  Did she split to 5 mg BID?    Also, can lower DBP goal to >40.     If BPs still running below thresholds, can lower clonidine to 0.1 mg in AM.     Thanks,   Kane Gil PA-C 7/10/2024 5:03 PM

## 2024-07-10 NOTE — TELEPHONE ENCOUNTER
[facility]:   [facility] ([employer]) Daily Review    July 10, 2024    Notes: Call placed to DENYS Gonzalez HC nurse for update on blood pressures and Amlodipine (5 mg BID vs 10 mg daily). Message left requesting call back.     Discussed with RIKI who reports BPs have been lower since doxazosin was restarted 12/2023.       Current cardiac meds:  Amlodipine 10 mg once daily  Atorvastatin 40 mg once daily  Carvedilol 37.5 mg twice daily  Clonidine 0.2 mg twice daily  Clopidogrel 75 mg once daily  Doxazosin 1 mg once daily  Torsemide 20 mg once daily. Can take an additional 20 mg PRN for weight gain >3 lbs and/or symptoms    Recent plan of care changes:   -- 4/16/24: Gen MAGGIE OV: weights stable 152-156 lbs. Near euvolemia.  /64. Occ has low BPs and feels tousey. This is after mediations. Feels better in 20-30 minutes after.  BMP reviewed. HRS weight window changed to 150-158 lbs. PLAN: After her AM pills, she can have some sxs hypotension lasting a short time  Split amlodipine to 5 mg BID   -- 5/10 (166 lbs) - 5/16/24 (162 lbs): FVR ADMIT: Respiratory failure. HFpEF, hyperkalemia. Cardiology for concerns of ACS complained by hyperkalemia possible secondary to Heparin. Heparin stopped, hyperkalemia treated and receive IV diuretics with improvement in breathing but still requiring supplemental O2. Echo completed (5/11). DISCHARGED to home with HC. STARTED Prednisone.   PLAN: Follow-up with PCC in 1 week with BMP. Home O2.   -- 5/20/24: BMP completed per PCC. Creatinine 2.66, .1 (baseline ~2.1)  -- 5/28/24: PCP VV for post hospital Follow-up: Home O2 oximeter shows at 96% rest on room air. Discussed using supplemental O2 if <90%. Can stop wearing it continuously. Due to see nephrology. No medication changes.          Goal Weight Range: 150-158 lbs  Goal Blood pressure:   SBP: >90 and <160                                          DBP: >45 and <100    Weight, BP and HR Readings:         Time spent  in review this day: 5 min.        Future Appointments   Date Time Provider Department Center   7/17/2024 10:30 AM Kane Gil PA-C [facility] PSA CLIN   7/30/2024  4:00 PM Ysabel Banuelos [facility] PSA CLIN   8/1/2024 12:00 [place] [facility]   8/27/2024 10:30 AM Ludivina Chamberlain MD CRFP CR   1/8/2025  8:30 AM Sadi Lamar MD CSPULM        ANKIT CrooksN, RN 9:24 AM 07/10/24

## 2024-07-10 NOTE — TELEPHONE ENCOUNTER
See my chart, advised visit needed for requested orders     Maria C Richey, Registered Nurse  [facility]

## 2024-07-11 NOTE — TELEPHONE ENCOUNTER
PAL called and spoke with pt's son, Daquan ([facility])  -Relayed message from Dr. Jose Canales - see previous note  -Referral is for pt's other son Bright, he did not specify what symptoms he is experiencing but states that Dr. 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 This PAL notified son of Nando's resignation and advised to contact main clinic line [phone] for future needs, explained pt will still have access to [facility] nurse resource- just no longer direct line  -Faxed referral to [phone]    Daquan was given an opportunity to ask questions, verbalized understanding of plan, and is agreeable.     Anabela SAUNDERS RN  Patient Advocate Liaison - CAR[facility] SERRANO [facility]  [phone]

## 2024-07-11 NOTE — PROGRESS NOTES
Contacted patient's homecare RN to inquire about updated BP readings and also if patient is splitting amlodipine to 5 mg BID or taking as 10 mg daily. No answer. Left message for homecare RN to call back.

## 2024-07-11 NOTE — PROGRESS NOTES
Spoke with Myesha, home care LPN, who states that patient has been taking amlodipine as 5 mg BID. Myesha also reports that Minerva's BP on Monday was 156/58, and on Tuesday it was 154/56. Myesha states that she will be seeing Minerva again on Monday, and will let us know if her BP is low again. No further questions.

## 2024-07-11 NOTE — PROGRESS NOTES
Updated DBP to >40 on HRS website.    No transmission sent as of 7/11/27 at 0940.      ANKIT CrooksN, RN 9:40 AM 07/11/24

## 2024-07-11 NOTE — TELEPHONE ENCOUNTER
Please call and let them know I have placed the referral, but the referral may be declined given that the request is very generic.    Please find out what condition for symptoms patient's son is experiencing that necessitates a neurology referral in particular.  We do have a genetics referral which would likely be more appropriate.

## 2024-07-11 NOTE — TELEPHONE ENCOUNTER
See pt's Mychart message.     The previous referral was sent to [facility] to their office fax [phone]. It looks like they also have an alternative fax number.     Faxed requested referral to [phone] from [facility].    Renetta VARGAS RN   PAL (Patient Advocate Liaison)  [facility]

## 2024-07-11 NOTE — TELEPHONE ENCOUNTER
Our records indicate no current orders for patients oxygen.    Please approve Oxygen 3L continuous per nasal canula.  This is what patient is currently doing.    Thank you,  Amalia Jones LPN  [facility]

## 2024-07-15 NOTE — TELEPHONE ENCOUNTER
HRS data from the last week:        Per Kane, find out if patient is having any symptoms and also make sure patient isn't taking any potassium. Contacted patient's home care nurse to inquire further, as home care nurse visited patient today. Patient's home care nurse states that patient didn't report any symptoms today, other than some right hand swelling. No swelling elsewhere, this swelling has been going on for a few days. Myesha also confirmed that the patient is not taking any potassium supplements. Will review with provider.

## 2024-07-15 NOTE — TELEPHONE ENCOUNTER
Per Kane, patient to hold tomorrow's dose of torsemide, and get repeat potassium level drawn tomorrow. Limit high potassium foods, and push fluids. Contacted patient's daughter in law Michelle to review this information, no answer. Left detailed message with recommendations and lab results, and instructed Michelle to reach back out to confirm she received the message. Order placed for repeat potassium. Also contacted patient's homecare nurse to review [facility]'s recommendations. Left detailed message with homecare nurse as well.

## 2024-07-15 NOTE — TELEPHONE ENCOUNTER
Lab results noted from today, 7/15/24:    Component      Latest Ref Rng 5/20/2024  1:30 PM 7/15/2024  10:15 AM   Sodium      135 - 145 mmol/L 140  137    Potassium      3.4 - 5.3 mmol/L 4.0  5.8 (H)    Chloride      98 - 107 mmol/L 103  104    Carbon Dioxide (CO2)      22 - 29 mmol/L 21 (L)  21 (L)    Anion Gap      7 - 15 mmol/L 16 (H)  12    Urea Nitrogen      8.0 - 23.0 mg/dL 106.1 (H)  77.7 (H)    Creatinine      0.51 - 0.95 mg/dL 2.66 (H)  2.94 (H)    GFR Estimate      >60 mL/min/1.73m2 17 (L)  15 (L)    Calcium      8.8 - 10.2 mg/dL 8.1 (L)  8.4 (L)    Glucose      70 - 99 mg/dL 115 (H)  110 (H)       Legend:  (L) Low  (H) High    Patient has follow up visit with Kane scheduled for 7/17/24, routing to provider to address high potassium level.

## 2024-07-16 NOTE — TELEPHONE ENCOUNTER
Lab draw via venipuncture was unsuccessful after 2 attempts. Probable hematomas to left hand and wrist, pressure held for 2 min each then covered with band aid and secured for light additional pressure with roll gauze. Patient will need labs drawn in clinic.    Provided instruction to hold torsemide tonight.     Patient found to have +3 pitting edema to right arm and right leg. Left arm +1 and left leg +2 pitting edema. Weight stable at 153.6 lbs.    Preformed orthostatic BP from sitting to standing -negative. Sitting 153/62 and standing 142/62. Patient continues to deny feeling lightheaded or dizzy, lungs clear to auscultation, 92% on 1lpm via nasal cannula.    Please be aware of the above and advise of any needed changes.  Thank you,  Amalia Jones LPN  [facility]

## 2024-07-16 NOTE — TELEPHONE ENCOUNTER
Need to report blood pressure out of parameters of  / 50/100.  HHA checked BP and it was 153/48 Rechecked 148/47 Rechecked again 150/48. Daughter in-law was there earlier it was 177/56.  Clinician called and spoke to patient she took all of her morning meds and is asymptomatic and denies s/sx of orthostatic hypotension.     Please advise.     Thank you,  Amalia Jones LPN  [facility]

## 2024-07-16 NOTE — TELEPHONE ENCOUNTER
Thank you, update sent to clinician.  Noted also other updates sent to clinician.  Branch aware.    Thank you,  Amalia Jones LPN  [facility]

## 2024-07-16 NOTE — TELEPHONE ENCOUNTER
Return VM received from patient's daughter in law, confirming that she received the message about holding torsemide, and will bring patient in for the appointment on Wednesday. Spoke with Michelle to confirm she was aware of potassium needing to be redrawn today. Michelle states that nobody can take her in for a lab appointment today, so home care will need to draw the lab. Order faxed to home care fax at [phone]. Contacted patient's homecare nurse Myesha to review that homecare needs to draw her potassium lab. Left detailed message reviewing that order has been faxed, and to return call to confirm that this can be done.

## 2024-07-17 NOTE — PATIENT INSTRUCTIONS
I need to get the results of the lab drawn yesterday before I make a decision on what to do with the torsemide.     I will refer you to [facility] for specialized wraps.     Stop the doxazosin since you have some low blood pressures at home.

## 2024-07-17 NOTE — LETTER
2024    Ludivina Canales MD  [address]  [facility] [address]    RE: Arpita Rocha       Dear Colleague,     I had the pleasure of seeing Arpita Rocha in the [facility].      CARDIOLOGY CLINIC PROGRESS NOTE    DOS: 2024      Arpita Rocha  : 1935, 88 year old  MRN: [id number]      Primary cardiologist: Dr. Gonzalez       History: Arpita Rocha is a very pleasant 88 year old female with a history of  HFpEF, SSS s/p PPM, COPD, CKD, HTN, CKD and carotid artery stenosis.     Patient moved from [place] to be closer to son/daughter-in-law (Michelle, who is one of our schedulers in clinic), she established care with Dr. Gonzalez after moving to the area.      Hospitalized -23 with PNA and acute HFpEF/COPD exacerbation, she was initially diuresed with IV furosemide. Discharged on prednisone and torsemide 20 mg BID (previously Lasix 40 mg daily) and clonidine increased to 0.2 mg BID. Discharge weight 163 lbs. Cardiology not involved during admission.      Echo 23 showed LVEF 55%, RV normal size/function, mild to moderate mitral regurgitation     Device interrogation 22 showed fair variability in HR, no atrial arrhythmia, one 5 beat run of NSVT.      She established with [facility] 24. She was hypotensive, 75/41, asymptomatic. BMP at time of post hospital follow up with PCP on 22 showed decline in renal function with creatinine of 2.7.   Kinza lowered torsemide to 20 mg daily.     She was enrolled into HRS.  Initially weight range was 158-164 lbs, then decreased to 154-164 lbs.          BMP 7/15/24 showed K 5.8, BUN 77.7, creat 2.94, GFR 15.   Weights were stable, no new CHF sxs.   I asked her to hold torsemide and get K rechecked the next day.       Patient is here today for cardiology follow up.   After splitting amlodipine to 5 mg BID, no more feeling unwell after meds.  BP runs lower at home.   The HRS data of  was before meds.    No change to breathing. On O2 1L.  No need for increase.   More edema in RUE and RLE.   Weights have been stable around 152-156 lbs per HRS.   Appetite good. Now lives in assisted living. They do cleaning and laundry. She has an alert button. They do not do vitals or weights.   Denies alcohol use. Denies tobacco use (quit at age 57).       Amlodipine 5 mg BID  Atorvastatin 40 mg once daily  Carvedilol 37.5 mg twice daily  Clonidine 0.2 mg twice daily  Clopidogrel 75 mg once daily  Doxazosin 1 mg once daily  Torsemide 20 mg once daily. On HOLD right now.  Can take an additional 20 mg PRN for weight gain >3 lbs and/or symptoms      ROS:  Skin:        Eyes:       ENT:       Respiratory:  Positive for shortness of breath;dyspnea on exertion  Cardiovascular:    edema;fatigue;Positive for  Gastroenterology:      Genitourinary:       Musculoskeletal:       Neurologic:       Psychiatric:       Heme/Lymph/Imm:       Endocrine:         PAST MEDICAL HISTORY:  Past Medical History:   Diagnosis Date    Ulcerative pancolitis (H) 2023       PAST SURGICAL HISTORY:  Past Surgical History:   Procedure Laterality Date    CATARACT EXTRACTION Bilateral     COLONOSCOPY      FEMUR FRACTURE SURGERY Right     2010    ILEOSTOMY      IMPLANT PACEMAKER      left knee replacement      MULTIPLE TOOTH EXTRACTIONS      right knee replacement Right     SPINAL FUSION      TONSILLECTOMY      TUBAL LIGATION         SOCIAL HISTORY:  Social History     Socioeconomic History    Marital status:    Tobacco Use    Smoking status: Former     Current packs/day: 0.00     Average packs/day: 1 pack/day for 44.0 years (44.0 ttl pk-yrs)     Types: Cigarettes     Start date: 1952     Quit date: 1996     Years since quittin.3    Smokeless tobacco: Never   Vaping Use    Vaping status: Never Used   Substance and Sexual Activity    Alcohol use: Not Currently    Drug use: Never    Sexual activity: Not Currently     Partners: Male     Social  Determinants of Health     Financial Resource Strain: Low Risk  (12/5/2023)    Financial Resource Strain     Within the past 12 months, have you or your family members you live with been unable to get utilities (heat, electricity) when it was really needed?: No   Food Insecurity: Low Risk  (12/5/2023)    Food Insecurity     Within the past 12 months, did you worry that your food would run out before you got money to buy more?: No     Within the past 12 months, did the food you bought just not last and you didn t have money to get more?: No   Transportation Needs: Low Risk  (12/5/2023)    Transportation Needs     Within the past 12 months, has lack of transportation kept you from medical appointments, getting your medicines, non-medical meetings or appointments, work, or from getting things that you need?: No   Physical Activity: Inactive (8/14/2023)    Exercise Vital Sign     Days of Exercise per Week: 0 days     Minutes of Exercise per Session: 0 min   Stress: No Stress Concern Present (8/14/2023)    [employer] [place] [employer] - Occupational Stress Questionnaire     Feeling of Stress : Not at all   Social Connections: Moderately Isolated (8/14/2023)    Social Connection and Isolation Panel [NHANES]     Frequency of Communication with Friends and Family: Twice a week     Frequency of Social Gatherings with Friends and Family: More than three times a week     Attends Christian Services: More than 4 times per year     Active Member of Clubs or Organizations: No     Marital Status:    Housing Stability: Low Risk  (12/5/2023)    Housing Stability     Do you have housing? : Yes     Are you worried about losing your housing?: No       FAMILY HISTORY:  Family History   Problem Relation Age of Onset    Cerebrovascular Disease Mother     Diabetes Mother     Hypertension Mother     Cancer Mother     Hypertension Father     Cerebrovascular Disease Sister     Hypertension Sister     Hypertension Sister      Breast Cancer Sister     Dementia Sister     Hypertension Brother     Cancer Brother     Hypertension Brother     Cancer Brother     Emphysema Brother        MEDS:   Current Outpatient Medications   Medication Sig Dispense Refill    acetaminophen (TYLENOL) 650 MG CR tablet Take 1,300 mg by mouth every morning      amLODIPine (NORVASC) 10 MG tablet Take 0.5 tablets (5 mg) by mouth 2 times daily 90 tablet 0    atorvastatin (LIPITOR) 40 MG tablet Take 1 tablet (40 mg) by mouth daily 90 tablet 3    budesonide (PULMICORT) 0.5 MG/2ML neb solution Take 2 mLs (0.5 mg) by nebulization 2 times daily 360 mL 3    carvedilol (COREG) 25 MG tablet Take 1.5 tablets (37.5 mg) by mouth 2 times daily (with meals) 180 tablet 4    citalopram (CELEXA) 20 MG tablet TAKE 1.5 TAB BY MOUTH EVERY  tablet 1    cloNIDine (CATAPRES) 0.2 MG tablet TAKE 1 TABLET BY MOUTH TWICE A  tablet 3    clopidogrel (PLAVIX) 75 MG tablet Take 1 tablet (75 mg) by mouth daily 90 tablet 3    Cranberry 450 MG TABS Take 1 tablet by mouth daily      diphenhydrAMINE-acetaminophen (TYLENOL PM)  MG tablet Take 2 tablets by mouth at bedtime      doxycycline hyclate (VIBRAMYCIN) 100 MG capsule Take 1 capsule (100 mg) by mouth three times a week 39 capsule 3    ferrous sulfate (FEROSUL) 325 (65 Fe) MG tablet Take 2 tablets (650 mg) by mouth daily (with breakfast) 180 tablet 3    ipratropium - albuterol 0.5 mg/2.5 mg/3 mL (DUONEB) 0.5-2.5 (3) MG/3ML neb solution Take 1 vial (3 mLs) by nebulization every 6 hours 180 mL 3    montelukast (SINGULAIR) 10 MG tablet Take 1 tablet (10 mg) by mouth At Bedtime 90 tablet 3    multivitamin w/minerals (THERA-VIT-M) tablet Take 1 tablet by mouth daily      nystatin (MYCOSTATIN) 593276 UNIT/GM external powder Apply topically 3 times daily as needed for other (rash) 60 g 1    Ostomy Supplies (ADAPT) PSTE Use with new ostomy pouch and drain  g 3    Ostomy Supplies (PREMIER DRAINABLE POUCH 22MM) Pouch MISC Use as  "directed every 3-4 days 5 each 12    sodium bicarbonate 650 MG tablet Take 1 tablet (650 mg) by mouth 2 times daily 180 tablet 0    torsemide (DEMADEX) 20 MG tablet Take 1 tablet (20 mg) by mouth daily With an additional 20 mg as needed for weight gain > 3# overnight or worsening symptoms (leg swelling/breathing). 100 tablet 3    benzonatate (TESSALON) 100 MG capsule Take 1 capsule (100 mg) by mouth 3 times daily as needed for cough 30 capsule 0    guaiFENesin (ROBITUSSIN) 20 mg/mL liquid Take 10 mLs (200 mg) by mouth every 4 hours as needed for cough 180 mL 0    predniSONE (DELTASONE) 10 MG tablet 50 mg oral x 2 days then 30 mg x 3 days then 10 mg x 3 days then discontinue 22 tablet 0    umeclidinium-vilanterol (ANORO ELLIPTA) 62.5-25 MCG/ACT oral inhaler Inhale 1 puff into the lungs daily 1 each 11     No current facility-administered medications for this visit.       ALLERGIES:   Allergies   Allergen Reactions    Acetaminophen-Codeine Hallucination    Penicillin G Rash       PHYSICAL EXAM:  Vitals: BP (!) 140/69   Pulse 60   Ht 1.448 m (4' 9\")   Wt 69.7 kg (153 lb 9.6 oz)   SpO2 98%   BMI 33.24 kg/m    Constitutional:  cooperative;in no acute distress obese      Skin:  warm and dry to the touch        Head:  normocephalic        Eyes:  pupils equal and round;conjunctivae and lids unremarkable;sclera white        ENT:  no pallor or cyanosis        Neck:    right carotid bruit mild JVD    Respiratory:      mild kyphosis, few bibasilar rales    Cardiac: regular rhythm;no murmurs, gallops or rubs detected                mild JVD    GI:  abdomen soft obese ostomy    Vascular:   pulses below the femoral arteries are diminished                                    Extremities and Musculoskeletal:    RUE edema   , RLE 1+ edema, LLE edema trace with varicose veins     Neurological:  no gross motor deficits;affect appropriate              LABS/DATA:  I reviewed the following:  Component      Latest Ref Rng " 7/15/2024  10:15 AM   Sodium      135 - 145 mmol/L 137    Potassium      3.4 - 5.3 mmol/L 5.8 (H)    Chloride      98 - 107 mmol/L 104    Carbon Dioxide (CO2)      22 - 29 mmol/L 21 (L)    Anion Gap      7 - 15 mmol/L 12    Urea Nitrogen      8.0 - 23.0 mg/dL 77.7 (H)    Creatinine      0.51 - 0.95 mg/dL 2.94 (H)    GFR Estimate      >60 mL/min/1.73m2 15 (L)    Calcium      8.8 - 10.2 mg/dL 8.4 (L)    Glucose      70 - 99 mg/dL 110 (H)    N-Terminal Pro Bnp      0 - 1,800 pg/mL 17,270 (H)    Hold Specimen [facility]              ASSESSMENT/PLAN:  1. Chronic HFpEF -  LVEF 55%, RV normal size/function.   She appears euvolemic or just slightly volume up but with what looks like lymphedema. Weights have been running 152-156 lbs.  We will continue HRS as although she lives in assisted living, they are not proving medical cares yet.   BMP 7/15/24 showed K 5.8 and creat 2.9. I had her hold torsemide and planned to repeat BMP 7/16/24. They apparently could not get the blood x 2 attempts.   BMP today here in our clinic.   We are holding torsemide until we get results.   Daily weights  Low sodium  Lymphedema referral.       2. Hypertension  Controlled to lower on amlodipine 5 mg BID, Coreg 37.5 mg BID, clonidine 0.2 mg BID, torsemide 20 mg, doxazosin.   Stop doxazosin.       3. Mild to moderate mitral regurgitation    4. SSS s/p PPM  Device checks per device clinic    5. Acute on chronic kidney disease - baseline creatinine ~ 2.1, since hospital discharge 2.7-2.8. Had seen a kidney doctor in [facility] and at the .  She does not want dialysis.   Creat 7/15/24 2.9  Repeat today    6. COPD    7. Obesity     8. Bilateral carotid artery stenosis - severe, has declined surgery consideration, on plavix.          Follow up:  BMP today. Recs and follow up pending results.   Continue HRS      Kane Gil PA-C          Thank you for allowing me to participate in the care of your patient.      Sincerely,     WAGNER Jordan  [facility]  cc:   Kane Gil PA-C  [address] [place] [address]

## 2024-07-17 NOTE — PROGRESS NOTES
[facility]:   [email or web address] ([employer]) Daily Review for Cardiology appt 7/17/2024 July 17, 2024      Current cardiac meds:  Amlodipine 10 mg once daily  Atorvastatin 40 mg once daily  Carvedilol 37.5 mg twice daily  Clonidine 0.2 mg twice daily  Clopidogrel 75 mg once daily  Doxazosin 1 mg once daily  Torsemide 20 mg once daily. Can take an additional 20 mg PRN for weight gain >3 lbs and/or symptoms    Recent plan of care changes:   -- 4/16/24: Gen MAGGIE OV: weights stable 152-156 lbs. Near euvolemia.  /64. Occ has low BPs and feels tousey. This is after mediations. Feels better in 20-30 minutes after.  BMP reviewed. HRS weight window changed to 150-158 lbs. PLAN: After her AM pills, she can have some sxs hypotension lasting a short time  Split amlodipine to 5 mg BID   -- 5/10 (166 lbs) - 5/16/24 (162 lbs): [facility] ADMIT: Respiratory failure. HFpEF, hyperkalemia. Cardiology for concerns of ACS complained by hyperkalemia possible secondary to Heparin. Heparin stopped, hyperkalemia treated and receive IV diuretics with improvement in breathing but still requiring supplemental O2. Echo completed (5/11). DISCHARGED to home with HC. STARTED Prednisone.   PLAN: Follow-up with [facility] in 1 week with BMP. Home O2.   -- 5/20/24: BMP completed per [facility]. Creatinine 2.66, .1 (baseline ~2.1)  -- 5/28/24: PCP [facility] for post hospital Follow-up: Home O2 oximeter shows at 96% rest on room air. Discussed using supplemental O2 if <90%. Can stop wearing it continuously. Due to see nephrology. No medication changes.   - 7/5/24:  RN called HC RN and dtr in law Michelle instructing to split amlodipine to 5 mg BID as prescribed (instead of 10 mg daily).   -- 7/8/24: DBP alerting on HRS for <45   -- 7/10/24: Amlodipine dose changed from 10 mg once daily to 5 mg twice daily. Lower DBP goal to >40. If BPs still running below thresholds, can lower clonidine to 0.1 mg in AM.  Per JOSE Rader.     Goal Weight Range: 150 - 158  lbs    Goal SBP: 90 - 160 mmHg    Goal DBP: 40 - 100 mmHg      Weight, BP and HR Readings:             Time spent in review this day: 5 min.      Future Appointments   Date Time Provider Department Center   7/17/2024 10:30 AM [facility]   7/30/2024  4:00 PM [facility]   8/1/2024 12:00 AM [facility]   8/27/2024 10:30 AM Ludivina Chamberlain MD [facility] CR   1/8/2025  8:30 AM Sadi Lamar MD CSPULM         ANKIT CrooksN, RN 9:15 AM 07/17/24

## 2024-07-17 NOTE — PROGRESS NOTES
CARDIOLOGY CLINIC PROGRESS NOTE    DOS: 2024      Arpita Rocha  : 1935, 88 year old  MRN: [id number]      Primary cardiologist: Dr. Gonzalez       History: Arpita Rocha is a very pleasant 88 year old female with a history of  HFpEF, SSS s/p PPM, COPD, CKD, HTN, CKD and carotid artery stenosis.     Patient moved from [place] to be closer to son/daughter-in-law (Michelle, who is one of our schedulers in clinic), she established care with Dr. Gonzalez after moving to the area.      Hospitalized -23 with PNA and acute HFpEF/COPD exacerbation, she was initially diuresed with IV furosemide. Discharged on prednisone and torsemide 20 mg BID (previously Lasix 40 mg daily) and clonidine increased to 0.2 mg BID. Discharge weight 163 lbs. Cardiology not involved during admission.      Echo 23 showed LVEF 55%, RV normal size/function, mild to moderate mitral regurgitation     Device interrogation 22 showed fair variability in HR, no atrial arrhythmia, one 5 beat run of NSVT.      She established with [facility] 24. She was hypotensive, 75/41, asymptomatic. BMP at time of post hospital follow up with PCP on 22 showed decline in renal function with creatinine of 2.7.   Kinza lowered torsemide to 20 mg daily.     She was enrolled into HRS.  Initially weight range was 158-164 lbs, then decreased to 154-164 lbs.          BMP 7/15/24 showed K 5.8, BUN 77.7, creat 2.94, GFR 15.   Weights were stable, no new CHF sxs.   I asked her to hold torsemide and get K rechecked the next day.       Patient is here today for cardiology follow up.   After splitting amlodipine to 5 mg BID, no more feeling unwell after meds.  BP runs lower at home.   The HRS data of  was before meds.   No change to breathing. On O2 1L.  No need for increase.   More edema in RUE and RLE.   Weights have been stable around 152-156 lbs per HRS.   Appetite good. Now lives in assisted living. They do cleaning and  laundry. She has an alert button. They do not do vitals or weights.   Denies alcohol use. Denies tobacco use (quit at age 57).       Amlodipine 5 mg BID  Atorvastatin 40 mg once daily  Carvedilol 37.5 mg twice daily  Clonidine 0.2 mg twice daily  Clopidogrel 75 mg once daily  Doxazosin 1 mg once daily  Torsemide 20 mg once daily. On HOLD right now.  Can take an additional 20 mg PRN for weight gain >3 lbs and/or symptoms      ROS:  Skin:        Eyes:       ENT:       Respiratory:  Positive for shortness of breath;dyspnea on exertion  Cardiovascular:    edema;fatigue;Positive for  Gastroenterology:      Genitourinary:       Musculoskeletal:       Neurologic:       Psychiatric:       Heme/Lymph/Imm:       Endocrine:         PAST MEDICAL HISTORY:  Past Medical History:   Diagnosis Date    Ulcerative pancolitis (H) 2023       PAST SURGICAL HISTORY:  Past Surgical History:   Procedure Laterality Date    CATARACT EXTRACTION Bilateral     COLONOSCOPY      FEMUR FRACTURE SURGERY Right     2010    ILEOSTOMY      IMPLANT PACEMAKER      left knee replacement      MULTIPLE TOOTH EXTRACTIONS      right knee replacement Right     SPINAL FUSION      TONSILLECTOMY      TUBAL LIGATION         SOCIAL HISTORY:  Social History     Socioeconomic History    Marital status:    Tobacco Use    Smoking status: Former     Current packs/day: 0.00     Average packs/day: 1 pack/day for 44.0 years (44.0 ttl pk-yrs)     Types: Cigarettes     Start date: 1952     Quit date: 1996     Years since quittin.3    Smokeless tobacco: Never   Vaping Use    Vaping status: Never Used   Substance and Sexual Activity    Alcohol use: Not Currently    Drug use: Never    Sexual activity: Not Currently     Partners: Male     Social Determinants of Health     Financial Resource Strain: Low Risk  (2023)    Financial Resource Strain     Within the past 12 months, have you or your family members you live with been unable to get utilities  (heat, electricity) when it was really needed?: No   Food Insecurity: Low Risk  (12/5/2023)    Food Insecurity     Within the past 12 months, did you worry that your food would run out before you got money to buy more?: No     Within the past 12 months, did the food you bought just not last and you didn t have money to get more?: No   Transportation Needs: Low Risk  (12/5/2023)    Transportation Needs     Within the past 12 months, has lack of transportation kept you from medical appointments, getting your medicines, non-medical meetings or appointments, work, or from getting things that you need?: No   Physical Activity: Inactive (8/14/2023)    Exercise Vital Sign     Days of Exercise per Week: 0 days     Minutes of Exercise per Session: 0 min   Stress: No Stress Concern Present (8/14/2023)    [employer] - Occupational Stress Questionnaire     Feeling of Stress : Not at all   Social Connections: Moderately Isolated (8/14/2023)    Social Connection and Isolation Panel [NHANES]     Frequency of Communication with Friends and Family: Twice a week     Frequency of Social Gatherings with Friends and Family: More than three times a week     Attends [employer] Services: More than 4 times per year     Active Member of Clubs or Organizations: No     Marital Status:    Housing Stability: Low Risk  (12/5/2023)    Housing Stability     Do you have housing? : Yes     Are you worried about losing your housing?: No       FAMILY HISTORY:  Family History   Problem Relation Age of Onset    Cerebrovascular Disease Mother     Diabetes Mother     Hypertension Mother     Cancer Mother     Hypertension Father     Cerebrovascular Disease Sister     Hypertension Sister     Hypertension Sister     Breast Cancer Sister     Dementia Sister     Hypertension Brother     Cancer Brother     Hypertension Brother     Cancer Brother     Emphysema Brother        MEDS:   Current Outpatient Medications   Medication  Sig Dispense Refill    acetaminophen (TYLENOL) 650 MG CR tablet Take 1,300 mg by mouth every morning      amLODIPine (NORVASC) 10 MG tablet Take 0.5 tablets (5 mg) by mouth 2 times daily 90 tablet 0    atorvastatin (LIPITOR) 40 MG tablet Take 1 tablet (40 mg) by mouth daily 90 tablet 3    budesonide (PULMICORT) 0.5 MG/2ML neb solution Take 2 mLs (0.5 mg) by nebulization 2 times daily 360 mL 3    carvedilol (COREG) 25 MG tablet Take 1.5 tablets (37.5 mg) by mouth 2 times daily (with meals) 180 tablet 4    citalopram (CELEXA) 20 MG tablet TAKE 1.5 TAB BY MOUTH EVERY  tablet 1    cloNIDine (CATAPRES) 0.2 MG tablet TAKE 1 TABLET BY MOUTH TWICE A  tablet 3    clopidogrel (PLAVIX) 75 MG tablet Take 1 tablet (75 mg) by mouth daily 90 tablet 3    Cranberry 450 MG TABS Take 1 tablet by mouth daily      diphenhydrAMINE-acetaminophen (TYLENOL PM)  MG tablet Take 2 tablets by mouth at bedtime      doxycycline hyclate (VIBRAMYCIN) 100 MG capsule Take 1 capsule (100 mg) by mouth three times a week 39 capsule 3    ferrous sulfate (FEROSUL) 325 (65 Fe) MG tablet Take 2 tablets (650 mg) by mouth daily (with breakfast) 180 tablet 3    ipratropium - albuterol 0.5 mg/2.5 mg/3 mL (DUONEB) 0.5-2.5 (3) MG/3ML neb solution Take 1 vial (3 mLs) by nebulization every 6 hours 180 mL 3    montelukast (SINGULAIR) 10 MG tablet Take 1 tablet (10 mg) by mouth At Bedtime 90 tablet 3    multivitamin w/minerals (THERA-VIT-M) tablet Take 1 tablet by mouth daily      nystatin (MYCOSTATIN) 913749 UNIT/GM external powder Apply topically 3 times daily as needed for other (rash) 60 g 1    Ostomy Supplies (ADAPT) PSTE Use with new ostomy pouch and drain  g 3    Ostomy Supplies (PREMIER DRAINABLE POUCH 22MM) Pouch MISC Use as directed every 3-4 days 5 each 12    sodium bicarbonate 650 MG tablet Take 1 tablet (650 mg) by mouth 2 times daily 180 tablet 0    torsemide (DEMADEX) 20 MG tablet Take 1 tablet (20 mg) by mouth daily With an  "additional 20 mg as needed for weight gain > 3# overnight or worsening symptoms (leg swelling/breathing). 100 tablet 3    benzonatate (TESSALON) 100 MG capsule Take 1 capsule (100 mg) by mouth 3 times daily as needed for cough 30 capsule 0    guaiFENesin (ROBITUSSIN) 20 mg/mL liquid Take 10 mLs (200 mg) by mouth every 4 hours as needed for cough 180 mL 0    predniSONE (DELTASONE) 10 MG tablet 50 mg oral x 2 days then 30 mg x 3 days then 10 mg x 3 days then discontinue 22 tablet 0    umeclidinium-vilanterol (ANORO ELLIPTA) 62.5-25 MCG/ACT oral inhaler Inhale 1 puff into the lungs daily 1 each 11     No current facility-administered medications for this visit.       ALLERGIES:   Allergies   Allergen Reactions    Acetaminophen-Codeine Hallucination    Penicillin G Rash       PHYSICAL EXAM:  Vitals: BP (!) 140/69   Pulse 60   Ht 1.448 m (4' 9\")   Wt 69.7 kg (153 lb 9.6 oz)   SpO2 98%   BMI 33.24 kg/m    Constitutional:  cooperative;in no acute distress obese      Skin:  warm and dry to the touch        Head:  normocephalic        Eyes:  pupils equal and round;conjunctivae and lids unremarkable;sclera white        ENT:  no pallor or cyanosis        Neck:    right carotid bruit mild JVD    Respiratory:      mild kyphosis, few bibasilar rales    Cardiac: regular rhythm;no murmurs, gallops or rubs detected                mild JVD    GI:  abdomen soft obese ostomy    Vascular:   pulses below the femoral arteries are diminished                                    Extremities and Musculoskeletal:    RUE edema   , RLE 1+ edema, LLE edema trace with varicose veins     Neurological:  no gross motor deficits;affect appropriate              LABS/DATA:  I reviewed the following:  Component      Latest Ref Rng 7/15/2024  10:15 AM   Sodium      135 - 145 mmol/L 137    Potassium      3.4 - 5.3 mmol/L 5.8 (H)    Chloride      98 - 107 mmol/L 104    Carbon Dioxide (CO2)      22 - 29 mmol/L 21 (L)    Anion Gap      7 - 15 mmol/L 12  "   Urea Nitrogen      8.0 - 23.0 mg/dL 77.7 (H)    Creatinine      0.51 - 0.95 mg/dL 2.94 (H)    GFR Estimate      >60 mL/min/1.73m2 15 (L)    Calcium      8.8 - 10.2 mg/dL 8.4 (L)    Glucose      70 - 99 mg/dL 110 (H)    N-Terminal Pro Bnp      0 - 1,800 pg/mL 17,270 (H)    Hold Specimen [facility]              ASSESSMENT/PLAN:  1. Chronic HFpEF -  LVEF 55%, RV normal size/function.   She appears euvolemic or just slightly volume up but with what looks like lymphedema. Weights have been running 152-156 lbs.  We will continue HRS as although she lives in assisted living, they are not proving medical cares yet.   BMP 7/15/24 showed K 5.8 and creat 2.9. I had her hold torsemide and planned to repeat BMP 7/16/24. They apparently could not get the blood x 2 attempts.   BMP today here in our clinic.   We are holding torsemide until we get results.   Daily weights  Low sodium  Lymphedema referral.       2. Hypertension  Controlled to lower on amlodipine 5 mg BID, Coreg 37.5 mg BID, clonidine 0.2 mg BID, torsemide 20 mg, doxazosin.   Stop doxazosin.       3. Mild to moderate mitral regurgitation    4. SSS s/p PPM  Device checks per device clinic    5. Acute on chronic kidney disease - baseline creatinine ~ 2.1, since hospital discharge 2.7-2.8. Had seen a kidney doctor in [facility] and at the .  She does not want dialysis.   Creat 7/15/24 2.9  Repeat today    6. COPD    7. Obesity     8. Bilateral carotid artery stenosis - severe, has declined surgery consideration, on plavix.          Follow up:  BMP today. Recs and follow up pending results.   Continue HRS      Kane Gil PA-C

## 2024-07-17 NOTE — TELEPHONE ENCOUNTER
[facility]    Received VM from pt's [facility] home care RN [place] stating that the lab draw for 7/16/24 to draw a potassium failed x 2 attempts. [place] adds that instructions were given to hold torsemide yesterday 7/16/24. He adds that her weight today was 153.6 lbs. He also states he completed orthostatic BP's. Sitting, patients BP was 153/62. Standing /62. Pt's O2 was 92% on 1L nasal cannula. Pt denies LH or dizziness.    Pt has OV with [facility] now. Will send to general nursing team and [facility] as FYI.    Parul Matos, RN, BSN  [facility]/ [facility] Care Coordinator  July 17, 2024 10:52 AM

## 2024-07-22 NOTE — TELEPHONE ENCOUNTER
"Patient reports that someone was supposed to come out today (Monday 7/22) to \"wrap my arm\" referring to her right arm that has +4 edema that has not resolved over the past couple weeks.   -Need to verify if the clinic has scheduled any lymphedema therapy?  -If not please approve OT to treat and eval through agency.    Patient reports KWAN that is resolved after several minutes of rest, also reports productive cough with cloudy sputum spo2 ranging from 90-94% on 1lpm nasal cannula, wheezes to mid and lower lobes bilaterally noted on auscultation, wt stable, lymphedema to RUE and edema to LUE +2 and RLE +3. Pt denies SOB at rest. no further concerns noted.  -Please advise on next steps    New wound to R medial buttocks, linear skin tear measuring 1.5x0.2x0.1cm with blanchable redness.  -Wound Care Order as follows:   *SKILLED NURSE TO PERFORM/TEACH WOUND CARE TO SKIN TEAR OF RIGHT MEDIAL BUTTOCKS AS FOLLOWS: CLEANSE WITH SOAP AND WATER, PAT DRY, APPLY BARRIER CREAM. USING CLEAN/ASEPTIC TECHNIQUE DAILY AND PRN FOR SOILAGE. CAREGIVER/PATIENT MAY PERFORM WOUND CARE IN BETWEEN SN VISITS.  MAY DC WOUND CARE WHEN WOUND IS HEALED &/OR WHEN WOUND DEMONSTRATES ADEQUATE HEALING TRAJECTORY & CAREGIVER/PATIENT DEMOS COMPETENCE IN WOUND MANAGEMENT.  MAY USE THE FOLLOWING FIRST AID PROTOCOL AS AN ALTERNATE DRESSING AS NEEDED IF THE ABOVE WOUND TREATMENT SUPPLIES ARE UNAVAILABLE IN THE HOME: CLEANSE WITH NORMAL SALINE OR WOUND CLEANSER, PAT DRY, COVER WITH DRY BORDERED GAUZE OR ABD PAD SECURED WITH TAPE.  MAY OBTAIN WOUND CULTURE PRN S/SX OF INFECTION.  ALLOW 3 PRN SN VISITS TO ASSESS REPORTS OF CHANGES IN THE WOUND THAT WOULD INDICATE INFECTION SUCH AS REDNESS, SWELLING, PAIN, ABNORMAL DISCHARGE, ODOR, ABNORMAL GRANULATION TISSUE OR FEVER.  -First aide and bandaid appliied for now until orders are confirmed\"  Please be aware of the above and needing to be advised of any new orders, please approve above orders.  Thank you,  Amalia " Robert VANCEN  [facility]

## 2024-07-22 NOTE — PROGRESS NOTES
OCCUPATIONAL THERAPY EVALUATION  Type of Visit: Evaluation              Subjective      Presenting condition or subjective complaint: Swelling in right arm  Date of onset: 07/03/24    Relevant medical history: Arthritis; Asthma; Bladder or bowel problems; COPD; Heart problems; High blood pressure; Implanted device; Kidney disease; Osteoporosis; Pacemaker   Dates & types of surgery:      Prior diagnostic imaging/testing results:       Prior therapy history for the same diagnosis, illness or injury: No      Prior Level of Function  Transfers:  not tested  Ambulation:  not tested  ADL:  not tested  IADL:  Lives in assisted living with much family support    Living Environment  Social support: Alone   Type of home: Assisted Living   Stairs to enter the home: No       Ramp: No   Stairs inside the home: No       Help at home: Home management tasks (cooking, cleaning)  Equipment owned: Walker with wheels; Standard wheelchair; Power wheelchair or scooter; Grab bars; Bath bench; Lift chair     Employment: No    Hobbies/Interests:      Patient goals for therapy: Nothing    Pain assessment: Pain present     Objective       EDEMA EVALUATION  Additional history:  Body part affected by edema: Right arm  If cancer related, treatment: Possibly related to right breadt but im choosing not to pursue treatment for that  If not cancer related, problems with veins or cause of swelling:    Distance able to walk: 20 feet or so  Time able to stand: 10 min or so  Sensation problems in hands/feet: No    Edema etiology:  CHF, but pt reports a slight dx of breast ca that they decline further workup for.    FUNCTIONAL SCALES  Pt to take LLIS  Cognitive Status Examination  Orientation: Oriented to person, place and time   Level of Consciousness: Alert  Follows Commands and Answers Questions: 100% of the time  Personal Safety and Judgement: Intact  Memory:  not assessed    EDEMA  Skin Condition: Dryness, Intact, Pitting, waxy yellow and skin taut  and shiney  Scar: No  Capillary Refill: Decreased  Radial Pulse:  not tested    Stemmer Sign: -  Ulceration: No    GIRTH MEASUREMENTS: Refer to separate girth measurement flowsheet.     VOLUME UE  Right UE (mL) 2098   Left UE (mL) 1709   UE Volume Comparison RUE volume greater than LUE volume   % Difference 19   VOLUME LE  Right LE (mL) Not tested, but pt has RLE swelling as well   Left LE (mL)    LE Volume Comparison    % Difference    Head/Neck Volume     Trunk Volume    Genital Volume       RANGE OF MOTION:  sh flex R 75*, L 72*  STRENGTH:  not tested  POSTURE: forward posture in sitting  PALPATION: no pain on checking for pitting  ACTIVITIES OF DAILY LIVING: Pt lives in assisted living  BED MOBILITY: WFL  TRANSFERS:  not tested  GAIT/LOCOMOTION: not tested  BALANCE:  not tested  SENSATION: UE Sensation WNL  VASCULAR:  concerns to be discussed with MD  COORDINATION: WFL  MUSCLE TONE: WFL    Assessment & Plan   CLINICAL IMPRESSIONS  Medical Diagnosis: Lymphedema    Treatment Diagnosis: Lymphedema    Impression/Assessment: Pt is a 88 year old female presenting to Occupational Therapy due to lymphedema.  The following significant findings have been identified: Impaired ROM and Pain.  These identified deficits interfere with their ability to perform self care tasks as compared to previous level of function.     Clinical Decision Making (Complexity):  Assessment of Occupational Performance: 1-3 Performance Deficits  Occupational Performance Limitations: health management and maintenance  Clinical Decision Making (Complexity): Low complexity    PLAN OF CARE  Treatment Interventions:  Interventions: Gradient Compression Bandaging    Long Term Goals   OT Goal 1  Goal Identifier: 1  Goal Description: In order to improve functional mobility for activities of living, by the completion of intensive treatment, patient and/or caregiver will;   Verbalize and/or demonstrate understanding of techniques to independently manage  their lymphedema at home  Rationale: In order to maximize safety and independence with performance of self-care activities  Target Date: 10/14/24  OT Goal 2  Goal Identifier: 1a  Goal Description: tolerate gradient compression bandaging/wearing compression garments 23 hrs/day to decrease volume of extracellular fluid  Rationale: In order to maximize safety and independence with performance of self-care activities  Target Date: 10/14/24  OT Goal 3  Goal Identifier: 1b  Goal Description: demonstrate independence in applying gradient compression bandages  Rationale: In order to maximize safety and independence with performance of self-care activities  Target Date: 10/14/24  OT Goal 4  Goal Identifier: 1c  Goal Description: demonstrate independence in performing prescribed exercises, self MLD to facilate the lymph system  Rationale: In order to maximize safety and independence with performance of self-care activities  Target Date: 10/14/24  OT Goal 5  Goal Identifier: 1d  Goal Description: be independent in donning/doffing, wearing schedule, and care of compression garments  Rationale: In order to maximize safety and independence with performance of self-care activities  Target Date: 10/14/24      Frequency of Treatment: 1x/week  Duration of Treatment: 12 weeks     Recommended Referrals to Other Professionals: Home care OT  Education Assessment: Learner/Method: Patient     Risks and benefits of evaluation/treatment have been explained.   Patient/Family/caregiver agrees with Plan of Care.     Evaluation Time:    OT Eval, Low Complexity Minutes (62230): 35      Signing Clinician: ZAID Moreno [facility]                                                                                   OUTPATIENT OCCUPATIONAL THERAPY      PLAN OF TREATMENT FOR OUTPATIENT REHABILITATION   Patient's Last Name, First Name, Arpita Moreno  Y YOB: 1935   Provider's Name   MAGGIE  [facility]   Medical Record No.  [id number]     Onset Date: 07/03/24 Start of Care Date: 07/22/24     Medical Diagnosis:  Lymphedema      OT Treatment Diagnosis:  Lymphedema Plan of Treatment  Frequency/Duration:1x/week/12 weeks    Certification date from 07/22/24   To 10/14/24        See note for plan of treatment details and functional goals     Brianna Oreilly OT                         I CERTIFY THE NEED FOR THESE SERVICES FURNISHED UNDER        THIS PLAN OF TREATMENT AND WHILE UNDER MY CARE     (Physician attestation of this document indicates review and certification of the therapy plan).              Referring Provider:  Kane Gil    Initial Assessment  See Epic Evaluation- 07/22/24

## 2024-07-23 NOTE — TELEPHONE ENCOUNTER
Lymphedema referral placed by Cardiology on 7-17-24.  Therapy completed yesterday 7-22-24.    Per cardiology note same day, Torsemide was being held and is awaiting cardiology MD review.    Will defer recommendations to cardiology.

## 2024-07-23 NOTE — TELEPHONE ENCOUNTER
"HHA checked VS this morning and found BP to be 164/47 and pulse 59, recheck of BP was 161/52 and pulse of 60. Patient was asymptomatic at time of visit. Writer followed up with patient this afternoon and requested patient to check her BP again, patient report she kept getting a cuff error on her home BP cuff but otherwise denied any new or worsening symptoms and is planning to attend [facility] at 2pm.  -Reporting per ordered parameters of / for BP and  for pulse.\"    Please be aware of the above and update of any changes needed.    Amalia Jones LPN  [facility]    "

## 2024-07-23 NOTE — TELEPHONE ENCOUNTER
Spoke with daughter in law Michelle and she states that Minerva does not want to pursue dialysis and stopped going to the Nephrologist Dr. Cata Mclain.  The family is having a meeting tonight and to discuss patient's wishes. Had appt with Lymphedema clinic and had to unwrap the dressing due to having to change/empty the osotomy bag 3-4 times a day.  Will forward message to [facility] with update.  Xuan Santos RN on 7/23/2024 at 1:55 PM

## 2024-07-23 NOTE — TELEPHONE ENCOUNTER
Component      Latest Ref Rng 7/17/2024  11:05 AM   Sodium      135 - 145 mmol/L 140    Potassium      3.4 - 5.3 mmol/L 5.7 (H)    Chloride      98 - 107 mmol/L 107    Carbon Dioxide (CO2)      22 - 29 mmol/L 20 (L)    Anion Gap      7 - 15 mmol/L 13    Urea Nitrogen      8.0 - 23.0 mg/dL 79.2 (H)    Creatinine      0.51 - 0.95 mg/dL 3.08 (H)    GFR Estimate      >60 mL/min/1.73m2 14 (L)    Calcium      8.8 - 10.4 mg/dL 8.6 (L)    Glucose      70 - 99 mg/dL 95     Left message for Michelle daughter inl-aw follow up on Nephrology or with PCP. Xuan Santos RN on 7/23/2024 at 1:31 PM    Legend:  (H) High  (L) Low  Maria C Richey RN  7/23/2024 12:44 PM CDT Back to Top      Cardiology team RN's please see result note accidentally routed to pcp team     Thank you  Maria C Richey, Registered Nurse  [facility]    Kane Gil PA-C  7/17/2024  5:13 PM CDT       Hi,     I saw Minerva today in the cardiology clinic.  Her BMP 7/15/24 showed K elevated at 5.8. BUN and creat were up so I had her hold her torsemide thinking potentially she was over diuresed.     I do not see any other medications that would cause her K to increase or really any other nephrotoxins at all.     Today, repeat shows K still 5.7, BUN and creat still trending up.     If anything she looks to be a bit volume up with some rales and edema in her LEs, but her most significant edema is mostly in her RUE and looks like lymphedema.     She felt ok during our visit. No complaints or sxs other than the edema.     I don't think this necessarily warrants ER visit, so I am hoping we can get her in to see PCP or nephrology ASAP if you think it can be managed as outpatient.  If not, then, we can send her to the ER.     Thanks for your input.     Kane Gil PA-C 7/17/2024 5:13 PM

## 2024-07-24 NOTE — TELEPHONE ENCOUNTER
Thank you, this update sent to the clinician as well.    Thank you,  Amalia Jones LPN  [facility]

## 2024-07-24 NOTE — TELEPHONE ENCOUNTER
Thank you, I did forward update to the clinician.    Amalia Jones LPN  [facility]

## 2024-07-24 NOTE — TELEPHONE ENCOUNTER
Nutrition Care Plan      Patient seen for admission nutrition trigger and wound consult.  Skin noted to be intact.  Regular diet started today.  Patient notes decreased appetite past few days from nausea, however stable/good intakes at baseline.  She follows regular diet, healthy eater per family.   Weight history reviewed.  Patient without diet related concerns at this time.  Will follow meal intakes, plan of care and monitor need for further nutrition intervention.     This is all re the BMP from 7/17/24.     OI did send the message to PCP, Dr. Gonzalez and nephrologist.   PCP replied and forwarded message to her team.  It was not sent in error.   See below.     Ludivina Chamberlain MD Sellers, Anah E, PA-C; P Cr Support [facility] Team (Ma-Esdras)  Cc: Mary Gonzalez, ; Cata Mclain MD  I am out of office the rest of this week and into next week, will return Tuesday.  Will send message to my team to see if we can get her in with another provider, OR nephrology could see her also.    April Jose Canales M.D.      I have not heard anything back from any of the providers, but Minerva has had discussion with her son and DIL. Her DIL works here in our clinic and shared Minerva's requests.   She does not want to see nephrology.   We did try to have her see lymphedema clinic re the edema in her arm and the disocmfort. She saw them and had her arm wrapped but then she could not change her ostomy bag.   She is declining further wrapping.   We will place a formal Palliative care consult to help guide her goals of care. And when hospice is appropriate, they would want this service.     Kane Gil PA-C 7/24/2024 4:45 PM

## 2024-07-30 NOTE — PROGRESS NOTES
[facility]:   [facility] (Primary Data) Routine Remote Evaluation    HRS Enrollment date: 1/26/2024     Dates: 6/25/2024 through 7/30/2024    This is not the first billing cycle.    Alerts in the last month:   - 7/5/24:  RN called  RN and dtr in law Michelle instructing to split amlodipine to 5 mg BID as prescribed (instead of 10 mg daily).   -- 7/8/24: DBP alerting on HRS for <45   -- 7/10/24: Amlodipine dose changed from 10 mg once daily to 5 mg twice daily. Lower DBP goal to >40. If BPs still running below thresholds, can lower clonidine to 0.1 mg in AM.  Per Kane Gil PAC  -- 7/16/24: Torsemide held due to elevated creatinine.   -- 7/17/24: Gen MAGGIE OV: Stop the doxazosin. Referred to lymphedema    These adjustments have been discussed with the patient/caregiver and they express verbal understanding.    Goal Weight: 150-158 lbs       Readings:              Total Minutes Spent: 30 minutes     Future Appointments   Date Time Provider Department Center   7/30/2024  4:00 PM Hfrn, Rosita SU[facility] PSA CLIN   8/1/2024 12:[facility]   8/27/2024 10:30 AM Coming Ludivina Canales MD CRFP CR   8/30/2024  1:50 PM Wendy Bloom, DO RHCCRS [place] RID   1/8/2025  8:30 AM [facility]        ALEJANDRA Crooks, RN 10:23 AM 07/30/24    Weights reviewed on HRS. Continue to monitor.     Diuretics adjusted accordingly.  30 days of remote monitoring completed.         Cody Marks MD

## 2024-07-30 NOTE — TELEPHONE ENCOUNTER
Forms/Letter Request    Type of form/letter: Home Health Certification      Do we have the form/letter: yes    Who is the form from? Home care [facility]     Where did/will the form come from? form was faxed in    When is form/letter needed by:     How would you like the form/letter returned: fax    Patient Notified form requests are processed in 5-7 business days:    Could we send this information to you [facility] or would you prefer to receive a phone call?:

## 2024-08-05 NOTE — TELEPHONE ENCOUNTER
Patient reports that needina a new nebulizer machine and accessories.  The machine patient currently has in the home has no labels as to what company it came from.     Patient has a concentrator from [employer], would it be possible for provider to give orders for a new nebulizer and neb kits have orders sent to [employer] to place order.    Please advise and I can also assist once order completed.    Thank you,  Amalia Jones LPN  [facility]

## 2024-08-05 NOTE — TELEPHONE ENCOUNTER
Clinician RN reporting coarse lung sounds to all 3 right lobes, pt endorses productive cough with cloudy sputum. Pt denies SOB at rest or worsening KWAN, SpO2 95% on 1lpm NC.    Updating that wound to right buttocks is completely healed and closed.     Please update of any changes needed.    Thank you,  Amalia Jones LPN  [facility]

## 2024-08-06 NOTE — TELEPHONE ENCOUNTER
Pt continues to have HTN in the morning (8/6/24) when visited by A. Today BP was 162/67 before shower and after shower 173/69.  - Requesting to update SBP parameters to .    Please advise on the above.  Thank you,  Amalia Jones LPN  [facility]

## 2024-08-12 NOTE — TELEPHONE ENCOUNTER
Called patient. Spoke with Bright, pt's son.     Wt today was 141.8 lbs (below threshold). Wt yesterday was 154.2 lb.     Pt's son thinks pt probably did not fully get on scale. When he spoke with pt this morning there were no new sxs. Pt will re-weigh herself today.     Minutes spent: 15 mins     Hayley SERRANO [facility]

## 2024-08-12 NOTE — PROGRESS NOTES
[facility]:   HRS ([place]) Daily Alert     August 12, 2024    Alert(s): Weight below threshold at 141.8 lbs     Current diuretic dose: torsemide 20 mg daily with an additional 20 mg as needed for weight gain > 3# overnight or worsening symptoms (leg swelling/breathing).     Recent plan of care changes:   -- 7/5/24:  RN called HC RN and dtr in law Michelle instructing to split amlodipine to 5 mg BID as prescribed (instead of 10 mg daily).   -- 7/8/24: DBP alerting on HRS for <45  -- 7/10/24: Amlodipine dose changed from 10 mg once daily to 5 mg twice daily. Lower DBP goal to >40. If BPs still running below thresholds, can lower clonidine to 0.1 mg in AM.  Per Kane Gil PAC  -- 7/16/24: Torsemide held due to elevated creatinine.   -- 7/17/24: Gen cardiology OV w/ Kane Gil PAC: Stop the doxazosin. Referred to lymphedema    Goal Weight Range:  150-158 lbs -- per Kane Gil 4/16/24     Weight, BP and HR Readings:       Time spent in review this day: 7 min.         Routing to general MAGGIE's nursing team.     Future Appointments   Date Time Provider Department Center   8/27/2024 10:30 AM Coming Ludivina Canales MD CRFP CR   8/30/2024  1:50 PM Wendy Bloom, DO RHCCRS [place] RID   9/3/2024  4:00 PM GRADY HFRN [facility]MP PSA CLIN   1/8/2025  8:30 AM Sadi Lamar MD CSPULM CS     Janet Vazquez, RN BSN   [facility] [place] [place]  [facility] Care Coordinator  08/12/24, 12:16 PM

## 2024-08-13 NOTE — PROGRESS NOTES
[facility]    Unable to log into [facility] website today to review Minerva's weight          Janet Vazquez RN BSN   [facility] [place], [place]  [facility] Care Coordinator  08/13/24, 10:10 AM

## 2024-08-13 NOTE — TELEPHONE ENCOUNTER
Contacted son Bright and he said patient could not reweigh today but they will be going to see her this afternoon and evening and will reweigh her. Conversation with son 3 minutes.   Xuan Santos RN on 8/13/2024 at 3:24 PM

## 2024-08-16 NOTE — PROGRESS NOTES
[facility]:   HRS ([employer]) Daily Alert     August 16, 2024    Notes: Weight's back in goal after weight error 8/12/24. Will continue to monitor.    Goal Weight Range: 150-158 lbs    Weight, BP and HR Readings:       Time spent in review this day: 5 min.    Future Appointments   Date Time Provider Department Center   8/27/2024 10:30 AM Coming Ludivina Canales MD CRFP CR   8/30/2024  1:50 PM Wendy Bloom,  RHCCRS [place] RID   9/3/2024  4:00 PM GRADY HFRN [facility] PSA CLIN   1/8/2025  8:30 AM Sadi Lamar MD CSPULM CS     Shanell Keen RN, BSN  08/16/24 at 12:58 PM

## 2024-08-19 PROBLEM — R79.89 ELEVATED BRAIN NATRIURETIC PEPTIDE (BNP) LEVEL: Status: ACTIVE | Noted: 2024-01-01

## 2024-08-19 PROBLEM — Z66 DNR (DO NOT RESUSCITATE): Status: ACTIVE | Noted: 2024-01-01

## 2024-08-19 PROBLEM — J44.1 COPD WITH ACUTE EXACERBATION (H): Status: ACTIVE | Noted: 2024-01-01

## 2024-08-19 PROBLEM — R06.00 DYSPNEA, UNSPECIFIED TYPE: Status: ACTIVE | Noted: 2024-01-01

## 2024-08-19 PROBLEM — I50.9 ACUTE ON CHRONIC CONGESTIVE HEART FAILURE, UNSPECIFIED HEART FAILURE TYPE (H): Status: ACTIVE | Noted: 2024-01-01

## 2024-08-19 PROBLEM — R79.89 ELEVATED TROPONIN: Status: ACTIVE | Noted: 2024-01-01

## 2024-08-19 PROBLEM — Z99.81 DEPENDENCE ON SUPPLEMENTAL OXYGEN: Status: ACTIVE | Noted: 2024-01-01

## 2024-08-19 PROBLEM — M79.89 SWELLING OF RIGHT UPPER EXTREMITY: Status: ACTIVE | Noted: 2024-01-01

## 2024-08-19 PROBLEM — Z78.9 DNI (DO NOT INTUBATE): Status: ACTIVE | Noted: 2024-01-01

## 2024-08-19 NOTE — ED PROVIDER NOTES
"  Emergency Department Note      History of Present Illness     Chief Complaint   Shortness of Breath and Altered Mental Status      HPI   Arpita Rocha is a 88 year old female on Plavix with history of CHF, COPD, chronic bronchitis, hypertension, hyperlipidemia, sick sinus syndrome, and stage 4 CKD who presents to the ED via EMS from Assisted Living with her son for evaluation of shortness of breath and altered mental status. EMS reports patient was satting in the 80s on room air. She was up to 96 with 4L of supplemental oxygen. EMS noted \"gunky\" lung sounds and states end tidal started to show an alpha wave slur. Patient refused a neb en route. She is on 2L of oxygen at baseline.   Patient's son reports Minerva's right arm/hand has been swollen for the past month. She has a history of COPD and stage 4 CKD but the swelling comes and goes. History of tobacco use, has not smoked in 20 years. Son does note Minerva has been more delirium. Patient is DNR/DNI.  Plans for hospice/palliative visit on aug 30th.      Independent Historian   EMS and son as detailed above.     Review of External Notes   I reviewed Kane Tinsley's 7/17/24 Cardiology Office Visit Note regarding chronic diastolic heart failure.     Past Medical History     Medical History and Problem List   Ulcerative pancolitis   Chronic bronchitis   Anemia   Cardiac pacemaker in situ  Ileostomy status   Hyperlipidemia  Recurrent major depressive disorder  Ulcerative colitis  Psoriasis  Hypertension   Chronic heart failure with preserved ejection fraction   Osteoarthritis   Recurrent UTI  Kyphoscoliosis  Parathyroid gland disorder   Sick sinus syndrome   Vitamin D deficiency  ZENA  Obesity   Chronic right-sided low back pain   Sciatica   Acute respiratory failure with hypoxia   Stage 4 CKD   COPD exacerbation   Carotid artery stenosis     Medications   Amlodipine  Atorvastatin   Budesonide   Carvedilol  Citalopram   Clonidine   Plavix   Vibramycin  Ferrous sulfate "   Duo neb  Montelukast   Torsemide   Anoro ellipta     Surgical History   Cataract extraction, bilateral  Colonoscopy  Right femur ORIF  Ileostomy  Implant pacemaker  Multiple tooth extractions   Bilateral total knee arthroplasty   Spinal fusion  Tonsillectomy  Tubal ligation     Physical Exam     Patient Vitals for the past 24 hrs:   BP Temp Temp src Pulse Resp SpO2 Weight   08/19/24 1130 -- -- -- -- -- 91 % --   08/19/24 1100 -- -- -- -- -- 95 % --   08/19/24 1057 -- 98.7  F (37.1  C) Temporal -- -- 93 % --   08/19/24 1055 (!) 154/65 -- -- 62 18 90 % 67.4 kg (148 lb 9.4 oz)     Physical Exam    Constitutional: Patient is chronically ill appearing with mild resp distress  Head: Atraumatic.  Eyes: Conjunctivae and EOM are normal. No scleral icterus.  Neck: Normal range of motion. Neck supple.   Cardiovascular: Normal rate, regular rhythm, normal heart sounds and intact distal perfusion.   Pulmonary/Chest: Brocnhovesicular Mild respiratory distress with expirtory wheezing.   Abdominal: Soft. Bowel sounds are normal. No distension. No tenderness. No rebound or guarding.   Musculoskeletal: Normal range of motion. Chronic bilateral lower extremity edema. Right upper extremity edematous without redness, fluctuance, or joint pain.   Neurological: Alert and orientated to person, place, and time. No observable focal neuro deficit  Skin: Warm and dry. No rash noted. Not diaphoretic.       Diagnostics     Lab Results   Labs Ordered and Resulted from Time of ED Arrival to Time of ED Departure   BASIC METABOLIC PANEL - Abnormal       Result Value    Sodium 141      Potassium 5.3      Chloride 107      Carbon Dioxide (CO2) 17 (*)     Anion Gap 17 (*)     Urea Nitrogen 92.4 (*)     Creatinine 3.65 (*)     GFR Estimate 12 (*)     Calcium 8.8      Glucose 127 (*)    TROPONIN T, HIGH SENSITIVITY - Abnormal    Troponin T, High Sensitivity 109 (*)    NT PROBNP INPATIENT - Abnormal    N terminal Pro BNP Inpatient 28,426 (*)    ROUTINE  UA WITH MICROSCOPIC REFLEX TO CULTURE - Abnormal    Color Urine Yellow      Appearance Urine Cloudy (*)     Glucose Urine Negative      Bilirubin Urine Negative      Ketones Urine Negative      Specific Gravity Urine 1.012      Blood Urine Negative      pH Urine 7.5 (*)     Protein Albumin Urine 200 (*)     Urobilinogen Urine Normal      Nitrite Urine Negative      Leukocyte Esterase Urine Large (*)     Bacteria Urine Few (*)     WBC Clumps Urine Present (*)     Mucus Urine Present (*)     RBC Urine 3 (*)     WBC Urine >182 (*)     Squamous Epithelials Urine <1     CBC WITH PLATELETS AND DIFFERENTIAL - Abnormal    WBC Count 6.2      RBC Count 3.02 (*)     Hemoglobin 9.9 (*)     Hematocrit 31.9 (*)      (*)     MCH 32.8      MCHC 31.0 (*)     RDW 13.3      Platelet Count 156      % Neutrophils 81      % Lymphocytes 10      % Monocytes 7      % Eosinophils 2      % Basophils 1      % Immature Granulocytes 0      NRBCs per 100 WBC 0      Absolute Neutrophils 5.0      Absolute Lymphocytes 0.6 (*)     Absolute Monocytes 0.4      Absolute Eosinophils 0.1      Absolute Basophils 0.0      Absolute Immature Granulocytes 0.0      Absolute NRBCs 0.0     INFLUENZA A/B, RSV, & SARS-COV2 PCR - Normal    Influenza A PCR Negative      Influenza B PCR Negative      RSV PCR Negative      SARS CoV2 PCR Negative     URINE CULTURE       Imaging   XR Chest 2 Views   Final Result   IMPRESSION: Small bilateral effusions with associated bibasilar   compressive atelectasis and/or infiltrate, appears more prominent than   previous. A cardiac implantable electronic device is in place with   lead(s) grossly intact. No abandoned leads/wires or other unexpected   metallic foreign bodies demonstrated on the film. The cardiac   silhouette is stable. Pulmonary vasculature is unremarkable.       OSMIN ADAMS MD            SYSTEM ID:  [id number]      US Upper Extremity Venous Duplex Right   Preliminary Result   IMPRESSION:    1. No evidence of  deep venous thrombosis in the right upper extremity.   2. No deep vein thrombosis in the right upper extremity.   3. Subcutaneous edema at the site of patient's swelling in the   anterior forearm.          EKG   ECG taken at 1116, ECG read at 1117  Atrial-paced rhythm   Left axis deviation  Left ventricular hypertrophy with QRS widening and repolarization abnormality (R in aVL, [place] product)  Abnormal ECG   No significant change as compared to prior, dated 5/11/24.  Rate 62 bpm. HI interval 198 ms. QRS duration 118 ms. QT/QTc 418/424 ms. P-R-T axes -27 -42 127.    Independent Interpretation   XR Chest diffuse vascular congestion bibasilar atelectasis vs effusions vs infiltrates.  Back hardware intact        ED Course      Medications Administered   Medications   cefTRIAXone (ROCEPHIN) 2 g vial to attach to  ml bag for ADULTS or NS 50 ml bag for PEDS (has no administration in time range)   azithromycin (ZITHROMAX) 500 mg in  mL intermittent infusion (has no administration in time range)   ipratropium - albuterol 0.5 mg/2.5 mg/3 mL (DUONEB) neb solution 3 mL (3 mLs Nebulization $Given 8/19/24 1107)   dexAMETHasone PF (DECADRON) injection 10 mg (10 mg Intravenous $Given 8/19/24 1107)   furosemide (LASIX) injection 40 mg (40 mg Intravenous $Given 8/19/24 1329)       Procedures   Procedures     Discussion of Management   Admitting Hospitalist, Dr. Pulido    ED Course   ED Course as of 08/19/24 1430   Mon Aug 19, 2024   1053 I obtained history and examined the patient as noted above.   1152 Troponin T, High Sensitivity(!!): 109   1346 I spoke with Dr. Pulido, hospitalist, regarding the patient's history and presentation in the emergency department today.     1405 I rechecked the patient and updated family on treatment plan.       Additional Documentation  COPD, chronic bronchitis  Assisted Living     Medical Decision Making / Diagnosis       FÁTIMA Rocha is a 88 year old female with multiple  medical problems with worsening dyspnea possibly multifactorial COPD and CHF.  Has better O2 sats on 4L vs 2L NC at home.  Also has CKD CHF CAD and has been planning possible hospice.  Elev trop with essentially no new EKG changes and no want for interventions.  Quite high BNP and lower extremity edema .DNR DNI and wants exam workup and eval options.  More O2 lasix IV.  Admit for further discussions and mgmt.  CXR diffuse pulm vascular congestion and effusion.  Covering for the UTI she has and cross covering pneumonia.  Long discussions with family about treatment goals focused more on simple interventions and comfortableness than aggressive measures plans for hospice and palliative.      Disposition   The patient was admitted to the hospital.     Diagnosis     ICD-10-CM    1. Dyspnea, unspecified type  R06.00       2. Dependence on supplemental oxygen  Z99.81       3. COPD with acute exacerbation (H)  J44.1       4. Elevated troponin  R79.89       5. DNR (do not resuscitate)  Z66       6. DNI (do not intubate)  Z78.9       7. Elevated brain natriuretic peptide (BNP) level  R79.89       8. Acute on chronic congestive heart failure, unspecified heart failure type (H)  I50.9       9. Swelling of right upper extremity  M79.89       10. Acute renal failure superimposed on chronic kidney disease, unspecified acute renal failure type, unspecified CKD stage  (H24)  N17.9     N18.9       11. Acute UTI  N39.0            Discharge Medications   New Prescriptions    No medications on file         Scribe Disclosure:  I, Ines Chidarrian, am serving as a scribe at 11:00 AM on 8/19/2024 to document services personally performed by Contreras Briceno MD based on my observations and the provider's statements to me.        Contreras Briceno MD  08/19/24 3426

## 2024-08-19 NOTE — PLAN OF CARE
"Goal Outcome Evaluation:      Pertinent assessments: Patient alert, disoriented to time and situation. VSS, on 5L via oxymask. Not out of bed yet. LS rhonchi and wheezing. BS active, chronic ostomy in place with minimal stool. Purewick in place. Denies pain and nausea. PIV SL. Abd folds and groin red and moist, miconazole powder applied. On tele, 100% a-fib. BLE/BUE pitting edema +2-3.     Major Shift Events Admitted to unit. Pt agitated, ativan given once.   Treatment Plan: IV abx, palliative consult, o2 needs, tele.  Bedside Nurse: Ana Cristina Matias RN         Problem: Adult Inpatient Plan of Care  Goal: Plan of Care Review  Description: The Plan of Care Review/Shift note should be completed every shift.  The Outcome Evaluation is a brief statement about your assessment that the patient is improving, declining, or no change.  This information will be displayed automatically on your shift  note.  Outcome: Progressing  Flowsheets (Taken 8/19/2024 1833)  Outcome Evaluation: Pt on 5L via oxymask, IV abx. SOB.  Plan of Care Reviewed With: patient  Overall Patient Progress: no change  Goal: Patient-Specific Goal (Individualized)  Description: You can add care plan individualizations to a care plan. Examples of Individualization might be:  \"Parent requests to be called daily at 9am for status\", \"I have a hard time hearing out of my right ear\", or \"Do not touch me to wake me up as it startles  me\".  Outcome: Progressing  Goal: Absence of Hospital-Acquired Illness or Injury  Outcome: Progressing  Intervention: Identify and Manage Fall Risk  Recent Flowsheet Documentation  Taken 8/19/2024 1534 by Ana Cristina Matias RN  Safety Promotion/Fall Prevention:   activity supervised   nonskid shoes/slippers when out of bed   room near nurse's station   safety round/check completed  Intervention: Prevent Skin Injury  Recent Flowsheet Documentation  Taken 8/19/2024 1534 by Ana Cristina Matias RN  Body Position: supine, legs " elevated  Device Skin Pressure Protection: absorbent pad utilized/changed  Intervention: Prevent and Manage VTE (Venous Thromboembolism) Risk  Recent Flowsheet Documentation  Taken 8/19/2024 1534 by Ana Cristina Matias RN  VTE Prevention/Management: SCDs off (sequential compression devices)  Intervention: Prevent Infection  Recent Flowsheet Documentation  Taken 8/19/2024 1534 by Ana Cristina Matias RN  Infection Prevention: single patient room provided  Goal: Optimal Comfort and Wellbeing  Outcome: Progressing  Goal: Readiness for Transition of Care  Outcome: Progressing  Intervention: Mutually Develop Transition Plan  Recent Flowsheet Documentation  Taken 8/19/2024 1542 by Ana Cristina Matias RN  Equipment Currently Used at Home:   walker, standard   wheelchair, manual     Problem: UTI (Urinary Tract Infection)  Goal: Improved Infection Symptoms  Outcome: Progressing     Problem: Gas Exchange Impaired  Goal: Optimal Gas Exchange  Outcome: Progressing  Intervention: Optimize Oxygenation and Ventilation  Recent Flowsheet Documentation  Taken 8/19/2024 1534 by Ana Cristina Matias RN  Head of Bed (HOB) Positioning: HOB at 20-30 degrees     Problem: Heart Failure  Goal: Optimal Coping  Outcome: Progressing  Goal: Optimal Cardiac Output  Outcome: Progressing  Goal: Stable Heart Rate and Rhythm  Outcome: Progressing  Goal: Optimal Functional Ability  Outcome: Progressing  Intervention: Optimize Functional Ability  Recent Flowsheet Documentation  Taken 8/19/2024 1534 by Ana Cristina Matias RN  Activity Management: activity adjusted per tolerance  Goal: Fluid and Electrolyte Balance  Outcome: Progressing  Goal: Improved Oral Intake  Outcome: Progressing  Goal: Effective Oxygenation and Ventilation  Outcome: Progressing  Intervention: Promote Airway Secretion Clearance  Recent Flowsheet Documentation  Taken 8/19/2024 1534 by Ana Cristina Matias RN  Cough And Deep Breathing: done with encouragement  Activity Management: activity  adjusted per tolerance  Intervention: Optimize Oxygenation and Ventilation  Recent Flowsheet Documentation  Taken 8/19/2024 1534 by Ana Cristina Matias RN  Head of Bed (HOB) Positioning: HOB at 20-30 degrees  Goal: Effective Breathing Pattern During Sleep  Outcome: Progressing  Intervention: Monitor and Manage Obstructive Sleep Apnea  Recent Flowsheet Documentation  Taken 8/19/2024 1534 by Ana Cristina Matias RN  Medication Review/Management: medications reviewed     Problem: Comorbidity Management  Goal: Maintenance of COPD Symptom Control  Outcome: Progressing  Intervention: Maintain COPD Symptom Control  Recent Flowsheet Documentation  Taken 8/19/2024 1534 by Ana Cristina Matias RN  Medication Review/Management: medications reviewed       Plan of Care Reviewed With: patient    Overall Patient Progress: no changeOverall Patient Progress: no change    Outcome Evaluation: Pt on 5L via oxymask, IV abx. SOB.

## 2024-08-19 NOTE — PHARMACY-ADMISSION MEDICATION HISTORY
Pharmacist Admission Medication History    Admission medication history is complete. The information provided in this note is only as accurate as the sources available at the time of the update.    Information Source(s): Family member via  printed list  Pertinent Information: resides @ [facility] but manages own meds (including neb treatments) which are set up by family    Changes made to PTA medication list:  Added: None  Deleted: sodium bicarbonate, duplicate ferrous sulfate  Changed: None    Allergies reviewed with patient and updates made in EHR: no  Medication History Completed By: Teddy Sutherland RP 8/19/2024 2:46 PM    PTA Med List   Medication Sig Last Dose    acetaminophen (TYLENOL) 650 MG CR tablet Take 1,300 mg by mouth every morning Past Week    amLODIPine (NORVASC) 10 MG tablet Take 0.5 tablets (5 mg) by mouth 2 times daily Past Week    atorvastatin (LIPITOR) 40 MG tablet Take 1 tablet (40 mg) by mouth daily Past Week    budesonide (PULMICORT) 0.5 MG/2ML neb solution TAKE 2 MLS (0.5 MG) BY NEBULIZATION 2 TIMES DAILY Past Week    carvedilol (COREG) 25 MG tablet Take 1.5 tablets (37.5 mg) by mouth 2 times daily (with meals) Past Week    citalopram (CELEXA) 20 MG tablet TAKE 1.5 TAB BY MOUTH EVERY DAY Past Week    cloNIDine (CATAPRES) 0.2 MG tablet TAKE 1 TABLET BY MOUTH TWICE A DAY Past Week    clopidogrel (PLAVIX) 75 MG tablet Take 1 tablet (75 mg) by mouth daily Past Week    Cranberry 450 MG TABS Take 1 tablet by mouth daily Past Week    diphenhydrAMINE-acetaminophen (TYLENOL PM)  MG tablet Take 2 tablets by mouth at bedtime Past Week    doxycycline hyclate (VIBRAMYCIN) 100 MG capsule Take 1 capsule (100 mg) by mouth three times a week Past Week    ferrous sulfate (FEROSUL) 325 (65 Fe) MG tablet Take 2 tablets (650 mg) by mouth daily (with breakfast) Past Week    ipratropium - albuterol 0.5 mg/2.5 mg/3 mL (DUONEB) 0.5-2.5 (3) MG/3ML neb solution TAKE 1 VIAL (3 MLS) BY NEBULIZATION EVERY 6 HOURS  Past Week    montelukast (SINGULAIR) 10 MG tablet TAKE 1 TABLET BY MOUTH AT BEDTIME Past Week    multivitamin w/minerals (THERA-VIT-M) tablet Take 1 tablet by mouth daily Past Week    nystatin (MYCOSTATIN) 155867 UNIT/GM external powder Apply topically 3 times daily as needed for other (rash) More than a month    torsemide (DEMADEX) 20 MG tablet Take 1 tablet (20 mg) by mouth daily With an additional 20 mg as needed for weight gain > 3# overnight or worsening symptoms (leg swelling/breathing). Past Week    umeclidinium-vilanterol (ANORO ELLIPTA) 62.5-25 MCG/ACT oral inhaler Inhale 1 puff into the lungs daily Past Week

## 2024-08-19 NOTE — ED NOTES
[facility]  ED Nurse Handoff Report    ED Chief complaint: Shortness of Breath and Altered Mental Status  . ED Diagnosis:   Final diagnoses:   None       Allergies:   Allergies   Allergen Reactions    Acetaminophen-Codeine Hallucination    Penicillin G Rash       Code Status: DNR    Activity level - Baseline/Home:  independent.  Activity Level - Current:   assist of 1.   Lift room needed: No.   Bariatric: No   Needed: No   Isolation: No.   Infection: Not Applicable.     Respiratory status: Room air    Vital Signs (within 30 minutes):   Vitals:    08/19/24 1055 08/19/24 1057 08/19/24 1100 08/19/24 1130   BP: (!) 154/65      Pulse: 62      Resp: 18      Temp:  98.7  F (37.1  C)     TempSrc:  Temporal     SpO2: 90% 93% 95% 91%   Weight: 67.4 kg (148 lb 9.4 oz)          Cardiac Rhythm:  ,      Pain level:    Patient confused: Yes.   Patient Falls Risk: nonskid shoes/slippers when out of bed, arm band in place, and patient and family education.   Elimination Status: Has voided     Patient Report - Initial Complaint: Sob.   Focused Assessment: arrives via EMS with SOB and hallucinations for the past 3 days. Hx of CHF and reoccurring UTIs. On 2 L NC at baseline but is on 4 L NC on arrival.     Abnormal Results:   Labs Ordered and Resulted from Time of ED Arrival to Time of ED Departure   BASIC METABOLIC PANEL - Abnormal       Result Value    Sodium 141      Potassium 5.3      Chloride 107      Carbon Dioxide (CO2) 17 (*)     Anion Gap 17 (*)     Urea Nitrogen 92.4 (*)     Creatinine 3.65 (*)     GFR Estimate 12 (*)     Calcium 8.8      Glucose 127 (*)    TROPONIN T, HIGH SENSITIVITY - Abnormal    Troponin T, High Sensitivity 109 (*)    CBC WITH PLATELETS AND DIFFERENTIAL - Abnormal    WBC Count 6.2      RBC Count 3.02 (*)     Hemoglobin 9.9 (*)     Hematocrit 31.9 (*)      (*)     MCH 32.8      MCHC 31.0 (*)     RDW 13.3      Platelet Count 156      % Neutrophils 81      %  Lymphocytes 10      % Monocytes 7      % Eosinophils 2      % Basophils 1      % Immature Granulocytes 0      NRBCs per 100 WBC 0      Absolute Neutrophils 5.0      Absolute Lymphocytes 0.6 (*)     Absolute Monocytes 0.4      Absolute Eosinophils 0.1      Absolute Basophils 0.0      Absolute Immature Granulocytes 0.0      Absolute NRBCs 0.0     INFLUENZA A/B, RSV, & SARS-COV2 PCR - Normal    Influenza A PCR Negative      Influenza B PCR Negative      RSV PCR Negative      SARS CoV2 PCR Negative     NT PROBNP INPATIENT   ROUTINE UA WITH MICROSCOPIC REFLEX TO CULTURE        XR Chest 2 Views    (Results Pending)   US Upper Extremity Venous Duplex Right    (Results Pending)       Treatments provided: See MAR  Family Comments: family at bedside  OBS brochure/video discussed/provided to patient:  Yes  ED Medications:   Medications   ipratropium - albuterol 0.5 mg/2.5 mg/3 mL (DUONEB) neb solution 3 mL (3 mLs Nebulization $Given 8/19/24 1107)   dexAMETHasone PF (DECADRON) injection 10 mg (10 mg Intravenous $Given 8/19/24 1107)       Drips infusing:  No  For the majority of the shift this patient was Green.   Interventions performed were N/A.    Sepsis treatment initiated: No    Cares/treatment/interventions/medications to be completed following ED care: N/A    ED Nurse Name: Izzy Winkler RN  12:25 PM  RECEIVING UNIT ED HANDOFF REVIEW    Above ED Nurse Handoff Report was reviewed: Yes  Reviewed by: Ana Cristina Matias RN on August 19, 2024 at 2:52 PM   ANJUM Quintero called the ED to inform them the note was read: No

## 2024-08-19 NOTE — TELEPHONE ENCOUNTER
Contacted [place] to review further. No answer. Left detailed message stating that if it was just a few incidents of random elevated BP, we aren't concerned. Advised that if she has consistently elevated readings up to 3-5 days in a row above parameters, to let us know. Instructed call back with any further questions.

## 2024-08-19 NOTE — ED TRIAGE NOTES
arrives via EMS with SOB and hallucinations for the past 3 days. Hx of CHF and reoccurring UTIs. On 2 L NC at baseline but is on 4 L NC on arrival.

## 2024-08-19 NOTE — H&P
[facility]    Hospitalist Admission Note    Name: Arpita Rocha    MRN: [id number]  YOB: 1935    Age: 88 year old  Date of admission: 8/19/2024  Primary care provider: Ludivina Chamberlain  Admitting physician : Christiano Pulido M.D. ,M.B.A.       Brief summary of admission assessment/MDM:    Arpita Rocha is a 88 year old  female  with past medical history is significant  for  on Plavix with history of CHF, COPD, chronic bronchitis, hypertension, hyperlipidemia, sick sinus syndrome, and stage 4 CKD who presents to the ED via EMS from Assisted Living with her son for evaluation of shortness of breath and altered mental status.    Problem List with Assessment and Plan:     #1.  Acute on chronic hypoxic with a failure-multifactorial  -- Presents with shortness of breath, hypoxia and encephalopathy.  Patient oxygen saturation was in 80s when she was first seen by paramedics.  She is on baseline 2 L of oxygen.  Oxygen saturation improved with 4 L of oxygen and patient was taken to the ER for evaluation  -- On presentation to the ED, patient was afebrile.  Heart rate 62.  Blood pressure 154/65.  93% on 4 L of oxygen.  -- Patient had worsening of kidney function and significantly elevated BNP.  -- Chest x-ray showed Small bilateral effusions with associated bibasilar  compressive atelectasis and/or infiltrate, appears more prominent than  previous  --Etiology suspected to be multifactorial including COPD exacerbation, UTI, CHF and kidney failure.  -- Will continue supplemental oxygen, patient was given 14 g IV Lasix in the ED, will continue IV Lasix at 40 mg IV every 8 hours, monitor intake and output, kidney function, monitor on telemetry.  Nebulizer treatment, steroid therapy and bronchodilator treatment.  Will treat with ceftriaxone IV, monitor cultures    #2.  Acute encephalopathy  -- Suspect due to toxic metabolic encephalopathy in the setting of  respiratory failure, acute kidney injury and hypoxia.  -- Continue to monitor, acute during treatment protocol.  Will get head CT for completion of workup    #3.  Acute kidney injury on chronic kidney disease stage IV  -- Initial serum creatinine was 3.65, BUN 92.4.  Bicarb of 17, anion gap 17.  Potassium normal.  Baseline kidney function was chronic kidney stage IV with recent baseline of 2.6-2.9 range.  -- Continue diuretic therapy, monitor kidney function.  Inform nephrology team if kidney function continues to get worse.    #4.  Acute CHF-diastolic  -- Recent echocardiogram in May 2024 showed EF of 50 to 55%.  Left ventricular stock function was low normal.  Patient was in atrial fibrillation  -- Patient is on torsemide at baseline-20 mg daily with additional 20 mg as needed for weight gain over 3 pounds overnight or worsening of breathing and swelling.  --Initial BNP significantly elevated 28,426.  Recent BNP was 17,270 in July 2024.  -- Concern for cardiorenal syndrome.  Will continue IV Lasix, hold torsemide.  Monitor kidney function, intake and output, daily weight.    #5. Elevated troponin   -- Likely demand ischemia due to CHF.  Trend troponin    #6.  Goals of care  -- Patient lives in assisted living facility and her health has been declining over the last several weeks.  She has multiorgan dysfunction. Care plan  discussed with patient's son and her daughter-in-law who agreed with limited intervention and focus on palliative care for now.  No escalation of care but continue limited restorative care.  Will consult palliative care service to assist with further exploration of goals of care.  Patient is at high risk of deterioration.      Chronic  comorbid medical conditions:    CHF, CKD 4, anxiety, HTN, sick sinus syndrome, pacemaker, chronic bronchitis, ulcerative colitis, HLD, kyphoscoliosis, osteoarthritis, depression       Care Plan:    # Admission Status: Inpatient     # Diet:Diet advanced as  tolerated     # Activity:Advance activity as tolerated    # Education/Counseling :Discussed treatment plan with the patient    # Consults:Inpatient consult with palliative care team    # VTE prophylactic measures:prophylaxis against venous thromboembolism with PCD     # Code Status:DNR / DNI    # Disposition:anticipate discharge to long-term acute care facility and Anticipate discharge in 2-4 days      Disclaimer: This note consists of symbols derived from keyboarding, dictation and/or voice recognition software. As a result, there may be errors in the script that have gone undetected. Please consider this when interpreting information found in this chart           Chief Complaint:     Altered mental status, shortness of breath  History is obtained from the patient, electronic health record, emergency department physician, and patient's family          History of Present Illness:      This patient is a 88 year old  female with a significant past medical history of complex medical problems listed above who presents with the following condition requiring a hospital admission:    Acute encephalopathy, acute on chronic respiratory failure    Arpita is a 88-year-old female who lives at an assisted living facility who has been feeling very weak and declining over the last 1 month.  Patient was confused and has been hallucinating.  History was obtained from family.  Both son and daughter-in-law were at bedside and provided history.  She has been doing very poorly with activity of daily living.  She was able to ambulate in an assisted living and pretty independent after about a month ago.  Since a months ago, she has been very weak and not able to function.  She began to have increased shortness of breath over the last few days including shortness of breath and hallucinations.  Patient has history of recurrent UTI.  She has chronic oxygen requirement at 2 L nasal cannula.  On presentation to emergency department, patient is  confused respiratory distress.  Evaluation revealed evidence of multiorgan dysfunction including respiratory failure, acute kidney injury on chronic kidney disease, UTI.  Patient was given Lasix and admission was requested.  I spoke with family at bedside and discussed the need to explore further goals of care.  Family would like to continue limited resuscitative care and palliative care consultation tomorrow.           Past Medical History:     Past Medical History:   Diagnosis Date    Ulcerative pancolitis (H) 2023   CHF, CKD 4, anxiety, HTN, sick sinus syndrome, pacemaker, chronic bronchitis, ulcerative colitis, HLD, kyphoscoliosis, osteoarthritis, depression          Past Surgical History:     Past Surgical History:   Procedure Laterality Date    CATARACT EXTRACTION Bilateral     COLONOSCOPY      FEMUR FRACTURE SURGERY Right     2010    ILEOSTOMY      IMPLANT PACEMAKER      left knee replacement      MULTIPLE TOOTH EXTRACTIONS      right knee replacement Right     SPINAL FUSION      TONSILLECTOMY      TUBAL LIGATION               Social History:     Social History     Tobacco Use    Smoking status: Former     Current packs/day: 0.00     Average packs/day: 1 pack/day for 44.0 years (44.0 ttl pk-yrs)     Types: Cigarettes     Start date: 1952     Quit date: 1996     Years since quittin.6     Passive exposure: Past    Smokeless tobacco: Never   Substance Use Topics    Alcohol use: Not Currently             Family History:     Family History   Problem Relation Age of Onset    Cerebrovascular Disease Mother     Diabetes Mother     Hypertension Mother     Cancer Mother     Hypertension Father     Cerebrovascular Disease Sister     Hypertension Sister     Hypertension Sister     Breast Cancer Sister     Dementia Sister     Hypertension Brother     Cancer Brother     Hypertension Brother     Cancer Brother     Emphysema Brother           Allergies:     Allergies   Allergen Reactions     Acetaminophen-Codeine Hallucination    Penicillin G Rash             Medications:        Prior to Admission medications    Medication Sig Last Dose Taking? Auth Provider Long Term End Date   acetaminophen (TYLENOL) 650 MG CR tablet Take 1,300 mg by mouth every morning   Reported, Patient     amLODIPine (NORVASC) 10 MG tablet Take 0.5 tablets (5 mg) by mouth 2 times daily   Kane Gil PA-C Yes    atorvastatin (LIPITOR) 40 MG tablet Take 1 tablet (40 mg) by mouth daily   Kane Gil PA-C Yes    budesonide (PULMICORT) 0.5 MG/2ML neb solution TAKE 2 MLS (0.5 MG) BY NEBULIZATION 2 TIMES DAILY   Coming Ludivina Canales MD Yes    carvedilol (COREG) 25 MG tablet Take 1.5 tablets (37.5 mg) by mouth 2 times daily (with meals)   Cata Mclain MD Yes    citalopram (CELEXA) 20 MG tablet TAKE 1.5 TAB BY MOUTH EVERY DAY   Coming Ludivina Canales MD Yes    cloNIDine (CATAPRES) 0.2 MG tablet TAKE 1 TABLET BY MOUTH TWICE A DAY   Coming Ludivina Canales MD Yes    clopidogrel (PLAVIX) 75 MG tablet Take 1 tablet (75 mg) by mouth daily   Kane Gil PA-C Yes    Cranberry 450 MG TABS Take 1 tablet by mouth daily   Reported, Patient     diphenhydrAMINE-acetaminophen (TYLENOL PM)  MG tablet Take 2 tablets by mouth at bedtime   Reported, Patient     doxycycline hyclate (VIBRAMYCIN) 100 MG capsule Take 1 capsule (100 mg) by mouth three times a week   Coming Ludivina Canales MD     ferrous sulfate (FE TABS) 325 (65 Fe) MG EC tablet TAKE 1 TABLET BY MOUTH TWICE A DAY WITH MEALS - OTC NOT COVERED   Coming Ludivina Canales MD     ferrous sulfate (FEROSUL) 325 (65 Fe) MG tablet Take 2 tablets (650 mg) by mouth daily (with breakfast)   Cata Mclain MD     ipratropium - albuterol 0.5 mg/2.5 mg/3 mL (DUONEB) 0.5-2.5 (3) MG/3ML neb solution TAKE 1 VIAL (3 MLS) BY NEBULIZATION EVERY 6 HOURS   Coming Ludivina Canales MD Yes    montelukast (SINGULAIR) 10 MG tablet TAKE 1 TABLET BY MOUTH AT BEDTIME   Coming Hay,  Ludivina Horn MD Yes    multivitamin w/minerals (THERA-VIT-M) tablet Take 1 tablet by mouth daily   Reported, Patient     nystatin (MYCOSTATIN) 217435 UNIT/GM external powder Apply topically 3 times daily as needed for other (rash)   Coming Ludivina Canales MD     Ostomy Supplies (ADAPT) PSTE Use with new ostomy pouch and drain PRN   Coming Ludivina Canales MD Yes    Ostomy Supplies (PREMIER DRAINABLE POUCH 22MM) Pouch MISC Use as directed every 3-4 days   Coming Ludivina Canales MD Yes    sodium bicarbonate 650 MG tablet Take 1 tablet (650 mg) by mouth 2 times daily   Cata Mclain MD     torsemide (DEMADEX) 20 MG tablet Take 1 tablet (20 mg) by mouth daily With an additional 20 mg as needed for weight gain > 3# overnight or worsening symptoms (leg swelling/breathing).   Kane Gil PA-C Yes    umeclidinium-vilanterol (ANORO ELLIPTA) 62.5-25 MCG/ACT oral inhaler Inhale 1 puff into the lungs daily   Sadi Lamar MD            Review of Systems:     Unable to obtain due to patient's encephalopathy           Physical Exam:     Vital signs were reviewed    Temp:  [98.7  F (37.1  C)] 98.7  F (37.1  C)  Pulse:  [62] 62  Resp:  [18] 18  BP: (154)/(65) 154/65  SpO2:  [90 %-95 %] 91 %        GEN: Alert but confused.  In moderate respiratory distress  NECK:Supple ,no mass or thyromegaly     HEENT:  Normocephalic/atraumatic, no scleral icterus, no nasal discharge, mouth moist.    CV:  Regular rate and rhythm, no murmur or JVD.  S1 + S2 noted, no S3 or S4.    LUNGS: Moderately increased work of breathing.  Diffuse rhonchi and wheezing.  ABD:  Active bowel sounds, soft, non-tender/non-distended.  No rebound/guarding/rigidity.    EXT: 2-3+ pedal and pretibial edema     LGS: No cervical or axillary lymphadenopathy     SKIN:  Dry to touch, warm ,no exanthems noted in the visualized areas.    Neurologic:Grossly intact,non focal . No acute focal neurologic deficit     Psychaitric exam: Confused  Additional  significant  Findings:             Data:       All laboratory and imaging data in the past 24 hours reviewed     Results for orders placed or performed during the hospital encounter of 08/19/24   XR Chest 2 Views     Status: None    Narrative    CHEST TWO VIEWS  8/19/2024 2:05 PM     HISTORY: Dyspnea.    COMPARISON: May 10, 2024       Impression    IMPRESSION: Small bilateral effusions with associated bibasilar  compressive atelectasis and/or infiltrate, appears more prominent than  previous. A cardiac implantable electronic device is in place with  lead(s) grossly intact. No abandoned leads/wires or other unexpected  metallic foreign bodies demonstrated on the film. The cardiac  silhouette is stable. Pulmonary vasculature is unremarkable.     OSMIN ADAMS MD         SYSTEM ID:  [id number]   US Upper Extremity Venous Duplex Right     Status: None (Preliminary result)    Narrative    ULTRASOUND RIGHT UPPER EXTREMITY VENOUS DUPLEX  8/19/2024 12:53 PM    CLINICAL HISTORY/INDICATION:  Swelling.    COMPARISON:  None relevant.    TECHNIQUE: Grayscale, color-flow, and spectral waveform analysis were  performed of the deep veins of the right upper extremity    FINDINGS: The right jugular vein demonstrates normal compressibility,  color-flow and spectral waveform.    The right subclavian vein, axillary vein, cephalic vein, brachial vein  and basilic vein demonstrate normal compressibility, spectral  waveform, color flow and augmentation. Imaging obtained at the site of  patient's forearm swelling shows subcutaneous edema. Otherwise no  focal abnormality.    The left subclavian and internal jugular veins were evaluated for  comparison and are normal.      Impression    IMPRESSION:   1. No evidence of deep venous thrombosis in the right upper extremity.  2. No deep vein thrombosis in the right upper extremity.  3. Subcutaneous edema at the site of patient's swelling in the  anterior forearm.   Basic metabolic panel     Status:  Abnormal   Result Value Ref Range    Sodium 141 135 - 145 mmol/L    Potassium 5.3 3.4 - 5.3 mmol/L    Chloride 107 98 - 107 mmol/L    Carbon Dioxide (CO2) 17 (L) 22 - 29 mmol/L    Anion Gap 17 (H) 7 - 15 mmol/L    Urea Nitrogen 92.4 (H) 8.0 - 23.0 mg/dL    Creatinine 3.65 (H) 0.51 - 0.95 mg/dL    GFR Estimate 12 (L) >60 mL/min/1.73m2    Calcium 8.8 8.8 - 10.4 mg/dL    Glucose 127 (H) 70 - 99 mg/dL   Troponin T, High Sensitivity     Status: Abnormal   Result Value Ref Range    Troponin T, High Sensitivity 109 (HH) <=14 ng/L   Nt probnp inpatient (BNP)     Status: Abnormal   Result Value Ref Range    N terminal Pro BNP Inpatient 28,426 (H) 0 - 1,800 pg/mL   UA with Microscopic reflex to Culture     Status: Abnormal    Specimen: Urine, Clean Catch   Result Value Ref Range    Color Urine Yellow Colorless, Straw, Light Yellow, Yellow    Appearance Urine Cloudy (A) Clear    Glucose Urine Negative Negative mg/dL    Bilirubin Urine Negative Negative    Ketones Urine Negative Negative mg/dL    Specific Gravity Urine 1.012 1.003 - 1.035    Blood Urine Negative Negative    pH Urine 7.5 (H) 5.0 - 7.0    Protein Albumin Urine 200 (A) Negative mg/dL    Urobilinogen Urine Normal Normal, 2.0 mg/dL    Nitrite Urine Negative Negative    Leukocyte Esterase Urine Large (A) Negative    Bacteria Urine Few (A) None Seen /HPF    WBC Clumps Urine Present (A) None Seen /HPF    Mucus Urine Present (A) None Seen /LPF    RBC Urine 3 (H) <=2 /HPF    WBC Urine >182 (H) <=5 /HPF    Squamous Epithelials Urine <1 <=1 /HPF    Narrative    Urine Culture ordered based on laboratory criteria   Symptomatic Influenza A/B, RSV, & SARS-CoV2 PCR (COVID-19) Nasopharyngeal     Status: Normal    Specimen: Nasopharyngeal; Swab   Result Value Ref Range    Influenza A PCR Negative Negative    Influenza B PCR Negative Negative    RSV PCR Negative Negative    SARS CoV2 PCR Negative Negative    Narrative    Testing was performed using the Xpert Xpress CoV2/Flu/RSV  Assay on the [employer] GeneXpert Instrument. This test should be ordered for the detection of SARS-CoV-2, influenza, and RSV viruses in individuals who meet clinical and/or epidemiological criteria. Test performance is unknown in asymptomatic patients. This test is for in vitro diagnostic use under the FDA EUA for laboratories certified under CLIA to perform high or moderate complexity testing. This test has not been FDA cleared or approved. A negative result does not rule out the presence of PCR inhibitors in the specimen or target RNA in concentration below the limit of detection for the assay. If only one viral target is positive but coinfection with multiple targets is suspected, the sample should be re-tested with another FDA cleared, approved, or authorized test, if coinfection would change clinical management. This test was validated by the [facility]. These laboratories are certified under the Clinical Laboratory Improvement Amendments of 1988 (CLIA-88) as qualified to perform high complexity laboratory testing.   CBC with platelets and differential     Status: Abnormal   Result Value Ref Range    WBC Count 6.2 4.0 - 11.0 10e3/uL    RBC Count 3.02 (L) 3.80 - 5.20 10e6/uL    Hemoglobin 9.9 (L) 11.7 - 15.7 g/dL    Hematocrit 31.9 (L) 35.0 - 47.0 %     (H) 78 - 100 fL    MCH 32.8 26.5 - 33.0 pg    MCHC 31.0 (L) 31.5 - 36.5 g/dL    RDW 13.3 10.0 - 15.0 %    Platelet Count 156 150 - 450 10e3/uL    % Neutrophils 81 %    % Lymphocytes 10 %    % Monocytes 7 %    % Eosinophils 2 %    % Basophils 1 %    % Immature Granulocytes 0 %    NRBCs per 100 WBC 0 <1 /100    Absolute Neutrophils 5.0 1.6 - 8.3 10e3/uL    Absolute Lymphocytes 0.6 (L) 0.8 - 5.3 10e3/uL    Absolute Monocytes 0.4 0.0 - 1.3 10e3/uL    Absolute Eosinophils 0.1 0.0 - 0.7 10e3/uL    Absolute Basophils 0.0 0.0 - 0.2 10e3/uL    Absolute Immature Granulocytes 0.0 <=0.4 10e3/uL    Absolute NRBCs 0.0 10e3/uL   EKG 12-lead, tracing  only     Status: None   Result Value Ref Range    Systolic Blood Pressure  mmHg    Diastolic Blood Pressure  mmHg    Ventricular Rate 62 BPM    Atrial Rate 62 BPM    MI Interval 198 ms    QRS Duration 118 ms     ms    QTc 424 ms    P Axis -27 degrees    R AXIS -42 degrees    T Axis 127 degrees    Interpretation ECG       Atrial-paced rhythm  Left axis deviation  Left ventricular hypertrophy with QRS widening and repolarization abnormality ( R in aVL , Aneesh product )  Abnormal ECG  When compared with ECG of 11-May-2024 00:21,  No significant change was found  Unconfirmed report - interpretation of this ECG is computer generated - see medical record for final interpretation  Confirmed by - EMERGENCY ROOM, PHYSICIAN ([id number]),  Ankit Johnson ([id number]) on 8/19/2024 11:20:00 AM     CBC with platelets differential     Status: Abnormal    Narrative    The following orders were created for panel order CBC with platelets differential.  Procedure                               Abnormality         Status                     ---------                               -----------         ------                     CBC with platelets and d...[[id number]]  Abnormal            Final result                 Please view results for these tests on the individual orders.        Recent Results (from the past 48 hour(s))   US Upper Extremity Venous Duplex Right    Narrative    ULTRASOUND RIGHT UPPER EXTREMITY VENOUS DUPLEX  8/19/2024 12:53 PM    CLINICAL HISTORY/INDICATION:  Swelling.    COMPARISON:  None relevant.    TECHNIQUE: Grayscale, color-flow, and spectral waveform analysis were  performed of the deep veins of the right upper extremity    FINDINGS: The right jugular vein demonstrates normal compressibility,  color-flow and spectral waveform.    The right subclavian vein, axillary vein, cephalic vein, brachial vein  and basilic vein demonstrate normal compressibility, spectral  waveform, color flow and augmentation. Imaging  obtained at the site of  patient's forearm swelling shows subcutaneous edema. Otherwise no  focal abnormality.    The left subclavian and internal jugular veins were evaluated for  comparison and are normal.      Impression    IMPRESSION:   1. No evidence of deep venous thrombosis in the right upper extremity.  2. No deep vein thrombosis in the right upper extremity.  3. Subcutaneous edema at the site of patient's swelling in the  anterior forearm.       EKG results: Paced rhythm         All imaging studies reviewed by me.     Patient`s old medical records reviewed and case discussed with the ED physician.    ED course-Reviewed  and care plan discussed with Dr. Contreras Briceno

## 2024-08-19 NOTE — TELEPHONE ENCOUNTER
Trenton left a message requesting specific BP parameters for systolic readings  in case she runs in the 160's and then Home Care does not have to call so frequently?  FYI at this time of this call patient is in [facility] will forward to [facility] with update and review.

## 2024-08-19 NOTE — TELEPHONE ENCOUNTER
[facility] Call Center    Phone Message    May a detailed message be left on voicemail: yes     Reason for Call: Symptoms or Concerns     If patient has red-flag symptoms, warm transfer to triage line    Current symptom or concern: Abnorla BP readings over the last month. 3 incidents where BP was 160-170 / 60-70. HomeHealth care current parameters are . Trenton was instructed to reach out by patient's PCP for further instructions    Symptoms have been present for:  1 month(s)    Has patient previously been seen for this? Yes    By : Kane Gil    Date: 7/2024    Are there any new or worsening symptoms? No    Action Taken: Message routed to:  Other: Cardio    Travel Screening: Not Applicable     Date of Service:                                            Thank you!  [facility]

## 2024-08-20 NOTE — PROVIDER NOTIFICATION
DATE/TIME OF CALL RECEIVED FROM LAB:  8/19/24 at 225  LAB TEST:  Potassium and Troponin  LAB VALUE:  Potassium 6.2 and Troponin 108  PROVIDER NOTIFIED?: Yes  PROVIDER NAME: Dr. Lopez  DATE/TIME LAB VALUE REPORTED TO PROVIDER:   08/ 19/2024 [id number]  MECHANISM OF PROVIDER NOTIFICATION: Page  PROVIDER RESPONSE: Treatment for hyperkalemia ordered.

## 2024-08-20 NOTE — PROVIDER NOTIFICATION
DATE/TIME OF CALL RECEIVED FROM LAB:  08/20/24 at 3:32 AM   LAB TEST: Potassium  LAB VALUE: 5.6  PROVIDER NOTIFIED?: Yes  PROVIDER NAME: Dr. Francisco  DATE/TIME LAB VALUE REPORTED TO PROVIDER:   MECHANISM OF PROVIDER NOTIFICATION: Page  PROVIDER RESPONSE: Aware and No order

## 2024-08-20 NOTE — PLAN OF CARE
"To Do:  End of Shift Summary  For vital signs and complete assessments, please see documentation flowsheets.     Pertinent assessments: Pt alert, disoriented time and place.  Arouse to voice. . O2 5L oxymask, labored breathing and SOBOE- nebs given. Dilaudid given for L knee pain. Ativan given for agitation due to breathing. Rash to perineum and abdo folds, miconzole powder applied. Voided 100 ml, bladder scanned for 238 ml and 348 ml. Denies bladder discomfort and no urge to urinate. Ileostomy with soft stool and flatus. Tele- Afib.   Major Shift Events Breathing labored. S/B MD stat CXR and labs ordered. K* 6.2, tx for hyperkalemia given. CXR- pleural effusion and pulmonary edema, stat dose Lasix 60 mg given. 0230- K+ rechecked 5.6, Dr. Francisco notified, no order, BG checked per protocol,  Treatment Plan: Monitor labs, Symptom management, supplemental O2, nebs, maxipime and Pall consulted  Bedside Nurse: Kizzy Martino RN     Problem: Adult Inpatient Plan of Care  Goal: Plan of Care Review  Description: The Plan of Care Review/Shift note should be completed every shift.  The Outcome Evaluation is a brief statement about your assessment that the patient is improving, declining, or no change.  This information will be displayed automatically on your shift  note.  Outcome: Not Progressing  Flowsheets (Taken 8/20/2024 0652)  Outcome Evaluation: Requiring O2. 100 ml voided after lasix.  Plan of Care Reviewed With: patient  Overall Patient Progress: no change  Goal: Patient-Specific Goal (Individualized)  Description: You can add care plan individualizations to a care plan. Examples of Individualization might be:  \"Parent requests to be called daily at 9am for status\", \"I have a hard time hearing out of my right ear\", or \"Do not touch me to wake me up as it startles  me\".  Outcome: Not Progressing  Goal: Absence of Hospital-Acquired Illness or Injury  Outcome: Not Progressing  Intervention: Identify and Manage Fall " Risk  Recent Flowsheet Documentation  Taken 8/19/2024 2144 by Kizzy Martino RN  Safety Promotion/Fall Prevention:   activity supervised   clutter free environment maintained   increase visualization of patient   nonskid shoes/slippers when out of bed   room near nurse's station   safety round/check completed  Intervention: Prevent Skin Injury  Recent Flowsheet Documentation  Taken 8/20/2024 0600 by Kizzy Martino RN  Body Position:   weight shifting   turned   side-lying  Taken 8/20/2024 0000 by Kizzy Martino RN  Body Position:   weight shifting   supine, legs elevated  Taken 8/19/2024 2123 by Kizzy Martino RN  Body Position:   weight shifting   supine, legs elevated  Intervention: Prevent and Manage VTE (Venous Thromboembolism) Risk  Recent Flowsheet Documentation  Taken 8/19/2024 2144 by Kizzy Martino RN  VTE Prevention/Management: patient refused intervention  Goal: Optimal Comfort and Wellbeing  Outcome: Not Progressing  Goal: Readiness for Transition of Care  Outcome: Not Progressing     Problem: UTI (Urinary Tract Infection)  Goal: Improved Infection Symptoms  Outcome: Not Progressing     Problem: Gas Exchange Impaired  Goal: Optimal Gas Exchange  Outcome: Not Progressing  Intervention: Optimize Oxygenation and Ventilation  Recent Flowsheet Documentation  Taken 8/20/2024 0600 by Kizzy Martino RN  Head of Bed (HOB) Positioning: HOB at 30-45 degrees  Taken 8/20/2024 0000 by Kizzy Martino RN  Head of Bed (HOB) Positioning: HOB at 30-45 degrees  Taken 8/19/2024 2123 by Kizzy Martino RN  Head of Bed (HOB) Positioning: HOB at 30-45 degrees     Problem: Heart Failure  Goal: Optimal Coping  Outcome: Not Progressing  Goal: Optimal Cardiac Output  Outcome: Not Progressing  Goal: Stable Heart Rate and Rhythm  Outcome: Not Progressing  Goal: Optimal Functional Ability  Outcome: Not Progressing  Intervention: Optimize Functional Ability  Recent Flowsheet Documentation  Taken 8/20/2024 0600 by Kizzy Martino  RN  Activity Management: activity adjusted per tolerance  Taken 8/20/2024 0000 by Kizzy Martino RN  Activity Management: activity adjusted per tolerance  Taken 8/19/2024 2123 by Kizzy Martino RN  Activity Management: activity adjusted per tolerance  Goal: Fluid and Electrolyte Balance  Outcome: Not Progressing  Goal: Improved Oral Intake  Outcome: Not Progressing  Goal: Effective Oxygenation and Ventilation  Outcome: Not Progressing  Intervention: Promote Airway Secretion Clearance  Recent Flowsheet Documentation  Taken 8/20/2024 0600 by Kizzy Martino RN  Activity Management: activity adjusted per tolerance  Taken 8/20/2024 0000 by Kizzy Martino RN  Activity Management: activity adjusted per tolerance  Taken 8/19/2024 2144 by Kizzy Martino RN  Cough And Deep Breathing: done with encouragement  Taken 8/19/2024 2123 by Kizzy Martino RN  Activity Management: activity adjusted per tolerance  Intervention: Optimize Oxygenation and Ventilation  Recent Flowsheet Documentation  Taken 8/20/2024 0600 by Kizzy Martino RN  Head of Bed (HOB) Positioning: HOB at 30-45 degrees  Taken 8/20/2024 0000 by Kizzy Martino RN  Head of Bed (HOB) Positioning: HOB at 30-45 degrees  Taken 8/19/2024 2123 by Kizzy Martino RN  Head of Bed (HOB) Positioning: HOB at 30-45 degrees  Goal: Effective Breathing Pattern During Sleep  Outcome: Not Progressing  Intervention: Monitor and Manage Obstructive Sleep Apnea  Recent Flowsheet Documentation  Taken 8/19/2024 2144 by Kizzy Martino RN  Medication Review/Management: medications reviewed     Problem: Comorbidity Management  Goal: Maintenance of COPD Symptom Control  Outcome: Not Progressing  Intervention: Maintain COPD Symptom Control  Recent Flowsheet Documentation  Taken 8/19/2024 2144 by Kizzy Martino RN  Medication Review/Management: medications reviewed     Problem: Skin Injury Risk Increased  Goal: Skin Health and Integrity  Outcome: Not Progressing  Intervention: Plan:  Nurse Driven Intervention: Moisture Management  Recent Flowsheet Documentation  Taken 8/20/2024 0000 by Kizzy Martino RN  Bathing/Skin Care: incontinence care  Taken 8/19/2024 2144 by Kizzy Martino RN  Moisture Interventions: Urinary collection device  Intervention: Plan: Nurse Driven Intervention: Friction and Shear  Recent Flowsheet Documentation  Taken 8/19/2024 2144 by Kizzy Martino RN  Friction/Shear Interventions: HOB 30 degrees or less  Intervention: Optimize Skin Protection  Recent Flowsheet Documentation  Taken 8/20/2024 0600 by Kizzy Martino, RN  Activity Management: activity adjusted per tolerance  Head of Bed (HOB) Positioning: HOB at 30-45 degrees  Taken 8/20/2024 0000 by Kizzy Martino RN  Activity Management: activity adjusted per tolerance  Head of Bed (HOB) Positioning: HOB at 30-45 degrees  Taken 8/19/2024 2123 by Kizzy Martino RN  Activity Management: activity adjusted per tolerance  Head of Bed (HOB) Positioning: HOB at 30-45 degrees     Problem: Pain Acute  Goal: Optimal Pain Control and Function  Outcome: Not Progressing  Intervention: Prevent or Manage Pain  Recent Flowsheet Documentation  Taken 8/19/2024 2144 by Kizzy Martino RN  Medication Review/Management: medications reviewed     Problem: Oral Intake Inadequate  Goal: Improved Oral Intake  Outcome: Not Progressing   Goal Outcome Evaluation:      Plan of Care Reviewed With: patient    Overall Patient Progress: no changeOverall Patient Progress: no change    Outcome Evaluation: Requiring O2. 100 ml voided after lasix.

## 2024-08-20 NOTE — PROGRESS NOTES
[facility]  Hospitalist Progress Note  Christiano Pulido M.D., M.B.A.   08/20/2024    Reason for Stay/active problem list    Comfort measures only  Multiorgan dysfunction  Acute hypoxic with a failure  Acute encephalopathy  Acute kidney injury  Acute diastolic congestive heart failure  UTI         Assessment and Plan:        Summary of Stay: Arpita Rocha is a 88 year old  female  with past medical history is significant  for  on Plavix with history of CHF, COPD, chronic bronchitis, hypertension, hyperlipidemia, sick sinus syndrome, and stage 4 CKD who presents to the ED via EMS from Assisted Living with her son for evaluation of shortness of breath and altered mental status.     Problem List with Assessment and Plan:      #1.  Acute on chronic hypoxic with a failure-multifactorial  -- Presents with shortness of breath, hypoxia and encephalopathy.  Patient oxygen saturation was in 80s when she was first seen by paramedics.  She is on baseline 2 L of oxygen.  Oxygen saturation improved with 4 L of oxygen and patient was taken to the ER for evaluation  -- On presentation to the ED, patient was afebrile.  Heart rate 62.  Blood pressure 154/65.  93% on 4 L of oxygen.  -- Patient had worsening of kidney function and significantly elevated BNP.  -- Chest x-ray showed Small bilateral effusions with associated bibasilar  compressive atelectasis and/or infiltrate, appears more prominent than  previous  --Etiology suspected to be multifactorial including COPD exacerbation, UTI, CHF and kidney failure.  -- Patient was admitted and was given multiple interventions.  Despite treatments of respiratory measures, patient condition continues to deteriorate.  Palliative care team consulted and she was made comfortable with comfort measures only call.  Plan to continue comfort measures only    #2.  Acute encephalopathy  -- Suspect due to toxic metabolic encephalopathy in the setting of respiratory  failure, acute kidney injury and hypoxia.    #3.  Acute kidney injury on chronic kidney disease stage IV  -- Initial serum creatinine was 3.65, BUN 92.4.  Bicarb of 17, anion gap 17.  Potassium normal.  Baseline kidney function was chronic kidney stage IV with recent baseline of 2.6-2.9 range.  #4.  Acute CHF-diastolic  -- Recent echocardiogram in May 2024 showed EF of 50 to 55%.  Left ventricular stock function was low normal.  Patient was in atrial fibrillation  -- Patient is on torsemide at baseline-20 mg daily with additional 20 mg as needed for weight gain over 3 pounds overnight or worsening of breathing and swelling.  --Initial BNP significantly elevated 28,426.  Recent BNP was 17,270 in July 2024.  -- Concern for cardiorenal syndrome.         #5. Elevated troponin   -- Likely demand ischemia due to CHF.  Trend troponin     #6.  Goals of care  -- Comfort focuse        VTE Prophylaxis: Comfort measures only  Code Status: No CPR- Do NOT Intubate  Diet: Regular Diet Adult (end of life care)    Henderson Catheter: Not present          Plan for today:  Comfort measures only      Family updated today:  Yes  ,update discussed with multiple family members at bedside    Anticipated Disposition : Home versus facility on hospice       Medically Ready for Discharge: Anticipated Tomorrow             Interval History (Subjective):        Patient is seen and examined by me today and medical record reviewed.Overnight events noted and care discussed with nursing staff.  Patient's condition remained critical.  Palliative care team recommendation noted.  Spoke with family members and the plan is to continue comfort measures only.  She appears to be comfortable.                    Physical Exam:        Last Vital Signs:  BP (!) 152/51 (BP Location: Left arm)   Pulse 59   Temp 99.6  F (37.6  C) (Axillary)   Resp 18   Wt 71 kg (156 lb 8.4 oz)   SpO2 92%   BMI 33.87 kg/m      Wt Readings from Last 1 Encounters:   08/20/24 71 kg  "(156 lb 8.4 oz)       Wt Readings from Last 5 Encounters:   08/20/24 71 kg (156 lb 8.4 oz)   07/17/24 69.7 kg (153 lb 9.6 oz)   05/16/24 73.9 kg (162 lb 14.7 oz)   04/16/24 72.1 kg (159 lb)   03/29/24 71.7 kg (158 lb)        Constitutional: Lethargic   Appears comfortable         Medications:        All current medications were reviewed with changes reflected in problem list.         Data:      All new lab and imaging data was reviewed.      Data reviewed today: I reviewed all new labs and imaging results over the last 24 hours. I personally reviewed       Recent Labs   Lab 08/20/24  0654 08/19/24 2210 08/19/24  1107   WBC 9.9 4.6 6.2   HGB 9.1* 8.8* 9.9*   HCT 30.2* 29.0* 31.9*   * 107* 106*    143* 156     No results for input(s): \"CULT\" in the last 168 hours.  Recent Labs   Lab 08/20/24  0654 08/20/24  0608 08/20/24  0509 08/20/24  0251 08/20/24  0234 08/19/24  2349 08/19/24 2210 08/19/24  1107     --   --   --   --   --  140 141   POTASSIUM 5.9*  --   --   --  5.6*  --  6.2* 5.3   CHLORIDE 108*  --   --   --   --   --  108* 107   CO2 18*  --   --   --   --   --  18* 17*   ANIONGAP 15  --   --   --   --   --  14 17*   * 133* 146*   < >  --    < > 145* 127*   BUN 95.8*  --   --   --   --   --  94.8* 92.4*   CR 3.98*  --   --   --   --   --  3.80* 3.65*   GFRESTIMATED 10*  --   --   --   --   --  11* 12*   CANELO 9.2  --   --   --   --   --  8.5* 8.8    < > = values in this interval not displayed.       Recent Labs   Lab 08/20/24  0654 08/20/24  0608 08/20/24  0509 08/20/24  0347 08/20/24  0251   * 133* 146* 181* 179*       No results for input(s): \"INR\" in the last 168 hours.      No results for input(s): \"TROPONIN\", \"TROPI\", \"TROPR\" in the last 168 hours.    Invalid input(s): \"TROP\", \"TROPONINIES\"    Recent Results (from the past 48 hour(s))   US Upper Extremity Venous Duplex Right    Narrative    ULTRASOUND RIGHT UPPER EXTREMITY VENOUS DUPLEX  8/19/2024 12:53 PM    CLINICAL " HISTORY/INDICATION:  Swelling.    COMPARISON:  None relevant.    TECHNIQUE: Grayscale, color-flow, and spectral waveform analysis were  performed of the deep veins of the right upper extremity    FINDINGS: The right jugular vein demonstrates normal compressibility,  color-flow and spectral waveform.    The right subclavian vein, axillary vein, cephalic vein, brachial vein  and basilic vein demonstrate normal compressibility, spectral  waveform, color flow and augmentation. Imaging obtained at the site of  patient's forearm swelling shows subcutaneous edema. Otherwise no  focal abnormality.    The left subclavian and internal jugular veins were evaluated for  comparison and are normal.      Impression    IMPRESSION:   1. No evidence of deep venous thrombosis in the right upper extremity.  2. No deep vein thrombosis in the right upper extremity.  3. Subcutaneous edema at the site of patient's swelling in the  anterior forearm.    AUNDREA TORRES DO         SYSTEM ID:  [id number]   XR Chest 2 Views    Narrative    CHEST TWO VIEWS  8/19/2024 2:05 PM     HISTORY: Dyspnea.    COMPARISON: May 10, 2024       Impression    IMPRESSION: Small bilateral effusions with associated bibasilar  compressive atelectasis and/or infiltrate, appears more prominent than  previous. A cardiac implantable electronic device is in place with  lead(s) grossly intact. No abandoned leads/wires or other unexpected  metallic foreign bodies demonstrated on the film. The cardiac  silhouette is stable. Pulmonary vasculature is unremarkable.     OSMIN ADAMS MD         SYSTEM ID:  [id number]   XR Chest Port 1 View    Narrative    EXAM: CHEST 2 VIEWS  LOCATION: [facility]  DATE: 8/19/2024    INDICATION: Worsening respiratory failure.  COMPARISON: 8/19/2024 at 1357 hours.      Impression    IMPRESSION:   1. No convincing interval change since the recent comparison study.  2. Increased interstitial opacities in both lungs that  likely relate to interstitial pulmonary edema again noted.  3. Small bilateral pleural effusions again noted.  4. A few patchy opacities in the mid and lower lungs bilaterally are nonspecific, with possibilities including pulmonary edema and pneumonia.        COVID Status:  COVID-19 PCR Results          12/8/2023    08:13 5/10/2024    20:58 8/19/2024    11:09   COVID-19 PCR Results   SARS CoV2 PCR Negative  Negative  Negative      COVID-19 Antibody Results, Testing for Immunity           No data to display                 Disclaimer: This note consists of symbols derived from keyboarding, dictation and/or voice recognition software. As a result, there may be errors in the script that have gone undetected. Please consider this when interpreting information found in this chart.

## 2024-08-20 NOTE — TELEPHONE ENCOUNTER
Xuan,   I agree completely with your plan/recs.   Thanks,   Kane      Faxed parameters for systolic BP rancge  to [facility] attention RODRÍGUEZ Chowdhury RN on 8/20/2024 at 12:30 PM

## 2024-08-20 NOTE — CONSULTS
"SPIRITUAL HEALTH SERVICES Progress Note  MS 5    Responded to patient's room regarding on call page for support nearing end of life.    Sat with patient, eldest son Daquan, his wife and younger brother Matt.    Listened to stories of life review; including patient's revered status in her [place] hometown.  She bought the house she lived in since age 15; and remained there until a year and a half ago.  She is positive, fiercely independent and helps everyone.    Her [employer] neil is very important to her.  People from her [place] continue to pray for her, as does the community [place]; [place] currently resides.    Family said they are at peace with patient's wish to go on comfort care.  She recently declared, \"I'm done.\"      in .  Numerous friends and family have also passed away.  Daughter-in-law states that departed friends and relatives have been appearing to patient at bedside.  She acknowledges them with a smile, and nods her head.  \"They are coming to take her Home...\" daughter-in-law said.    Second of eldest twins, Bright; is on vacation in [place].  Daquan held the phone to his mother's ear so Bright could say goodbye.  Youngest son, Dean was here earlier this morning.    Matt was taking time with mom alone.  I gave them privacy.      Daquan and his wife are attending to other tasks.    I notified Matt and unit staff that [facility] will return any time needed for prayer and additional support.    Rev. Breanne Baltazar, M.Shadi.  Staff   Phone  [phone]    "

## 2024-08-20 NOTE — PLAN OF CARE
Occupational Therapy: Orders received. Chart reviewed and discussed with care team.? Occupational Therapy not indicated due to pt now on comfort cares.? Defer discharge recommendations to medical team.? Will complete orders.

## 2024-08-20 NOTE — PLAN OF CARE
"To Do:  End of Shift Summary  For vital signs and complete assessments, please see documentation flowsheets.     Pertinent assessments: Pt placed on comfort cares. Alert at times. IV dilaudid and ativan given for pain. Muscle jerking and facial grimacing noted for pain. On 2L oxy mask. Repo q 2 and oral cares q2. Henderson placed, cloudy urine noted. Ostomy in place with minimal output. PIV SL. Edema noted in rt. Arm and bilateral legs. Abdominal folds red/sore, powder applied. Family at bedside.     Major Shift Events: Pt placed on comfort cares.     Treatment Plan: Comfort cares. Pain management.    Bedside Nurse: Kristen Quinones RN   Problem: Adult Inpatient Plan of Care  Goal: Plan of Care Review  Description: The Plan of Care Review/Shift note should be completed every shift.  The Outcome Evaluation is a brief statement about your assessment that the patient is improving, declining, or no change.  This information will be displayed automatically on your shift  note.  Outcome: Progressing  Flowsheets (Taken 8/20/2024 1554)  Outcome Evaluation: Pt placed on comfort cares.  Plan of Care Reviewed With: patient  Overall Patient Progress: improving  Goal: Patient-Specific Goal (Individualized)  Description: You can add care plan individualizations to a care plan. Examples of Individualization might be:  \"Parent requests to be called daily at 9am for status\", \"I have a hard time hearing out of my right ear\", or \"Do not touch me to wake me up as it startles  me\".  Outcome: Progressing  Goal: Absence of Hospital-Acquired Illness or Injury  Outcome: Progressing  Intervention: Identify and Manage Fall Risk  Recent Flowsheet Documentation  Taken 8/20/2024 1100 by Kristen Quinones, RN  Safety Promotion/Fall Prevention:   activity supervised   clutter free environment maintained   increase visualization of patient   nonskid shoes/slippers when out of bed   room near nurse's station   safety round/check completed  Intervention: " Prevent Skin Injury  Recent Flowsheet Documentation  Taken 8/20/2024 1430 by Kristen Quinones RN  Body Position:   weight shifting   upper extremity elevated   lower extremity elevated   neutral body alignment   neutral head position   heels elevated   legs elevated  Taken 8/20/2024 1230 by Kristen Quinones RN  Body Position:   legs elevated   weight shifting   upper extremity elevated  Intervention: Prevent Infection  Recent Flowsheet Documentation  Taken 8/20/2024 1100 by Kristen Quinones RN  Infection Prevention: single patient room provided  Goal: Optimal Comfort and Wellbeing  Outcome: Progressing  Goal: Readiness for Transition of Care  Outcome: Progressing     Problem: UTI (Urinary Tract Infection)  Goal: Improved Infection Symptoms  Outcome: Progressing     Problem: Gas Exchange Impaired  Goal: Optimal Gas Exchange  Outcome: Progressing  Intervention: Optimize Oxygenation and Ventilation  Recent Flowsheet Documentation  Taken 8/20/2024 1430 by Kristen Quinones RN  Head of Bed (HOB) Positioning: HOB at 45 degrees  Taken 8/20/2024 1230 by Kristen Quinones RN  Head of Bed (HOB) Positioning: HOB at 45 degrees     Problem: Heart Failure  Goal: Optimal Coping  Outcome: Progressing  Goal: Optimal Cardiac Output  Outcome: Progressing  Goal: Stable Heart Rate and Rhythm  Outcome: Progressing  Goal: Optimal Functional Ability  Outcome: Progressing  Intervention: Optimize Functional Ability  Recent Flowsheet Documentation  Taken 8/20/2024 1230 by Kristen Quinones RN  Activity Management: activity adjusted per tolerance  Goal: Fluid and Electrolyte Balance  Outcome: Progressing  Goal: Improved Oral Intake  Outcome: Progressing  Goal: Effective Oxygenation and Ventilation  Outcome: Progressing  Intervention: Promote Airway Secretion Clearance  Recent Flowsheet Documentation  Taken 8/20/2024 1230 by Kristen Quinones RN  Activity Management: activity adjusted per tolerance  Intervention: Optimize Oxygenation and  Ventilation  Recent Flowsheet Documentation  Taken 8/20/2024 1430 by Kristen Quinones RN  Head of Bed (HOB) Positioning: HOB at 45 degrees  Taken 8/20/2024 1230 by Kristen Quinones RN  Head of Bed (HOB) Positioning: HOB at 45 degrees  Goal: Effective Breathing Pattern During Sleep  Outcome: Progressing  Intervention: Monitor and Manage Obstructive Sleep Apnea  Recent Flowsheet Documentation  Taken 8/20/2024 1100 by Kristen Quinones RN  Medication Review/Management: medications reviewed     Problem: Comorbidity Management  Goal: Maintenance of COPD Symptom Control  Outcome: Progressing  Intervention: Maintain COPD Symptom Control  Recent Flowsheet Documentation  Taken 8/20/2024 1100 by Kristen Quinones RN  Medication Review/Management: medications reviewed     Problem: Skin Injury Risk Increased  Goal: Skin Health and Integrity  Outcome: Progressing  Intervention: Plan: Nurse Driven Intervention: Moisture Management  Recent Flowsheet Documentation  Taken 8/20/2024 1230 by Kristen Quinones RN  Bathing/Skin Care: incontinence care  Taken 8/20/2024 1100 by Kristen Quinones RN  Moisture Interventions: Urinary collection device  Plan: Moisture Management:   no brief in bed   incontinence pad  Intervention: Plan: Nurse Driven Intervention: Friction and Shear  Recent Flowsheet Documentation  Taken 8/20/2024 1100 by Kristen Quinones RN  Friction/Shear Interventions: HOB 30 degrees or less  Intervention: Optimize Skin Protection  Recent Flowsheet Documentation  Taken 8/20/2024 1430 by Kristen Quinones RN  Head of Bed (HOB) Positioning: HOB at 45 degrees  Taken 8/20/2024 1230 by Kristen Quinones RN  Activity Management: activity adjusted per tolerance  Head of Bed (HOB) Positioning: HOB at 45 degrees     Problem: Pain Acute  Goal: Optimal Pain Control and Function  Outcome: Progressing  Intervention: Prevent or Manage Pain  Recent Flowsheet Documentation  Taken 8/20/2024 1100 by Kristen Quinones RN  Medication  Review/Management: medications reviewed     Problem: Oral Intake Inadequate  Goal: Improved Oral Intake  Outcome: Progressing   Goal Outcome Evaluation:      Plan of Care Reviewed With: patient    Overall Patient Progress: improvingOverall Patient Progress: improving    Outcome Evaluation: Pt placed on comfort cares.

## 2024-08-20 NOTE — CONSULTS
"  Palliative Care Consultation Note  [facility]      Patient: Arpita Rocha  Date of Admission:  8/19/2024    Requesting Clinician / Team: Hospitalist Christiano Pulido MD   Reason for consult: Exploring goals of care with family.        Recommendations & Counseling     GOALS OF CARE:   Comfort focused - her twin sons Daquan and Bright request a comfort plan and acknowledged \"Minerva\" may pass in the hospital.   This was the first time Minerva requested to come to the hospital, which she had been resistant to in the past.  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     ADVANCE CARE PLANNING:  Patient has an advance directive dated 10/17/2021.  Primary Health Care Agent son, Daquan Rocha.  Alternate(s) son, Bright Rocha.   There is a POLST form on file, but will need to be updated prior to discharge.  Code status: No CPR- Do NOT Intubate    MEDICAL MANAGEMENT:   Comfort Care   Below are common symptoms routinely seen in patients with comfort focused goals and at the end of life. This patient may not currently exhibit all of these symptoms; however, if these were to occur our recommendations are as follows:     #Pain  Do not recommend the use of Morphine due to CKD (creatine Clearance 11.4 mL/min)    Patient is too lethargic to safely swallow  1st choice: Roxicodone SL 5-10 q 1 hour as needed   2nd choice:hydromorphone IV 0.5 mg q 15 minutes as needed   SL 5-10 q 1 hour as needed     #Air hunger/Dyspnea   Do not recommend the use of Morphine due to CKD (creatine Clearance 11.4 mL/min)   1st choice: Roxicodone SL 5-10 q 1 hour as needed   2nd choice:hydromorphone IV 0.3-0.5 mg q 15 minutes as needed     #Anxiety    1st choice: Lorazepam PO/SL q 1 mg  q 3 hour as needed.   2nd choice: Lorazepam IV q 1 mg  q 3 hour as needed    #Secretion burden   Robinul 0.3 mg (PO/IV) q 4 hours as needed.   Consider " atropine if Robinul ineffective     #Nausea   Zofran 4 mg q 6 hours as needed. Can increase to 8 mg   Zyprexa 5 mg q 6 hours as needed     #Agitation  Aggressive symptoms control first, then  1st choice: Zyprexa 5 mg ODT or IM   2nd choice: Increase dose of Zyprexa to 10 mg or consider switch to Haldol   3rd choice: Lorazepam as above     PSYCHOSOCIAL/SPIRITUAL SUPPORT:  Family 4 sons and their spouses are supportive   [place] community: Cheryl España  MAGGIE Lew for meeting with Minerva, her son Daquan and daughter-in-law Michelle (who works in the  cardiology clinic)    Palliative Care will continue to follow. Thank you for the consult and allowing us to aid in the care of Arpita Rocha.    These recommendations have been discussed with Hospitalist Christiano Pulido MD, bedside nurse RODRÍGUEZ Peterson and  JER Alejandre.    KOKO Law CNS  [facility], Palliative Care  Securely message with the [employer] Web Console (learn more here) or  Text page via [employer] Paging/Directory         Assessment      Arpita Rocha is a 88 year old female admitted 8/19/2024 from assisted living with shortness of breath and altered mental status.  Her  past medical history of CHF, COPD on Oxygen at 2 liters at baseline (former tobaccoism), chronic bronchitis, hypertension, hyperlipidemia, sick sinus syndrome s/p pacemaker, stage 4 CKD and anxiety/depression.     Today, the patient was seen for:  Goals of care.    History of Present Illness   Met with the patient's son/healthcare agent Daquan Rocha and daughter-in-law Michelle at bedside. I introduced our role as an extra layer of support and how we help patients and families dealing with serious, potentially life-limiting illnesses. I explained the composition of the palliative care team.  Palliative care helps patients and families navigate their care while focusing on the whole person; providing emotional, social and spiritual support  Palliative care often  "assists with symptom management, information sharing about what to expect from the illness, available treatment options and what effect those options may have on the disease course, and provide effective communication and caring support. Daquan and Michelle explained that last evening Minerva was talking with relatives (who  years ago) and she was planning a Halloween party with her sister (who has also passed). They acknowledged Minerva's rapid deterioration. 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 She moved to [place] and lived with [place] for a year, before her care needs were so great, she moved to assisted living. Her  was 19 years older than her and  in  at the age of 89. Daquan phoned his twin Bright to include him on our discussion regarding the choice to continue restorative efforts versus a comfort focused plan of care. Michelle explained \"She told me she's done.\"  Daquan and Bright acknowledged Minerva's advanced directive and her request to be pain free at end of life.     Prognosis, Goals, & Planning:   Functional Status just prior to this current hospitalization:  Outpatient Palliative Performance Score (PPS) 10%  Extensive disease. Bedbound & requires total care. No oral intake. Drowsy or comatose or confused.     Prognosis, Goals, and/or Advance Care Planning:  We discussed general treatment options (full/restorative, selective/conservatives, and comfort only/hospice). We then discussed how these specifically apply to Minerva's request \"I'm done\".  Based on this discussion, her twin sons Yasemin has decided to make Minerva Comfort-focused plan of care    Code Status was addressed today:   Yes, We discussed potential risks and rationale of attempting cardiac resuscitation, intubation, and mechanical ventilation.  We also discussed probability of survival as well as quality of life implications.  Based on this discussion, patient or surrogate response/decision: No " CPR- Do NOT Intubate      Patient's decision making preferences: unable to assess        Patient has decision-making capacity today for complex decisions: Unreliable          Coping, Meaning, & Spirituality:   Mood, coping, and/or meaning in the context of serious illness were addressed today: Yes    Social:   Living situation:resides in assisted living      Medications:  Reviewed this patient's medication profile and medications from this hospitalization. Notable medications:Oral medications discontinued and comfort-care medications ordered.   [place] Board of Pharmacy Data Base Reviewed: Yes:   reviewed - no record of controlled substances prescribed.    ROS:  Comprehensive ROS is reviewed and is negative except as here & per HPI:     Physical Exam   Vital Signs with Ranges  Temp:  [98  F (36.7  C)-99.6  F (37.6  C)] 99.6  F (37.6  C)  Pulse:  [59-66] 59  Resp:  [18-24] 18  BP: (128-154)/() 152/51  SpO2:  [88 %-95 %] 92 %  Wt Readings from Last 10 Encounters:   08/20/24 71 kg (156 lb 8.4 oz)   07/17/24 69.7 kg (153 lb 9.6 oz)   05/16/24 73.9 kg (162 lb 14.7 oz)   04/16/24 72.1 kg (159 lb)   03/29/24 71.7 kg (158 lb)   02/15/24 73.1 kg (161 lb 3.2 oz)   01/26/24 72.3 kg (159 lb 6.4 oz)   12/19/23 74.3 kg (163 lb 12.8 oz)   12/13/23 74 kg (163 lb 3.2 oz)   12/05/23 77 kg (169 lb 12.8 oz)     156 lbs 8.43 oz    PHYSICAL EXAM:  GEN:  Lethargic, intermittently moaning without meaningful verbal response. Appears comfortable, no apparent distress.  HEENT:  Normocephalic/atraumatic, no nasal discharge, mouth moist.  CV:  62 regular RRR, S1, S2; no murmurs or other irregularities noted.  +2 DP/PT pulses bilatererally; bilateral edema to knees.  RESP:  Bilateral rhonchi/wheezing and using accessory muscles to breath.  Symmetric chest rise on inhalation noted.    ABD:  Rounded, soft, non-tender/non-distended.  +BS   M/S:   No change in moaning with palpation of extremities.    SKIN:  Pale, warm and dry to touch,  no  mottling.      Data reviewed:  Results for orders placed or performed during the hospital encounter of 08/19/24   XR Chest 2 Views     Status: None    Narrative    CHEST TWO VIEWS  8/19/2024 2:05 PM     HISTORY: Dyspnea.    COMPARISON: May 10, 2024       Impression    IMPRESSION: Small bilateral effusions with associated bibasilar  compressive atelectasis and/or infiltrate, appears more prominent than  previous. A cardiac implantable electronic device is in place with  lead(s) grossly intact. No abandoned leads/wires or other unexpected  metallic foreign bodies demonstrated on the film. The cardiac  silhouette is stable. Pulmonary vasculature is unremarkable.     OSMIN ADAMS MD         SYSTEM ID:  [id number]   US Upper Extremity Venous Duplex Right     Status: None    Narrative    ULTRASOUND RIGHT UPPER EXTREMITY VENOUS DUPLEX  8/19/2024 12:53 PM    CLINICAL HISTORY/INDICATION:  Swelling.    COMPARISON:  None relevant.    TECHNIQUE: Grayscale, color-flow, and spectral waveform analysis were  performed of the deep veins of the right upper extremity    FINDINGS: The right jugular vein demonstrates normal compressibility,  color-flow and spectral waveform.    The right subclavian vein, axillary vein, cephalic vein, brachial vein  and basilic vein demonstrate normal compressibility, spectral  waveform, color flow and augmentation. Imaging obtained at the site of  patient's forearm swelling shows subcutaneous edema. Otherwise no  focal abnormality.    The left subclavian and internal jugular veins were evaluated for  comparison and are normal.      Impression    IMPRESSION:   1. No evidence of deep venous thrombosis in the right upper extremity.  2. No deep vein thrombosis in the right upper extremity.  3. Subcutaneous edema at the site of patient's swelling in the  anterior forearm.    AUNDREA TORRES DO         SYSTEM ID:  [id number]   XR Chest Port 1 View     Status: None    Narrative    EXAM: CHEST 2  VIEWS  LOCATION: [facility]  DATE: 8/19/2024    INDICATION: Worsening respiratory failure.  COMPARISON: 8/19/2024 at 1357 hours.      Impression    IMPRESSION:   1. No convincing interval change since the recent comparison study.  2. Increased interstitial opacities in both lungs that likely relate to interstitial pulmonary edema again noted.  3. Small bilateral pleural effusions again noted.  4. A few patchy opacities in the mid and lower lungs bilaterally are nonspecific, with possibilities including pulmonary edema and pneumonia.    Basic metabolic panel     Status: Abnormal   Result Value Ref Range    Sodium 141 135 - 145 mmol/L    Potassium 5.3 3.4 - 5.3 mmol/L    Chloride 107 98 - 107 mmol/L    Carbon Dioxide (CO2) 17 (L) 22 - 29 mmol/L    Anion Gap 17 (H) 7 - 15 mmol/L    Urea Nitrogen 92.4 (H) 8.0 - 23.0 mg/dL    Creatinine 3.65 (H) 0.51 - 0.95 mg/dL    GFR Estimate 12 (L) >60 mL/min/1.73m2    Calcium 8.8 8.8 - 10.4 mg/dL    Glucose 127 (H) 70 - 99 mg/dL   Troponin T, High Sensitivity     Status: Abnormal   Result Value Ref Range    Troponin T, High Sensitivity 109 (HH) <=14 ng/L   Nt probnp inpatient (BNP)     Status: Abnormal   Result Value Ref Range    N terminal Pro BNP Inpatient 28,426 (H) 0 - 1,800 pg/mL   UA with Microscopic reflex to Culture     Status: Abnormal    Specimen: Urine, Clean Catch   Result Value Ref Range    Color Urine Yellow Colorless, Straw, Light Yellow, Yellow    Appearance Urine Cloudy (A) Clear    Glucose Urine Negative Negative mg/dL    Bilirubin Urine Negative Negative    Ketones Urine Negative Negative mg/dL    Specific Gravity Urine 1.012 1.003 - 1.035    Blood Urine Negative Negative    pH Urine 7.5 (H) 5.0 - 7.0    Protein Albumin Urine 200 (A) Negative mg/dL    Urobilinogen Urine Normal Normal, 2.0 mg/dL    Nitrite Urine Negative Negative    Leukocyte Esterase Urine Large (A) Negative    Bacteria Urine Few (A) None Seen /HPF    WBC Clumps Urine  Present (A) None Seen /HPF    Mucus Urine Present (A) None Seen /LPF    RBC Urine 3 (H) <=2 /HPF    WBC Urine >182 (H) <=5 /HPF    Squamous Epithelials Urine <1 <=1 /HPF    Narrative    Urine Culture ordered based on laboratory criteria   Symptomatic Influenza A/B, RSV, & SARS-CoV2 PCR (COVID-19) Nasopharyngeal     Status: Normal    Specimen: Nasopharyngeal; Swab   Result Value Ref Range    Influenza A PCR Negative Negative    Influenza B PCR Negative Negative    RSV PCR Negative Negative    SARS CoV2 PCR Negative Negative    Narrative    Testing was performed using the Xpert Xpress CoV2/Flu/RSV Assay on the Cepheid GeneXpert Instrument. This test should be ordered for the detection of SARS-CoV-2, influenza, and RSV viruses in individuals who meet clinical and/or epidemiological criteria. Test performance is unknown in asymptomatic patients. This test is for in vitro diagnostic use under the FDA EUA for laboratories certified under CLIA to perform high or moderate complexity testing. This test has not been FDA cleared or approved. A negative result does not rule out the presence of PCR inhibitors in the specimen or target RNA in concentration below the limit of detection for the assay. If only one viral target is positive but coinfection with multiple targets is suspected, the sample should be re-tested with another FDA cleared, approved, or authorized test, if coinfection would change clinical management. This test was validated by the [facility]. These laboratories are certified under the Clinical Laboratory Improvement Amendments of 1988 (CLIA-88) as qualified to perform high complexity laboratory testing.   CBC with platelets and differential     Status: Abnormal   Result Value Ref Range    WBC Count 6.2 4.0 - 11.0 10e3/uL    RBC Count 3.02 (L) 3.80 - 5.20 10e6/uL    Hemoglobin 9.9 (L) 11.7 - 15.7 g/dL    Hematocrit 31.9 (L) 35.0 - 47.0 %     (H) 78 - 100 fL    MCH 32.8 26.5 - 33.0 pg     MCHC 31.0 (L) 31.5 - 36.5 g/dL    RDW 13.3 10.0 - 15.0 %    Platelet Count 156 150 - 450 10e3/uL    % Neutrophils 81 %    % Lymphocytes 10 %    % Monocytes 7 %    % Eosinophils 2 %    % Basophils 1 %    % Immature Granulocytes 0 %    NRBCs per 100 WBC 0 <1 /100    Absolute Neutrophils 5.0 1.6 - 8.3 10e3/uL    Absolute Lymphocytes 0.6 (L) 0.8 - 5.3 10e3/uL    Absolute Monocytes 0.4 0.0 - 1.3 10e3/uL    Absolute Eosinophils 0.1 0.0 - 0.7 10e3/uL    Absolute Basophils 0.0 0.0 - 0.2 10e3/uL    Absolute Immature Granulocytes 0.0 <=0.4 10e3/uL    Absolute NRBCs 0.0 10e3/uL   Troponin T, High Sensitivity     Status: Abnormal   Result Value Ref Range    Troponin T, High Sensitivity 106 (HH) <=14 ng/L   Troponin T, High Sensitivity     Status: Abnormal   Result Value Ref Range    Troponin T, High Sensitivity 108 (HH) <=14 ng/L   CBC with platelets     Status: Abnormal   Result Value Ref Range    WBC Count 4.6 4.0 - 11.0 10e3/uL    RBC Count 2.70 (L) 3.80 - 5.20 10e6/uL    Hemoglobin 8.8 (L) 11.7 - 15.7 g/dL    Hematocrit 29.0 (L) 35.0 - 47.0 %     (H) 78 - 100 fL    MCH 32.6 26.5 - 33.0 pg    MCHC 30.3 (L) 31.5 - 36.5 g/dL    RDW 13.4 10.0 - 15.0 %    Platelet Count 143 (L) 150 - 450 10e3/uL   Basic metabolic panel     Status: Abnormal   Result Value Ref Range    Sodium 140 135 - 145 mmol/L    Potassium 6.2 (HH) 3.4 - 5.3 mmol/L    Chloride 108 (H) 98 - 107 mmol/L    Carbon Dioxide (CO2) 18 (L) 22 - 29 mmol/L    Anion Gap 14 7 - 15 mmol/L    Urea Nitrogen 94.8 (H) 8.0 - 23.0 mg/dL    Creatinine 3.80 (H) 0.51 - 0.95 mg/dL    GFR Estimate 11 (L) >60 mL/min/1.73m2    Calcium 8.5 (L) 8.8 - 10.4 mg/dL    Glucose 145 (H) 70 - 99 mg/dL   Lactic acid whole blood     Status: Normal   Result Value Ref Range    Lactic Acid 0.7 0.7 - 2.0 mmol/L   Ketone Beta-Hydroxybutyrate Quantitative     Status: Abnormal   Result Value Ref Range    Ketone (Beta-Hydroxybutyrate) Quantitative 0.74 (H) <=0.30 mmol/L   Potassium     Status:  Abnormal   Result Value Ref Range    Potassium 5.6 (H) 3.4 - 5.3 mmol/L   Glucose by meter     Status: Abnormal   Result Value Ref Range    GLUCOSE BY METER POCT 143 (H) 70 - 99 mg/dL   Basic metabolic panel     Status: Abnormal   Result Value Ref Range    Sodium 141 135 - 145 mmol/L    Potassium 5.9 (H) 3.4 - 5.3 mmol/L    Chloride 108 (H) 98 - 107 mmol/L    Carbon Dioxide (CO2) 18 (L) 22 - 29 mmol/L    Anion Gap 15 7 - 15 mmol/L    Urea Nitrogen 95.8 (H) 8.0 - 23.0 mg/dL    Creatinine 3.98 (H) 0.51 - 0.95 mg/dL    GFR Estimate 10 (L) >60 mL/min/1.73m2    Calcium 9.2 8.8 - 10.4 mg/dL    Glucose 133 (H) 70 - 99 mg/dL   CBC with platelets     Status: Abnormal   Result Value Ref Range    WBC Count 9.9 4.0 - 11.0 10e3/uL    RBC Count 2.79 (L) 3.80 - 5.20 10e6/uL    Hemoglobin 9.1 (L) 11.7 - 15.7 g/dL    Hematocrit 30.2 (L) 35.0 - 47.0 %     (H) 78 - 100 fL    MCH 32.6 26.5 - 33.0 pg    MCHC 30.1 (L) 31.5 - 36.5 g/dL    RDW 13.6 10.0 - 15.0 %    Platelet Count 175 150 - 450 10e3/uL   Glucose by meter     Status: Abnormal   Result Value Ref Range    GLUCOSE BY METER POCT 192 (H) 70 - 99 mg/dL   Glucose by meter     Status: Abnormal   Result Value Ref Range    GLUCOSE BY METER POCT 172 (H) 70 - 99 mg/dL   Glucose by meter     Status: Abnormal   Result Value Ref Range    GLUCOSE BY METER POCT 165 (H) 70 - 99 mg/dL   Glucose by meter     Status: Abnormal   Result Value Ref Range    GLUCOSE BY METER POCT 180 (H) 70 - 99 mg/dL   Glucose by meter     Status: Abnormal   Result Value Ref Range    GLUCOSE BY METER POCT 179 (H) 70 - 99 mg/dL   Glucose by meter     Status: Abnormal   Result Value Ref Range    GLUCOSE BY METER POCT 181 (H) 70 - 99 mg/dL   Glucose by meter     Status: Abnormal   Result Value Ref Range    GLUCOSE BY METER POCT 146 (H) 70 - 99 mg/dL   Glucose by meter     Status: Abnormal   Result Value Ref Range    GLUCOSE BY METER POCT 133 (H) 70 - 99 mg/dL   EKG 12-lead, tracing only     Status: None   Result  Value Ref Range    Systolic Blood Pressure  mmHg    Diastolic Blood Pressure  mmHg    Ventricular Rate 62 BPM    Atrial Rate 62 BPM    MN Interval 198 ms    QRS Duration 118 ms     ms    QTc 424 ms    P Axis -27 degrees    R AXIS -42 degrees    T Axis 127 degrees    Interpretation ECG       Atrial-paced rhythm  Left axis deviation  Left ventricular hypertrophy with QRS widening and repolarization abnormality ( R in aVL , Aneesh product )  Abnormal ECG  When compared with ECG of 11-May-2024 00:21,  No significant change was found  Unconfirmed report - interpretation of this ECG is computer generated - see medical record for final interpretation  Confirmed by - EMERGENCY ROOM, PHYSICIAN ([id number]),  Ankit Johnson ([id number]) on 8/19/2024 11:20:00 AM     Urine Culture     Status: Abnormal (Preliminary result)    Specimen: Urine, Clean Catch   Result Value Ref Range    Culture >100,000 CFU/mL Gram negative bacilli (A)    Respiratory Aerobic Bacterial Culture with Gram Stain     Status: Abnormal    Specimen: Expectorate; Sputum   Result Value Ref Range    Culture       >10 Squamous epithelial cells/low power field indicates oral contamination. Please recollect.    Gram Stain Result >10 Squamous epithelial cells/low power field (A)     Gram Stain Result >25 PMNs/low power field (A)     Gram Stain Result 3+ Gram positive cocci (A)    CBC with platelets differential     Status: Abnormal    Narrative    The following orders were created for panel order CBC with platelets differential.  Procedure                               Abnormality         Status                     ---------                               -----------         ------                     CBC with platelets and d...[[id number]]  Abnormal            Final result                 Please view results for these tests on the individual orders.       MANAGEMENT DISCUSSED with the following over the past 24 hours: Hospitalist Christiano Pulido MD, bedside nurse  RODRÍGUEZ Peterson and  JER Alejandre    9:45 AM until 11:15 AM - 90 MINUTES SPENT BY ME on the date of service doing chart review, history, exam, documentation & further activities per the note.

## 2024-08-20 NOTE — PROGRESS NOTES
Paged by RN that patient had increasing oxygen requirements. Assessed at bedside. Patient somewhat agitated. Lung sounds with diffusely wet breath sounds; difficult to hear if rhonchi or rales but I suspect its a combination of the two. Wet cough, with patient unable to clear mucus. Obtaining stat chest xr. Ordered nebs (scheduled + prn) and RT suctioning. Ordered stat BMP, CBC, lactic acid, ketones. Will be interested to see if creatinine stabilized enough to give more aggressive lasix doses. Broaded antibiotics to cefepime + azithromycin; cannot use zosyn as has listed penicillin allergy. Will follow patient closely.    Michael Lopez MD  Hospitalist

## 2024-08-21 ENCOUNTER — PATIENT OUTREACH (OUTPATIENT)
Dept: NEPHROLOGY | Facility: CLINIC | Age: 89
End: 2024-08-21
Payer: MEDICARE

## 2024-08-21 NOTE — DISCHARGE SUMMARY
[facility]  Hospitalist Discharge Summary      Date of Admission:  2024  Date of Discharge:  2024  9:00 PM  Discharging Provider: Christiano Pulido MD  Discharge Service: Hospitalist Service    Discharge Diagnoses   Comfort measures only  Multiorgan dysfunction  Acute hypoxic with a failure  Acute encephalopathy  Acute kidney injury  Acute diastolic congestive heart failure  UTI  Drug Induced Platelet Defect due to Plavix Use   Frailty         Discharge Disposition     Condition at discharge:     Hospital Course   Arpita Rocha is a 88 year old  female  with past medical history is significant  for  on Plavix with history of CHF, COPD, chronic bronchitis, hypertension, hyperlipidemia, sick sinus syndrome, and stage 4 CKD who presents to the ED via EMS from [facility]isted [facility] with her son for evaluation of shortness of breath and altered mental status.      Problem List with Assessment and Plan:        #1.  Acute on chronic hypoxic with a failure-multifactorial  -- Presents with shortness of breath, hypoxia and encephalopathy.  Patient oxygen saturation was in 80s when she was first seen by paramedics.  She is on baseline 2 L of oxygen.  Oxygen saturation improved with 4 L of oxygen and patient was taken to the ER for evaluation  -- On presentation to the ED, patient was afebrile.  Heart rate 62.  Blood pressure 154/65.  93% on 4 L of oxygen.  -- Patient had worsening of kidney function and significantly elevated BNP.  -- Chest x-ray showed Small bilateral effusions with associated bibasilar  compressive atelectasis and/or infiltrate, appears more prominent than  previous  --Etiology suspected to be multifactorial including COPD exacerbation, UTI, CHF and kidney failure.  -- Patient was admitted and was given multiple interventions.  Despite treatments of respiratory measures, patient condition continues to deteriorate.  Palliative care team consulted and she was  made comfortable with comfort measures only call.  Plan to continue comfort measures only     #2.  Acute encephalopathy  -- Suspect due to toxic metabolic encephalopathy in the setting of respiratory failure, acute kidney injury and hypoxia.     #3.  Acute kidney injury on chronic kidney disease stage IV  -- Initial serum creatinine was 3.65, BUN 92.4.  Bicarb of 17, anion gap 17.  Potassium normal.  Baseline kidney function was chronic kidney stage IV with recent baseline of 2.6-2.9 range.  #4.  Acute CHF-diastolic  -- Recent echocardiogram in May 2024 showed EF of 50 to 55%.  Left ventricular stock function was low normal.  Patient was in atrial fibrillation  -- Patient is on torsemide at baseline-20 mg daily with additional 20 mg as needed for weight gain over 3 pounds overnight or worsening of breathing and swelling.  --Initial BNP significantly elevated 28,426.  Recent BNP was 17,270 in July 2024.  -- Concern for cardiorenal syndrome.         #5. Elevated troponin   -- Likely demand ischemia due to CHF.  Trend troponin     #6.  Goals of care  -- Comfort focuse           Consultations This Hospital Stay   OCCUPATIONAL THERAPY ADULT IP CONSULT  PALLIATIVE CARE ADULT IP CONSULT  [facility] IP CONSULT    Code Status   DNR/DNI    Time Spent on this Encounter   I, Christiano Pulido MD, personally saw the patient today and spent less than or equal to 30 minutes discharging this patient.         Christiano Pulido MD  [place] [address] [facility]  [address] [place] [address]  Phone: [phone]  Fax: [phone]  ______________________________________________________________________    Physical Exam   Vital Signs: Temp: 99.6  F (37.6  C) Temp src: Axillary BP: (!) 152/51 Pulse: 59   Resp: 18 SpO2: 92 % O2 Device: Oxymask Oxygen Delivery: 5 LPM  Weight: 156 lbs 8.43 oz       Primary Care Physician   Ludivina Garcia [place]    Discharge Orders       Significant Results and  Procedures   Results for orders placed or performed during the hospital encounter of 08/19/24   XR Chest 2 Views    Narrative    CHEST TWO VIEWS  8/19/2024 2:05 PM     HISTORY: Dyspnea.    COMPARISON: May 10, 2024       Impression    IMPRESSION: Small bilateral effusions with associated bibasilar  compressive atelectasis and/or infiltrate, appears more prominent than  previous. A cardiac implantable electronic device is in place with  lead(s) grossly intact. No abandoned leads/wires or other unexpected  metallic foreign bodies demonstrated on the film. The cardiac  silhouette is stable. Pulmonary vasculature is unremarkable.     OSMIN ADAMS MD         SYSTEM ID:  [id number]   US Upper Extremity Venous Duplex Right    Narrative    ULTRASOUND RIGHT UPPER EXTREMITY VENOUS DUPLEX  8/19/2024 12:53 PM    CLINICAL HISTORY/INDICATION:  Swelling.    COMPARISON:  None relevant.    TECHNIQUE: Grayscale, color-flow, and spectral waveform analysis were  performed of the deep veins of the right upper extremity    FINDINGS: The right jugular vein demonstrates normal compressibility,  color-flow and spectral waveform.    The right subclavian vein, axillary vein, cephalic vein, brachial vein  and basilic vein demonstrate normal compressibility, spectral  waveform, color flow and augmentation. Imaging obtained at the site of  patient's forearm swelling shows subcutaneous edema. Otherwise no  focal abnormality.    The left subclavian and internal jugular veins were evaluated for  comparison and are normal.      Impression    IMPRESSION:   1. No evidence of deep venous thrombosis in the right upper extremity.  2. No deep vein thrombosis in the right upper extremity.  3. Subcutaneous edema at the site of patient's swelling in the  anterior forearm.    AUNDREA TORRES DO         SYSTEM ID:  [id number]   XR Chest Port 1 View    Narrative    EXAM: CHEST 2 VIEWS  LOCATION: [facility]  DATE: 8/19/2024    INDICATION:  Worsening respiratory failure.  COMPARISON: 8/19/2024 at 1357 hours.      Impression    IMPRESSION:   1. No convincing interval change since the recent comparison study.  2. Increased interstitial opacities in both lungs that likely relate to interstitial pulmonary edema again noted.  3. Small bilateral pleural effusions again noted.  4. A few patchy opacities in the mid and lower lungs bilaterally are nonspecific, with possibilities including pulmonary edema and pneumonia.        Discharge Medications   No current facility-administered medications for this encounter.    Current Outpatient Medications:     acetaminophen (TYLENOL) 650 MG CR tablet, Take 1,300 mg by mouth every morning, Disp: , Rfl:     amLODIPine (NORVASC) 10 MG tablet, Take 0.5 tablets (5 mg) by mouth 2 times daily, Disp: 90 tablet, Rfl: 0    atorvastatin (LIPITOR) 40 MG tablet, Take 1 tablet (40 mg) by mouth daily, Disp: 90 tablet, Rfl: 3    budesonide (PULMICORT) 0.5 MG/2ML neb solution, TAKE 2 MLS (0.5 MG) BY NEBULIZATION 2 TIMES DAILY, Disp: 360 mL, Rfl: 0    carvedilol (COREG) 25 MG tablet, Take 1.5 tablets (37.5 mg) by mouth 2 times daily (with meals), Disp: 180 tablet, Rfl: 4    citalopram (CELEXA) 20 MG tablet, TAKE 1.5 TAB BY MOUTH EVERY DAY, Disp: 135 tablet, Rfl: 1    cloNIDine (CATAPRES) 0.2 MG tablet, TAKE 1 TABLET BY MOUTH TWICE A DAY, Disp: 180 tablet, Rfl: 3    clopidogrel (PLAVIX) 75 MG tablet, Take 1 tablet (75 mg) by mouth daily, Disp: 90 tablet, Rfl: 3    Cranberry 450 MG TABS, Take 1 tablet by mouth daily, Disp: , Rfl:     diphenhydrAMINE-acetaminophen (TYLENOL PM)  MG tablet, Take 2 tablets by mouth at bedtime, Disp: , Rfl:     doxycycline hyclate (VIBRAMYCIN) 100 MG capsule, Take 1 capsule (100 mg) by mouth three times a week, Disp: 39 capsule, Rfl: 3    ferrous sulfate (FEROSUL) 325 (65 Fe) MG tablet, Take 2 tablets (650 mg) by mouth daily (with breakfast), Disp: 180 tablet, Rfl: 3    ipratropium - albuterol 0.5 mg/2.5  mg/3 mL (DUONEB) 0.5-2.5 (3) MG/3ML neb solution, TAKE 1 VIAL (3 MLS) BY NEBULIZATION EVERY 6 HOURS, Disp: 360 mL, Rfl: 0    montelukast (SINGULAIR) 10 MG tablet, TAKE 1 TABLET BY MOUTH AT BEDTIME, Disp: 90 tablet, Rfl: 3    multivitamin w/minerals (THERA-VIT-M) tablet, Take 1 tablet by mouth daily, Disp: , Rfl:     nystatin (MYCOSTATIN) 864132 UNIT/GM external powder, Apply topically 3 times daily as needed for other (rash), Disp: 60 g, Rfl: 1    torsemide (DEMADEX) 20 MG tablet, Take 1 tablet (20 mg) by mouth daily With an additional 20 mg as needed for weight gain > 3# overnight or worsening symptoms (leg swelling/breathing)., Disp: 100 tablet, Rfl: 3    umeclidinium-vilanterol (ANORO ELLIPTA) 62.5-25 MCG/ACT oral inhaler, Inhale 1 puff into the lungs daily, Disp: 1 each, Rfl: 11    Ostomy Supplies (ADAPT) PSTE, Use with new ostomy pouch and drain PRN, Disp: 120 g, Rfl: 3    Ostomy Supplies (PREMIER DRAINABLE POUCH 22MM) Pouch MISC, Use as directed every 3-4 days, Disp: 5 each, Rfl: 12    Allergies   Allergies   Allergen Reactions    Acetaminophen-Codeine Hallucination    Penicillin G Rash

## 2024-08-21 NOTE — PROGRESS NOTES
MD DEATH PRONOUNCEMENT    Called to pronounce Arpita Rocha dead.    Physical Exam: Unresponsive to noxious stimuli, Spontaneous respirations absent, Breath sounds absent, Heart sounds absent, and corneas dilated    Patient was pronounced dead at 2010: PM, August 20, 2024.    No data filed       Active Problems:    Dependence on supplemental oxygen    DNI (do not intubate)    DNR (do not resuscitate)    Elevated troponin    COPD with acute exacerbation (H)    Elevated brain natriuretic peptide (BNP) level    Swelling of right upper extremity    Dyspnea, unspecified type    Acute on chronic congestive heart failure, unspecified heart failure type (H)       Infectious disease present?: NO      Body disposition: [facility]    Death summary to be done by Dr Christiano Trejo MD

## 2024-08-22 ENCOUNTER — MEDICAL CORRESPONDENCE (OUTPATIENT)
Dept: HEALTH INFORMATION MANAGEMENT | Facility: CLINIC | Age: 89
End: 2024-08-22
Payer: MEDICARE

## 2024-09-03 ENCOUNTER — DOCUMENTATION ONLY (OUTPATIENT)
Dept: CARDIOLOGY | Facility: CLINIC | Age: 89
End: 2024-09-03

## 2024-09-04 ENCOUNTER — DOCUMENTATION ONLY (OUTPATIENT)
Dept: CARDIOLOGY | Facility: CLINIC | Age: 89
End: 2024-09-04
Payer: MEDICARE

## 2024-09-06 NOTE — PROGRESS NOTES
[facility]    Called HRS IT line as pt's HRS equipment not picked up yet.  They have reset  for 9/9/24.      Tablet number: [id number]         RODRÍGUEZ FentonN   [facility]- [place], [place]  [facility] Care Coordinator  09/06/24, 2:56 PM